# Patient Record
Sex: FEMALE | Race: WHITE | NOT HISPANIC OR LATINO | Employment: FULL TIME | URBAN - METROPOLITAN AREA
[De-identification: names, ages, dates, MRNs, and addresses within clinical notes are randomized per-mention and may not be internally consistent; named-entity substitution may affect disease eponyms.]

---

## 2017-01-05 DIAGNOSIS — E89.0 POSTOPERATIVE HYPOTHYROIDISM: ICD-10-CM

## 2017-01-07 RX ORDER — LEVOTHYROXINE SODIUM 125 UG/1
TABLET ORAL
Qty: 30 TABLET | Refills: 12 | Status: SHIPPED | OUTPATIENT
Start: 2017-01-07 | End: 2017-07-25 | Stop reason: SDUPTHER

## 2017-01-23 ENCOUNTER — OFFICE VISIT (OUTPATIENT)
Dept: HEMATOLOGY/ONCOLOGY | Facility: CLINIC | Age: 62
End: 2017-01-23
Payer: COMMERCIAL

## 2017-01-23 VITALS
DIASTOLIC BLOOD PRESSURE: 73 MMHG | WEIGHT: 140 LBS | RESPIRATION RATE: 20 BRPM | SYSTOLIC BLOOD PRESSURE: 132 MMHG | HEART RATE: 73 BPM | HEIGHT: 65 IN | BODY MASS INDEX: 23.32 KG/M2 | OXYGEN SATURATION: 97 % | TEMPERATURE: 99 F

## 2017-01-23 DIAGNOSIS — Z79.811 USE OF AROMATASE INHIBITORS: ICD-10-CM

## 2017-01-23 DIAGNOSIS — C50.411 BREAST CANCER OF UPPER-OUTER QUADRANT OF RIGHT FEMALE BREAST: Primary | ICD-10-CM

## 2017-01-23 PROCEDURE — 99215 OFFICE O/P EST HI 40 MIN: CPT | Mod: S$GLB,,, | Performed by: PHYSICIAN ASSISTANT

## 2017-01-23 PROCEDURE — 1159F MED LIST DOCD IN RCRD: CPT | Mod: S$GLB,,, | Performed by: PHYSICIAN ASSISTANT

## 2017-01-23 PROCEDURE — 99999 PR PBB SHADOW E&M-EST. PATIENT-LVL III: CPT | Mod: PBBFAC,,, | Performed by: PHYSICIAN ASSISTANT

## 2017-01-23 RX ORDER — LETROZOLE 2.5 MG/1
2.5 TABLET, FILM COATED ORAL DAILY
COMMUNITY
End: 2018-02-20 | Stop reason: SDUPTHER

## 2017-01-23 NOTE — PROGRESS NOTES
Subjective:       Patient ID: Sherry Torres is a 61 y.o. female.    Chief Complaint: Follow-up    HPI Comments: Dr. Mckee's patient, here for completion of treatment summary and surveillance.     61 year old female with Stage IIA ER/AZ positive, HER-2 negative right breast cancer.     She completed adjuvant radiation on 12/5/16 and is currently on Letrozole.    Patient tolerating treatment well. No fever, chills, nausea, vomiting or shortness of breath. No significant hot flashes or arthralgias. She is exercising regularly and intent on keeping weight down.  Works full time as an .  Emotionally doing well without significant anxiety related to diagnosis.          Breast history: She has a history of atypical ductal hyperplasia and atypical lobular hyperplasia in the past (12/2008). That biopsy was done to evaluate calcifications.     She noted an abnormality on self examination at the end of July early August.     On August 8 she underwent diagnostic mammogram which showed an irregular mass was plated margins in the middle right breast and oclock position. By ultrasound this was a solid 1.7 x 1.0 x 1.5 cm mass.     On August 9 a core needle biopsy showed infiltrating ductal carcinoma which was well differentiated (histologic grade 2, nuclear grade 2, mitotic index 1). The tumor was 100% ER positive, 70% AZ positive and HER-2 1+.  On August 25 right breast lumpectomy and sentinel lymph node biopsy was performed. That showed a 14 mm low-grade infiltrating ductal carcinoma.   The sentinel lymph node was positive for 1.5 mm micrometastasis.      Final pathological stage TI cN1 stage II   Mammaprint genomic assay showed that she was low risk. Score was +0.336 with a 5 year risk of distant recurrence at 5% and a 10 year risk at 10%.        Review of Systems   Constitutional: Negative.    HENT: Negative for congestion, rhinorrhea, sore throat and trouble swallowing.    Eyes: Negative for visual disturbance.    Respiratory: Negative for cough, chest tightness and shortness of breath.    Cardiovascular: Negative for chest pain, palpitations and leg swelling.   Gastrointestinal: Negative for abdominal pain, blood in stool, constipation, diarrhea, nausea and vomiting.   Genitourinary: Negative for dysuria, hematuria and vaginal bleeding.   Musculoskeletal: Negative for arthralgias, back pain and myalgias.   Skin: Negative for pallor and rash.   Neurological: Negative for dizziness, weakness and headaches.   Hematological: Negative for adenopathy. Does not bruise/bleed easily.   Psychiatric/Behavioral: Negative for dysphoric mood and suicidal ideas. The patient is not nervous/anxious.        Objective:      Physical Exam   Constitutional: She is oriented to person, place, and time. She appears well-developed and well-nourished. No distress.   NAD   HENT:   Head: Normocephalic.   Mouth/Throat: Oropharynx is clear and moist. No oropharyngeal exudate.   Eyes: Conjunctivae are normal. Pupils are equal, round, and reactive to light. No scleral icterus.   Neck: Normal range of motion. Neck supple. No thyromegaly present.   Cardiovascular: Normal rate and regular rhythm.    Pulmonary/Chest: Effort normal and breath sounds normal. No respiratory distress.   Right breast with well healed lumpectomy and SLNB incisions. No mass, nodule or skin changes. Left breast without mass, nodule or skin changes. No axillary or supraclavicular adenopathy.    Abdominal: Soft. Bowel sounds are normal. She exhibits no distension and no mass. There is no tenderness.   Musculoskeletal: Normal range of motion. She exhibits no edema or tenderness.   No spinal or paraspinal tenderness to palpation     Lymphadenopathy:     She has no cervical adenopathy.   Neurological: She is alert and oriented to person, place, and time. No cranial nerve deficit.   Skin: Skin is warm and dry.   Psychiatric: She has a normal mood and affect. Her behavior is normal.  Judgment and thought content normal.   Vitals reviewed.      Assessment:       1. Breast cancer of upper-outer quadrant of right female breast        Plan:       Patient will continue Letrozole and return to clinic in three months.    Annual mammogram due 8/2017.    Referral for baseline bone density to be scheduled.    We discussed the role of the survivorship clinic in her care.  Today we reviewed all aspects of treatment and the potential long term side effects of treatment.  We also discussed the emotional/psychological impact of diagnosis and treatment and I let her know about our psychosocial services (dedicated  and Psychologist).  She declines referral to these services at this time.       Please see Summary Treatment and Care plan filed under same date.    60 mins spent with patient and in completion of treatment summary.

## 2017-01-30 ENCOUNTER — OFFICE VISIT (OUTPATIENT)
Dept: RADIATION ONCOLOGY | Facility: CLINIC | Age: 62
End: 2017-01-30
Attending: RADIOLOGY
Payer: COMMERCIAL

## 2017-01-30 DIAGNOSIS — C50.411 BREAST CANCER OF UPPER-OUTER QUADRANT OF RIGHT FEMALE BREAST: Primary | ICD-10-CM

## 2017-01-30 PROCEDURE — 99499 UNLISTED E&M SERVICE: CPT | Mod: S$GLB,,, | Performed by: RADIOLOGY

## 2017-01-30 PROCEDURE — 99999 PR PBB SHADOW E&M-EST. PATIENT-LVL II: CPT | Mod: PBBFAC,,, | Performed by: RADIOLOGY

## 2017-01-30 NOTE — PROGRESS NOTES
Subjective:       Patient ID: Sherry Torres is a 61 y.o. female.    Chief Complaint: Breast Cancer (f/u after xrt)    HPI Comments: This patient returns for her initial follow up visit.     Mrs. Torres has a history of Stage IB (T1c, N1(taya), M0) infiltrating ductal carcinoma.  presented in August of this year for evaluation of soreness in the region of the right axillary tail. Mammogram revealed an irregular mass with spiculated margins and associated architectural distortion in the middle region of the right breast at the 10:00 position. Core biopsy on 8/9/16 revealed well-differentiated invasive ductal carcinoma. 100% of the tumor cells were strongly ER positive, 70% of the tumor cells were moderate to strongly GA positive, HER-2 was negative. The patient underwent wire localization lumpectomy with sentinel lymph node biopsy on 8/25/16. Pathology revealed a 1.4 cm focus of invasive ductal carcinoma, histologic grade 2, nuclear grade 2, and mitotic index 1. There was associated solid and cribriform DCIS. The tumor was negative for EIC. The margins of resection were negative with the posterior margin being the closest for invasive carcinoma at 2 mm. The anterior margin was the closest margin for DCIS at 0.2 mm. One sentinel lymph node was removed which was positive for a focus of micrometastatic disease measuring 1.5 mm. A Mammaprint genomic assay showed that she was low risk. Score was +0.336 with a 5 year risk of distant recurrence at 5% and a 10 year risk at 10%.  The patient was referred to our department and completed a course of adjuvant radiotherapy to the breast and axilla on 12/5/16.  Today, the patient states she feels well.  No breast complaints.  Currently on hormonal therapy with Letrozole.  Tolerating therapy well.      Review of Systems   Constitutional: Negative for activity change, appetite change, chills, fatigue and fever.   Respiratory: Negative for cough and shortness of breath.     Cardiovascular: Negative for chest pain and palpitations.   Gastrointestinal: Negative for abdominal pain, diarrhea, nausea and vomiting.       Objective:      Physical Exam   Constitutional: She appears well-developed and well-nourished. No distress.   Neck: Normal range of motion. Neck supple.   Pulmonary/Chest: Right breast exhibits no inverted nipple, no mass, no nipple discharge, no skin change and no tenderness.   Lymphadenopathy:     She has no cervical adenopathy.     She has no axillary adenopathy.        Right: No supraclavicular adenopathy present.        Left: No supraclavicular adenopathy present.       Assessment:       1. Breast cancer of upper-outer quadrant of right female breast        Plan:       Patient doing well  recovered from the acute effects of radiotherapy.  She is planned for continued folllow up in Medical oncology.  She will be due for MM in August.  Plan follow up with us PRN.

## 2017-02-06 ENCOUNTER — HOSPITAL ENCOUNTER (OUTPATIENT)
Dept: RADIOLOGY | Facility: CLINIC | Age: 62
Discharge: HOME | End: 2017-02-06
Attending: INTERNAL MEDICINE
Payer: COMMERCIAL

## 2017-02-06 DIAGNOSIS — C50.411 BREAST CANCER OF UPPER-OUTER QUADRANT OF RIGHT FEMALE BREAST: ICD-10-CM

## 2017-02-06 DIAGNOSIS — Z79.811 USE OF AROMATASE INHIBITORS: ICD-10-CM

## 2017-02-06 PROCEDURE — 77080 DXA BONE DENSITY AXIAL: CPT | Mod: TC

## 2017-02-06 PROCEDURE — 77080 DXA BONE DENSITY AXIAL: CPT | Mod: 26,,, | Performed by: INTERNAL MEDICINE

## 2017-02-13 ENCOUNTER — PATIENT MESSAGE (OUTPATIENT)
Dept: HEMATOLOGY/ONCOLOGY | Facility: CLINIC | Age: 62
End: 2017-02-13

## 2017-02-14 ENCOUNTER — TELEPHONE (OUTPATIENT)
Dept: OBSTETRICS AND GYNECOLOGY | Facility: CLINIC | Age: 62
End: 2017-02-14

## 2017-02-14 NOTE — TELEPHONE ENCOUNTER
----- Message from Natalya Daley sent at 2/14/2017  3:20 PM CST -----  Contact: pt   Patient states she is returning a call Patient transferred to staff

## 2017-02-15 ENCOUNTER — OFFICE VISIT (OUTPATIENT)
Dept: OBSTETRICS AND GYNECOLOGY | Facility: CLINIC | Age: 62
End: 2017-02-15
Attending: OBSTETRICS & GYNECOLOGY
Payer: COMMERCIAL

## 2017-02-15 VITALS
BODY MASS INDEX: 23.95 KG/M2 | HEIGHT: 65 IN | SYSTOLIC BLOOD PRESSURE: 120 MMHG | DIASTOLIC BLOOD PRESSURE: 76 MMHG | WEIGHT: 143.75 LBS

## 2017-02-15 DIAGNOSIS — Z85.3 HISTORY OF BREAST CANCER: ICD-10-CM

## 2017-02-15 DIAGNOSIS — Z12.4 SCREENING FOR MALIGNANT NEOPLASM OF THE CERVIX: Primary | ICD-10-CM

## 2017-02-15 DIAGNOSIS — Z01.419 ENCOUNTER FOR GYNECOLOGICAL EXAMINATION WITHOUT ABNORMAL FINDING: ICD-10-CM

## 2017-02-15 PROCEDURE — 88175 CYTOPATH C/V AUTO FLUID REDO: CPT

## 2017-02-15 PROCEDURE — 99396 PREV VISIT EST AGE 40-64: CPT | Mod: S$GLB,,, | Performed by: OBSTETRICS & GYNECOLOGY

## 2017-02-15 PROCEDURE — 99999 PR PBB SHADOW E&M-EST. PATIENT-LVL III: CPT | Mod: PBBFAC,,, | Performed by: OBSTETRICS & GYNECOLOGY

## 2017-02-15 NOTE — MR AVS SNAPSHOT
[facility] [address]  [address]  [facility] [address]  Phone: [phone]  Fax: [phone]                  Sherry Torres   2/15/2017 9:30 AM   Office Visit    Description:  Female : 1955   Provider:  Laura Oden MD   Department:  [facility] [address]           Reason for Visit     Gynecologic Exam                To Do List           Goals (5 Years of Data)     None      [facility]     [facility] Nurse Care Line -  Assistance  Registered nurses in the [facility] provide clinical advisement, health education, appointment booking, and other advisory services.  Call for this free service at [phone].             Medications           Message regarding Medications     Verify the changes and/or additions to your medication regime listed below are the same as discussed with your clinician today.  If any of these changes or additions are incorrect, please notify your healthcare provider.             Verify that the below list of medications is an accurate representation of the medications you are currently taking.  If none reported, the list may be blank. If incorrect, please contact your healthcare provider. Carry this list with you in case of emergency.           Current Medications     calcium-vitamin D3 500 MG, 1,250MG, 200 UNITS (CALCIUM-VITAMIN D) 500 mg(1,250mg) -200 unit per tablet Take 4 tablets by mouth 2 (two) times daily with meals.     fish oil-omega-3 fatty acids 300-1,000 mg capsule Take 2 g by mouth once daily.    letrozole (FEMARA) 2.5 mg Tab Take 2.5 mg by mouth once daily.    MAGNESIUM CARBONATE ORAL Take by mouth.    multivitamin capsule Take 2 capsules by mouth once daily.     potassium chloride SA (K-DUR,KLOR-CON) 10 MEQ tablet Take 20 mEq by mouth once daily.    SYNTHROID 125 mcg tablet TAKE ONE DAILY           Clinical Reference Information           Your Vitals Were     BP Height Weight BMI       120/76 5'  "5" (1.651 m) 65.2 kg (143 lb 11.8 oz) 23.92 kg/m2       Blood Pressure          Most Recent Value    BP  120/76      Allergies as of 2/15/2017     Codeine    Sulfa (Sulfonamide Antibiotics)      Immunizations Administered on Date of Encounter - 2/15/2017     None      Language Assistance Services     ATTENTION: Language assistance services are available, free of charge. Please call [phone].      ATENCIÓN: Si habla español, tiene a vasquze disposición servicios gratuitos de asistencia lingüística. Llame al [phone].     CHÚ Ý: N?u b?n nói Ti?ng Vi?t, có các d?ch v? h? tr? ngôn ng? mi?n phí dành cho b?n. G?i s? [phone].         [facility] [address] complies with applicable Federal civil rights laws and does not discriminate on the basis of race, color, national origin, age, disability, or sex.        "

## 2017-02-15 NOTE — PROGRESS NOTES
"SUBJECTIVE:   61 y.o. female   for annual routine Pap and checkup.She is also here for survivorship- she has not seen GYN in 3 years.  No LMP recorded. Patient is postmenopausal..  She complains of vaginal dryness and occasional hot flashes although these are not "as bad as menopause."   She denies vaginal itching or irritation. She is doing well emotionally.   She had Stage IIA ER/ND positive, HER-2 negative right breast cancer.      She completed adjuvant radiation on 16 and is currently on Letrozole.    Past Medical History   Diagnosis Date    Breast cancer     Cancer     GERD (gastroesophageal reflux disease)     History of thyroid cancer     Mitral valve prolapse     Psychiatric problem      Past Surgical History   Procedure Laterality Date     section      Tubal ligation      Thyroid removed      Cyst removed from right breast in       Left breast wire localization excisional biopsy with bracketed wires using 3 wires and excision through 1 incision.       Thyroid surgery      Hernia repair      Breast surgery       Social History     Social History    Marital status:      Spouse name: N/A    Number of children: 2    Years of education: N/A     Occupational History    Not on file.     Social History Main Topics    Smoking status: Former Smoker    Smokeless tobacco: Never Used      Comment: socally a few years in college. none since 30 years ago    Alcohol use No      Comment: none since 2016    Drug use: No    Sexual activity: Yes     Partners: Male     Birth control/ protection: Post-menopausal     Other Topics Concern    Patient Feels They Ought To Cut Down On Drinking/Drug Use No    Patient Annoyed By Others Criticizing Their Drinking/Drug Use No    Patient Has Felt Bad Or Guilty About Drinking/Drug Use No    Patient Has Had A Drink/Used Drugs As An Eye Opener In The Am No     Social History Narrative    ,  x 40 years, 2 " children, 2 grandchildren, Zoro[employer]ism     Family History   Problem Relation Age of Onset    Osteoporosis Mother     Stroke Mother     Dementia Father     Breast cancer Maternal Grandmother 88    Breast cancer Other      70-80's    Colon cancer Neg Hx     Colon polyps Neg Hx     Rectal cancer Neg Hx     Stomach cancer Neg Hx     Esophageal cancer Neg Hx     Liver cancer Neg Hx     Liver disease Neg Hx     Cirrhosis Neg Hx     Inflammatory bowel disease Neg Hx     Celiac disease Neg Hx     Crohn's disease Neg Hx     Ulcerative colitis Neg Hx     Ovarian cancer Neg Hx     Cancer Neg Hx      OB History    Para Term  AB SAB TAB Ectopic Multiple Living   2 2              # Outcome Date GA Lbr Tin/2nd Weight Sex Delivery Anes PTL Lv   2 Para      CS-LTranv      1 Para      CS-LTranv               Current Outpatient Prescriptions   Medication Sig Dispense Refill    calcium-vitamin D3 500 MG, 1,250MG, 200 UNITS (CALCIUM-VITAMIN D) 500 mg(1,250mg) -200 unit per tablet Take 4 tablets by mouth 2 (two) times daily with meals.       fish oil-omega-3 fatty acids 300-1,000 mg capsule Take 2 g by mouth once daily.      letrozole (FEMARA) 2.5 mg Tab Take 2.5 mg by mouth once daily.      MAGNESIUM CARBONATE ORAL Take by mouth.      multivitamin capsule Take 2 capsules by mouth once daily.       potassium chloride SA (K-DUR,KLOR-CON) 10 MEQ tablet Take 20 mEq by mouth once daily.      SYNTHROID 125 mcg tablet TAKE ONE DAILY 30 tablet 12     No current facility-administered medications for this visit.      Allergies: Codeine and Sulfa (sulfonamide antibiotics)     ROS:  Constitutional: no weight loss, weight gain, fever, fatigue  Eyes:  No vision changes, glasses/contacts  ENT/Mouth: No ulcers, sinus problems, ears ringing, headache  Cardiovascular: No inability to lie flat, chest pain, exercise intolerance, swelling, heart palpitations  Respiratory: No wheezing, coughing blood, shortness of  breath, or cough  Gastrointestinal: No diarrhea, bloody stool, nausea/vomiting, constipation, gas, hemorrhoids  Genitourinary: No blood in urine, painful urination, urgency of urination, frequency of urination, incomplete emptying, incontinence, abnormal bleeding, painful periods, heavy periods, vaginal discharge, vaginal odor, painful intercourse, sexual problems, bleeding after intercourse.  Musculoskeletal: No muscle weakness  Skin/Breast: No painful breasts, nipple discharge, masses, rash, ulcers- right breast cancer  Neurological: No passing out, seizures,+ numbness, headache  Endocrine: No diabetes, hypothyroid, hyperthyroid, hot flashes, hair loss, abnormal hair growth, acne  Psychiatric: No depression, crying  Hematologic: No bruises, bleeding, swollen lymph nodes, anemia.      Physical Exam:   Constitutional: She is oriented to person, place, and time. She appears well-developed and well-nourished.      Neck: Normal range of motion. No tracheal deviation present. No thyromegaly present.    Cardiovascular: Exam reveals no edema.     Pulmonary/Chest: Effort normal. She exhibits no mass, no tenderness, no deformity and no retraction.        Abdominal: Soft. She exhibits no distension and no mass. There is no tenderness. There is no rebound and no guarding. No hernia. Hernia confirmed negative in the left inguinal area.     Genitourinary: Vagina normal and uterus normal. Rectal exam shows no external hemorrhoid. There is no rash, tenderness or lesion on the right labia. There is no rash, tenderness or lesion on the left labia. Uterus is not deviated. Cervix is normal. No no adexnal prolapse. Right adnexum displays no mass, no tenderness and no fullness. Left adnexum displays no mass, no tenderness and no fullness. No tenderness, bleeding, rectocele, cystocele or unspecified prolapse of vaginal walls in the vagina. No vaginal discharge found. Cervix exhibits no motion tenderness, no discharge and no friability.            Musculoskeletal: Normal range of motion and moves all extremeties. She exhibits no edema.      Lymphadenopathy:        Right: No inguinal adenopathy present.        Left: No inguinal adenopathy present.    Neurological: She is alert and oriented to person, place, and time.    Skin: No rash noted. No erythema. No pallor.    Psychiatric: She has a normal mood and affect. Her behavior is normal. Judgment and thought content normal.     Breast exam deferred  Vaginal atrophy    ASSESSMENT:   well woman  History of breast cancer  Vaginal atrophy    PLAN:   pap smear  return annually or prn for survivorship issues  Gave patient samples of Uber lube to try for vaginal dryness

## 2017-04-24 DIAGNOSIS — C50.919 MALIGNANT NEOPLASM OF FEMALE BREAST, UNSPECIFIED LATERALITY, UNSPECIFIED SITE OF BREAST: Primary | ICD-10-CM

## 2017-04-24 DIAGNOSIS — Z00.6 EXAMINATION OF PARTICIPANT IN CLINICAL TRIAL: ICD-10-CM

## 2017-04-25 ENCOUNTER — HOSPITAL ENCOUNTER (OUTPATIENT)
Dept: RADIOLOGY | Facility: HOSPITAL | Age: 62
Discharge: HOME | End: 2017-04-25
Attending: INTERNAL MEDICINE
Payer: COMMERCIAL

## 2017-04-25 ENCOUNTER — OFFICE VISIT (OUTPATIENT)
Dept: HEMATOLOGY/ONCOLOGY | Facility: CLINIC | Age: 62
End: 2017-04-25
Payer: COMMERCIAL

## 2017-04-25 VITALS
SYSTOLIC BLOOD PRESSURE: 132 MMHG | DIASTOLIC BLOOD PRESSURE: 76 MMHG | HEIGHT: 65 IN | TEMPERATURE: 99 F | RESPIRATION RATE: 16 BRPM | HEART RATE: 75 BPM | BODY MASS INDEX: 24.75 KG/M2 | WEIGHT: 148.56 LBS

## 2017-04-25 DIAGNOSIS — C50.411 BREAST CANCER OF UPPER-OUTER QUADRANT OF RIGHT FEMALE BREAST: ICD-10-CM

## 2017-04-25 DIAGNOSIS — N64.4 BREAST PAIN, RIGHT: Primary | ICD-10-CM

## 2017-04-25 DIAGNOSIS — N64.4 BREAST PAIN, RIGHT: ICD-10-CM

## 2017-04-25 DIAGNOSIS — C50.911 BREAST CANCER, RIGHT: ICD-10-CM

## 2017-04-25 PROCEDURE — 77061 BREAST TOMOSYNTHESIS UNI: CPT | Mod: 26,,, | Performed by: RADIOLOGY

## 2017-04-25 PROCEDURE — 76642 ULTRASOUND BREAST LIMITED: CPT | Mod: TC,RT

## 2017-04-25 PROCEDURE — 1160F RVW MEDS BY RX/DR IN RCRD: CPT | Mod: S$GLB,,, | Performed by: PHYSICIAN ASSISTANT

## 2017-04-25 PROCEDURE — 99999 PR PBB SHADOW E&M-EST. PATIENT-LVL III: CPT | Mod: PBBFAC,,, | Performed by: PHYSICIAN ASSISTANT

## 2017-04-25 PROCEDURE — 76642 ULTRASOUND BREAST LIMITED: CPT | Mod: 26,RT,, | Performed by: RADIOLOGY

## 2017-04-25 PROCEDURE — 77061 BREAST TOMOSYNTHESIS UNI: CPT | Mod: TC

## 2017-04-25 PROCEDURE — 77065 DX MAMMO INCL CAD UNI: CPT | Mod: 26,,, | Performed by: RADIOLOGY

## 2017-04-25 PROCEDURE — 99213 OFFICE O/P EST LOW 20 MIN: CPT | Mod: S$GLB,,, | Performed by: PHYSICIAN ASSISTANT

## 2017-04-25 RX ORDER — MELOXICAM 15 MG/1
15 TABLET ORAL DAILY
COMMUNITY
End: 2017-07-04

## 2017-04-25 RX ORDER — CYCLOBENZAPRINE HCL 10 MG
10 TABLET ORAL 3 TIMES DAILY PRN
COMMUNITY
End: 2017-07-25

## 2017-04-25 NOTE — PROGRESS NOTES
Subjective:       Patient ID: Sherry Torres is a 61 y.o. female.    Chief Complaint: No chief complaint on file.    HPI  Review of Systems   Constitutional: Negative for activity change, appetite change, chills, diaphoresis, fatigue, fever and unexpected weight change.   HENT: Negative for congestion, rhinorrhea and trouble swallowing.    Eyes: Negative for visual disturbance.   Respiratory: Positive for cough. Negative for shortness of breath.    Cardiovascular: Negative for chest pain.   Gastrointestinal: Negative for abdominal pain, blood in stool, diarrhea, nausea and vomiting.   Genitourinary: Negative for dysuria and frequency.   Musculoskeletal: Positive for back pain (herniated disc).   Skin: Negative for rash.   Neurological: Positive for headaches (a couple of migraines which is baseline.).   Hematological: Negative for adenopathy. Does not bruise/bleed easily.   Psychiatric/Behavioral: Negative for decreased concentration. The patient is nervous/anxious (generalized anxiety).        Objective:      Physical Exam    Assessment:       No diagnosis found.    Plan:       ***

## 2017-04-25 NOTE — PROGRESS NOTES
Subjective:       Patient ID: Sherry Torres is a 61 y.o. female.    Chief Complaint: No chief complaint on file.    HPI Comments: Dr. Mckee's patient    61 year old female with Stage IIA ER/NM positive, HER-2 negative right breast cancer.     She completed adjuvant radiation on 12/5/16 and is currently on Letrozole.    Patient tolerating Letrozole well. No fever, chills, nausea, vomiting or shortness of breath. She has has some right knee pain related to a herniated disc and is seen by ortho.  She also reports right breast pain in the inferior aspect of her right breast x 2 weeks. This has intensified in the last two weeks and is tender to palpation.  Per patient this has been extremely worrisome and has kept her from sleeping well.   Hasn't been able to exercise due to knee pain.        Breast history: She has a history of atypical ductal hyperplasia and atypical lobular hyperplasia in the past (12/2008). That biopsy was done to evaluate calcifications.     She noted an abnormality on self examination at the end of July early August.     On August 8 she underwent diagnostic mammogram which showed an irregular mass was plated margins in the middle right breast and oclock position. By ultrasound this was a solid 1.7 x 1.0 x 1.5 cm mass.     On August 9 a core needle biopsy showed infiltrating ductal carcinoma which was well differentiated (histologic grade 2, nuclear grade 2, mitotic index 1). The tumor was 100% ER positive, 70% NM positive and HER-2 1+.  On August 25 right breast lumpectomy and sentinel lymph node biopsy was performed. That showed a 14 mm low-grade infiltrating ductal carcinoma.   The sentinel lymph node was positive for 1.5 mm micrometastasis.      Final pathological stage TI cN1 stage II   Mammaprint genomic assay showed that she was low risk. Score was +0.336 with a 5 year risk of distant recurrence at 5% and a 10 year risk at 10%.        Review of Systems   Constitutional: Negative for  appetite change and unexpected weight change.   HENT: Negative for congestion, postnasal drip, sinus pressure, sore throat and trouble swallowing.         Watery eyes from seasonal allergies   Eyes: Negative for visual disturbance.   Respiratory: Positive for cough. Negative for shortness of breath.    Cardiovascular: Negative for chest pain.   Gastrointestinal: Negative for abdominal distention, abdominal pain, constipation, diarrhea, nausea and vomiting.   Genitourinary: Negative for dysuria and frequency.   Musculoskeletal: Positive for arthralgias and myalgias. Negative for back pain.        Right knee pain  Recently diagnosed with herniated disc     Skin: Negative for rash.   Neurological: Positive for headaches (occasional migraines which are baseline for patient). Negative for dizziness and weakness.   Hematological: Negative for adenopathy. Does not bruise/bleed easily.   Psychiatric/Behavioral: Negative for dysphoric mood. The patient is nervous/anxious.        Objective:      Physical Exam   Constitutional: She is oriented to person, place, and time. She appears well-developed and well-nourished. No distress.   NAD   HENT:   Head: Normocephalic.   Mouth/Throat: Oropharynx is clear and moist. No oropharyngeal exudate.   Eyes: Conjunctivae are normal. Pupils are equal, round, and reactive to light. No scleral icterus.   Neck: Normal range of motion. Neck supple. No thyromegaly present.   Cardiovascular: Normal rate and regular rhythm.    Pulmonary/Chest: Effort normal and breath sounds normal. No respiratory distress.   Right breast with well healed lumpectomy and SLNB incisions. No mass, nodule or skin changes but tenderness to palpation in lower inner quadrant. Left breast without mass, nodule or skin changes. No axillary or supraclavicular adenopathy.    Abdominal: Soft. Bowel sounds are normal. She exhibits no distension and no mass. There is no tenderness.   Musculoskeletal: Normal range of motion. She  exhibits no edema or tenderness.   No spinal or paraspinal tenderness to palpation     Lymphadenopathy:     She has no cervical adenopathy.   Neurological: She is alert and oriented to person, place, and time. No cranial nerve deficit.   Skin: Skin is warm and dry.   Psychiatric: She has a normal mood and affect. Her behavior is normal. Judgment and thought content normal.   Vitals reviewed.      Assessment:       1. Breast pain, right    2. Breast cancer of upper-outer quadrant of right female breast        Plan:       Right breast US today did not show any abnormalities.  Will be due for annual mammogram in 8/2017.  Reassured patient that pain was likely post surgical.  Continue Letrozole and return to clinic in 3 months.

## 2017-05-17 ENCOUNTER — TELEPHONE (OUTPATIENT)
Dept: PAIN MEDICINE | Facility: CLINIC | Age: 62
End: 2017-05-17

## 2017-05-17 NOTE — TELEPHONE ENCOUNTER
Procedure is not medically urgent.     Patient can be offered to accept financial responsibility if she so choose.

## 2017-05-17 NOTE — TELEPHONE ENCOUNTER
----- Message from Clifford Pena sent at 5/17/2017 12:28 PM CDT -----  We have not been able to secure an authorization for  for this visit.  We are attempting to work with the insurance company; [facility] turn around time is 15 days. However, could the procedure be potentially rescheduled if we are unable to obtain authorization?   If not, is this Medically Urgent?  Please advise.     Thanks,   Clifford Ext [phone]

## 2017-05-18 ENCOUNTER — HOSPITAL ENCOUNTER (OUTPATIENT)
Facility: OTHER | Age: 62
Discharge: HOME | End: 2017-05-18
Attending: ANESTHESIOLOGY | Admitting: ANESTHESIOLOGY
Payer: COMMERCIAL

## 2017-05-18 ENCOUNTER — SURGERY (OUTPATIENT)
Age: 62
End: 2017-05-18

## 2017-05-18 VITALS
DIASTOLIC BLOOD PRESSURE: 63 MMHG | OXYGEN SATURATION: 92 % | WEIGHT: 135 LBS | HEART RATE: 79 BPM | TEMPERATURE: 99 F | SYSTOLIC BLOOD PRESSURE: 136 MMHG | BODY MASS INDEX: 22.49 KG/M2 | HEIGHT: 65 IN | RESPIRATION RATE: 18 BRPM

## 2017-05-18 DIAGNOSIS — M54.16 LUMBAR RADICULOPATHY: Primary | ICD-10-CM

## 2017-05-18 PROCEDURE — 64483 NJX AA&/STRD TFRM EPI L/S 1: CPT | Performed by: ANESTHESIOLOGY

## 2017-05-18 PROCEDURE — 64483 NJX AA&/STRD TFRM EPI L/S 1: CPT | Mod: RT,,, | Performed by: ANESTHESIOLOGY

## 2017-05-18 PROCEDURE — 63600175 PHARM REV CODE 636 W HCPCS: Performed by: ANESTHESIOLOGY

## 2017-05-18 PROCEDURE — 25000003 PHARM REV CODE 250: Performed by: ANESTHESIOLOGY

## 2017-05-18 PROCEDURE — 64484 NJX AA&/STRD TFRM EPI L/S EA: CPT | Mod: R,,, | Performed by: ANESTHESIOLOGY

## 2017-05-18 PROCEDURE — 64484 NJX AA&/STRD TFRM EPI L/S EA: CPT | Performed by: ANESTHESIOLOGY

## 2017-05-18 PROCEDURE — 25500020 PHARM REV CODE 255: Performed by: ANESTHESIOLOGY

## 2017-05-18 RX ORDER — DEXAMETHASONE SODIUM PHOSPHATE 100 MG/10ML
INJECTION INTRAMUSCULAR; INTRAVENOUS
Status: DISCONTINUED | OUTPATIENT
Start: 2017-05-18 | End: 2017-05-18 | Stop reason: HOSPADM

## 2017-05-18 RX ORDER — LIDOCAINE HYDROCHLORIDE 10 MG/ML
INJECTION INFILTRATION; PERINEURAL
Status: DISCONTINUED | OUTPATIENT
Start: 2017-05-18 | End: 2017-05-18 | Stop reason: HOSPADM

## 2017-05-18 RX ORDER — SODIUM CHLORIDE 9 MG/ML
500 INJECTION, SOLUTION INTRAVENOUS CONTINUOUS
Status: CANCELLED | OUTPATIENT
Start: 2017-05-18

## 2017-05-18 RX ORDER — LIDOCAINE HYDROCHLORIDE 10 MG/ML
INJECTION, SOLUTION EPIDURAL; INFILTRATION; INTRACAUDAL; PERINEURAL
Status: DISCONTINUED | OUTPATIENT
Start: 2017-05-18 | End: 2017-05-18 | Stop reason: HOSPADM

## 2017-05-18 RX ADMIN — LIDOCAINE HYDROCHLORIDE 10 ML: 10 INJECTION, SOLUTION INFILTRATION; PERINEURAL at 03:05

## 2017-05-18 RX ADMIN — IOHEXOL 5 ML: 300 INJECTION, SOLUTION INTRAVENOUS at 03:05

## 2017-05-18 RX ADMIN — DEXAMETHASONE SODIUM PHOSPHATE 10 MG: 10 INJECTION INTRAMUSCULAR; INTRAVENOUS at 03:05

## 2017-05-18 RX ADMIN — LIDOCAINE HYDROCHLORIDE 5 ML: 10 INJECTION, SOLUTION EPIDURAL; INFILTRATION; INTRACAUDAL; PERINEURAL at 03:05

## 2017-05-18 NOTE — OP NOTE
Lumbosacral Transforaminal Epidural Steroid Injection    Date of Service: 05/18/2017    PCP: John Jackson MD    Referring Physician:    Current medications reviewed. Time-out taken to identify patient and procedure side prior to starting the procedure.                                                                 PROCEDURE: Right L5 and S1 transforaminal epidural steroid injection under fluoroscopy    REASON FOR PROCEDURE: Right radiculopathy    PHYSICIAN: Nadya Mcfarland MD  ASSISTANTS: Jas España DO    MEDICATIONS INJECTED:  Preservative-free dexamethasone 10mg, Xylocaine 1% MPF 3-5ml. 3ml per level. Preservative free, sterile normal saline is used to get larger volume as needed.  LOCAL ANESTHETIC INJECTED:  Xylocaine 1% 9ml with Sodium Bicarbonate 1ml. 3ml per site.    SEDATION MEDICATIONS: None  ESTIMATED BLOOD LOSS:  None.    COMPLICATIONS:  None.    TECHNIQUE:   Laying in a prone position, the patient was prepped and draped in the usual sterile fashion using ChloraPrep and fenestrated drape.  The area to be injected was determined under fluoroscopic guidance.  Local anesthetic was given by raising a wheel and going down to the hub of a 27-gauge 1.25 inch needle.  The 3.5inch 22-gauge spinal needle was introduced towards the transverse process of each above named nerve root level.  The needle was walked medially then hinged into the neural foramen.  Omnipaque was injected to confirm appropriate placement and that there was no vascular runoff.  The medication was then injected after applying negative pressure. The patient tolerated the procedure well.    PAIN BEFORE THE PROCEDURE: 6/10.    PAIN AFTER THE PROCEDURE: 0/10.    The patient was monitored after the procedure.  Patient was given post procedure and discharge instructions to follow at home.  We will see the patient back in two weeks or the patient may call to inform of status. The patient was discharged in a stable condition.

## 2017-05-18 NOTE — DISCHARGE INSTRUCTIONS

## 2017-05-18 NOTE — IP AVS SNAPSHOT
[facility] Location ([facility])  [address] [place], [place] [id number]  Phone: [phone]           Patient Discharge Instructions   Our goal is to set you up for success. This packet includes information on your condition, medications, and your home care.  It will help you care for yourself to prevent having to return to the hospital.     Please ask your nurse if you have any questions.      There are many details to remember when preparing to leave the hospital. Here is what you will need to do:    1. Take your medicine. If you are prescribed medications, review your Medication List on the following pages. You may have new medications to  at the pharmacy and others that you'll need to stop taking. Review the instructions for how and when to take your medications. Talk with your doctor or nurses if you are unsure of what to do.     2. Go to your follow-up appointments. Specific follow-up information is listed in the following pages. Your may be contacted by a nurse or clinical provider about future appointments. Be sure we have all of the phone numbers to reach you. Please contact your provider's office if you are unable to make an appointment.     3. Watch for warning signs. Your doctor or nurse will give you detailed warning signs to watch for and when to call for assistance. These instructions may also include educational information about your condition. If you experience any of warning signs to your health, call your doctor.           [facility]  Unless otherwise directed by your provider, please   contact [facility], our nurse care line   that is available for 24/7 assistance.     [phone] (toll-free)     Registered nurses in the [facility]   provide: appointment scheduling, clinical advisement, health education, and other advisory services.                  ** Verify the list of medication(s) below is accurate and up to date. Carry this with you in case of  emergency. If your medications have changed, please notify your healthcare provider.             Medication List      ASK your doctor about these medications        Additional Info                      CALCIUM 500 + D 500 mg(1,250mg) -200 unit per tablet   Refills:  0   Dose:  4 tablet   Generic drug:  calcium-vitamin D3    Instructions:  Take 4 tablets by mouth 2 (two) times daily with meals.     Begin Date    AM    Noon    PM    Bedtime       cyclobenzaprine 10 MG tablet   Commonly known as:  FLEXERIL   Refills:  0   Dose:  10 mg    Instructions:  Take 10 mg by mouth 3 (three) times daily as needed for Muscle spasms.     Begin Date    AM    Noon    PM    Bedtime       fish oil-omega-3 fatty acids 300-1,000 mg capsule   Refills:  0   Dose:  2 g    Instructions:  Take 2 g by mouth once daily.     Begin Date    AM    Noon    PM    Bedtime       letrozole 2.5 mg Tab   Commonly known as:  FEMARA   Refills:  0   Dose:  2.5 mg    Instructions:  Take 2.5 mg by mouth once daily.     Begin Date    AM    Noon    PM    Bedtime       MAGNESIUM CARBONATE ORAL   Refills:  0    Instructions:  Take by mouth.     Begin Date    AM    Noon    PM    Bedtime       meloxicam 15 MG tablet   Commonly known as:  MOBIC   Refills:  0   Dose:  15 mg    Instructions:  Take 15 mg by mouth once daily.     Begin Date    AM    Noon    PM    Bedtime       multivitamin capsule   Refills:  0   Dose:  2 capsule    Instructions:  Take 2 capsules by mouth once daily.     Begin Date    AM    Noon    PM    Bedtime       potassium chloride SA 10 MEQ tablet   Commonly known as:  K-DUR,KLOR-CON   Refills:  0   Dose:  20 mEq    Instructions:  Take 20 mEq by mouth once daily.     Begin Date    AM    Noon    PM    Bedtime       SYNTHROID 125 MCG tablet   Quantity:  30 tablet   Refills:  12   Generic drug:  levothyroxine    Instructions:  TAKE ONE DAILY     Begin Date    AM    Noon    PM    Bedtime                  Please bring to all follow up  appointments:    1. A copy of your discharge instructions.  2. All medicines you are currently taking in their original bottles.  3. Identification and insurance card.    Please arrive 15 minutes ahead of scheduled appointment time.    Please call 24 hours in advance if you must reschedule your appointment and/or time.        Your Scheduled Appointments     Jul 25, 2017  9:00 AM CDT   Follow Up/Office Visit with MD Eric Logan II - Endo/Diab/Metab ([facility] )    [address]  [facility] [id number] [phone]            Jul 26, 2017  9:00 AM CDT   Established Patient Visit with Phillip Mckee MD   [place] - Hematology Oncology ([facility])    [address]  [facility] [address] [phone]                  Discharge Instructions       Home Care Instructions Pain Management:    1. DIET:   You may resume your normal diet today.   2. BATHING:   You may shower with luke warm water.  3. DRESSING:   You may remove your bandage today.   4. ACTIVITY LEVEL:   You may resume your normal activities 24 hrs after your procedure.  5. MEDICATIONS:   You may resume your normal medications today.   6. SPECIAL INSTRUCTIONS:   No heat to the injection site for 24 hrs including, bath or shower, heating pad, moist heat, or hot tubs.    Use ice pack to injection site for any pain or discomfort.  Apply ice packs for 20 minute intervals as needed.   If you have received any sedatives by mouth today you may not drive for 12 hours.    If you have received any sedation through your IV, you may not drive for 24 hrs.     PLEASE CALL YOUR DOCTOR IF:  1. Redness or swelling around the injection site.  2. Fever of 101 degrees  3. Drainage (pus) from the injection site.  4. For any continuous bleeding (some dried blood over the incision is normal.)    FOR EMERGENCIES:   If any unusual problems or difficulties occur during clinic hours, call [phone] or [phone].  "        Admission Information     Date & Time Provider Department CSN    5/18/2017 12:39 PM Nadya Mcfarland MD [facility] [id number]      Care Providers     Provider Role Specialty Primary office phone    Nadya Mcfarland MD Attending Provider Pain Medicine [phone]    Nadya Mcfarland MD Surgeon  Pain Medicine [phone]      Your Vitals Were     BP Pulse Temp Resp Height Weight    136/63 (BP Location: Right arm, Patient Position: Sitting, BP Method: Automatic) 79 98.5 °F (36.9 °C) (Oral) 18 5' 5" (1.651 m) 61.2 kg (135 lb)    SpO2 BMI             92% 22.47 kg/m2         Recent Lab Values     No lab values to display.      Allergies as of 5/18/2017        Reactions    Codeine Nausea Only    Sulfa (Sulfonamide Antibiotics) Nausea Only      Advance Directives     An advance directive is a document which, in the event you are no longer able to make decisions for yourself, tells your healthcare team what kind of treatment you do or do not want to receive, or who you would like to make those decisions for you.  If you do not currently have an advance directive, [facility] encourages you to create one.  For more information call:  [phone] ([phone]), [phone] ([phone]),  or log on to [email or web address].        Smoking Cessation     If you would like to quit smoking:   You may be eligible for free services if you are a [place] resident and started smoking cigarettes before September 1, 1988.  Call the Smoking Cessation Trust (SCT) toll free at (304) 430-0836 or (116) 625-7816.   Call 2-602-QUIT-NOW if you do not meet the above criteria.   Contact us via email: [email or web address]   View our website for more information: [email or web address]        Language Assistance Services     ATTENTION: Language assistance services are available, free of charge. Please call [phone].      ATENCIÓN: Si habla español, tiene a vasquez disposición servicios gratuitos de asistencia " lingüística. Juliet diallo [phone].     SAEID Ý: N?u b?n nói Ti?ng Vi?t, có các d?ch v? h? tr? ngôn ng? mi?n phí dành cho b?n. G?i s? [phone].         [facility] complies with applicable Federal civil rights laws and does not discriminate on the basis of race, color, national origin, age, disability, or sex.

## 2017-07-04 ENCOUNTER — HOSPITAL ENCOUNTER (EMERGENCY)
Facility: HOSPITAL | Age: 62
Discharge: HOME | End: 2017-07-04
Attending: FAMILY MEDICINE
Payer: COMMERCIAL

## 2017-07-04 VITALS
SYSTOLIC BLOOD PRESSURE: 127 MMHG | OXYGEN SATURATION: 100 % | HEIGHT: 65 IN | WEIGHT: 140 LBS | HEART RATE: 71 BPM | BODY MASS INDEX: 23.32 KG/M2 | TEMPERATURE: 98 F | RESPIRATION RATE: 16 BRPM | DIASTOLIC BLOOD PRESSURE: 71 MMHG

## 2017-07-04 DIAGNOSIS — K44.9 HIATAL HERNIA: ICD-10-CM

## 2017-07-04 DIAGNOSIS — R06.09 EXERTIONAL DYSPNEA: ICD-10-CM

## 2017-07-04 DIAGNOSIS — Z87.19 H/O GASTROESOPHAGEAL REFLUX (GERD): ICD-10-CM

## 2017-07-04 DIAGNOSIS — R07.9 CHEST PAIN: ICD-10-CM

## 2017-07-04 DIAGNOSIS — R07.9 CHEST PAIN AT REST: Primary | ICD-10-CM

## 2017-07-04 LAB
ALBUMIN SERPL BCP-MCNC: 3.8 G/DL
ALP SERPL-CCNC: 45 U/L
ALT SERPL W/O P-5'-P-CCNC: 12 U/L
ANION GAP SERPL CALC-SCNC: 11 MMOL/L
AST SERPL-CCNC: 14 U/L
BASOPHILS # BLD AUTO: 0.01 K/UL
BASOPHILS NFR BLD: 0.1 %
BILIRUB SERPL-MCNC: 0.6 MG/DL
BUN SERPL-MCNC: 17 MG/DL
BUN SERPL-MCNC: 17 MG/DL (ref 6–30)
CALCIUM SERPL-MCNC: 9.1 MG/DL
CHLORIDE SERPL-SCNC: 102 MMOL/L (ref 95–110)
CHLORIDE SERPL-SCNC: 104 MMOL/L
CO2 SERPL-SCNC: 24 MMOL/L
CREAT SERPL-MCNC: 0.6 MG/DL (ref 0.5–1.4)
CREAT SERPL-MCNC: 0.8 MG/DL
DIFFERENTIAL METHOD: ABNORMAL
EOSINOPHIL # BLD AUTO: 0.2 K/UL
EOSINOPHIL NFR BLD: 1.4 %
ERYTHROCYTE [DISTWIDTH] IN BLOOD BY AUTOMATED COUNT: 12.5 %
EST. GFR  (AFRICAN AMERICAN): >60 ML/MIN/1.73 M^2
EST. GFR  (NON AFRICAN AMERICAN): >60 ML/MIN/1.73 M^2
GLUCOSE SERPL-MCNC: 101 MG/DL
GLUCOSE SERPL-MCNC: 97 MG/DL (ref 70–110)
HCT VFR BLD AUTO: 40.1 %
HCT VFR BLD CALC: 44 %PCV (ref 36–54)
HGB BLD-MCNC: 13.9 G/DL
INR PPP: 0.9
LYMPHOCYTES # BLD AUTO: 0.9 K/UL
LYMPHOCYTES NFR BLD: 8.3 %
MCH RBC QN AUTO: 31.7 PG
MCHC RBC AUTO-ENTMCNC: 34.7 %
MCV RBC AUTO: 91 FL
MONOCYTES # BLD AUTO: 0.7 K/UL
MONOCYTES NFR BLD: 6.5 %
NEUTROPHILS # BLD AUTO: 8.9 K/UL
NEUTROPHILS NFR BLD: 83.5 %
PLATELET # BLD AUTO: 326 K/UL
PMV BLD AUTO: 9.2 FL
POC IONIZED CALCIUM: 1.13 MMOL/L (ref 1.06–1.42)
POC TCO2 (MEASURED): 25 MMOL/L (ref 23–29)
POTASSIUM BLD-SCNC: 3.7 MMOL/L (ref 3.5–5.1)
POTASSIUM SERPL-SCNC: 4 MMOL/L
PROT SERPL-MCNC: 7.1 G/DL
PROTHROMBIN TIME: 10 SEC
RBC # BLD AUTO: 4.39 M/UL
SAMPLE: NORMAL
SODIUM BLD-SCNC: 139 MMOL/L (ref 136–145)
SODIUM SERPL-SCNC: 139 MMOL/L
TROPONIN I SERPL DL<=0.01 NG/ML-MCNC: 0.01 NG/ML
WBC # BLD AUTO: 10.69 K/UL

## 2017-07-04 PROCEDURE — 85610 PROTHROMBIN TIME: CPT

## 2017-07-04 PROCEDURE — 96374 THER/PROPH/DIAG INJ IV PUSH: CPT

## 2017-07-04 PROCEDURE — 25500020 PHARM REV CODE 255: Performed by: FAMILY MEDICINE

## 2017-07-04 PROCEDURE — 80053 COMPREHEN METABOLIC PANEL: CPT

## 2017-07-04 PROCEDURE — 99284 EMERGENCY DEPT VISIT MOD MDM: CPT | Mod: 25

## 2017-07-04 PROCEDURE — 93010 ELECTROCARDIOGRAM REPORT: CPT | Mod: S$GLB,,, | Performed by: INTERNAL MEDICINE

## 2017-07-04 PROCEDURE — 25000003 PHARM REV CODE 250: Performed by: FAMILY MEDICINE

## 2017-07-04 PROCEDURE — 93005 ELECTROCARDIOGRAM TRACING: CPT

## 2017-07-04 PROCEDURE — 85025 COMPLETE CBC W/AUTO DIFF WBC: CPT

## 2017-07-04 PROCEDURE — 99284 EMERGENCY DEPT VISIT MOD MDM: CPT | Mod: ,,, | Performed by: FAMILY MEDICINE

## 2017-07-04 PROCEDURE — 84484 ASSAY OF TROPONIN QUANT: CPT

## 2017-07-04 PROCEDURE — 63600175 PHARM REV CODE 636 W HCPCS: Performed by: FAMILY MEDICINE

## 2017-07-04 RX ORDER — ESOMEPRAZOLE MAGNESIUM 40 MG/1
40 CAPSULE, DELAYED RELEASE ORAL DAILY
Qty: 30 CAPSULE | Refills: 1 | Status: SHIPPED | OUTPATIENT
Start: 2017-07-04 | End: 2017-11-20

## 2017-07-04 RX ORDER — ASPIRIN 325 MG
325 TABLET ORAL
Status: COMPLETED | OUTPATIENT
Start: 2017-07-04 | End: 2017-07-04

## 2017-07-04 RX ORDER — ONDANSETRON 2 MG/ML
4 INJECTION INTRAMUSCULAR; INTRAVENOUS
Status: COMPLETED | OUTPATIENT
Start: 2017-07-04 | End: 2017-07-04

## 2017-07-04 RX ADMIN — IOHEXOL 75 ML: 350 INJECTION, SOLUTION INTRAVENOUS at 01:07

## 2017-07-04 RX ADMIN — ASPIRIN 325 MG ORAL TABLET 325 MG: 325 PILL ORAL at 10:07

## 2017-07-04 RX ADMIN — ONDANSETRON 4 MG: 2 INJECTION INTRAMUSCULAR; INTRAVENOUS at 11:07

## 2017-07-04 NOTE — ED TRIAGE NOTES
Complaining of chest tightness and mildly SOB with nausea for 2-3 days.    GENERAL: The patient is a well-developed, well-nourished female in no apparent distress. She is alert and oriented x3.    HEENT: Head is normocephalic and atraumatic. Extraocular muscles are intact. Pupils are equal, round, and reactive to light and accommodation. Nares appeared normal. Mouth is well hydrated and without lesions. Mucous membranes are moist. Posterior pharynx clear of any exudate or lesions.    NECK: Supple. No carotid bruits. No lymphadenopathy or thyromegaly.    LUNGS: Clear to auscultation.    HEART: Regular rate and rhythm without murmur.     ABDOMEN: Soft, nontender, and nondistended. Positive bowel sounds. No hepatosplenomegaly was noted.     EXTREMITIES: Without any cyanosis, clubbing, rash, lesions or edema.     NEUROLOGIC: Cranial nerves II through XII are grossly intact.     PSYCHIATRIC: Flat affect, but denies suicidal or homicidal ideations.    SKIN: No ulceration or induration present.

## 2017-07-04 NOTE — PROVIDER PROGRESS NOTES - EMERGENCY DEPT.
Encounter Date: 7/4/2017    ED Physician Progress Notes       SCRIBE NOTE: I, Lilian Bassett, am scribing for, and in the presence of,  Dr. Denise.  Physician Statement: I, Dr. Denise, personally performed the services described in this documentation as scribed by Lilian Bassett in my presence, and it is both accurate and complete.     Physician Note:   EKG reading: Sinus rhythm rate of 91, no acute st/t changes, no ectopy, no STEMI.

## 2017-07-25 ENCOUNTER — LAB VISIT (OUTPATIENT)
Dept: LAB | Facility: HOSPITAL | Age: 62
End: 2017-07-25
Payer: COMMERCIAL

## 2017-07-25 ENCOUNTER — OFFICE VISIT (OUTPATIENT)
Dept: ENDOCRINOLOGY | Facility: CLINIC | Age: 62
End: 2017-07-25
Payer: COMMERCIAL

## 2017-07-25 VITALS
WEIGHT: 147.25 LBS | HEART RATE: 83 BPM | DIASTOLIC BLOOD PRESSURE: 78 MMHG | HEIGHT: 65 IN | SYSTOLIC BLOOD PRESSURE: 126 MMHG | BODY MASS INDEX: 24.53 KG/M2

## 2017-07-25 DIAGNOSIS — Z85.850 HISTORY OF THYROID CANCER: ICD-10-CM

## 2017-07-25 DIAGNOSIS — E89.0 POST-SURGICAL HYPOTHYROIDISM: ICD-10-CM

## 2017-07-25 DIAGNOSIS — E89.0 POST-SURGICAL HYPOTHYROIDISM: Primary | ICD-10-CM

## 2017-07-25 LAB
T4 FREE SERPL-MCNC: 1.63 NG/DL
TSH SERPL DL<=0.005 MIU/L-ACNC: 0.03 UIU/ML

## 2017-07-25 PROCEDURE — 86800 THYROGLOBULIN ANTIBODY: CPT

## 2017-07-25 PROCEDURE — 84443 ASSAY THYROID STIM HORMONE: CPT

## 2017-07-25 PROCEDURE — 84439 ASSAY OF FREE THYROXINE: CPT

## 2017-07-25 PROCEDURE — 36415 COLL VENOUS BLD VENIPUNCTURE: CPT

## 2017-07-25 PROCEDURE — 99214 OFFICE O/P EST MOD 30 MIN: CPT | Mod: S$GLB,,, | Performed by: INTERNAL MEDICINE

## 2017-07-25 PROCEDURE — 3008F BODY MASS INDEX DOCD: CPT | Mod: S$GLB,,, | Performed by: INTERNAL MEDICINE

## 2017-07-25 PROCEDURE — 99999 PR PBB SHADOW E&M-EST. PATIENT-LVL III: CPT | Mod: PBBFAC,,, | Performed by: INTERNAL MEDICINE

## 2017-07-25 RX ORDER — LEVOTHYROXINE SODIUM 125 UG/1
125 TABLET ORAL DAILY
Qty: 30 TABLET | Refills: 12 | Status: SHIPPED | OUTPATIENT
Start: 2017-07-25 | End: 2018-07-24 | Stop reason: SDUPTHER

## 2017-07-25 NOTE — PROGRESS NOTES
Subjective:       Patient ID: Sherry Torres is a 61 y.o. female.    Chief Complaint: Hypothyroidism    HPI   Patient comes in for a thyroid cancer followup. She had thyroid cancer surgery in 2006 by Dr. Pardo. It was stage III: T2, N0, M0. 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 She has had 3 total body scans, 2007, 2008 and 2009, all negative. She was treated with 100 mCi I-131 postsurgery.    Most recent thyroid labs (8/2016) show normal fT4 (1.36), undetectable thyroglobulin, and low TSH (0.183).  Most recent chemistry (7/2017) normal.     She notes that she was diagnosed in 8/2016 with breast cancer.  Required lumpectomy and radiation (33 rounds, completed 12/2016). She is currently taking letrozole, which she associates with some weight gain.   In March 2017, was diagnosed with herniated disc. She had an epidural injection at [facility].     With regards to her hypothyroidism:    Current medication:  synthroid 125mcg daily   Ca-Vit D3 (1250mg)-200u daily.    Takes thyroid medication properly without food first thing in the morning     Denies any current symptoms including:   Fatigue   Constipation   Hair loss  Brittle nails  Mental fog      She denies dysphagia, but notes that she had terrible chest pain in 7/4/17.  Negative workup.  Only significant for hiatal hernia. She was on protonix previously, but had discontinued it for one year prior to her symptoms, but has since been restarted on Nexium 40mg Daily.     Last BMD: 2/2017  Osteopenia of the lumbar spine. L1 and L2 are denser than surrounding vertebrae thus falsely elevating BMD at the lumbar spine.  There has been a significant decrease in the total hip and lumbar spine BMD from the previous study.    RECOMMENDATIONS:  1) Adequate calcium and Vitamin D therapy  2) Appropriate exercise  3) Consider repeat BMD in 2- 4 years.  4) Consider lateral thoracic and lumbar spine X-rays to evaluate variation in vertebral  densities.    Review of Systems   Constitutional: Negative for unexpected weight change.   Eyes: Negative for visual disturbance.   Respiratory: Negative for shortness of breath.    Cardiovascular: Negative for chest pain.   Gastrointestinal: Negative for abdominal pain.   Genitourinary: Negative for urgency.   Musculoskeletal: Negative for arthralgias.   Skin: Negative for wound.   Neurological: Negative for headaches.   Hematological: Does not bruise/bleed easily.   Psychiatric/Behavioral: Negative for sleep disturbance.       Objective:      Physical Exam   Neck: No thyromegaly present.   Cardiovascular: Normal rate.    Pulmonary/Chest: Effort normal.   Abdominal: Soft.   Musculoskeletal: She exhibits no edema.   Vitals reviewed.      Assessment:       1. Post-surgical hypothyroidism    2. History of thyroid cancer        Plan:       History of thyroid cancer  Thyroid cancer, stage III: T2, N0, M0. There is no evidence of recurrence.     Post-surgical hypothyroidism  No new symptoms.  Hypothyroidism stable on synthroid 125mcg.  Takes medication appropriately.  Will update labs and neck ultrasound.         Labs today.  Ordered Neck US (last performed 2015)  Return to clinic in 1 year.

## 2017-07-25 NOTE — ASSESSMENT & PLAN NOTE
No new symptoms.  Hypothyroidism stable on synthroid 125mcg.  Takes medication appropriately.  Will update labs and neck ultrasound.

## 2017-07-26 LAB
THRYOGLOBULIN INTERPRETATION: NORMAL
THYROGLOB AB SERPL-ACNC: <1.8 IU/ML
THYROGLOB SERPL-MCNC: <0.1 NG/ML

## 2017-07-27 ENCOUNTER — HOSPITAL ENCOUNTER (OUTPATIENT)
Dept: ENDOCRINOLOGY | Facility: CLINIC | Age: 62
Discharge: HOME | End: 2017-07-27
Attending: INTERNAL MEDICINE
Payer: COMMERCIAL

## 2017-07-27 DIAGNOSIS — Z85.850 HISTORY OF THYROID CANCER: ICD-10-CM

## 2017-07-27 DIAGNOSIS — E89.0 POST-SURGICAL HYPOTHYROIDISM: ICD-10-CM

## 2017-07-27 PROCEDURE — 76536 US EXAM OF HEAD AND NECK: CPT | Mod: S$GLB,,, | Performed by: INTERNAL MEDICINE

## 2017-08-07 NOTE — PROGRESS NOTES
I have personally taken the history and examined this patient and agree with the resident's or fellow's note as stated above     John Jackson MD, FACE

## 2017-08-17 ENCOUNTER — OFFICE VISIT (OUTPATIENT)
Dept: HEMATOLOGY/ONCOLOGY | Facility: CLINIC | Age: 62
End: 2017-08-17
Payer: COMMERCIAL

## 2017-08-17 ENCOUNTER — HOSPITAL ENCOUNTER (OUTPATIENT)
Dept: RADIOLOGY | Facility: HOSPITAL | Age: 62
Discharge: HOME | End: 2017-08-17
Attending: RADIOLOGY
Payer: COMMERCIAL

## 2017-08-17 VITALS
DIASTOLIC BLOOD PRESSURE: 68 MMHG | SYSTOLIC BLOOD PRESSURE: 132 MMHG | HEART RATE: 72 BPM | RESPIRATION RATE: 18 BRPM | TEMPERATURE: 98 F | BODY MASS INDEX: 24.58 KG/M2 | OXYGEN SATURATION: 100 % | WEIGHT: 147.69 LBS

## 2017-08-17 VITALS — BODY MASS INDEX: 24.49 KG/M2 | HEIGHT: 65 IN | WEIGHT: 147 LBS

## 2017-08-17 DIAGNOSIS — C50.411 BREAST CANCER OF UPPER-OUTER QUADRANT OF RIGHT FEMALE BREAST: ICD-10-CM

## 2017-08-17 DIAGNOSIS — Z17.0 MALIGNANT NEOPLASM OF UPPER-OUTER QUADRANT OF RIGHT BREAST IN FEMALE, ESTROGEN RECEPTOR POSITIVE: Primary | ICD-10-CM

## 2017-08-17 DIAGNOSIS — C50.411 MALIGNANT NEOPLASM OF UPPER-OUTER QUADRANT OF RIGHT BREAST IN FEMALE, ESTROGEN RECEPTOR POSITIVE: Primary | ICD-10-CM

## 2017-08-17 PROCEDURE — 77062 BREAST TOMOSYNTHESIS BI: CPT | Mod: 26,,, | Performed by: RADIOLOGY

## 2017-08-17 PROCEDURE — 3008F BODY MASS INDEX DOCD: CPT | Mod: S$GLB,,, | Performed by: PHYSICIAN ASSISTANT

## 2017-08-17 PROCEDURE — 77066 DX MAMMO INCL CAD BI: CPT | Mod: 26,,, | Performed by: RADIOLOGY

## 2017-08-17 PROCEDURE — 99999 PR PBB SHADOW E&M-EST. PATIENT-LVL III: CPT | Mod: PBBFAC,,, | Performed by: PHYSICIAN ASSISTANT

## 2017-08-17 PROCEDURE — 99213 OFFICE O/P EST LOW 20 MIN: CPT | Mod: S$GLB,,, | Performed by: PHYSICIAN ASSISTANT

## 2017-08-17 PROCEDURE — 77066 DX MAMMO INCL CAD BI: CPT | Mod: TC

## 2017-08-17 NOTE — PROGRESS NOTES
Subjective:       Patient ID: Sherry Torres is a 61 y.o. female.    Chief Complaint: No chief complaint on file.    Dr. Mckee's patient    61 year old female with Stage IIA ER/NE positive, HER-2 negative right breast cancer.     She completed adjuvant radiation on 12/5/16 and is currently on Letrozole.    Patient tolerating Letrozole well. No fever, chills, nausea, vomiting or shortness of breath. She has has some right knee pain related to a herniated disc and is seen by ortho, recent steroid injection and resolution of those symptoms.  Went to ED on 7/4 for chest pain, found to have hiatal hernia. She is currently on Nexium with resolution of symptoms and negative cardiac workup.    No breast pain.     Bilateral mammogram from today:  Impression:  Bilateral  There is no mammographic evidence of malignancy.  BI-RADS Category:   Overall: 2 - Benign  Recommendation:  Bilateral mammogram in 1 year.     Breast history:   She has a history of atypical ductal hyperplasia and atypical lobular hyperplasia in the past (12/2008). That biopsy was done to evaluate calcifications.     She noted an abnormality on self examination at the end of July early August.     On August 8 she underwent diagnostic mammogram which showed an irregular mass was plated margins in the middle right breast and o'clock position. By ultrasound this was a solid 1.7 x 1.0 x 1.5 cm mass.     On August 9 a core needle biopsy showed infiltrating ductal carcinoma which was well differentiated (histologic grade 2, nuclear grade 2, mitotic index 1). The tumor was 100% ER positive, 70% NE positive and HER-2 1+.  On August 25 right breast lumpectomy and sentinel lymph node biopsy was performed. That showed a 14 mm low-grade infiltrating ductal carcinoma.   The sentinel lymph node was positive for 1.5 mm micrometastasis.      Final pathological stage TI cN1 stage II   Mammaprint genomic assay showed that she was low risk. Score was +0.336 with a 5 year  risk of distant recurrence at 5% and a 10 year risk at 10%.          Review of Systems   Constitutional: Negative for appetite change and unexpected weight change.   HENT: Negative for congestion, postnasal drip, sinus pressure, sore throat and trouble swallowing.    Eyes: Negative for visual disturbance.   Respiratory: Negative for cough and shortness of breath.    Cardiovascular: Negative for chest pain.   Gastrointestinal: Negative for abdominal distention, abdominal pain, constipation, diarrhea, nausea and vomiting.   Genitourinary: Negative for dysuria and frequency.   Musculoskeletal: Negative for arthralgias, back pain and myalgias.             Skin: Negative for rash.   Neurological: Positive for headaches (occasional migraines which are baseline for patient). Negative for dizziness and weakness.   Hematological: Negative for adenopathy. Does not bruise/bleed easily.   Psychiatric/Behavioral: Negative for dysphoric mood. The patient is not nervous/anxious.        Objective:      Physical Exam   Constitutional: She is oriented to person, place, and time. She appears well-developed and well-nourished. No distress.   NAD  Presents alone   HENT:   Head: Normocephalic.   Mouth/Throat: Oropharynx is clear and moist. No oropharyngeal exudate.   Eyes: Conjunctivae are normal. Pupils are equal, round, and reactive to light. No scleral icterus.   Neck: Normal range of motion. Neck supple. No thyromegaly present.   Cardiovascular: Normal rate and regular rhythm.    Pulmonary/Chest: Effort normal and breath sounds normal. No respiratory distress.   Right breast with well healed lumpectomy and SLNB incisions. No mass, nodule or skin changes. Left breast without mass, nodule or skin changes. No axillary or supraclavicular adenopathy.    Abdominal: Soft. Bowel sounds are normal. She exhibits no distension and no mass. There is no tenderness.   Musculoskeletal: Normal range of motion. She exhibits no edema or tenderness.    No spinal or paraspinal tenderness to palpation     Lymphadenopathy:     She has no cervical adenopathy.   Neurological: She is alert and oriented to person, place, and time. No cranial nerve deficit.   Skin: Skin is warm and dry.   Psychiatric: She has a normal mood and affect. Her behavior is normal. Judgment and thought content normal.   Vitals reviewed.      Assessment:       1. Malignant neoplasm of upper-outer quadrant of right breast in female, estrogen receptor positive        Plan:         Continue Letrozole and return to clinic in 3 months.  Patient clinically doing well with normal mammogram.

## 2017-10-25 ENCOUNTER — PATIENT MESSAGE (OUTPATIENT)
Dept: PSYCHIATRY | Facility: CLINIC | Age: 62
End: 2017-10-25

## 2017-11-20 ENCOUNTER — OFFICE VISIT (OUTPATIENT)
Dept: HEMATOLOGY/ONCOLOGY | Facility: CLINIC | Age: 62
End: 2017-11-20
Payer: COMMERCIAL

## 2017-11-20 VITALS
DIASTOLIC BLOOD PRESSURE: 76 MMHG | TEMPERATURE: 99 F | WEIGHT: 152.56 LBS | RESPIRATION RATE: 17 BRPM | SYSTOLIC BLOOD PRESSURE: 137 MMHG | BODY MASS INDEX: 25.39 KG/M2 | HEART RATE: 82 BPM

## 2017-11-20 DIAGNOSIS — C50.411 MALIGNANT NEOPLASM OF UPPER-OUTER QUADRANT OF RIGHT BREAST IN FEMALE, ESTROGEN RECEPTOR POSITIVE: Primary | ICD-10-CM

## 2017-11-20 DIAGNOSIS — Z17.0 MALIGNANT NEOPLASM OF UPPER-OUTER QUADRANT OF RIGHT BREAST IN FEMALE, ESTROGEN RECEPTOR POSITIVE: Primary | ICD-10-CM

## 2017-11-20 PROCEDURE — 99999 PR PBB SHADOW E&M-EST. PATIENT-LVL III: CPT | Mod: PBBFAC,,, | Performed by: INTERNAL MEDICINE

## 2017-11-20 PROCEDURE — 99213 OFFICE O/P EST LOW 20 MIN: CPT | Mod: S$GLB,,, | Performed by: INTERNAL MEDICINE

## 2017-11-20 RX ORDER — HYDROCODONE BITARTRATE AND ACETAMINOPHEN 5; 325 MG/1; MG/1
TABLET ORAL
COMMUNITY
Start: 2017-11-15 | End: 2017-11-20

## 2017-11-20 RX ORDER — DOCUSATE SODIUM 100 MG/1
CAPSULE, LIQUID FILLED ORAL
Refills: 0 | COMMUNITY
Start: 2017-11-15 | End: 2017-11-20

## 2017-11-20 RX ORDER — OLOPATADINE HYDROCHLORIDE 1 MG/ML
SOLUTION/ DROPS OPHTHALMIC
COMMUNITY
Start: 2017-10-02 | End: 2017-11-29

## 2017-11-20 RX ORDER — TOBRAMYCIN AND DEXAMETHASONE 3; 1 MG/ML; MG/ML
SUSPENSION/ DROPS OPHTHALMIC
COMMUNITY
Start: 2017-10-02 | End: 2017-11-29

## 2017-11-20 RX ORDER — LORATADINE 10 MG/1
10 TABLET ORAL DAILY
COMMUNITY
End: 2018-01-29

## 2017-11-20 NOTE — PROGRESS NOTES
Subjective:       Patient ID: Sherry Torres is a 61 y.o. female.    Chief Complaint: No chief complaint on file.    HPI   Ms Torres is a 61-year-old female seen in follow-up  of right breast cancer.   She had stage II A, strongly ER and AZ positive and HER-2 negative disease. She is on letrozole therapy.      She fell last week and sustained a laceration to her left forehead and nose which required multiple stitches.  She was taken to [facility] for that treatment.  Pertinent she been doing well with no new issues.      Breast history: She has a history of atypical ductal hyperplasia and atypical lobular hyperplasia in the past (12/2008). That biopsy was done to evaluate calcifications.    She noted an abnormality on self examination at the end of July or early August 2016.  On August 8 she underwent diagnostic mammogram which showed an irregular mass was plated margins in the middle right breast and oclock position.  By ultrasound this was a solid 1.7 x 1.0 x 1.5 cm mass.    On August 9 a core needle biopsy showed infiltrating ductal carcinoma which was well differentiated (histologic grade 2, nuclear grade 2, mitotic index 1).  The tumor was 100% ER positive, 70% AZ positive and HER-2 1+.  On August 25 right breast lumpectomy and sentinel lymph node biopsy was performed.  That showed a 14 mm low-grade infiltrating ductal carcinoma.    The sentinel lymph node was positive for 1.5 mm micrometastasis.      Final pathological stage TI cN1 stage II    Mammaprint genomic assay  showed that she was low risk.  Score was +0.336 with a 5 year risk of distant recurrence at 5% and a 10 year risk at 10%.      She completed radiation in December 2016 and began Letrozole at that time.      Review of Systems   Constitutional: Negative.    Respiratory: Negative.    Cardiovascular: Negative.    Gastrointestinal: Negative.    Musculoskeletal: Negative.    Neurological: Negative.        Objective:      Physical Exam    Constitutional: She is oriented to person, place, and time. She appears well-developed. No distress.   Eyes: No scleral icterus.   Cardiovascular: Regular rhythm.    Pulmonary/Chest: Effort normal and breath sounds normal. Left breast exhibits no mass, no nipple discharge and no skin change.   Abdominal: Soft. She exhibits no mass. There is no tenderness.   Lymphadenopathy:     She has no cervical adenopathy.   Neurological: She is alert and oriented to person, place, and time.   Psychiatric: She has a normal mood and affect. Her behavior is normal. Thought content normal.   Vitals reviewed.      Assessment:     last mammograms were  2017 August, those were negative.  1. Malignant neoplasm of upper-outer quadrant of right breast in female, estrogen receptor positive        Plan:       Return to clinic in 3 months.

## 2017-11-29 ENCOUNTER — OFFICE VISIT (OUTPATIENT)
Dept: GASTROENTEROLOGY | Facility: CLINIC | Age: 62
End: 2017-11-29
Payer: COMMERCIAL

## 2017-11-29 VITALS
HEIGHT: 65 IN | DIASTOLIC BLOOD PRESSURE: 84 MMHG | HEART RATE: 74 BPM | WEIGHT: 152.13 LBS | SYSTOLIC BLOOD PRESSURE: 132 MMHG | BODY MASS INDEX: 25.34 KG/M2

## 2017-11-29 DIAGNOSIS — K21.9 GASTROESOPHAGEAL REFLUX DISEASE, ESOPHAGITIS PRESENCE NOT SPECIFIED: Primary | ICD-10-CM

## 2017-11-29 DIAGNOSIS — R13.19 ESOPHAGEAL DYSPHAGIA: ICD-10-CM

## 2017-11-29 PROCEDURE — 99999 PR PBB SHADOW E&M-EST. PATIENT-LVL III: CPT | Mod: PBBFAC,,, | Performed by: NURSE PRACTITIONER

## 2017-11-29 PROCEDURE — 99214 OFFICE O/P EST MOD 30 MIN: CPT | Mod: S$GLB,,, | Performed by: NURSE PRACTITIONER

## 2017-11-29 RX ORDER — OMEPRAZOLE 20 MG/1
20 TABLET, DELAYED RELEASE ORAL DAILY
COMMUNITY
End: 2017-11-29 | Stop reason: DRUGHIGH

## 2017-11-29 RX ORDER — OMEPRAZOLE 40 MG/1
40 CAPSULE, DELAYED RELEASE ORAL EVERY MORNING
Qty: 30 CAPSULE | Refills: 3 | Status: SHIPPED | OUTPATIENT
Start: 2017-11-29 | End: 2018-09-05 | Stop reason: ALTCHOICE

## 2017-11-29 NOTE — PATIENT INSTRUCTIONS
For GERD/Reflux:    Take your PPI (omeprazole/Prilosec) 30-45 minutes before your first protein containing meal (breakfast) every day.    Remain upright for at least 3 hours after eating.    Elevate the head of the bead about 6 inches.  Some patients place cinder blocks under the head of the bed for elevation.    Avoid foods that you have noticed make your symptoms worse (possible triggers include:peppermint, chocolate, caffeine, spicy foods, greasy/fried foods, acidic foods).    Set a weight loss goal of 10% of your body weight. (For example, if you weigh 150 lbs, your goal should be to loose 15 lbs).    You may take over the counter Zantac/ranitadine as directed, as needed for breakthrough symptoms.

## 2017-11-29 NOTE — PROGRESS NOTES
[facility] Established Patient Visit    Reason for Visit:  The primary encounter diagnosis was Gastroesophageal reflux disease, esophagitis presence not specified. A diagnosis of Esophageal dysphagia was also pertinent to this visit.    PCP: John Jackson    HPI:This is a 61 y.o. female here for evaluation of GERD. Her last GI clinic visit w/ me was on 7/8/2016-please refer to that note for details.      GERD- worse since this summer. Has been taking 20 mg Prilosec every morning b/f breakfast and 75 mg zantac 1-2 day x one month with no improvement. Had been managing GERD well off PPI with GERD healthy lifestyle prior to this. Daily retrosternal pyrosis and indigestion.     Dysphagia-H/o dysphagia w/ improvement after dilation. Problems swallowing solids several days weekly, which recurred this summer a few weeks after GERD got worse. Worse with sandwiches. Practices swallow precautions. Problems in upper esophagus. Lasts for 5-10 secs. Some relief with washing with fluids. Has not had to go to ED for this.      Was seen in ED for CP this summer. Cards and lung cleared. Ultimately d/x with GERD and HH.     Abdominal pain -denies  Bowel habits-1 formed BM/daily  GI bleeding - denies hematochezia, hematemesis, melena, BRBPR, black/tarry stools, and coffee ground emesis  NSAID usage -was RX NSAIDs for herniated disc in 4/2017, which she took for a few weeks with meals. Had been avoiding prior to and has been avoiding since.     ROS:  Constitutional: No fevers, no chills, No unintentional weight loss, no fatigue,   ENT: + allergies-has not needed allergy meds since being gluten free  CV: No chest pain, no palpitations, no perif. edema, no sob on exertion  Pulm: no cough, No shortness of breath, no wheezes, no sputum.  Ophtho: No vision changes  GI: see HPI; also no nausea, no vomiting, no change in appetite  Derm: No rash  Heme: No lymphadenopathy, No bruising  MSK: No arthritis, no muscle pain, no  muscle weakness  : No dysuria, No hematuria  Endo: No hot or cold intolerance  Neuro: No syncope, No seizure,     PMHX:  has a past medical history of Atypical ductal hyperplasia, breast (); Breast cancer (2016); Breast cyst (approx. 40 years ago); Cancer; GERD (gastroesophageal reflux disease); History of thyroid cancer (); Lobular carcinoma in situ (not certain of 2016 diagnosis); Mitral valve prolapse; and Psychiatric problem.    PSHX:  has a past surgical history that includes  section; Tubal ligation; thyroid removed; CYST REMOVED FROM RIGHT BREAST IN ; left breast wire localization excisional biopsy with bracketed wires using 3 wires and excision through 1 incision. ; Thyroid surgery; Hernia repair; Breast cyst excision (Right, approx. 40 years ago); Breast lumpectomy (Right, ); Breast biopsy (Right, 2016); and Breast biopsy (Left, ).    The patient's social and family histories were reviewed by me and updated in the appropriate section of the electronic medical record.    Allergies   Allergen Reactions    Codeine Nausea Only    Sulfa (Sulfonamide Antibiotics) Nausea Only       Current Outpatient Prescriptions   Medication Sig    calcium-vitamin D3 500 MG, 1,250MG, 200 UNITS (CALCIUM-VITAMIN D) 500 mg(1,250mg) -200 unit per tablet Take 4 tablets by mouth 2 (two) times daily with meals.     fish oil-omega-3 fatty acids 300-1,000 mg capsule Take 2 g by mouth once daily.    letrozole (FEMARA) 2.5 mg Tab Take 2.5 mg by mouth once daily.    loratadine (CLARITIN) 10 mg tablet Take 10 mg by mouth once daily.    MAGNESIUM CARBONATE ORAL Take by mouth.    multivitamin capsule Take 2 capsules by mouth once daily.     potassium chloride SA (K-DUR,KLOR-CON) 10 MEQ tablet Take 20 mEq by mouth once daily.    ranitidine (ZANTAC) 75 MG tablet Take 75 mg by mouth 2 (two) times daily.    SYNTHROID 125 mcg tablet Take 1 tablet (125 mcg total) by mouth once daily.    omeprazole  "(PRILOSEC) 40 MG capsule Take 1 capsule (40 mg total) by mouth every morning.     No current facility-administered medications for this visit.          Objective Findings:    Vital Signs:  /84   Pulse 74   Ht 5' 5" (1.651 m)   Wt 69 kg (152 lb 1.9 oz)   BMI 25.31 kg/m²  Body mass index is 25.31 kg/m².    Physical Exam:  General Appearance: Well appearing in no acute distress  Head:   Normocephalic, without obvious abnormality  Eyes:    No scleral icterus, EOMI  Throat: Lips, mucosa, and tongue normal; teeth and gums normal  Lungs: CTA bilaterally in anterior and posterior fields, no wheezes, no crackles.  Heart:  Regular rate and rhythm, S1, S2 normal, no murmurs heard  Abdomen: Soft, non tender, non distended with positive bowel sounds in all four quadrants. No hepatosplenomegaly, ascites, or mass  Extremities:    2+ radial pulses, no clubbing, cyanosis or edema  Skin: No rash to exposed areas  Neurologic: A&Ox4      Labs:  Lab Results   Component Value Date    WBC 10.69 07/04/2017    HGB 13.9 07/04/2017    HCT 44 07/04/2017     07/04/2017    ALT 12 07/04/2017    AST 14 07/04/2017     07/04/2017    K 4.0 07/04/2017     07/04/2017    CREATININE 0.8 07/04/2017    BUN 17 07/04/2017    CO2 24 07/04/2017    TSH 0.032 (L) 07/25/2017    INR 0.9 07/04/2017       Endoscopy:   10/2015 EGD Dr. Cabrera--HH. Fundic gland polyp-benign.irreg z line  7/2009 Colon Dr. Barnett- WNL. Repeat in 10 years (2019).  5/13/2009 EGD Dr. Lemos-Benign esophageal stricture w/ balloon dilation. HH.   5/1/2009 EGD Dr. Lemos- LA grade D esophagitis, food in somach  Assessment:    1. Gastroesophageal reflux disease, esophagitis presence not specified    2. Esophageal dysphagia          Recommendations:  1.  GERD-recurrence this summer. EGD for further eval. 40 mg PPI once daily 30-45 min b/f breakfast. Zantac PRN. GERD healthy lifestyle as per handout provided.   2. dysphagia/ recurrence this summer. EGD as above. " PPI as above.      Return in about 2 months (around 1/29/2018) for GERD.      Thank you for allowing me to participate in the care of DEJAN Estrada, FNP-C

## 2017-12-11 ENCOUNTER — HOSPITAL ENCOUNTER (OUTPATIENT)
Facility: HOSPITAL | Age: 62
Discharge: HOME | End: 2017-12-11
Attending: INTERNAL MEDICINE | Admitting: INTERNAL MEDICINE
Payer: COMMERCIAL

## 2017-12-11 ENCOUNTER — SURGERY (OUTPATIENT)
Age: 62
End: 2017-12-11

## 2017-12-11 ENCOUNTER — ANESTHESIA (OUTPATIENT)
Dept: ENDOSCOPY | Facility: HOSPITAL | Age: 62
End: 2017-12-11
Payer: COMMERCIAL

## 2017-12-11 ENCOUNTER — PATIENT MESSAGE (OUTPATIENT)
Dept: ENDOSCOPY | Facility: HOSPITAL | Age: 62
End: 2017-12-11

## 2017-12-11 ENCOUNTER — ANESTHESIA EVENT (OUTPATIENT)
Dept: ENDOSCOPY | Facility: HOSPITAL | Age: 62
End: 2017-12-11
Payer: COMMERCIAL

## 2017-12-11 VITALS
WEIGHT: 150 LBS | HEART RATE: 69 BPM | RESPIRATION RATE: 18 BRPM | OXYGEN SATURATION: 99 % | BODY MASS INDEX: 24.99 KG/M2 | SYSTOLIC BLOOD PRESSURE: 114 MMHG | TEMPERATURE: 97 F | DIASTOLIC BLOOD PRESSURE: 57 MMHG | HEIGHT: 65 IN

## 2017-12-11 DIAGNOSIS — K21.9 GERD (GASTROESOPHAGEAL REFLUX DISEASE): ICD-10-CM

## 2017-12-11 DIAGNOSIS — K21.9 GASTROESOPHAGEAL REFLUX DISEASE, ESOPHAGITIS PRESENCE NOT SPECIFIED: Primary | ICD-10-CM

## 2017-12-11 PROCEDURE — 25000003 PHARM REV CODE 250: Performed by: INTERNAL MEDICINE

## 2017-12-11 PROCEDURE — 88305 TISSUE EXAM BY PATHOLOGIST: CPT | Mod: 26,,, | Performed by: PATHOLOGY

## 2017-12-11 PROCEDURE — D9220A PRA ANESTHESIA: Mod: ANES,,, | Performed by: ANESTHESIOLOGY

## 2017-12-11 PROCEDURE — C1773 RET DEV, INSERTABLE: HCPCS | Performed by: INTERNAL MEDICINE

## 2017-12-11 PROCEDURE — 43239 EGD BIOPSY SINGLE/MULTIPLE: CPT | Mod: 59,,, | Performed by: INTERNAL MEDICINE

## 2017-12-11 PROCEDURE — 43239 EGD BIOPSY SINGLE/MULTIPLE: CPT | Performed by: INTERNAL MEDICINE

## 2017-12-11 PROCEDURE — 37000008 HC ANESTHESIA 1ST 15 MINUTES: Performed by: INTERNAL MEDICINE

## 2017-12-11 PROCEDURE — 43249 ESOPH EGD DILATION <30 MM: CPT | Mod: ,,, | Performed by: INTERNAL MEDICINE

## 2017-12-11 PROCEDURE — 37000009 HC ANESTHESIA EA ADD 15 MINS: Performed by: INTERNAL MEDICINE

## 2017-12-11 PROCEDURE — C1726 CATH, BAL DIL, NON-VASCULAR: HCPCS | Performed by: INTERNAL MEDICINE

## 2017-12-11 PROCEDURE — 88305 TISSUE EXAM BY PATHOLOGIST: CPT | Performed by: PATHOLOGY

## 2017-12-11 PROCEDURE — D9220A PRA ANESTHESIA: Mod: CRNA,,, | Performed by: NURSE ANESTHETIST, CERTIFIED REGISTERED

## 2017-12-11 PROCEDURE — 43249 ESOPH EGD DILATION <30 MM: CPT | Performed by: INTERNAL MEDICINE

## 2017-12-11 PROCEDURE — 27201012 HC FORCEPS, HOT/COLD, DISP: Performed by: INTERNAL MEDICINE

## 2017-12-11 PROCEDURE — 63600175 PHARM REV CODE 636 W HCPCS: Performed by: NURSE ANESTHETIST, CERTIFIED REGISTERED

## 2017-12-11 RX ORDER — PROPOFOL 10 MG/ML
VIAL (ML) INTRAVENOUS
Status: DISCONTINUED | OUTPATIENT
Start: 2017-12-11 | End: 2017-12-11

## 2017-12-11 RX ORDER — PROPOFOL 10 MG/ML
VIAL (ML) INTRAVENOUS CONTINUOUS PRN
Status: DISCONTINUED | OUTPATIENT
Start: 2017-12-11 | End: 2017-12-11

## 2017-12-11 RX ORDER — LIDOCAINE HCL/PF 100 MG/5ML
SYRINGE (ML) INTRAVENOUS
Status: DISCONTINUED | OUTPATIENT
Start: 2017-12-11 | End: 2017-12-11

## 2017-12-11 RX ORDER — SODIUM CHLORIDE 9 MG/ML
INJECTION, SOLUTION INTRAVENOUS CONTINUOUS
Status: DISCONTINUED | OUTPATIENT
Start: 2017-12-11 | End: 2017-12-11 | Stop reason: HOSPADM

## 2017-12-11 RX ADMIN — LIDOCAINE HYDROCHLORIDE 100 MG: 20 INJECTION, SOLUTION INTRAVENOUS at 07:12

## 2017-12-11 RX ADMIN — PROPOFOL 80 MG: 10 INJECTION, EMULSION INTRAVENOUS at 07:12

## 2017-12-11 RX ADMIN — SODIUM CHLORIDE: 0.9 INJECTION, SOLUTION INTRAVENOUS at 07:12

## 2017-12-11 RX ADMIN — PROPOFOL 200 MCG/KG/MIN: 10 INJECTION, EMULSION INTRAVENOUS at 07:12

## 2017-12-11 NOTE — TRANSFER OF CARE
"Anesthesia Transfer of Care Note    Patient: Sherry Torres    Procedure(s) Performed: Procedure(s) (LRB):  ESOPHAGOGASTRODUODENOSCOPY (EGD) (N/A)    Patient location: PACU    Anesthesia Type: general    Transport from OR: Transported from OR on 2-3 L/min O2 by NC with adequate spontaneous ventilation    Post pain: adequate analgesia    Post assessment: no apparent anesthetic complications    Post vital signs: stable    Level of consciousness: sedated and responds to stimulation    Nausea/Vomiting: no nausea/vomiting    Complications: none    Transfer of care protocol was followed      Last vitals:   Visit Vitals  BP 92/63 (BP Location: Left arm, Patient Position: Lying)   Pulse 73   Temp 36.2 °C (97.2 °F) (Temporal)   Resp 20   Ht 5' 5" (1.651 m)   Wt 68 kg (150 lb)   SpO2 95%   Breastfeeding? No   BMI 24.96 kg/m²     "

## 2017-12-11 NOTE — ANESTHESIA POSTPROCEDURE EVALUATION
"Anesthesia Post Evaluation    Patient: Sherry Torres    Procedure(s) Performed: Procedure(s) (LRB):  ESOPHAGOGASTRODUODENOSCOPY (EGD) (N/A)    Final Anesthesia Type: general  Patient location during evaluation: PACU  Patient participation: Yes- Able to Participate  Level of consciousness: awake and alert  Post-procedure vital signs: reviewed and stable  Pain management: adequate  Airway patency: patent  PONV status at discharge: No PONV  Anesthetic complications: no      Cardiovascular status: blood pressure returned to baseline  Respiratory status: unassisted  Hydration status: euvolemic  Follow-up not needed.        Visit Vitals  BP (!) 114/57 (BP Location: Left arm, Patient Position: Lying)   Pulse 69   Temp 36.2 °C (97.2 °F) (Temporal)   Resp 18   Ht 5' 5" (1.651 m)   Wt 68 kg (150 lb)   SpO2 99%   Breastfeeding? No   BMI 24.96 kg/m²       Pain/Sheldon Score: Pain Assessment Performed: Yes (12/11/2017  8:05 AM)  Presence of Pain: non-verbal indicators absent (12/11/2017  8:05 AM)  Sheldon Score: 10 (12/11/2017  8:30 AM)      "

## 2017-12-11 NOTE — PATIENT INSTRUCTIONS
Discharge Summary/Instructions after an Endoscopic Procedure  Patient Name: Sherry Torres  Patient MRN: [id number]  Patient YOB: 1955 Monday, December 11, 2017  Shine Cabrera MD  RESTRICTIONS:  During your procedure today, you received medications for sedation.  These   medications may affect your judgment, balance and coordination.  Therefore,   for 24 hours, you have the following restrictions:   - DO NOT drive a car, operate machinery, make legal/financial decisions,   sign important papers or drink alcohol.    ACTIVITY:  The following day: return to full activity including work, except no heavy   lifting, straining or running for 3 days if polyps were removed.  DIET:  Eat and drink normally unless instructed otherwise.     TREATMENT FOR COMMON SIDE EFFECTS:  - Mild abdominal pain, belching, bloating or excessive gas: rest, eat   lightly and use a heating pad.  - Sore Throat: treat with throat lozenges and/or gargle with warm salt   water.  SYMPTOMS TO WATCH FOR AND REPORT TO YOUR PHYSICIAN:  1. Abdominal pain or bloating, other than gas cramps.  2. Chest pain.  3. Back pain.  4. Chills or fever occurring within 24 hours after the procedure.  5. Rectal bleeding, which would show as bright red, maroon, or black stools.   (A tablespoon of blood from the rectum is not serious, especially if   hemorrhoids are present.)  6. Vomiting.  7. Weakness or dizziness.  8. Because air was used during the procedure, expelling large amounts of air   from your rectum or belching is normal.  9. If a bowel prep was taken, you may not have a bowel movement for 1-3   days.  This is normal.  GO DIRECTLY TO THE NEAREST EMERGENCY ROOM IF YOU HAVE ANY OF THE FOLLOWING:      Difficulty breathing  Chills and/or fever over 101 F   Persistent vomiting and/or vomiting blood   Severe abdominal pain   Severe chest pain   Black, tarry stools   Bleeding- more than one tablespoon   Any other symptom or condition that you may feel  needs urgent attention  Your doctor recommends these additional instructions:  If any biopsies were taken, your doctor s clinic will contact you in 1 to 2   weeks with any results.  You have a contact number available for emergencies.  The signs and symptoms   of potential delayed complications were discussed with you.  You may return   to normal activities tomorrow.  Written discharge instructions were   provided to you.   You are being discharged to home.   Resume your previous diet.   Continue your present medications.   We are waiting for your pathology results.  For questions, problems or results please call your physician - Shine Cabrera MD at Work:  [phone].  [facility], EMERGENCY ROOM PHONE NUMBER: [phone]  IF A COMPLICATION OR EMERGENCY SITUATION ARISES AND YOU ARE UNABLE TO REACH   YOUR PHYSICIAN - GO DIRECTLY TO THE EMERGENCY ROOM.  Shine Cabrera MD  12/11/2017 8:01:29 AM  This report has been verified and signed electronically.

## 2017-12-11 NOTE — ANESTHESIA PREPROCEDURE EVALUATION
12/11/2017  Sherry Torres is a 62 y.o., female.    Anesthesia Evaluation         Review of Systems  Anesthesia Hx:  No problems with previous Anesthesia   Social:  Non-Smoker    Cardiovascular:   Exercise tolerance: good Denies Hypertension.  Denies CAD.     Denies Angina.  Functional Capacity Can you climb two flights of stairs? ==> Yes    Pulmonary:   Denies Asthma.  Denies Recent URI.  Denies Sleep Apnea.    Renal/:  Renal/ Normal     Hepatic/GI:   Denies PUD. Denies Hiatal Hernia. GERD Denies Liver Disease.  Denies Hepatitis.    Neurological:   Denies CVA. Denies Seizures.    Endocrine:   Denies Diabetes. Hypothyroidism        Physical Exam  General:  Well nourished    Airway/Jaw/Neck:  Airway Findings: Mouth Opening: Normal Tongue: Normal  General Airway Assessment: Adult  Mallampati: I  TM Distance: Normal, at least 6 cm  Jaw/Neck Findings:  Neck ROM: Normal ROM  Neck Findings:     Eyes/Ears/Nose:  EYES/EARS/NOSE FINDINGS: Normal   Dental:  Dental Findings: In tact   Chest/Lungs:  Chest/Lungs Findings: Clear to auscultation     Heart/Vascular:  Heart Findings: Rate: Normal  Rhythm: Regular Rhythm  Sounds: Normal        Mental Status:  Mental Status Findings:  Alert and Oriented         Anesthesia Plan  Type of Anesthesia, risks & benefits discussed:  Anesthesia Type:  general, MAC  Patient's Preference: Proceed with anesthesia understanding that the risks are very small but could be serious or life threatening.  Intra-op Monitoring Plan: standard ASA monitors  Intra-op Monitoring Plan Comments:   Post Op Pain Control Plan:   Post Op Pain Control Plan Comments:   Induction:   IV  Beta Blocker:  Patient is not currently on a Beta-Blocker (No further documentation required).       Informed Consent: Patient understands risks and agrees with Anesthesia plan.  Questions answered. Anesthesia consent  signed with patient.  ASA Score: 2     Day of Surgery Review of History & Physical: I have interviewed and examined the patient. I have reviewed the patient's H&P dated:            Ready For Surgery From Anesthesia Perspective.

## 2017-12-11 NOTE — INTERVAL H&P NOTE
The patient has been examined and the H&P has been reviewed:    There is no interval changes since last encounter.    EGD: GERD and Dysphagia  Sedation: GA  ASA: Per anesthesia  Mallampati: Per anesthesia    Endoscopy risks, benefits and alternative options discussed and understood by patient/family.          Active Hospital Problems    Diagnosis  POA    GERD (gastroesophageal reflux disease) [K21.9]  Yes      Resolved Hospital Problems    Diagnosis Date Resolved POA   No resolved problems to display.

## 2017-12-13 ENCOUNTER — TELEPHONE (OUTPATIENT)
Dept: GASTROENTEROLOGY | Facility: CLINIC | Age: 62
End: 2017-12-13

## 2017-12-13 NOTE — PROGRESS NOTES
No Olivia's esophagus seen on biopsies, just inflammation from reflux. Continue Omeprazole prescribed by Ms. Coombs.

## 2017-12-13 NOTE — TELEPHONE ENCOUNTER
----- Message from Shine Cabrera MD sent at 12/13/2017 10:44 AM CST -----  No Olivia's esophagus seen on biopsies, just inflammation from reflux. Continue Omeprazole prescribed by Ms. Coombs.

## 2017-12-18 ENCOUNTER — TELEPHONE (OUTPATIENT)
Dept: ENDOSCOPY | Facility: HOSPITAL | Age: 62
End: 2017-12-18

## 2017-12-28 ENCOUNTER — OFFICE VISIT (OUTPATIENT)
Dept: NEUROLOGY | Facility: CLINIC | Age: 62
End: 2017-12-28
Payer: COMMERCIAL

## 2017-12-28 VITALS
DIASTOLIC BLOOD PRESSURE: 73 MMHG | BODY MASS INDEX: 25.16 KG/M2 | HEART RATE: 93 BPM | HEIGHT: 65 IN | SYSTOLIC BLOOD PRESSURE: 116 MMHG | WEIGHT: 151 LBS

## 2017-12-28 DIAGNOSIS — R55 SYNCOPE AND COLLAPSE: Primary | ICD-10-CM

## 2017-12-28 DIAGNOSIS — H93.11 TINNITUS OF RIGHT EAR: ICD-10-CM

## 2017-12-28 PROCEDURE — 99999 PR PBB SHADOW E&M-EST. PATIENT-LVL III: CPT | Mod: PBBFAC,,, | Performed by: PSYCHIATRY & NEUROLOGY

## 2017-12-28 PROCEDURE — 99204 OFFICE O/P NEW MOD 45 MIN: CPT | Mod: S$GLB,,, | Performed by: PSYCHIATRY & NEUROLOGY

## 2017-12-28 NOTE — LETTER
December 28, 2017      John Jackson II, MD  [address]  [facility] [address]           [facility]  [address]  [facility] [address]  Phone: [phone]  Fax: [phone]          Patient: Sherry Torres   MR Number: [id number]   YOB: 1955   Date of Visit: 12/28/2017       Dear Dr. John Jackson II:    Thank you for referring Sherry Torres to me for evaluation. Attached you will find relevant portions of my assessment and plan of care.    If you have questions, please do not hesitate to call me. I look forward to following Sherry Torres along with you.    Sincerely,    Pito Guerrero MD    Enclosure  CC:  No Recipients    If you would like to receive this communication electronically, please contact [email or web address] or [phone] to request more information on [facility] [facility] Link access.    For providers and/or their staff who would like to refer a patient to [facility], please contact us through our one-stop-shop provider referral line, [facility], at [phone].    If you feel you have received this communication in error or would no longer like to receive these types of communications, please e-mail [email or web address]

## 2017-12-29 NOTE — PROGRESS NOTES
Subjective:     Chief Complaint:  Consult      History of Present Illness:  Sherry Torres is a 62 y.o. female who presents today for initial evaluation of recent syncopal event, which occurred on 11/15/17. She arose from bed in the middle of the night and walked through the adjacent room when she fell into the corner of a wall and split open her left/central forehead and the periorbital tissue of the left eye. She has only partial recollection of the time immediately prior to the fall, but is fairly certain that there were no preceding feelings of dizziness, disorientation, arhythmia or tachycardia, seizure-like events, or otherwise. She is on synthroid and and reports normal compliance with it. No other medications were taken prior to the event.     She was taken to [facility] for suturing of the wounds, which have healed very nicely. She has had no similar events. MRI Brain was reportedly done at [facility] and she reports it as normal. Her D-dimer was elevated (537) and CK was 304. Other available labs for review were largely unremarkable.    No seizure history. She does have h/o thyroid and breast cancer and is on letrozole, but I did not see any known side effects listed as similar to her presentation. She does have a known history of mitral valve prolapse, but it has never caused any clinical deficits. Since the fall, she has had intermittent right ear tinnitus and has some numbness in the left CNV1 distribution as well as tingling in the same region.    Review of Systems  Review of Systems   Constitutional: Negative for activity change, fatigue and fever.   HENT: Negative for hearing loss, trouble swallowing and voice change.    Eyes: Negative for pain, redness and visual disturbance.   Respiratory: Negative for choking, chest tightness and shortness of breath.    Cardiovascular: Negative for chest pain.   Gastrointestinal: Negative for abdominal pain, nausea and vomiting.   Endocrine: Negative for cold intolerance.  "  Genitourinary: Negative for frequency.   Musculoskeletal: Negative for arthralgias, back pain, gait problem, joint swelling, myalgias and neck pain.   Skin: Negative for color change.   Allergic/Immunologic: Negative for immunocompromised state.   Neurological: Positive for syncope (one time event 11/15/17). Negative for dizziness, seizures, speech difficulty, weakness, numbness and headaches.   Hematological: Negative for adenopathy.   Psychiatric/Behavioral: Negative for agitation, behavioral problems, dysphoric mood and suicidal ideas.        Objective:     Vitals:    12/28/17 1405   BP: 116/73   Pulse: 93   Weight: 68.5 kg (151 lb)   Height: 5' 5" (1.651 m)   PainSc: 0-No pain       Neurologic Exam     Mental Status   Oriented to person, place, and time.   Attention: normal.   Speech: speech is normal   Level of consciousness: alert  Knowledge: good.     Cranial Nerves     CN II   Visual fields full to confrontation.     CN III, IV, VI   Pupils are equal, round, and reactive to light.  Ophthalmoparesis: right abduction (right strabismus)    CN V   Facial sensation intact.     CN VII   Facial expression full, symmetric.     CN VIII   Hearing: intact    CN IX, X   CN IX normal.     CN XI   CN XI normal.     CN XII   CN XII normal.     Motor Exam   Muscle bulk: normal  Overall muscle tone: normal    Strength   Strength 5/5 throughout.     Sensory Exam   Light touch normal.   Vibration normal.     Gait, Coordination, and Reflexes     Gait  Gait: normal    Tremor   Resting tremor: absent  Intention tremor: absent  Action tremor: absent    Reflexes   Right brachioradialis: 2+  Left brachioradialis: 2+  Right biceps: 2+  Left biceps: 2+  Right triceps: 2+  Left triceps: 2+  Right patellar: 2+  Left patellar: 2+  Right achilles: 2+  Left achilles: 2+      Physical Exam   Constitutional: She is oriented to person, place, and time.   HENT:   Head:       Left frontal/central scar that is well-healed   Eyes: Pupils are " equal, round, and reactive to light.   Neurological: She is oriented to person, place, and time. She has normal strength. Gait normal.   Reflex Scores:       Tricep reflexes are 2+ on the right side and 2+ on the left side.       Bicep reflexes are 2+ on the right side and 2+ on the left side.       Brachioradialis reflexes are 2+ on the right side and 2+ on the left side.       Patellar reflexes are 2+ on the right side and 2+ on the left side.       Achilles reflexes are 2+ on the right side and 2+ on the left side.  Psychiatric: Her speech is normal.         Lab Results   Component Value Date    WBC 10.69 07/04/2017    HGB 13.9 07/04/2017    HCT 44 07/04/2017     07/04/2017    ALT 12 07/04/2017    AST 14 07/04/2017     07/04/2017    K 4.0 07/04/2017     07/04/2017    CREATININE 0.8 07/04/2017    BUN 17 07/04/2017    CO2 24 07/04/2017    TSH 0.032 (L) 07/25/2017    [id number] 02/24/2015         Assessment and Plan:     Problem List Items Addressed This Visit     Syncope and collapse - Primary    Current Assessment & Plan     One time syncopal event on 11/15/18. She has vague recollection of events preceding event but denies any sensation of dizziness, tachycardia, abnormal heart rhythms, stumble, concurrent illness, or seizure-like episodes. No similar either prior to or since then. Etiology is uncertain. She denies any use of drugs or improper use of synthroid, and she cannot explain how she came to fall. EEG will be low yield. She has known mitral valve prolapse but has never had cardiogenic syncope previously. Brain MRI done at [facility] was reportedly normal.    - 2D Echo to assess for cardiac abnormalities, abnormal rhythms, abnormal wall motion, etc.         Relevant Orders    Echo 2d complete    Ambulatory consult to Physical Therapy    Tinnitus of right ear    Current Assessment & Plan     Onset after her recent fall of 11/15/17. Intermittent and not related to aspirin use or other  medication. No current associated dizziness or abnormality with neck rotation, but given her recent unexplained fall, Epley maneuver training is in order.   - PT referral for Epley training.                 Pito Guerrero MD  [facility]

## 2017-12-29 NOTE — ASSESSMENT & PLAN NOTE
Onset after her recent fall of 11/15/17. Intermittent and not related to aspirin use or other medication. No current associated dizziness or abnormality with neck rotation, but given her recent unexplained fall, Epley maneuver training is in order.   - PT referral for Epley training.

## 2017-12-29 NOTE — ASSESSMENT & PLAN NOTE
One time syncopal event on 11/15/18. She has vague recollection of events preceding event but denies any sensation of dizziness, tachycardia, abnormal heart rhythms, stumble, concurrent illness, or seizure-like episodes. No similar either prior to or since then. Etiology is uncertain. She denies any use of drugs or improper use of synthroid, and she cannot explain how she came to fall. EEG will be low yield. She has known mitral valve prolapse but has never had cardiogenic syncope previously. Brain MRI done at [facility] was reportedly normal.    - 2D Echo to assess for cardiac abnormalities, abnormal rhythms, abnormal wall motion, etc.

## 2018-01-02 ENCOUNTER — CLINICAL SUPPORT (OUTPATIENT)
Dept: REHABILITATION | Facility: HOSPITAL | Age: 63
End: 2018-01-02
Attending: PSYCHIATRY & NEUROLOGY
Payer: COMMERCIAL

## 2018-01-02 DIAGNOSIS — Z78.9 IMPAIRED MOTOR CONTROL: ICD-10-CM

## 2018-01-02 DIAGNOSIS — R29.898 DECREASED ROM OF NECK: ICD-10-CM

## 2018-01-02 DIAGNOSIS — R51.9 NEW ONSET OF HEADACHES: ICD-10-CM

## 2018-01-02 PROCEDURE — 97161 PT EVAL LOW COMPLEX 20 MIN: CPT | Mod: PO | Performed by: PHYSICAL THERAPIST

## 2018-01-02 PROCEDURE — G8990 OTHER PT/OT CURRENT STATUS: HCPCS | Mod: CJ,PO | Performed by: PHYSICAL THERAPIST

## 2018-01-02 PROCEDURE — G8991 OTHER PT/OT GOAL STATUS: HCPCS | Mod: CI,PO | Performed by: PHYSICAL THERAPIST

## 2018-01-02 PROCEDURE — 97110 THERAPEUTIC EXERCISES: CPT | Mod: PO | Performed by: PHYSICAL THERAPIST

## 2018-01-02 NOTE — PLAN OF CARE
OUTPATIENT NEUROLOGICAL REHABILITATION  PHYSICAL THERAPY EVALUATION    Name: Sherry Torres  Clinic Number: [id number]    Medical Diagnosis: R55 (ICD-10-CM) - Syncope and collapse  Encounter Diagnosis:   1. New onset of headaches     2. Decreased ROM of neck     3. Impaired motor control         Physician: Pito Guerrero*  Treatment Orders: PT Eval and Treat, perform Tomer Gonzalez  Past Medical History:   Diagnosis Date    Atypical ductal hyperplasia, breast 2008    left side    Breast cancer 08/2016    right side    Breast cyst approx. 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    Mitral valve prolapse     Psychiatric problem        Evaluation Date: 1/2/2018  Visit #: 1  Plan of care expiration: 2/13/2018  Precautions: universal    Functional Limitations Reports - G Codes  Category: other   Tool: FOTO  Score: 73% (27% impairment)  Current: CJ at least 20% < 40% impaired, limited or restricted  Goal: CI at least 1% but less than 20% impaired, limited or restricted    History   Medical Diagnosis: syncope and collapse  PT Diagnosis: impaired ROM, impaired muscle tone, impaired oculomotor function, headaches  Chief complaint: HA, ringing in ears, numbness in face  History of Present Illness: Sherry is a 62 y.o. female that presents to [facility] secondary to fall due to syncope. Fall resulted in frequent HA and tinnitus. Pt reports soreness of LUE due to fall. Pt reports she typically receive massages 2-3x/month. PMHx: breast CA, thyroid CA, history of epidural to lumbar region for pain, L5 disc impairment    Prior Therapy: none  Social History: cooks  Place of Residence (Steps/Adaptations/Levels)/ assistance available: lives with  in a 2 story home, bedroom on first floor. Threshold to enter.   Previous functional status includes: occassional HA, no  "tinnitus, no abnormal HA  Current functional status:  returned to work full time, daily HA and tinnitus  DME owned: none  Work/Job description:  working full time, . Has not worked an 8 hour day since falling      Subjective   Pt stated goals: get rid of the ringing in the ears.   Family present/states: NA  Pain: 7-8/10 headaches, daily, increases with computer use and florescent lighting    FOTO: unspecified injury survey= 73% (27% limitation)  Objective   - Follows commands: yes   - Speech: no deficits     Mental status: alert, oriented to person, place, and time, normal mood, behavior, speech, dress, motor activity, and thought processes  Appearance: Casually dressed  Behavior:  calm and cooperative  Attention Span and Concentration:  Decreased    Dominant hand:  right     Posture Alignment :rounded shoulders, left shoulder at least 1" shorter than right, no scoliosis noted    Sensation:  Light Touch: Impaired: CN 5 left side           Proprioception:   Intact    Tone: 1-  Slight increase in muscle tone, manifested by a catch and release or by minimal resistance at the end of the range of motino when the affected part(s) is moved in flexion or extension  Limbs/muscles affected: left upper trap, left levator scapula, left erector spinae cervical- mid thoracic region    Visual/Auditory: denies changes   Tracking:Intact and but pt reports difficulty. Noted blinking in superior quadrant L>R and lower L  Saccades: Impaired: slowed, pt reports difficulty with saccades to left  Convergence: intact  VOR: intact  VOR cancellation: slight blurriness reported, but grossly intact  Acuity:Intact  R/L discrimination: Intact  Visual field: Intact    Vertebral artery test: (-) BRIGITTE  Hallpike Win: (-) BRIGITTE, pt reported a "euphoria" of dizziness, no nystagmus noted    Coordination:   - fine motor: thumb to fingertip- intact  - UE coordination: gross coordination intact    - LE coordination:  Alternating toe taps- " normal    ROM:   UPPER EXTREMITY--AROM/PROM  (R) UE: WFLs  (L) UE: WFLs       CERVICAL ROM: AROM SITTING  Flex: 40 deg  Ext: 40 deg  Rotation: R= 55 deg, L=40 degrees  SB= R=12 deg, L= 20 deg    *pain in left upper quadrant at end ranges of all directions    RANGE OF MOTION--LOWER EXTREMITIES  Grossly WFLs    Strength: manual muscle test grades below   Upper Extremity Strength  RUE grossly WFLs  LUE grossly 4/5, limited due to reports of pt soreness    Lower Extremity Strength  Grossly WFLs, no complaints     Evaluation   Single Limb Stance R LE NT  (<10 sec = HIGH FALL RISK)   Single Limb Stance L LE NT  (<10 sec = HIGH FALL RISK)   30 second Chair Rise NT   5 times sit-stand NT     Postural control:  Grossly WFLs, pt denies any balance issues    Gait Assessment:   - AD used: none  - Assistance: I  - Distance: community  - Curb: I  - Ramp:  I      GAIT DEVIATIONS:  Sherry displays the following deviations with ambulation: no significant deviations noted    Impairments contributing to deviations: NA    Endurance Deficit: pt reports increased need for breaks during average workday. Computer use is currently limited by headaches      Evaluation   Timed Up and Go NT   Self Selected Walking Speed NT   Fast Walking Speed NT     Functional Mobility (Bed mobility, transfers)  Bed mobility: I  Supine to sit: I  Sit to supine: I  Rolling: I  Transfers to bed: I  Transfers to toilet: I  Sit to stand:  I  Stand pivot:  I  Car transfers: I  Wheelchair mobility: NA  Floor transfers: NT    Written Home Exercises Provided: eval- chin tuck, levator scap stretch, upper trap stretch, AROM cervical rotation, scap retraction, pec doorway stretch (2 positions) saccades.  Pt demo good understanding of the education provided. Sherry demonstrated good return demonstration of activities.     Education provided re:role of PT, goals for PT, scheduling - pt verbalized understanding.     Sherry verbalized good understanding of education  provided.   Pt has no cultural, educational or language barriers to learning provided.    Pt participated in the following treatment today:   therapeutic exercises x 17 minutes to review HEP:   chin tuck, levator scap stretch, upper trap stretch, AROM cervical rotation, scap retraction, pec doorway stretch (2 positions)  Saccades- able to perform for 30 sec, no symptoms. Pt instructed to perform 1 min at home    Assessment   This is a 62 y.o. female referred to outpatient physical therapy and presents with a medical diagnosis of syncope with collapse and demonstrates limitations as described in the problem list. Due to pt's deficits, pt is not currently working full time and reports a limitation in usual daily activities (i.e. Carrying groceries). Pt is reporting daily headaches that limit physical and mental activity. Hallpike Paris was negative bilaterally to r/o BPPV. Headaches are mostly likely arising from increased muscle tone in the left upper quadrant and decreased oculomotor function. PT is warranted to address impairments listed below to return pt to OF. Pt's condition is considered stable, she denies any significant changes in symptoms. Pt demonstrates a 27% average level of impairment for daily activities, pt is anticipated to improve impairment level to <20% by d/c.      History  Co-morbidities and personal factors that may impact the plan of care Examination  Body Structures and Functions, activity limitations and participation restrictions that may impact the plan of care    Clinical Presentation   Co-morbidities:   none        Personal Factors:   no deficits Body Regions:   head  neck  back  upper extremities  trunk    Body Systems:    gross symmetry  ROM  strength  gross coordinated movement  gait  transfers  transitions  skin integrity  muscle tone    Participation Restrictions:    1. Impaired muscle tone  2. Limited computer use  3. Frequent HA  4. Decreased ROM  5. Decreased activity tolerance    6. Difficulty participating in daily activities   7. Difficulty participating in vocational pursuits   8. Requires skilled supervision to complete and progress HEP      Activity limitations:   Learning and applying knowledge  no deficits    General Tasks and Commands  undertaking multiple tasks    Communication  no deficits    Mobility  lifting and carrying objects    Self care  no deficits    Domestic Life  shopping    Interactions/Relationships  no deficits    Life Areas  no deficits    Community and Social Life  recreation and leisure         stable and uncomplicated                      low     Pt rehab potential is Excellent. Pt will benefit from continuing skilled outpatient physical therapy to address the deficits listed below in the problem list, provide pt/family education and to maximize pt's level of independence in the home and community environment.       Pt's spiritual, cultural and educational needs considered and pt agreeable to plan of care and goals as stated below:     GOALS:   Short term goals: 2 weeks, pt agrees to goals set.  1. Pt will perform HEP for cervical ROM and oculomotor function with supervision to improve carryover of progress and reduce HA.  2. Pt will report decrease intensity of HA to 4-5/10 to improve tolerance to daily activities.  3. Pt will demonstrate improved cervical SB AROM to R to 20 deg to equal left and decrease pain/ HA.  4. Pt will demonstrate improved cervical AROM for flexion and extension to 50 degrees to demonstrate improved mobility and to decrease pain/ HA.    Long term goals: 4 weeks, pt agrees to goals set  5. Pt will report decreased HA occurrence to <daily to demonstrate an improvement of symptoms.  6. Pt will report decreased HA intensity to 3/10 to demonstrate improved tolerance to daily activities.   7. Pt will demonstrate improved cervical rotation AROM to 70 degrees to demonstrate improved mobility and ROM WFLs.   8. Pt will perform horizontal  saccades at 120 bpm x 30 sec to demonstrate improved oculomotor function to decrease headaches.       Plan   Outpatient physical therapy 1-2 times weekly for 4-6 weeks to include: Pt Education, HEP, therapeutic exercises, gait training, neuromuscular re-education, therapeutic activities, manual therapy, joint mobilizations, and modalities PRN to achieve established goals. Pt may be seen by PTA as part of the rehabilitation team.       Susy Nicole, PT  01/02/2018      I certify the need for these services furnished under this plan of treatment and while under my care.  ____________________________________ Physician/Referring Practitioner   Date of Signature

## 2018-01-05 ENCOUNTER — DOCUMENTATION ONLY (OUTPATIENT)
Dept: REHABILITATION | Facility: HOSPITAL | Age: 63
End: 2018-01-05

## 2018-01-05 ENCOUNTER — CLINICAL SUPPORT (OUTPATIENT)
Dept: REHABILITATION | Facility: HOSPITAL | Age: 63
End: 2018-01-05
Attending: PSYCHIATRY & NEUROLOGY
Payer: COMMERCIAL

## 2018-01-05 DIAGNOSIS — R51.9 NEW ONSET OF HEADACHES: ICD-10-CM

## 2018-01-05 DIAGNOSIS — Z78.9 IMPAIRED MOTOR CONTROL: ICD-10-CM

## 2018-01-05 DIAGNOSIS — R29.898 DECREASED ROM OF NECK: ICD-10-CM

## 2018-01-05 PROCEDURE — 97140 MANUAL THERAPY 1/> REGIONS: CPT | Mod: PO | Performed by: PHYSICAL THERAPIST

## 2018-01-05 PROCEDURE — 97110 THERAPEUTIC EXERCISES: CPT | Mod: PO | Performed by: PHYSICAL THERAPIST

## 2018-01-05 NOTE — PROGRESS NOTES
I, JADIEL MerrittT, met with Jeimy Avalos PTA to discuss this pt's POC.  Pt is presenting with increased tone in left upper quadrant, responded well to MHP. Address cervical ROM in all directions (P/AROM). Perform gentle scapular strengthening (used OTB for pull downs, may try scap stars with YTB). Pt tolerates saccades at 70 bpm.      Susy Nicole DPT  1/5/2018    Face to face consultation with supervision PT held on 01/05/2018    Jeimy vAalos PTA

## 2018-01-05 NOTE — PATIENT INSTRUCTIONS
Axial Extension (Chin Tuck)        Gently pull chin in while lengthening back of neck. Hold 5 seconds. Repeat 10 times. Do 2 sets, 1-2 sessions per day.     Levator Stretch        Grasp seat or sit on hand on side to be stretched. Turn head toward other side and look down. Use hand on head to gently stretch neck in that position. Hold 15-30 seconds. Repeat on other side.  Repeat 3 times each side. Do 1-2 sessions per day.    Upper Trapezius Stretch        Look straight ahead. Gently grasp right side of head while reaching behind back with other hand. Tilt head away until a gentle stretch is felt. Hold 15-30 seconds. Repeat on opposite side.   Repeat 3 times each side. Do 1-2 sessions per day.    AROM, Rotation        Sit or stand, head comfortable, centered position. Turn head slowly to look over one shoulder. Hold 5-10 seconds. Repeat to other side.  Repeat 10 times per session. Do 1-2 sessions per day.       Scapula Adduction With Pectoralis Stretch: Low - Standing        Shoulders at 45°, keeping weight on feet, lean forward and squeeze shoulder blades together. Hold 30 seconds.  Do 3 times, 1-2 times per day.   Scapula Adduction With Pectoralis Stretch: Mid-Range - Standing        Shoulders at 90°, keeping weight on feet, lean forward and squeeze shoulder blades together. Hold 10 seconds.   Do 10 times, 2 times per day.     [email or web address]     Shoulder Blade Squeeze        Rotate shoulders back, then squeeze shoulder blades together. Hold 5 seconds.   Repeat 10 times. Do 2 sets, 1-2 sessions per day.         Gaze Stabilization: Tip Card    1.Target must remain in focus, not blurry, and appear stationary while head is in motion.  2.Perform exercises with small head movements (45° to either side of midline).  3.Increase speed of head motion so long as target is in focus.  4.If you wear eyeglasses, be sure you can see target through lens (therapist will give specific instructions for bifocal / progressive  lenses).  5.These exercises may provoke dizziness or nausea. Work through these symptoms. If too dizzy, slow head movement slightly. Rest between each exercise.  6.Exercises demand concentration; avoid distractions.  7.For safety, perform standing exercises close to a counter, wall, corner, or next to someone.    Copyright © I. All rights reserved.   Oculomotor: Saccades        Holding two targets positioned side by side (in front of a blank wall)~10-12 inches apart, move eyes quickly from target to target as head stays still. Perform in sitting.    Repeat 1 minute per session. Do 3-5 sessions per day.

## 2018-01-08 ENCOUNTER — CLINICAL SUPPORT (OUTPATIENT)
Dept: REHABILITATION | Facility: HOSPITAL | Age: 63
End: 2018-01-08
Attending: PSYCHIATRY & NEUROLOGY
Payer: COMMERCIAL

## 2018-01-08 DIAGNOSIS — R29.898 DECREASED ROM OF NECK: ICD-10-CM

## 2018-01-08 DIAGNOSIS — Z78.9 IMPAIRED MOTOR CONTROL: ICD-10-CM

## 2018-01-08 DIAGNOSIS — R51.9 NEW ONSET OF HEADACHES: ICD-10-CM

## 2018-01-08 PROCEDURE — 97140 MANUAL THERAPY 1/> REGIONS: CPT | Mod: PO

## 2018-01-08 PROCEDURE — 97110 THERAPEUTIC EXERCISES: CPT | Mod: PO

## 2018-01-08 NOTE — PROGRESS NOTES
"                                                    Physical Therapy Progress Note     Name: Sherry Torres  Clinic Number: [id number]  Diagnosis:R55 (ICD-10-CM) - Syncope and collapse     Encounter Diagnoses   Name Primary?    New onset of headaches     Decreased ROM of neck     Impaired motor control        Physician: Pito Guerrero*  Treatment Orders: PT Eval and Treat, perform Eply Manuever  Past Medical History:   Diagnosis Date    Atypical ductal hyperplasia, breast 2008    left side    Breast cancer 08/2016    right side    Breast cyst approx. 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    Mitral valve prolapse     Psychiatric problem        Precautions: standard  Visit #: 2  Date of Eval: 1/2/2018  Plan of Care Expiration: 2/13/2018    Functional Limitations Reports - G Codes  Category: other   Tool: FOTO  Score: 73% (27% impairment)    G-CODE  3/10   eval CJ-CI             Subjective   Pt reports: " I have a headache, but only because I didn't sleep last night.  I was in the ER last night with my son.  He fell and broke his ankle after the game."   Pain Scale:  No pain, other than HA.    Objective     Patient received individual therapy to increase strength, ROM, flexibility, posture and decrease HA with activities as follows:     Pt participated in therapeutic exercise x 30 minutes to address ROM, strength, and posture to decrease headaches:   MHP cervical region in supine with knees elevated x 10 min    Supine:    PROM upper trap 3 x 30 sec    Chin tucks x 10 reps at 5 sec each     Standing: Pull downs with OTB 2 x 10    Sitting:   Upper trap stretch 2 x 30 sec B  Scapular stretch 2 x 30 sec B  X 10 reps of cervical rotation   X 10 reps of cervical flex/ext    Saccades- 70 BPM, all directions x 60 sec each(sitting)           Scalene stretch    Pt participated in manual " therapy treatment x 15 minutes to address cervical ROM and decrease headaches:  Manual cervical distraction   Cervical PROM rotation    Written Home Exercises: 1/5/18- scalene stretch  eval- chin tuck, levator scap stretch, upper trap stretch, AROM cervical rotation, scap retraction, pec doorway stretch (2 positions) saccades.  Pt demo good understanding of the education provided. Sherry demonstrated good return demonstration of activities.     Education provided re: POC, HEP    Pt has no cultural, educational or language barriers to learning provided.    Assessment   Sherry tolerated treatment well and did not have any problems. Pt cont with stretching and strengthening and reports feeling some relief following tx session.  Pt can benefit from continued skilled PT to address impairments to decrease headaches, improve cervical mobility, and return pt to PLOF.    Pt prognosis is Excellent. Pt will continue to benefit from skilled outpatient physical therapy to address the deficits listed in the problem list, provide pt/family education and to maximize pt's level of independence in the home and community environment.     Anticipated barriers to physical therapy: none    Medical necessity is demonstrated by the following IMPAIRMENTS/PROBLEM LIST:   Impaired muscle tone  Limited computer use  Frequent HA  Decreased ROM  Decreased activity tolerance   Difficulty participating in daily activities   Difficulty participating in vocational pursuits   Requires skilled supervision to complete and progress HEP     Pt's spiritual, cultural and educational needs considered and pt agreeable to plan of care and GOALS as stated below:   Short term goals: 2 weeks, pt agrees to goals set.  1. Pt will perform HEP for cervical ROM and oculomotor function with supervision to improve carryover of progress and reduce HA.  2. Pt will report decrease intensity of HA to 4-5/10 to improve tolerance to daily activities.  3. Pt will  demonstrate improved cervical SB AROM to R to 20 deg to equal left and decrease pain/ HA.  4. Pt will demonstrate improved cervical AROM for flexion and extension to 50 degrees to demonstrate improved mobility and to decrease pain/ HA.     Long term goals: 4 weeks, pt agrees to goals set  5. Pt will report decreased HA occurrence to <daily to demonstrate an improvement of symptoms.  6. Pt will report decreased HA intensity to 3/10 to demonstrate improved tolerance to daily activities.   7. Pt will demonstrate improved cervical rotation AROM to 70 degrees to demonstrate improved mobility and ROM WFLs.   8. Pt will perform horizontal saccades at 120 bpm x 30 sec to demonstrate improved oculomotor function to decrease headaches.         Plan   Continue PT 1-2x weekly under established Plan of Care, with treatment to include: pt education, HEP, therapeutic exercises, gait training, neuromuscular re-education/balance exercises, therapeutic activities, manual therapy, and modalities PRN, to work towards established goals. Pt may be seen by PTA to carry out plan of care.     Jeimy Avalos, PTA   01/08/2018

## 2018-01-09 ENCOUNTER — HOSPITAL ENCOUNTER (OUTPATIENT)
Dept: CARDIOLOGY | Facility: CLINIC | Age: 63
Discharge: HOME | End: 2018-01-09
Attending: PSYCHIATRY & NEUROLOGY
Payer: COMMERCIAL

## 2018-01-09 DIAGNOSIS — R55 SYNCOPE AND COLLAPSE: ICD-10-CM

## 2018-01-09 LAB
DIASTOLIC DYSFUNCTION: NO
ESTIMATED PA SYSTOLIC PRESSURE: 26.43
MITRAL VALVE REGURGITATION: NORMAL
RETIRED EF AND QEF - SEE NOTES: 63 (ref 55–65)
TRICUSPID VALVE REGURGITATION: NORMAL

## 2018-01-09 PROCEDURE — 93306 TTE W/DOPPLER COMPLETE: CPT | Mod: S$GLB,,, | Performed by: INTERNAL MEDICINE

## 2018-01-11 ENCOUNTER — CLINICAL SUPPORT (OUTPATIENT)
Dept: REHABILITATION | Facility: HOSPITAL | Age: 63
End: 2018-01-11
Attending: PSYCHIATRY & NEUROLOGY
Payer: COMMERCIAL

## 2018-01-11 DIAGNOSIS — R51.9 NEW ONSET OF HEADACHES: ICD-10-CM

## 2018-01-11 DIAGNOSIS — Z78.9 IMPAIRED MOTOR CONTROL: ICD-10-CM

## 2018-01-11 DIAGNOSIS — R29.898 DECREASED ROM OF NECK: ICD-10-CM

## 2018-01-11 PROCEDURE — 97110 THERAPEUTIC EXERCISES: CPT | Mod: PO

## 2018-01-11 NOTE — PROGRESS NOTES
"                                                    Physical Therapy Progress Note     Name: Sherry Torres  Clinic Number: [id number]  Diagnosis:R55 (ICD-10-CM) - Syncope and collapse     Encounter Diagnoses   Name Primary?    New onset of headaches     Decreased ROM of neck     Impaired motor control        Physician: Pito Guerrero*  Treatment Orders: PT Eval and Treat, perform Eply Manuever  Past Medical History:   Diagnosis Date    Atypical ductal hyperplasia, breast 2008    left side    Breast cancer 08/2016    right side    Breast cyst approx. 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    Mitral valve prolapse     Psychiatric problem        Precautions: standard  Visit #: 4  Date of Eval: 1/2/2018  Plan of Care Expiration: 2/13/2018    Functional Limitations Reports - G Codes  Category: other   Tool: FOTO  Score: 73% (27% impairment)    G-CODE  4/10   eval CJ-CI             Subjective   Pt reports: " I'm stressed. My granddaughter has the flu and after this I have to go home and take care of her.  I had to take off work."   Pain Scale:  3/10 HA.    Objective     Patient received individual therapy to increase strength, ROM, flexibility, posture and decrease HA with activities as follows:     Pt participated in therapeutic exercise x 30 minutes to address ROM, strength, and posture to decrease headaches:   MHP cervical region in supine with knees elevated x 8 min    Supine:    PROM upper trap 3 x 30 sec    Chin tucks x 10 reps at 5 sec each     Standing: Pull downs with OTB 2 x 10   Scapular stars with YTB x 5 each  Sitting:   Upper trap stretch 2 x 30 sec B  Scapular stretch 2 x 30 sec B  X 10 reps of cervical rotation     Saccades- 80 BPM, all directions x 60 sec each(sitting)           Scalene stretch    Pt participated in manual therapy treatment x 15 minutes to address " cervical ROM and decrease headaches:  Manual cervical distraction   Cervical PROM rotation    Written Home Exercises: 1/5/18- scalene stretch  eval- chin tuck, levator scap stretch, upper trap stretch, AROM cervical rotation, scap retraction, pec doorway stretch (2 positions) saccades.  Pt demo good understanding of the education provided. Sherry demonstrated good return demonstration of activities.     Education provided re: POC, HEP    Pt has no cultural, educational or language barriers to learning provided.    Assessment   Sherry tolerated treatment well and did not have any problems. Pt was able to progress to 80 bpm on saccades with no reports of dizziness or increased headache.  Pt remains cooperative and motivated.    Pt can benefit from continued skilled PT to address impairments to decrease headaches, improve cervical mobility, and return pt to PLOF.    Pt prognosis is Excellent. Pt will continue to benefit from skilled outpatient physical therapy to address the deficits listed in the problem list, provide pt/family education and to maximize pt's level of independence in the home and community environment.     Anticipated barriers to physical therapy: none    Medical necessity is demonstrated by the following IMPAIRMENTS/PROBLEM LIST:   Impaired muscle tone  Limited computer use  Frequent HA  Decreased ROM  Decreased activity tolerance   Difficulty participating in daily activities   Difficulty participating in vocational pursuits   Requires skilled supervision to complete and progress HEP     Pt's spiritual, cultural and educational needs considered and pt agreeable to plan of care and GOALS as stated below:   Short term goals: 2 weeks, pt agrees to goals set.  1. Pt will perform HEP for cervical ROM and oculomotor function with supervision to improve carryover of progress and reduce HA.  2. Pt will report decrease intensity of HA to 4-5/10 to improve tolerance to daily activities.  3. Pt will  demonstrate improved cervical SB AROM to R to 20 deg to equal left and decrease pain/ HA.  4. Pt will demonstrate improved cervical AROM for flexion and extension to 50 degrees to demonstrate improved mobility and to decrease pain/ HA.     Long term goals: 4 weeks, pt agrees to goals set  5. Pt will report decreased HA occurrence to <daily to demonstrate an improvement of symptoms.  6. Pt will report decreased HA intensity to 3/10 to demonstrate improved tolerance to daily activities.   7. Pt will demonstrate improved cervical rotation AROM to 70 degrees to demonstrate improved mobility and ROM WFLs.   8. Pt will perform horizontal saccades at 120 bpm x 30 sec to demonstrate improved oculomotor function to decrease headaches.         Plan   Continue PT 1-2x weekly under established Plan of Care, with treatment to include: pt education, HEP, therapeutic exercises, gait training, neuromuscular re-education/balance exercises, therapeutic activities, manual therapy, and modalities PRN, to work towards established goals. Pt may be seen by PTA to carry out plan of care.     Jeimy Avalos, PTA   01/11/2018

## 2018-01-16 ENCOUNTER — CLINICAL SUPPORT (OUTPATIENT)
Dept: REHABILITATION | Facility: HOSPITAL | Age: 63
End: 2018-01-16
Attending: PSYCHIATRY & NEUROLOGY
Payer: COMMERCIAL

## 2018-01-16 DIAGNOSIS — Z78.9 IMPAIRED MOTOR CONTROL: ICD-10-CM

## 2018-01-16 DIAGNOSIS — R29.898 DECREASED ROM OF NECK: ICD-10-CM

## 2018-01-16 DIAGNOSIS — R51.9 NEW ONSET OF HEADACHES: ICD-10-CM

## 2018-01-16 PROCEDURE — 97140 MANUAL THERAPY 1/> REGIONS: CPT | Mod: PO | Performed by: PHYSICAL THERAPIST

## 2018-01-16 PROCEDURE — 97110 THERAPEUTIC EXERCISES: CPT | Mod: PO | Performed by: PHYSICAL THERAPIST

## 2018-01-16 NOTE — PROGRESS NOTES
Physical Therapy Progress Note     Name: Sherry Torres  Clinic Number: [id number]    Diagnosis:R55 (ICD-10-CM) - Syncope and collapse     Encounter Diagnoses   Name Primary?    New onset of headaches     Decreased ROM of neck     Impaired motor control        Physician: Pito Guerrero*  Treatment Orders: PT Eval and Treat, perform Eply Manuever  Past Medical History:   Diagnosis Date    Atypical ductal hyperplasia, breast 2008    left side    Breast cancer 08/2016    right side    Breast cyst approx. 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    Mitral valve prolapse     Psychiatric problem        Precautions: standard  Visit #: 4  Date of Eval: 1/2/2018  Plan of Care Expiration: 2/13/2018    Functional Limitations Reports - G Codes  Category: other   Tool: FOTO  Score: 73% (27% impairment)    G-CODE  4/10   eval CJ-CI             Subjective   Pt reports: I am feeling better. Decreased HA frequency, 3-4 times/ day mainly later in the afternoons. Pt also reports improved sensation in head/ face.  Pain Scale:  Average HA 4-5/10    Objective     Patient received individual therapy to increase strength, ROM, flexibility, posture and decrease HA with activities as follows:     Pt participated in therapeutic exercise x 23 minutes to address ROM, strength, and posture to decrease headaches:   MHP cervical region x 8 min while on UBE L3, front/ back    Cervical AROM:   Flex- 45 deg  Ext- 40 deg  SB: R- 14 deg, L-24 deg  Rotation: R- 75 deg, L- 68 deg    Supine: PROM SCM   PROM upper trap    Saccades- 90 BPM, all directions x 60 sec each(standing) x 2 reps    NP today:   Chin tucks x 10 reps at 5 sec each   Sitting:   Upper trap stretch 2 x 30 sec B  Scapular stretch 2 x 30 sec B  X 10 reps of cervical rotation   Rubi  stretch  Standing: Pull downs with OTB 2 x 10   Scapular stars with YTB x 5 each    Pt participated in manual therapy treatment x 17 minutes to address cervical ROM and decrease headaches:  Manual cervical distraction   Suboccipital PROM    Written Home Exercises: 1/5/18- scalene stretch  eval- chin tuck, levator scap stretch, upper trap stretch, AROM cervical rotation, scap retraction, pec doorway stretch (2 positions) saccades.  Pt demo good understanding of the education provided. Sherry demonstrated good return demonstration of activities.     Education provided re: POC, HEP    Pt has no cultural, educational or language barriers to learning provided.    Assessment   Sherry tolerated treatment well.  Pt was able to demonstrate increased speed of saccades up to 90 bpm without dizziness. Pt reports improvement of HA, now occurring mainly later in the day. Pt also reports improved cervical mobility. Pt reports continued impairment of left hand/ thumb. Pt is unable to oppose or flex thumb and reports sensitivity to touch. Pt can benefit from an OT eval (hand specialist) to address left thumb impairments as they are not improving. Pt can benefit from continued skilled PT to address impairments to decrease headaches, improve cervical mobility, and return pt to PLOF.    Pt prognosis is Excellent. Pt will continue to benefit from skilled outpatient physical therapy to address the deficits listed in the problem list, provide pt/family education and to maximize pt's level of independence in the home and community environment.     Anticipated barriers to physical therapy: none    Medical necessity is demonstrated by the following IMPAIRMENTS/PROBLEM LIST:   Impaired muscle tone  Limited computer use  Frequent HA  Decreased ROM  Decreased activity tolerance   Difficulty participating in daily activities   Difficulty participating in vocational pursuits   Requires skilled supervision to complete and progress HEP     Pt's  spiritual, cultural and educational needs considered and pt agreeable to plan of care and GOALS as stated below:   Status as of 1/16/18  Short term goals: 2 weeks, pt agrees to goals set.  1. Pt will perform HEP for cervical ROM and oculomotor function with supervision to improve carryover of progress and reduce HA. Met 1/16/18- pt reports good compliance  2. Pt will report decrease intensity of HA to 4-5/10 to improve tolerance to daily activities. Met 1/16/18- 4-5/10 average HA pain  3. Pt will demonstrate improved cervical SB AROM to R to 20 deg to equal left and decrease pain/ HA. Improved/ not met- 14 degrees AROM  4. Pt will demonstrate improved cervical AROM for flexion and extension to 50 degrees to demonstrate improved mobility and to decrease pain/ HA. Improved- flex= 45 deg, ext= 40 deg     Long term goals: 4 weeks, pt agrees to goals set  5. Pt will report decreased HA occurrence to <daily to demonstrate an improvement of symptoms.  6. Pt will report decreased HA intensity to 3/10 to demonstrate improved tolerance to daily activities.   7. Pt will demonstrate improved cervical rotation AROM to 70 degrees to demonstrate improved mobility and ROM WFLs.   8. Pt will perform horizontal saccades at 120 bpm x 30 sec to demonstrate improved oculomotor function to decrease headaches.         Plan   Continue PT 1-2x weekly under established Plan of Care, with treatment to include: pt education, HEP, therapeutic exercises, gait training, neuromuscular re-education/balance exercises, therapeutic activities, manual therapy, and modalities PRN, to work towards established goals. Pt may be seen by PTA to carry out plan of care.     Susy Nicole, PT   01/16/2018

## 2018-01-17 DIAGNOSIS — M79.642 HAND PAIN, LEFT: Primary | ICD-10-CM

## 2018-01-19 ENCOUNTER — CLINICAL SUPPORT (OUTPATIENT)
Dept: REHABILITATION | Facility: HOSPITAL | Age: 63
End: 2018-01-19
Attending: PSYCHIATRY & NEUROLOGY
Payer: COMMERCIAL

## 2018-01-19 DIAGNOSIS — R51.9 NEW ONSET OF HEADACHES: ICD-10-CM

## 2018-01-19 DIAGNOSIS — Z78.9 IMPAIRED MOTOR CONTROL: ICD-10-CM

## 2018-01-19 DIAGNOSIS — R29.898 DECREASED ROM OF NECK: ICD-10-CM

## 2018-01-19 PROCEDURE — 97110 THERAPEUTIC EXERCISES: CPT | Mod: PO | Performed by: PHYSICAL THERAPIST

## 2018-01-19 PROCEDURE — 97140 MANUAL THERAPY 1/> REGIONS: CPT | Mod: PO | Performed by: PHYSICAL THERAPIST

## 2018-01-19 NOTE — PROGRESS NOTES
"                                                    Physical Therapy Progress Note     Name: Sherry Torres  Clinic Number: [id number]    Diagnosis:R55 (ICD-10-CM) - Syncope and collapse     Encounter Diagnoses   Name Primary?    New onset of headaches     Decreased ROM of neck     Impaired motor control        Physician: Pito Guerrero*  Treatment Orders: PT Eval and Treat, perform Eply Manuever  Past Medical History:   Diagnosis Date    Atypical ductal hyperplasia, breast 2008    left side    Breast cancer 08/2016    right side    Breast cyst approx. 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    Mitral valve prolapse     Psychiatric problem        Precautions: standard  Visit #: 5  Date of Eval: 1/2/2018  Plan of Care Expiration: 2/13/2018    Functional Limitations Reports - G Codes  Category: other   Tool: FOTO  Score: 80% (20% impairment)     G-CODE  5/10   eval CJ-CI   1/19/18 CJ-CJ         Subjective   Pt reports: I am feeling better. Reports "itching" on L side of face/ head  Pain Scale:  Reports more discomfort in neck vs pain    Objective     Patient received individual therapy to increase strength, ROM, flexibility, posture and decrease HA with activities as follows:     Pt participated in therapeutic exercise x 27 minutes to address ROM, strength, and posture to decrease headaches:     Cervical AROM:   Flex- 40 deg  Ext- 45 deg  SB: R- 19 deg, L-23 deg  Rotation: R- 68 deg, L- 56 deg    Supine: PROM SCM   PROM upper trap   PROM cervical rotation    Standing: Pull downs with OTB 2 x 10   Rows OTB 2 x 10   Scapular stars with YTB x 5 each    Saccades- 100 BPM, all directions x 60 sec each(standing) x 2 reps    Sitting: Scalene stretch   Levator stretch    NP today:   Chin tucks x 10 reps at 5 sec each   Sitting:   Upper trap stretch 2 x 30 sec B    MHP cervical region x 8 " "min while on UBE L3, front/ back    Pt participated in manual therapy treatment x 15 minutes to address cervical ROM and decrease headaches:  Manual cervical distraction   Suboccipital PROM  Cervical upslides    Written Home Exercises: 1/5/18- scalene stretch  eval- chin tuck, levator scap stretch, upper trap stretch, AROM cervical rotation, scap retraction, pec doorway stretch (2 positions) saccades.  Pt demo good understanding of the education provided. Sherry demonstrated good return demonstration of activities.     Education provided re: POC, HEP    Pt has no cultural, educational or language barriers to learning provided.    Assessment   Sherry tolerated treatment well.  Pt reports that cervical "pain" is now feeling more of a "discomfort". Pt is subjectively reporting improvements per FOTO survey. Pt is able to tolerate increase in speed of saccades with minimal difficulty maintaining acuity with diagonal saccades. Pt can benefit from continued skilled PT to address impairments to decrease headaches, improve cervical mobility, and return pt to PLOF.    Pt prognosis is Excellent. Pt will continue to benefit from skilled outpatient physical therapy to address the deficits listed in the problem list, provide pt/family education and to maximize pt's level of independence in the home and community environment.     Anticipated barriers to physical therapy: none    Medical necessity is demonstrated by the following IMPAIRMENTS/PROBLEM LIST:   Impaired muscle tone  Limited computer use  Frequent HA  Decreased ROM  Decreased activity tolerance   Difficulty participating in daily activities   Difficulty participating in vocational pursuits   Requires skilled supervision to complete and progress HEP     Pt's spiritual, cultural and educational needs considered and pt agreeable to plan of care and GOALS as stated below:   Status as of 1/16/18  Short term goals: 2 weeks, pt agrees to goals set.  1. Pt will perform HEP " for cervical ROM and oculomotor function with supervision to improve carryover of progress and reduce HA. Met 1/16/18- pt reports good compliance  2. Pt will report decrease intensity of HA to 4-5/10 to improve tolerance to daily activities. Met 1/16/18- 4-5/10 average HA pain  3. Pt will demonstrate improved cervical SB AROM to R to 20 deg to equal left and decrease pain/ HA. Improved/ not met- 14 degrees AROM  4. Pt will demonstrate improved cervical AROM for flexion and extension to 50 degrees to demonstrate improved mobility and to decrease pain/ HA. Improved- flex= 45 deg, ext= 40 deg     Long term goals: 4 weeks, pt agrees to goals set  5. Pt will report decreased HA occurrence to <daily to demonstrate an improvement of symptoms.  6. Pt will report decreased HA intensity to 3/10 to demonstrate improved tolerance to daily activities.   7. Pt will demonstrate improved cervical rotation AROM to 70 degrees to demonstrate improved mobility and ROM WFLs.   8. Pt will perform horizontal saccades at 120 bpm x 30 sec to demonstrate improved oculomotor function to decrease headaches.         Plan   Continue PT 1-2x weekly under established Plan of Care, with treatment to include: pt education, HEP, therapeutic exercises, gait training, neuromuscular re-education/balance exercises, therapeutic activities, manual therapy, and modalities PRN, to work towards established goals. Pt may be seen by PTA to carry out plan of care.     Susy Nicole, PT   01/19/2018

## 2018-01-22 ENCOUNTER — HOSPITAL ENCOUNTER (OUTPATIENT)
Dept: RADIOLOGY | Facility: HOSPITAL | Age: 63
Discharge: HOME | End: 2018-01-22
Attending: PSYCHIATRY & NEUROLOGY
Payer: COMMERCIAL

## 2018-01-22 DIAGNOSIS — M79.642 HAND PAIN, LEFT: ICD-10-CM

## 2018-01-22 PROCEDURE — 73130 X-RAY EXAM OF HAND: CPT | Mod: 26,LT,, | Performed by: RADIOLOGY

## 2018-01-22 PROCEDURE — 73130 X-RAY EXAM OF HAND: CPT | Mod: TC,FY,LT

## 2018-01-23 ENCOUNTER — CLINICAL SUPPORT (OUTPATIENT)
Dept: REHABILITATION | Facility: HOSPITAL | Age: 63
End: 2018-01-23
Attending: PSYCHIATRY & NEUROLOGY
Payer: COMMERCIAL

## 2018-01-23 DIAGNOSIS — R29.898 DECREASED ROM OF NECK: ICD-10-CM

## 2018-01-23 DIAGNOSIS — R51.9 NEW ONSET OF HEADACHES: ICD-10-CM

## 2018-01-23 DIAGNOSIS — Z78.9 IMPAIRED MOTOR CONTROL: ICD-10-CM

## 2018-01-23 PROCEDURE — 97110 THERAPEUTIC EXERCISES: CPT | Mod: PO

## 2018-01-23 PROCEDURE — 97140 MANUAL THERAPY 1/> REGIONS: CPT | Mod: PO

## 2018-01-23 NOTE — PROGRESS NOTES
"                                                    Physical Therapy Progress Note     Name: Sherry Torres  Clinic Number: [id number]    Diagnosis:R55 (ICD-10-CM) - Syncope and collapse     Encounter Diagnoses   Name Primary?    New onset of headaches     Decreased ROM of neck     Impaired motor control        Physician: Pito Guerrero*  Treatment Orders: PT Eval and Treat, perform Eply Manuever  Past Medical History:   Diagnosis Date    Atypical ductal hyperplasia, breast 2008    left side    Breast cancer 08/2016    right side    Breast cyst approx. 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    Mitral valve prolapse     Psychiatric problem        Precautions: standard  Visit #: 6  Date of Eval: 1/2/2018  Plan of Care Expiration: 2/13/2018    Functional Limitations Reports - G Codes  Category: other   Tool: FOTO  Score: 80% (20% impairment)     G-CODE  6/10   eval CJ-CI   1/19/18 CJ-CJ         Subjective   Pt reports: " I'm feeling better."   Pain Scale:  None reported today    Objective     Patient received individual therapy to increase strength, ROM, flexibility, posture and decrease HA with activities as follows:     Pt participated in therapeutic exercise x 35 minutes to address ROM, strength, and posture to decrease headaches:   MHP cervical region x 8 min ( 4 forward/4 backward) while on UBE L3, front/ back    Supine: PROM SCM   PROM upper trap   PROM cervical rotation    Standing: Pull downs with OTB 2 x 10   Rows OTB 2 x 10   Scapular stars with YTB x 5 each    Saccades- 100 BPM, all directions x 60 sec each(standing) x 2 reps    Sitting: Scalene stretch   Levator stretch      Pt participated in manual therapy treatment x 10 minutes to address cervical ROM and decrease headaches:  Manual cervical distraction   Suboccipital PROM  Cervical upslides    Written Home " Exercises: 1/5/18- scalene stretch  eval- chin tuck, levator scap stretch, upper trap stretch, AROM cervical rotation, scap retraction, pec doorway stretch (2 positions) saccades.  Pt demo good understanding of the education provided. Sherry demonstrated good return demonstration of activities.     Education provided re: POC, HEP    Pt has no cultural, educational or language barriers to learning provided.    Assessment   Sherry tolerated treatment well and reports feeling better with no reports of pain today.  Pt cont with UBE, stretching and strengthening exercises.  No new exercises were started today.  Pt can benefit from continued skilled PT to address impairments to decrease headaches, improve cervical mobility, and return pt to PLOF.    Pt prognosis is Excellent. Pt will continue to benefit from skilled outpatient physical therapy to address the deficits listed in the problem list, provide pt/family education and to maximize pt's level of independence in the home and community environment.     Anticipated barriers to physical therapy: none    Medical necessity is demonstrated by the following IMPAIRMENTS/PROBLEM LIST:   Impaired muscle tone  Limited computer use  Frequent HA  Decreased ROM  Decreased activity tolerance   Difficulty participating in daily activities   Difficulty participating in vocational pursuits   Requires skilled supervision to complete and progress HEP     Pt's spiritual, cultural and educational needs considered and pt agreeable to plan of care and GOALS as stated below:   Status as of 1/16/18  Short term goals: 2 weeks, pt agrees to goals set.  1. Pt will perform HEP for cervical ROM and oculomotor function with supervision to improve carryover of progress and reduce HA. Met 1/16/18- pt reports good compliance  2. Pt will report decrease intensity of HA to 4-5/10 to improve tolerance to daily activities. Met 1/16/18- 4-5/10 average HA pain  3. Pt will demonstrate improved cervical SB  AROM to R to 20 deg to equal left and decrease pain/ HA. Improved/ not met- 14 degrees AROM  4. Pt will demonstrate improved cervical AROM for flexion and extension to 50 degrees to demonstrate improved mobility and to decrease pain/ HA. Improved- flex= 45 deg, ext= 40 deg     Long term goals: 4 weeks, pt agrees to goals set  5. Pt will report decreased HA occurrence to <daily to demonstrate an improvement of symptoms.  6. Pt will report decreased HA intensity to 3/10 to demonstrate improved tolerance to daily activities.   7. Pt will demonstrate improved cervical rotation AROM to 70 degrees to demonstrate improved mobility and ROM WFLs.   8. Pt will perform horizontal saccades at 120 bpm x 30 sec to demonstrate improved oculomotor function to decrease headaches.         Plan   Continue PT 1-2x weekly under established Plan of Care, with treatment to include: pt education, HEP, therapeutic exercises, gait training, neuromuscular re-education/balance exercises, therapeutic activities, manual therapy, and modalities PRN, to work towards established goals. Pt may be seen by PTA to carry out plan of care.     Jeimy Avalos, PTA   01/23/2018

## 2018-01-24 ENCOUNTER — PATIENT MESSAGE (OUTPATIENT)
Dept: NEUROLOGY | Facility: CLINIC | Age: 63
End: 2018-01-24

## 2018-01-24 DIAGNOSIS — M79.642 HAND PAIN, LEFT: Primary | ICD-10-CM

## 2018-01-26 ENCOUNTER — CLINICAL SUPPORT (OUTPATIENT)
Dept: REHABILITATION | Facility: HOSPITAL | Age: 63
End: 2018-01-26
Attending: PSYCHIATRY & NEUROLOGY
Payer: COMMERCIAL

## 2018-01-26 DIAGNOSIS — R51.9 NEW ONSET OF HEADACHES: ICD-10-CM

## 2018-01-26 DIAGNOSIS — R29.898 DECREASED ROM OF NECK: ICD-10-CM

## 2018-01-26 DIAGNOSIS — M79.643 PAIN OF HAND, UNSPECIFIED LATERALITY: ICD-10-CM

## 2018-01-26 DIAGNOSIS — Z78.9 IMPAIRED MOTOR CONTROL: ICD-10-CM

## 2018-01-26 PROCEDURE — 97165 OT EVAL LOW COMPLEX 30 MIN: CPT | Mod: PO

## 2018-01-26 PROCEDURE — 97110 THERAPEUTIC EXERCISES: CPT | Mod: PO

## 2018-01-26 PROCEDURE — 97110 THERAPEUTIC EXERCISES: CPT | Mod: PO | Performed by: PHYSICAL THERAPIST

## 2018-01-26 PROCEDURE — 97018 PARAFFIN BATH THERAPY: CPT | Mod: PO

## 2018-01-26 PROCEDURE — 97140 MANUAL THERAPY 1/> REGIONS: CPT | Mod: PO | Performed by: PHYSICAL THERAPIST

## 2018-01-26 NOTE — PROGRESS NOTES
Patient: Sherry Torres  Date of Evaluation: 01/26/2018  Referring Physician: LYN Guerrero Neurologist  Diagnosis:   Encounter Diagnosis   Name Primary?    Pain of hand, unspecified laterality        Referral Orders: Likely injured or malaligned CMC joint. Please refer to OT hand specialists at the [facility] location as pt would benefit from an evaluation and treatment to address    Start Time: 1300  End Time: 1345  Total Time: 45 min  Group Time: 0    Age: 62 y.o.  Sex: female  Hand dominance: right    Occupation:   Working presently: Yes  Last time worked: today  Workmen's Compensation: No    Date of Injury: November 15, 2017    Mechanism of Injury: Fell with TBI, hitting L side  Past Medical History:   Diagnosis Date    Atypical ductal hyperplasia, breast 2008    left side    Breast cancer 08/2016    right side    Breast cyst approx. 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    Mitral valve prolapse     Psychiatric problem      Current Outpatient Prescriptions   Medication Sig    calcium-vitamin D3 500 MG, 1,250MG, 200 UNITS (CALCIUM-VITAMIN D) 500 mg(1,250mg) -200 unit per tablet Take 4 tablets by mouth 2 (two) times daily with meals.     fish oil-omega-3 fatty acids 300-1,000 mg capsule Take 2 g by mouth once daily.    letrozole (FEMARA) 2.5 mg Tab Take 2.5 mg by mouth once daily.    loratadine (CLARITIN) 10 mg tablet Take 10 mg by mouth once daily.    MAGNESIUM CARBONATE ORAL Take by mouth.    multivitamin capsule Take 2 capsules by mouth once daily.     omeprazole (PRILOSEC) 40 MG capsule Take 1 capsule (40 mg total) by mouth every morning.    potassium chloride SA (K-DUR,KLOR-CON) 10 MEQ tablet Take 20 mEq by mouth once daily.    ranitidine (ZANTAC) 75 MG tablet Take 75 mg by mouth 2 (two) times daily.    SYNTHROID 125 mcg tablet Take 1 tablet (125 mcg total) by mouth  "once daily.     No current facility-administered medications for this visit.      Review of patient's allergies indicates:   Allergen Reactions    Codeine Nausea Only    Sulfa (sulfonamide antibiotics) Nausea Only     X-Ray Results: no fx or dislocation   Subjective  Sherry reports "Cannot open things"    Functional Pain Scale Rating 0-10: 6/10 at rest, 8/10 with grasp   Location: base of left thumb  Description: throbs, sharp pain with grasp,   Activities which increase pain: typing , grabbing  Activities which decrease pain: heat, aleve    Sherry's goals for therapy are: pain decrease for use of left hand      Objective    Observation: intact    Sensation: Radial Nerve distribution area numb but she can locate light touch  Edema: none  A/P Range of Motion: Thumb     Thumb Opposition: 3 cm/ WNL  Palmar Abduction: 52/60  Radial Abduction: 45/55    Wrist Ext/Flex: WNL  Wrist RD/UD: WNL  Supination/Pronation: WNL    Manual Muscle Test: deferred due to pain    Functional Limitations: Patient presents with the following functional Limitations:   Self Care / ADL: carrying plate, pulling on clothes, opening pill bottles, holding hair dryer    Work/Activities: typing    Leisure: none noted    Treatment included: OT evaluation and instruction in written HEP including (Hand Therapy/Finger Exercises) AROM of thumb. Paraffin followed by soft tissue massage of thumb MP joint, joint mobilization, PROM and AROM     Repetitions 5 each   Frequency 3 per day  CMS Impairment/Limitation/Restriction for FOTO Hand Survey  Status Limitation G-Code CMS Severity Modifier  Intake 50% 50% Current Status CK - At least 40 percent but less than 60 percent  Predicted 66% 34% Goal Status+ CJ - At least 20 percent but less than 40 percent        Assessment  Treatment Diagnosis/ Problem List LUE Decreased ROM, Decreased  strength, Decreased pinch strength, Decreased functional hand use and Increased pain    Short Term Goals:  Patient to be " IND with HEP and modalities for pain/edema managment.,  Increase AROM to WFL to improve functional grasp for ADLs/work/leisure activities.   Pain less than 3/10 with daily tasks.  Decrease complaints of numbness.   Measure  once pain decreases.  Plan  Sherry Torres to be treated by Occupational Therapy 1 times per week for 4 weeks during the certification period from 1/26/18 to 2/23/2018 to achieve the established goals.     Treatment to include: Paraffin, Fluidotherapy, Manual therapy/joint mobilizations, Modalities for pain management and Therapeutic exercises/activities., as well as any other treatments deemed necessary based on the patient's needs or progress.   Profile and History Assessment of Occupational Performance Level of Clinical Decision Making Complexity Score   Occupational Profile:   Sherry Torres is a 62 y.o. female who lives with their spouse and is currently employed as . Sherry Torres has difficulty with  dressing  typing   Home care  affecting his/her daily functional abilities. His/her main goal for therapy is decrease pain in L hand to use as prior to accident.     Comorbidities:   Concussion, back pain, breast cancer, GI problems    Medical and Therapy History Review:   Brief               Performance Deficits    Physical:  Pain, ROM and strength    Cognitive:  No Deficits    Psychosocial:    No Deficits     Clinical Decision Making:  low    Assessment Process:  Detailed Assessments    Modification/Need for Assistance:  Not Necessary    Intervention Selection:  Several Treatment Options       low  Based on PMHX, co morbidities , data from assessments and functional level of assistance required with task and clinical presentation directly impacting function.

## 2018-01-26 NOTE — PLAN OF CARE
"                                                    Physical Therapy Progress Note     Name: Sherry Torres  Clinic Number: [id number]    Diagnosis:R55 (ICD-10-CM) - Syncope and collapse     Encounter Diagnoses   Name Primary?    New onset of headaches     Decreased ROM of neck     Impaired motor control        Physician: Pito Guerrero*  Treatment Orders: PT Eval and Treat, perform Eply Manuever  Past Medical History:   Diagnosis Date    Atypical ductal hyperplasia, breast 2008    left side    Breast cancer 08/2016    right side    Breast cyst approx. 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    Mitral valve prolapse     Psychiatric problem        Precautions: standard  Visit #: 7  Date of Eval: 1/2/2018  Plan of Care Expiration: 2/13/2018    Functional Limitations Reports - G Codes  Category: other   Tool: FOTO  Score: 80% (20% impairment)     G-CODE  2/10   eval CJ-CI   1/19/18 CJ-CJ         Subjective   Pt reports: " I'm feeling better." I had a bad HA last night, but that was the first in a few weeks.  Pain Scale:  None reported today    Objective     Patient received individual therapy to increase strength, ROM, flexibility, posture and decrease HA with activities as follows:     Pt participated in therapeutic exercise x 25 minutes to address ROM, strength, and posture to decrease headaches:     AROM in sitting:   Flex= 55 deg  Ext= 40 deg   Rotation: R= 55 deg, L=  60 degrees  SB: B= 26 deg    MHP cervical region x 8 min ( 4 forward/4 backward) while on UBE L3, front/ back    Supine: PROM SCM   PROM upper trap   PROM cervical rotation  Side lying: PNF for left scap posterior depression- poor motor control for last 1/4 range  Standing: Pull downs with OTB 2 x 10   Lower trap Y's at door 10x     Saccades- 110 BPM, stripped surface, all directions x 60 sec each(standing) x 2 " reps    NP today:   Sitting: Scalene stretch   Levator stretch  Standing: Rows OTB 2 x 10   Scapular stars with YTB x 5 each    Pt participated in manual therapy treatment x 22 minutes to address cervical ROM and decrease headaches:  Manual cervical distraction   Suboccipital PROM  Cervical upslides  Trigger point release left levator scap    Written Home Exercises: 1/5/18- scalene stretch  eval- chin tuck, levator scap stretch, upper trap stretch, AROM cervical rotation, scap retraction, pec doorway stretch (2 positions) saccades.  Pt demo good understanding of the education provided. Sherry demonstrated good return demonstration of activities.     Education provided re: POC, HEP    Pt has no cultural, educational or language barriers to learning provided.    Assessment   Assessment period: 1/2/2018 to 1/26/2018. Sherry tolerates treatments well and demonstrates good progress towards goals set.  Pt met goals set for HEP, cervical side bending, and HA occurrence. Pt continues to demonstrate decreased cervical rotation AROM in sitting, but demonstrates improved ROM in supine. Pt reports tightness in left levator scapulae with right rotation and side bending. Pt is progressing well with oculomotor exercises and is tolerating saccades at 110 bpm. Lower trap strengthening was initiated to address neck and head posture to improve coordination of cervical rotation. Decreased motor control for left scapular posterior depression and increased tone of left levator scapulae also noted. Pt can benefit from continued skilled PT to address remaining impairments to decrease HA, improve ROM , and return pt to PLOF.     Pt prognosis is Excellent. Pt will continue to benefit from skilled outpatient physical therapy to address the deficits listed in the problem list, provide pt/family education and to maximize pt's level of independence in the home and community environment.     Anticipated barriers to physical therapy:  none    Medical necessity is demonstrated by the following IMPAIRMENTS/PROBLEM LIST:   Impaired muscle tone  Limited computer use  Frequent HA  Decreased ROM  Decreased activity tolerance   Difficulty participating in daily activities   Difficulty participating in vocational pursuits   Requires skilled supervision to complete and progress HEP     Pt's spiritual, cultural and educational needs considered and pt agreeable to plan of care and GOALS as stated below:   Status as of 1/26/18  Short term goals: 2 weeks, pt agrees to goals set.  1. Pt will perform HEP for cervical ROM and oculomotor function with supervision to improve carryover of progress and reduce HA. Met 1/16/18- pt reports good compliance  2. Pt will report decrease intensity of HA to 4-5/10 to improve tolerance to daily activities. Met 1/16/18-   3. Pt will demonstrate improved cervical SB AROM to R to 20 deg to equal left and decrease pain/ HA. Met- 26 deg  4. Pt will demonstrate improved cervical AROM for flexion and extension to 50 degrees to demonstrate improved mobility and to decrease pain/ HA. Improved/ partially met- flex= 55 deg, ext= 40 deg     Long term goals: 4 weeks, pt agrees to goals set  5. Pt will report decreased HA occurrence to <daily to demonstrate an improvement of symptoms. Met- pt reports 1 HA last night, 1st in 1-2 weeks  6. Pt will report decreased HA intensity to 3/10 to demonstrate improved tolerance to daily activities. Not met- pt reported last HA kept her up night  7. Pt will demonstrate improved cervical rotation AROM to 70 degrees to demonstrate improved mobility and ROM WFLs. Improved- R= 55 deg, L= 60 deg  8. Pt will perform horizontal saccades at 120 bpm x 30 sec to demonstrate improved oculomotor function to decrease headaches. Improved- 110 bpm x 1 min tolerated on stripped surface        Plan   Continue PT 1-2x weekly under established Plan of Care, with treatment to include: pt education, HEP, therapeutic  exercises, gait training, neuromuscular re-education/balance exercises, therapeutic activities, manual therapy, and modalities PRN, to work towards established goals. Pt may be seen by PTA to carry out plan of care.     Susy Nicole, PT   01/26/2018

## 2018-01-29 ENCOUNTER — OFFICE VISIT (OUTPATIENT)
Dept: GASTROENTEROLOGY | Facility: CLINIC | Age: 63
End: 2018-01-29
Payer: COMMERCIAL

## 2018-01-29 VITALS
DIASTOLIC BLOOD PRESSURE: 76 MMHG | HEART RATE: 91 BPM | WEIGHT: 154.13 LBS | HEIGHT: 65 IN | SYSTOLIC BLOOD PRESSURE: 117 MMHG | BODY MASS INDEX: 25.68 KG/M2

## 2018-01-29 DIAGNOSIS — R13.19 ESOPHAGEAL DYSPHAGIA: ICD-10-CM

## 2018-01-29 DIAGNOSIS — K21.9 GASTROESOPHAGEAL REFLUX DISEASE WITHOUT ESOPHAGITIS: Primary | ICD-10-CM

## 2018-01-29 PROCEDURE — 99999 PR PBB SHADOW E&M-EST. PATIENT-LVL III: CPT | Mod: PBBFAC,,, | Performed by: NURSE PRACTITIONER

## 2018-01-29 PROCEDURE — 99213 OFFICE O/P EST LOW 20 MIN: CPT | Mod: SA,S$GLB,, | Performed by: NURSE PRACTITIONER

## 2018-01-29 NOTE — PATIENT INSTRUCTIONS
If you abruptly stop taking a PPI (omperazole/Prilosec) (if you have been using it for 12 weeks or more), you will likely experience rebound acid reflux as your stomach acid pumps reactivate. Therefore, it is important to wean off of your PPI slowly and let your body readjust. Wean off in this manner:    1. Take your PPI every other day for 2 weeks. Alternately try half dose daily for a week, then every other day for a week.  2. Then take your PPI every 3rd day for 1 week.  3. Then take your PPI just once a week.  4. Try to stop altogether, and use as/if needed    If symptoms return, go back to the lowest dose necessary to control your symptoms (Good control means symptoms of reflux occurring less than twice per week).     If you will be on the medication daily you should follow up in GI clinic once a year.    *You may try to control your symptoms of reflux with a GERD healthy lifestyle and with alternative, more holistic options as detailed below:    GERD Healthy Lifestyle:    Remain upright for at least 3 hours after eating.    Elevate the head of the bead about 6 inches.  Some patients place cinder blocks under the head of the bed for elevation.    Avoid foods that you have noticed make your symptoms worse.    Set a weight loss goal of 10% of your body weight. (For example, if you weigh 150 lbs, your goal should be to loose 15 lbs).    You may take over the counter Zantac/ranitadine as directed, as needed for breakthrough symptoms.     Change what you eat:  Eat smaller meals  Eat slowly and chew thoroughly until food is almost liquid  Cut down on junk carbohydrates such as sugar and white flour  Use herbs in your cooking  Eat more raw foods (more than 10 ingredients is not a raw food)  Avoid trans fats and partially hydrogenated oils  Eat more fish and switch to grass fed beef  Switch your cooking oil to macadamia nut or olive oil  Watch extremes of salt intake (too high or too low is bad)    Change these  habits:  Stop smoking  Eat dinner earlier (3-4 hours before lying down to sleep)  Exercise (but wait 2 hours after eating)  Drink more water (between meals)    Worst Foods for Acid Reflux  Chocolate (milk chocolate worse than dark chocolate)  Soda (all carbonated beverages)  Alcohol (beer, liquor, wine)  Fried foods  Tan, sausage, ribs  Cream sauce  Fatty meats (beef)  Butter, margarine, lard, shortening  Coffee, tea  Mint   High fat nuts  Hot sauces and pepper  Citrus fruit/juices  Maybe bad foods (Everyone is unique)  Tomatoes  Garlic  Onion  Nuts (macadamia nuts)  Apples (especially green)  Cucumber  Green peppers  Spicy food  Some herbal teas      Alternative measures to control GERD    Take these supplements:  Multi vitamin  Probiotic  Fish oil    All natural immediate relief:  Chew 2-3 soft probiotic capsules - Dr. Rubio's Probiotics 12 Plus  Chew chewable DGL licorice tablet  Chew papaya tablet with high protein meal - [employer]  Drink 2 ounces of aloe vera juice  Swedish bitters  Prelief- reduce the acid in food to keep it form burning sensitive tissue  Iberogast  [place]  Drink Chamomile Tea  Teaspoon of baking soda in water  Spoonful of vinegar in water      All natural ulcer healers:  Zinc carnosine - 75.5 mg with food twice a day x 8 weeks   Chrissy Griffin - $8 for 60 pills  DGL (deglycyrrhizinated licorice) - 2 tablets before meals. Heals stomach lining   Natural Factors brand, Enzymatic therapy brand.  Aloe Vera juice  - 2 to 8 ounces a day   [place] or [place]

## 2018-01-29 NOTE — PROGRESS NOTES
[facility] Established Patient Visit    Reason for Visit:  The primary encounter diagnosis was Gastroesophageal reflux disease without esophagitis. A diagnosis of Esophageal dysphagia was also pertinent to this visit.    PCP: John Jackson    HPI:This is a 62 y.o. female here for evaluation of GERD. Her last GI clinic visit w/ me was on 2016-please refer to that note for details.      GERD-now well controlled with omeprazole 40 mg once daily x 2 months. Has not had to take Zantac.   Avoids triggers: wine, fried foods, red sauces.   Previously well controlled with GERD healthy lifestyle.    Dysphagia-resolved s/p daily PPI x 2 months and esophageal dilation.    Abdominal pain -denies  Bowel habits-1 formed BM/daily  GI bleeding - denies hematochezia, hematemesis, melena, BRBPR, black/tarry stools, and coffee ground emesis  NSAID usage -avoids.     ROS:  Constitutional: No fevers, no chills, No unintentional weight loss, no fatigue,   ENT: + allergies-has not needed allergy meds since being gluten free  CV: No chest pain, no palpitations, no perif. edema, no sob on exertion  Pulm: no cough, No shortness of breath, no wheezes, no sputum.  Ophtho: No vision changes  GI: see HPI; also no nausea, no vomiting, no change in appetite  Derm: No rash  Heme: No lymphadenopathy, No bruising  MSK: No arthritis, no muscle pain, no muscle weakness  : No dysuria, No hematuria  Endo: No hot or cold intolerance  Neuro: No syncope, No seizure,     PMHX:  has a past medical history of Atypical ductal hyperplasia, breast (); Breast cancer (2016); Breast cyst (approx. 40 years ago); Cancer; GERD (gastroesophageal reflux disease); History of thyroid cancer (); Lobular carcinoma in situ (not certain of 2016 diagnosis); Mitral valve prolapse; and Psychiatric problem.    PSHX:  has a past surgical history that includes  section; Tubal ligation; thyroid removed; CYST REMOVED FROM RIGHT BREAST IN  "1969; left breast wire localization excisional biopsy with bracketed wires using 3 wires and excision through 1 incision. ; Thyroid surgery; Hernia repair; Breast cyst excision (Right, approx. 40 years ago); Breast lumpectomy (Right, 8/16); Breast biopsy (Right, 08/2016); and Breast biopsy (Left, 2008).    The patient's social and family histories were reviewed by me and updated in the appropriate section of the electronic medical record.    Allergies   Allergen Reactions    Codeine Nausea Only    Sulfa (Sulfonamide Antibiotics) Nausea Only       Current Outpatient Prescriptions   Medication Sig    calcium-vitamin D3 500 MG, 1,250MG, 200 UNITS (CALCIUM-VITAMIN D) 500 mg(1,250mg) -200 unit per tablet Take 4 tablets by mouth 2 (two) times daily with meals.     fish oil-omega-3 fatty acids 300-1,000 mg capsule Take 2 g by mouth once daily.    letrozole (FEMARA) 2.5 mg Tab Take 2.5 mg by mouth once daily.    MAGNESIUM CARBONATE ORAL Take by mouth.    multivitamin capsule Take 2 capsules by mouth once daily.     omeprazole (PRILOSEC) 40 MG capsule Take 1 capsule (40 mg total) by mouth every morning.    potassium chloride SA (K-DUR,KLOR-CON) 10 MEQ tablet Take 20 mEq by mouth once daily.    SYNTHROID 125 mcg tablet Take 1 tablet (125 mcg total) by mouth once daily.     No current facility-administered medications for this visit.          Objective Findings:    Vital Signs:  /76   Pulse 91   Ht 5' 5" (1.651 m)   Wt 69.9 kg (154 lb 1.6 oz)   BMI 25.64 kg/m²  Body mass index is 25.64 kg/m².    Physical Exam:  General Appearance: Well appearing in no acute distress  Head:   Normocephalic, without obvious abnormality  Eyes:    No scleral icterus, EOMI  Throat: Lips, mucosa, and tongue normal; teeth and gums normal  Lungs: CTA bilaterally in anterior and posterior fields, no wheezes, no crackles.  Heart:  Regular rate and rhythm, S1, S2 normal, no murmurs heard  Abdomen: Soft, non tender, non distended with " positive bowel sounds in all four quadrants. No hepatosplenomegaly, ascites, or mass  Extremities:    2+ radial pulses, no clubbing, cyanosis or edema  Skin: No rash to exposed areas  Neurologic: A&Ox4      Labs:  Lab Results   Component Value Date    WBC 10.69 07/04/2017    HGB 13.9 07/04/2017    HCT 44 07/04/2017     07/04/2017    ALT 12 07/04/2017    AST 14 07/04/2017     07/04/2017    K 4.0 07/04/2017     07/04/2017    CREATININE 0.8 07/04/2017    BUN 17 07/04/2017    CO2 24 07/04/2017    TSH 0.032 (L) 07/25/2017    INR 0.9 07/04/2017       Endoscopy:   12/11/2017 EGD Dr. Cabrera-mild Schatzki ring. Dilated to 18 mm. GEJ bx (reflux esophagitis, chronic gastritis, no metaplasia, no dysplasia). Med HH.   10/2015 EGD Dr. Cabrera--HH. Fundic gland polyp-benign.irreg z line  7/2009 Colon Dr. Barnett- WNL. Repeat in 10 years (2019).  5/13/2009 EGD Dr. Lemos-Benign esophageal stricture w/ balloon dilation. HH.   5/1/2009 EGD Dr. Finch LA grade D esophagitis, food in somach  Assessment:    1. Gastroesophageal reflux disease without esophagitis    2. Esophageal dysphagia          Recommendations:  1.  GERD-better s/p  daily omeprazole use and GERD healthy lifestyle. No breakthrough. Continue daily PPI and GERD healthy lifestyle. Wean PPI to lowest dose necessary after 12 weeks of use as per handout provided. Pt made aware of slightly increased risk of CAP, C diff infections and decreased mag, vit D and Vit B- 12 levels with long term PPI use. Yearly Mag, B 12 and Vit D levels recomended. Routine bone mineral densities per PCP recommended. Pt instructed to contact PCP for s/s of lung infection (fever, chills, CP, SOB, and/or productive cough) or GI infection (profuse, watery diarrhea with or without rectal bleeding, severe abd pain,and/or fever).    2. Dysphagia-improved s/p daily PPI and esophageal dilation. Continue PPI x 12 weeks total and wean as above.    Follow-up prn or yearly if you remain  on daily omeprazole..      Thank you for allowing me to participate in the care of DEJAN Estrada, FNP-C

## 2018-01-30 ENCOUNTER — CLINICAL SUPPORT (OUTPATIENT)
Dept: REHABILITATION | Facility: HOSPITAL | Age: 63
End: 2018-01-30
Attending: PSYCHIATRY & NEUROLOGY
Payer: COMMERCIAL

## 2018-01-30 DIAGNOSIS — Z78.9 IMPAIRED MOTOR CONTROL: ICD-10-CM

## 2018-01-30 DIAGNOSIS — R29.898 DECREASED ROM OF NECK: ICD-10-CM

## 2018-01-30 DIAGNOSIS — M79.642 PAIN OF LEFT HAND: ICD-10-CM

## 2018-01-30 DIAGNOSIS — R51.9 NEW ONSET OF HEADACHES: ICD-10-CM

## 2018-01-30 PROCEDURE — 97140 MANUAL THERAPY 1/> REGIONS: CPT | Mod: PO | Performed by: PHYSICAL THERAPIST

## 2018-01-30 PROCEDURE — 97018 PARAFFIN BATH THERAPY: CPT | Mod: PO

## 2018-01-30 PROCEDURE — 97110 THERAPEUTIC EXERCISES: CPT | Mod: PO

## 2018-01-30 PROCEDURE — 97110 THERAPEUTIC EXERCISES: CPT | Mod: PO | Performed by: PHYSICAL THERAPIST

## 2018-01-30 PROCEDURE — 97035 APP MDLTY 1+ULTRASOUND EA 15: CPT | Mod: PO

## 2018-01-30 NOTE — PLAN OF CARE
Physical Therapy Progress Note      Name: Sherry Torres  [facility] Number: [id number]     Diagnosis:R55 (ICD-10-CM) - Syncope and collapse          Encounter Diagnoses   Name Primary?    New onset of headaches      Decreased ROM of neck      Impaired motor control           Physician: Pito Guerrero*  Treatment Orders: PT Eval and Treat, perform Eply Manuever       Past Medical History:   Diagnosis Date    Atypical ductal hyperplasia, breast 2008     left side    Breast cancer 08/2016     right side    Breast cyst approx. 40 years ago    Cancer      GERD (gastroesophageal reflux disease)      History of thyroid cancer 2006    Lobular carcinoma in situ not certain of 2016 diagnosis    Mitral valve prolapse      Psychiatric problem           Precautions: standard  Visit #: 8  Date of Eval: 1/2/2018  Plan of Care Expiration: 2/13/2018     Functional Limitations Reports - G Codes  Category: other   Tool: FOTO  Score: 80% (20% impairment)      G-CODE  3/10   eval CJ-CI   1/19/18 CJ-CJ          Subjective   Pt reports: no new complaints, improved frequency of headaches  Pain Scale:  None reported today     Objective      Patient received individual therapy to increase strength, ROM, flexibility, posture and decrease HA with activities as follows:      Pt participated in therapeutic exercise x 30 minutes to address ROM, strength, and posture to decrease headaches:      AROM in sitting:   Flex= 55 deg  Ext= 50 deg   Rotation: R= 75 deg, L=  80 degrees  SB: B= 26-7 deg     MHP cervical region x 8 min ( 4 forward/4 backward) while on UBE L3, front/ back     Supine: PROM SCM              PROM upper trap              PROM cervical rotation   pec stretch  Standing: Pull downs with GTB 2 x 10   Rows GTB 2 x10               Lower trap Y's at door 10x              Scapular stars with YTB x 10 each  Saccades- 120 BPM, stripped surface, all directions x 60 sec each(standing) x 2 reps     NP today:   Sitting:  Scalene stretch              Levator stretch     Pt participated in manual therapy treatment x 11 minutes to address cervical ROM and decrease headaches:  Manual cervical distraction   Suboccipital PROM  Cervical upslides       Written Home Exercises: 1/5/18- scalene stretch  eval- chin tuck, levator scap stretch, upper trap stretch, AROM cervical rotation, scap retraction, pec doorway stretch (2 positions) saccades.  Pt demo good understanding of the education provided. Sherry demonstrated good return demonstration of activities.      Education provided re: POC, HEP     Pt has no cultural, educational or language barriers to learning provided.     Assessment   Sherry tolerated treatment well. Pt reported 1 severe HA, but associated it with sleeping with an unfamiliar pillow. Pt denies HA today. Pt reports an overall decrease in frequency of HA.  Decreased tone of left levator scapulae noted today. Pt also demonstrate a good improvement in cervical ROM as compared to last visit. Pt was able to tolerate saccades at 120 bpm without significant complaints. Pt is planning to be in a parade and PT will add 1 follow up visit to determine if continued PT is required after that point (to address HA/ cervical ROM/ pain). Pt can benefit from continued skilled PT to address remaining impairments to decrease HA, improve ROM , and return pt to PLOF. POC will be extended until the end of the month to allow for last scheduled follow up session.      Pt prognosis is Excellent. Pt will continue to benefit from skilled outpatient physical therapy to address the deficits listed in the problem list, provide pt/family education and to maximize pt's level of independence in the home and community environment.      Anticipated barriers to physical therapy: none     Medical necessity is demonstrated by the following IMPAIRMENTS/PROBLEM LIST:   Impaired muscle tone  Limited computer use  Frequent HA  Decreased ROM  Decreased activity  tolerance   Difficulty participating in daily activities   Difficulty participating in vocational pursuits   Requires skilled supervision to complete and progress HEP      Pt's spiritual, cultural and educational needs considered and pt agreeable to plan of care and GOALS as stated below:   Status as of 1/26/18  Short term goals: 2 weeks, pt agrees to goals set.  1. Pt will perform HEP for cervical ROM and oculomotor function with supervision to improve carryover of progress and reduce HA. Met 1/16/18- pt reports good compliance  2. Pt will report decrease intensity of HA to 4-5/10 to improve tolerance to daily activities. Met 1/16/18-   3. Pt will demonstrate improved cervical SB AROM to R to 20 deg to equal left and decrease pain/ HA. Met- 26 deg  4. Pt will demonstrate improved cervical AROM for flexion and extension to 50 degrees to demonstrate improved mobility and to decrease pain/ HA. Improved/ partially met- flex= 55 deg, ext= 40 deg     Long term goals: 4 weeks, pt agrees to goals set  5. Pt will report decreased HA occurrence to <daily to demonstrate an improvement of symptoms. Met- pt reports 1 HA last night, 1st in 1-2 weeks  6. Pt will report decreased HA intensity to 3/10 to demonstrate improved tolerance to daily activities. Not met- pt reported last HA kept her up night  7. Pt will demonstrate improved cervical rotation AROM to 70 degrees to demonstrate improved mobility and ROM WFLs. Improved- R= 55 deg, L= 60 deg  8. Pt will perform horizontal saccades at 120 bpm x 30 sec to demonstrate improved oculomotor function to decrease headaches. Improved- 110 bpm x 1 min tolerated on stripped surface        Plan   Continue PT 1-2x weekly under established Plan of Care, with treatment to include: pt education, HEP, therapeutic exercises, gait training, neuromuscular re-education/balance exercises, therapeutic activities, manual therapy, and modalities PRN, to work towards established goals. Pt may be seen  by PTA to carry out plan of care.      Susy Nicole, PT   1/30/2018

## 2018-01-30 NOTE — PROGRESS NOTES
Patient:  Sherry Torres  Clinic #:  [id number]   Date of Note: 01/30/2018   Referring Physician:  Pito Guerrero*  Diagnosis:    Encounter Diagnosis   Name Primary?    Pain of left hand       Start Time: 1030  End Time: 1115  Total Time: 45 min  Group Time: 0      Subjective: Pt. States she can move better.     Pain: 6 out of 10     Objective:   Patient seen by OT this session. Treatment  consist of the following:  Paraffin, Ultrasound and Therapeutic exercises  Paraffin: 10 minutes with thumb in flexion stretch.  US: Patient receives ultrasound  for pain control and decreased inflammation @ 100% duty cycle, 3.3 Mhz, applied to MP of thumb, intensity = .6 w/cm2 for 8  Minutes.  Therapeutic exercise: PROM of thumb with and without blocking, AROM of thumb and wrist in fluidotherapy . Pt. Taught radial nerve glides to add to HEP. Written instructions given.      Assessment:ROM improving. Pain still present.  Pt will continue to benefit from skilled OT intervention. Patient continues to demonstrate limitation  with  LUE Decreased ROM, Decreased  strength, Decreased pinch strength, Decreased functional hand use and Increased pain     Short Term Goals:  Patient to be IND with HEP and modalities for pain/edema managment.,  Increase AROM to WFL to improve functional grasp for ADLs/work/leisure activities.   Pain less than 3/10 with daily tasks.  Decrease complaints of numbness.   Measure  once pain decreases.  Plan  Sherry Torres to be treated by Occupational Therapy 1 times per week for 4 weeks during the certification period from 1/26/18 to 2/23/2018 to achieve the established goals.      Treatment to include: Paraffin, Fluidotherapy, Manual therapy/joint mobilizations, Modalities for pain management and Therapeutic exercises/activities., as well as any other treatments deemed necessary based on the patient's needs or progress.

## 2018-02-05 ENCOUNTER — CLINICAL SUPPORT (OUTPATIENT)
Dept: REHABILITATION | Facility: HOSPITAL | Age: 63
End: 2018-02-05
Attending: PSYCHIATRY & NEUROLOGY
Payer: COMMERCIAL

## 2018-02-05 DIAGNOSIS — R29.898 DECREASED ROM OF NECK: ICD-10-CM

## 2018-02-05 DIAGNOSIS — R51.9 NEW ONSET OF HEADACHES: ICD-10-CM

## 2018-02-05 DIAGNOSIS — M79.642 PAIN OF LEFT HAND: ICD-10-CM

## 2018-02-05 DIAGNOSIS — Z78.9 IMPAIRED MOTOR CONTROL: ICD-10-CM

## 2018-02-05 PROCEDURE — 97110 THERAPEUTIC EXERCISES: CPT | Mod: PO | Performed by: PHYSICAL THERAPIST

## 2018-02-05 PROCEDURE — L3913 HFO W/O JOINTS CF: HCPCS | Mod: PO

## 2018-02-05 PROCEDURE — 97110 THERAPEUTIC EXERCISES: CPT | Mod: PO

## 2018-02-05 NOTE — PROGRESS NOTES
"Patient:  Sherry ACEVEDO [facility] #:  [id number]   Date of Note: 02/05/2018   Referring Physician:  Pito Guerrero*  Diagnosis:    Encounter Diagnosis   Name Primary?    Pain of left hand       Start Time: 2:00  End Time: 3:00  Total Time: 60 min  Group Time: 0      Subjective: Pt. states "not too painful, more of a nagging, my concern is more so the numbness."     Pain: 1 out of 10 not too painful    Objective:   Patient seen by OT this session. Treatment  consist of the following:  Paraffin, Ultrasound and Therapeutic exercises  Paraffin: 5 minutes with thumb in flexion stretch.  US: Patient receives ultrasound  for pain control and decreased inflammation @ 100% duty cycle, 3.3 Mhz, applied to MP of thumb, intensity = .6 w/cm2 for 5  Minutes.  Therapeutic exercise: TE: Pt instructed and performed thumb radial abduction x40 reps against gravity, instructed on doing these at home against gravity as well.   Fabricated and educated pt on CMC with thumb MP immobilization hand orthosis and wear schedule.        Assessment:  Palpated pain at RCL of the thumb. Fabricated CMC with thumb MP immobilization half way up to first phalanx orthosis to immobilize MP for healing possible MP collateral ligament injury that she sustained from a fall.  pt with good understanding of wear schedule and no pain reported from orthosis.  Pt reports pain and numbness still present. Pt reports good compliance with home exercises. Pt will continue to benefit from skilled OT intervention. Patient continues to demonstrate limitation  with  LUE Decreased ROM, Decreased  strength, Decreased pinch strength, Decreased functional hand use and Increased pain     Short Term Goals:  Patient to be IND with HEP and modalities for pain/edema managment.,  Increase AROM to WFL to improve functional grasp for ADLs/work/leisure activities.   Pain less than 3/10 with daily tasks.  Decrease complaints of numbness.   Measure  once pain " decreases.    Plan  Sherry Torres to be treated by Occupational Therapy 1 times per week for 4 weeks during the certification period from 1/26/18 to 2/23/2018 to achieve the established goals.      Treatment to include: Paraffin, Fluidotherapy, Manual therapy/joint mobilizations, Modalities for pain management and Therapeutic exercises/activities., as well as any other treatments deemed necessary based on the patient's needs or progress.

## 2018-02-05 NOTE — PROGRESS NOTES
Physical Therapy Progress Note      Name: Sherry Torres  [facility] Number: [id number]     Diagnosis:R55 (ICD-10-CM) - Syncope and collapse             Encounter Diagnoses   Name Primary?    New onset of headaches      Decreased ROM of neck      Impaired motor control           Physician: Pito Guerrero*  Treatment Orders: PT Eval and Treat, perform Eply Manuever          Past Medical History:   Diagnosis Date    Atypical ductal hyperplasia, breast 2008     left side    Breast cancer 08/2016     right side    Breast cyst approx. 40 years ago    Cancer      GERD (gastroesophageal reflux disease)      History of thyroid cancer 2006    Lobular carcinoma in situ not certain of 2016 diagnosis    Mitral valve prolapse      Psychiatric problem           Precautions: standard  Visit #: 9  Date of Eval: 1/2/2018  Plan of Care Expiration: 2/13/2018     Functional Limitations Reports - G Codes  Category: other   Tool: FOTO  Score: 80% (20% impairment)      G-CODE  4/10   eval CJ-CI   1/19/18 CJ-CJ         Subjective   Pt reports: no new complaints, no HA x ~4 days  Pain Scale:  None reported today     Objective      Patient received individual therapy to increase strength, ROM, flexibility, posture and decrease HA with activities as follows:      Pt participated in therapeutic exercise x 40 minutes to address ROM, strength, and posture to decrease headaches:      AROM in sitting:   Flex= 55 deg  Ext= 55 deg   Rotation: R= 86 deg, L=  86 degrees       MHP cervical region x 10 min ( 5 forward/5 backward) while on UBE L3, front/ back     Supine: suboccipital PROM (slight limitation on left)   Inhibitive distraction              PROM upper trap              PROM cervical rotation              pec stretch  Standing: Pull downs with GTB 2 x 10              Rows GTB 2 x10               Lower trap Y's at door 10x              Scapular stars with YTB x 10 each   Rachid wings 10x      Written Home Exercises: 2/5/18-  scap stars YTB, russell wings, Lower trap Y's  1/5/18- scalene stretch  eval- chin tuck, levator scap stretch, upper trap stretch, AROM cervical rotation, scap retraction, pec doorway stretch (2 positions) saccades.  Pt demo good understanding of the education provided. Sherry demonstrated good return demonstration of activities.      Education provided re: POC, HEP     Pt has no cultural, educational or language barriers to learning provided.     Assessment   Sherry tolerated treatment well. Pt reports improved headaches as compared to last visit. Pt also reports improved stiffness in cervical region. Pt demonstrates improved cervical ROM in all directions and equal side bending and rotation. PT advanced HEP for scapular strengthening/ stability to ensure longevity of progress made. Pt denies oculomotor problems and has stopped performing exercises as recommended by PT. Pt was instructed to initiate saccades only if reading became difficult or began causing headaches.  PT will follow up in 1 week to determine if continued PT is required.      Pt prognosis is Excellent. Pt will continue to benefit from skilled outpatient physical therapy to address the deficits listed in the problem list, provide pt/family education and to maximize pt's level of independence in the home and community environment.      Anticipated barriers to physical therapy: none     Medical necessity is demonstrated by the following IMPAIRMENTS/PROBLEM LIST:   Impaired muscle tone  Limited computer use  Frequent HA  Decreased ROM  Decreased activity tolerance   Difficulty participating in daily activities   Difficulty participating in vocational pursuits   Requires skilled supervision to complete and progress HEP      Pt's spiritual, cultural and educational needs considered and pt agreeable to plan of care and GOALS as stated below:   Status as of 1/26/18  Short term goals: 2 weeks, pt agrees to goals set.  1. Pt will perform HEP for cervical ROM  and oculomotor function with supervision to improve carryover of progress and reduce HA. Met 1/16/18- pt reports good compliance  2. Pt will report decrease intensity of HA to 4-5/10 to improve tolerance to daily activities. Met 1/16/18-   3. Pt will demonstrate improved cervical SB AROM to R to 20 deg to equal left and decrease pain/ HA. Met- 26 deg  4. Pt will demonstrate improved cervical AROM for flexion and extension to 50 degrees to demonstrate improved mobility and to decrease pain/ HA. Improved/ partially met- flex= 55 deg, ext= 40 deg     Long term goals: 4 weeks, pt agrees to goals set  5. Pt will report decreased HA occurrence to <daily to demonstrate an improvement of symptoms. Met- pt reports 1 HA last night, 1st in 1-2 weeks  6. Pt will report decreased HA intensity to 3/10 to demonstrate improved tolerance to daily activities. Not met- pt reported last HA kept her up night  7. Pt will demonstrate improved cervical rotation AROM to 70 degrees to demonstrate improved mobility and ROM WFLs. Improved- R= 55 deg, L= 60 deg  8. Pt will perform horizontal saccades at 120 bpm x 30 sec to demonstrate improved oculomotor function to decrease headaches. Improved- 110 bpm x 1 min tolerated on stripped surface        Plan   Continue PT 1-2x weekly under established Plan of Care, with treatment to include: pt education, HEP, therapeutic exercises, gait training, neuromuscular re-education/balance exercises, therapeutic activities, manual therapy, and modalities PRN, to work towards established goals. Pt may be seen by PTA to carry out plan of care.

## 2018-02-12 ENCOUNTER — CLINICAL SUPPORT (OUTPATIENT)
Dept: REHABILITATION | Facility: HOSPITAL | Age: 63
End: 2018-02-12
Attending: PSYCHIATRY & NEUROLOGY
Payer: COMMERCIAL

## 2018-02-12 DIAGNOSIS — M79.642 PAIN OF LEFT HAND: Primary | ICD-10-CM

## 2018-02-12 PROCEDURE — 97018 PARAFFIN BATH THERAPY: CPT | Mod: PO

## 2018-02-12 PROCEDURE — 97110 THERAPEUTIC EXERCISES: CPT | Mod: PO

## 2018-02-12 PROCEDURE — 97035 APP MDLTY 1+ULTRASOUND EA 15: CPT | Mod: PO

## 2018-02-12 NOTE — PROGRESS NOTES
Patient:  Sherry Torres  Clinic #:  [id number]   Date of Note: 02/12/2018   Referring Physician:  Pito Guerrero*  Diagnosis:    Encounter Diagnosis   Name Primary?    Pain of left hand Yes      Start Time: 720  End Time: 815  Total Time: 55 min  Group Time: 0      Subjective: Pt. states splint has helped a lot. She bought a prefab neoprene one to wear during riding on [employer].  Pain: 1-2 out of 10 not too painful    Objective:   Patient seen by OT this session. Treatment  consist of the following:  Paraffin, Ultrasound and Therapeutic exercises  Paraffin: 5 minutes with thumb in flexion stretch.  US: Patient receives ultrasound  for pain control and decreased inflammation @ 100% duty cycle, 3.3 Mhz, applied to MP of thumb, intensity = .6 w/cm2 for 5  Minutes.  Therapeutic exercise: TE: In FOT, thumb flexion, radial abd and palmar abduction. Pt instructed to continue thumb radial abduction x40 reps against gravity, instructed on doing these at home against gravity as well. Continue with splint wear.       Assessment:    Pt reports pain and numbness better. Pt reports good compliance with home exercises and splint wear. Pt will continue to benefit from skilled OT intervention. Patient continues to demonstrate limitation  with  LUE Decreased ROM, Decreased  strength, Decreased pinch strength, Decreased functional hand use and Increased pain     Short Term Goals:  Patient to be IND with HEP and modalities for pain/edema managment.,  Increase AROM to WFL to improve functional grasp for ADLs/work/leisure activities.   Pain less than 3/10 with daily tasks.  Decrease complaints of numbness.   Measure  once pain decreases.    Plan  Sherry Torres to be treated by Occupational Therapy 1 times per week for 4 weeks during the certification period from 1/26/18 to 2/23/2018 to achieve the established goals.      Treatment to include: Paraffin, Fluidotherapy, Manual therapy/joint mobilizations,  Modalities for pain management and Therapeutic exercises/activities., as well as any other treatments deemed necessary based on the patient's needs or progress.

## 2018-02-20 ENCOUNTER — PATIENT MESSAGE (OUTPATIENT)
Dept: HEMATOLOGY/ONCOLOGY | Facility: CLINIC | Age: 63
End: 2018-02-20

## 2018-02-20 ENCOUNTER — APPOINTMENT (OUTPATIENT)
Dept: LAB | Facility: HOSPITAL | Age: 63
End: 2018-02-20
Attending: PHYSICIAN ASSISTANT
Payer: COMMERCIAL

## 2018-02-20 ENCOUNTER — OFFICE VISIT (OUTPATIENT)
Dept: HEMATOLOGY/ONCOLOGY | Facility: CLINIC | Age: 63
End: 2018-02-20
Payer: COMMERCIAL

## 2018-02-20 VITALS
SYSTOLIC BLOOD PRESSURE: 145 MMHG | WEIGHT: 155 LBS | RESPIRATION RATE: 16 BRPM | DIASTOLIC BLOOD PRESSURE: 65 MMHG | HEIGHT: 65 IN | HEART RATE: 90 BPM | BODY MASS INDEX: 25.83 KG/M2 | TEMPERATURE: 98 F | OXYGEN SATURATION: 98 %

## 2018-02-20 DIAGNOSIS — R30.0 DYSURIA: ICD-10-CM

## 2018-02-20 DIAGNOSIS — Z17.0 MALIGNANT NEOPLASM OF UPPER-OUTER QUADRANT OF RIGHT BREAST IN FEMALE, ESTROGEN RECEPTOR POSITIVE: Primary | ICD-10-CM

## 2018-02-20 DIAGNOSIS — N39.0 URINARY TRACT INFECTION WITHOUT HEMATURIA, SITE UNSPECIFIED: ICD-10-CM

## 2018-02-20 DIAGNOSIS — R30.0 DYSURIA: Primary | ICD-10-CM

## 2018-02-20 DIAGNOSIS — C50.411 MALIGNANT NEOPLASM OF UPPER-OUTER QUADRANT OF RIGHT BREAST IN FEMALE, ESTROGEN RECEPTOR POSITIVE: Primary | ICD-10-CM

## 2018-02-20 LAB
BACTERIA #/AREA URNS AUTO: ABNORMAL /HPF
BILIRUB UR QL STRIP: NEGATIVE
CLARITY UR REFRACT.AUTO: ABNORMAL
COLOR UR AUTO: YELLOW
GLUCOSE UR QL STRIP: NEGATIVE
HGB UR QL STRIP: ABNORMAL
KETONES UR QL STRIP: NEGATIVE
LEUKOCYTE ESTERASE UR QL STRIP: ABNORMAL
MICROSCOPIC COMMENT: ABNORMAL
NITRITE UR QL STRIP: NEGATIVE
PH UR STRIP: 5 [PH] (ref 5–8)
PROT UR QL STRIP: NEGATIVE
RBC #/AREA URNS AUTO: 2 /HPF (ref 0–4)
SP GR UR STRIP: 1.02 (ref 1–1.03)
URN SPEC COLLECT METH UR: ABNORMAL
UROBILINOGEN UR STRIP-ACNC: NEGATIVE EU/DL
WBC #/AREA URNS AUTO: >100 /HPF (ref 0–5)
WBC CLUMPS UR QL AUTO: ABNORMAL

## 2018-02-20 PROCEDURE — 99214 OFFICE O/P EST MOD 30 MIN: CPT | Mod: SA,S$GLB,, | Performed by: PHYSICIAN ASSISTANT

## 2018-02-20 PROCEDURE — 3008F BODY MASS INDEX DOCD: CPT | Mod: S$GLB,,, | Performed by: PHYSICIAN ASSISTANT

## 2018-02-20 PROCEDURE — 99999 PR PBB SHADOW E&M-EST. PATIENT-LVL IV: CPT | Mod: PBBFAC,,, | Performed by: PHYSICIAN ASSISTANT

## 2018-02-20 PROCEDURE — 81001 URINALYSIS AUTO W/SCOPE: CPT

## 2018-02-20 RX ORDER — CIPROFLOXACIN 500 MG/1
500 TABLET ORAL 2 TIMES DAILY
Qty: 20 TABLET | Refills: 0 | Status: SHIPPED | OUTPATIENT
Start: 2018-02-20 | End: 2018-02-27

## 2018-02-20 RX ORDER — LETROZOLE 2.5 MG/1
2.5 TABLET, FILM COATED ORAL DAILY
Qty: 90 TABLET | Refills: 3 | Status: SHIPPED | OUTPATIENT
Start: 2018-02-20 | End: 2020-02-19 | Stop reason: SDUPTHER

## 2018-02-20 NOTE — PROGRESS NOTES
Subjective:       Patient ID: Sherry Torres is a 62 y.o. female.    Chief Complaint: Malignant neoplasm of upper-outer quadrant of right breast i    Ms Torres is a 62-year-old female seen in follow-up  of right breast cancer.   She had stage II A, strongly ER and IL positive and HER-2 negative disease. She is on letrozole therapy.      She fell last fall and sustained a laceration to her left forehead and nose which required multiple stitches.  She was treated at [facility] and per patient brain MRI was negative.  She was subsequently seen by neurology for that syncopal episode and no direct etiology uncovered. She just completed a course of Physical therapy for injuries associated with that fall.     Tolerating letrozole well. No fever, chills, nausea, vomiting.     Breast history: She has a history of atypical ductal hyperplasia and atypical lobular hyperplasia in the past (12/2008). That biopsy was done to evaluate calcifications.    She noted an abnormality on self examination at the end of July or early August 2016.  On August 8 she underwent diagnostic mammogram which showed an irregular mass was plated margins in the middle right breast and o'clock position.  By ultrasound this was a solid 1.7 x 1.0 x 1.5 cm mass.    On August 9 a core needle biopsy showed infiltrating ductal carcinoma which was well differentiated (histologic grade 2, nuclear grade 2, mitotic index 1).  The tumor was 100% ER positive, 70% IL positive and HER-2 1+.  On August 25 right breast lumpectomy and sentinel lymph node biopsy was performed.  That showed a 14 mm low-grade infiltrating ductal carcinoma.    The sentinel lymph node was positive for 1.5 mm micrometastasis.      Final pathological stage TI cN1 stage II    Mammaprint genomic assay  showed that she was low risk.  Score was +0.336 with a 5 year risk of distant recurrence at 5% and a 10 year risk at 10%.      She completed radiation in December 2016 and began Letrozole at that  time.         Review of Systems   Constitutional: Negative for appetite change and unexpected weight change.   Eyes: Negative for visual disturbance.   Respiratory: Negative for cough and shortness of breath.    Cardiovascular: Negative for chest pain.   Gastrointestinal: Negative for abdominal pain and diarrhea.   Genitourinary: Positive for dysuria and frequency. Negative for hematuria.   Musculoskeletal: Positive for back pain (low back pain with UTI symtptoms).   Skin: Negative for rash.   Neurological: Positive for headaches.   Hematological: Negative for adenopathy.   Psychiatric/Behavioral: The patient is nervous/anxious.        Objective:      Physical Exam   Constitutional: She is oriented to person, place, and time. She appears well-developed and well-nourished. No distress.   NAD  Presents alone   HENT:   Head: Normocephalic.   Mouth/Throat: Oropharynx is clear and moist. No oropharyngeal exudate.   Eyes: Conjunctivae are normal. Pupils are equal, round, and reactive to light. No scleral icterus.   Neck: Normal range of motion. Neck supple. No thyromegaly present.   Cardiovascular: Normal rate and regular rhythm.    Pulmonary/Chest: Effort normal and breath sounds normal. No respiratory distress.   Right breast with well healed lumpectomy and SLNB incisions. No mass, nodule or skin changes. Left breast without mass, nodule or skin changes. No axillary or supraclavicular adenopathy.    Abdominal: Soft. Bowel sounds are normal. She exhibits no distension and no mass. There is no tenderness.   Musculoskeletal: Normal range of motion. She exhibits no edema or tenderness.   No spinal or paraspinal tenderness to palpation     Lymphadenopathy:     She has no cervical adenopathy.   Neurological: She is alert and oriented to person, place, and time. No cranial nerve deficit.   Skin: Skin is warm and dry.   Psychiatric: She has a normal mood and affect. Her behavior is normal. Judgment and thought content normal.    Vitals reviewed.      Assessment:       1. Malignant neoplasm of upper-outer quadrant of right breast in female, estrogen receptor positive    2. Dysuria    3. Urinary tract infection without hematuria, site unspecified        Plan:       1)continue Letrozole and return to clinic in 3 months. Mammogram due April 2018.  2-3) UA shows UTI, RX for cipro sent to patient's pharmacy and she knows to call clinic if symptoms do not improve.    Distress Screening Results: Psychosocial Distress screening score of Distress Score: 8 noted and reviewed. No intervention indicated.

## 2018-02-22 ENCOUNTER — CLINICAL SUPPORT (OUTPATIENT)
Dept: REHABILITATION | Facility: HOSPITAL | Age: 63
End: 2018-02-22
Attending: PSYCHIATRY & NEUROLOGY
Payer: COMMERCIAL

## 2018-02-22 DIAGNOSIS — M79.642 PAIN OF LEFT HAND: Primary | ICD-10-CM

## 2018-02-22 PROCEDURE — 97035 APP MDLTY 1+ULTRASOUND EA 15: CPT | Mod: PO

## 2018-02-22 PROCEDURE — 97110 THERAPEUTIC EXERCISES: CPT | Mod: PO

## 2018-02-22 NOTE — PROGRESS NOTES
Patient:  Sherry Torres  Clinic #:  [id number]   Date of Note: 02/22/2018   Referring Physician:  Pito Guerrero*  Diagnosis:    Encounter Diagnosis   Name Primary?    Pain of left hand Yes      Start Time: 1355  End Time: 1435  Total Time:40min  Group Time: 0  Visit #5.     Subjective:   Pain: 1-2 out of 10 not too painful. Painful with palpation of lateral IP of thumb.    Objective:   Patient seen by OT this session. Treatment  consist of the following:  Paraffin, Ultrasound and Therapeutic exercises  Paraffin: 10 minutes with thumb in flexion stretch.  US: Patient receives ultrasound  for pain control and decreased inflammation @ 100% duty cycle, 3.3 Mhz, applied to MP of thumb, intensity = .6 w/cm2 for 8  Minutes.  Therapeutic exercise: TE: thumb flexion of MP and IP, radial abd and palmar abduction. Pt instructed to continue HEP.  Continue with splint wear.       Assessment:    Pt reports pain and numbness better. Pt reports good compliance with home exercises and splint wear. Pt will continue to benefit from skilled OT intervention. Patient continues to demonstrate limitation  with  LUE Decreased ROM, Decreased  strength, Decreased pinch strength, Decreased functional hand use and Increased pain     Short Term Goals:  Patient to be IND with HEP and modalities for pain/edema managment.,  Increase AROM to WFL to improve functional grasp for ADLs/work/leisure activities.   Pain less than 3/10 with daily tasks.  Decrease complaints of numbness.   Measure  once pain decreases.    Plan  Sherry Torres to be treated by Occupational Therapy 1 times per week for 4 weeks during the certification period from 1/26/18 to 2/23/2018 to achieve the established goals. Extend 2 weeks to assure goals maintained.      Treatment to include: Paraffin, Fluidotherapy, Manual therapy/joint mobilizations, Modalities for pain management and Therapeutic exercises/activities., as well as any other treatments deemed  necessary based on the patient's needs or progress.

## 2018-03-01 ENCOUNTER — CLINICAL SUPPORT (OUTPATIENT)
Dept: REHABILITATION | Facility: HOSPITAL | Age: 63
End: 2018-03-01
Attending: PSYCHIATRY & NEUROLOGY
Payer: COMMERCIAL

## 2018-03-01 DIAGNOSIS — M79.642 PAIN OF LEFT HAND: ICD-10-CM

## 2018-03-01 PROCEDURE — 97018 PARAFFIN BATH THERAPY: CPT | Mod: PO

## 2018-03-01 PROCEDURE — 97110 THERAPEUTIC EXERCISES: CPT | Mod: PO

## 2018-03-01 PROCEDURE — 97035 APP MDLTY 1+ULTRASOUND EA 15: CPT | Mod: PO

## 2018-03-01 NOTE — PROGRESS NOTES
Patient:  Sherry ACEVEDO [facility] #:  [id number]   Date of Note: 03/01/2018   Referring Physician:  Pito Guerrero*  Diagnosis:    Encounter Diagnosis   Name Primary?    Pain of left hand       Start Time: 2:00 pm  End Time: 2:45 pm  Total Time:45 min  Group Time: 0  Visit #6.     Subjective:   Pain: 2-3 out of 10 upon arrival     Objective:   Patient seen by OT this session. Treatment  consist of the following:  Paraffin, Ultrasound and Therapeutic exercises  Paraffin: 10 minutes with thumb in flexion stretch.  US: Patient receives ultrasound  for pain control and decreased inflammation @ 100% duty cycle, 3.3 Mhz, applied to MP of thumb, intensity = .6 w/cm2 for 8  Minutes.  Therapeutic exercise: TE: Pt performed the following in fluidotherapy: thumb flexion of MP and IP, radial abd and isometric palmar abduction against wall of fluido machine, thumb extension, gentle gripping of soft sponge x2 mins each exercise. Yellow theraputty thumb and index raking (MP and IP flexion) with puttyciser #1 Pt instructed to continue HEP, and add gentle gripping of soft sponge in pain free range.      Assessment:  Pt with no reports of pain during any exercises, with good tolerance of gentle sponge gripping, isometric thumb palmar abduction and theraputty raking. Pt feels as though thumb is making good improvements. Pt encouraged to continue wearing splint. Plan is to supply HEP for isometric HEP next session.  Pt reports pain and numbness better. Pt reports good compliance with home exercises and splint wear. Pt will continue to benefit from skilled OT intervention. Patient continues to demonstrate limitation  with  LUE Decreased ROM, Decreased  strength, Decreased pinch strength, Decreased functional hand use and Increased pain     Short Term Goals:  Patient to be IND with HEP and modalities for pain/edema managment.  Increase AROM to WFL to improve functional grasp for ADLs/work/leisure activities.   Pain less than  3/10 with daily tasks.  Decrease complaints of numbness.   Measure  once pain decreases.    Plan  Sherry Torres to be treated by Occupational Therapy 1 times per week for 4 weeks during the certification period from 1/26/18 to 2/23/2018 to achieve the established goals. Extend 2 weeks to assure goals maintained.      Treatment to include: Paraffin, Fluidotherapy, Manual therapy/joint mobilizations, Modalities for pain management and Therapeutic exercises/activities., as well as any other treatments deemed necessary based on the patient's needs or progress.

## 2018-03-08 ENCOUNTER — CLINICAL SUPPORT (OUTPATIENT)
Dept: REHABILITATION | Facility: HOSPITAL | Age: 63
End: 2018-03-08
Attending: PSYCHIATRY & NEUROLOGY
Payer: COMMERCIAL

## 2018-03-08 DIAGNOSIS — M79.642 PAIN OF LEFT HAND: ICD-10-CM

## 2018-03-08 PROCEDURE — 97018 PARAFFIN BATH THERAPY: CPT | Mod: PO

## 2018-03-08 PROCEDURE — 97110 THERAPEUTIC EXERCISES: CPT | Mod: PO

## 2018-03-08 NOTE — PROGRESS NOTES
"Patient:  Sherry ACEVEDO [facility] #:  [id number]   Date of Note: 03/08/2018   Referring Physician:  Pito Guerrero*  Diagnosis:    Encounter Diagnosis   Name Primary?    Pain of left hand       Start Time: 9:00 pm  End Time: 2:45 pm  Total Time:45 min  Group Time: 0    Visit #7.     Subjective: "My thumb is doing a lot better just a slight pain maybe about a 1."      Pain: 1 out of 10 upon arrival     Objective:   Patient seen by OT this session. Treatment  consist of the following:  Paraffin, Ultrasound and Therapeutic exercises  Paraffin: 8 minutes.  US: Patient receives ultrasound  for pain control and decreased inflammation @ 100% duty cycle, 3.3 Mhz, applied to MP of thumb, intensity = .6 w/cm2 for 8  Minutes.  Therapeutic exercise: TE:  Yellow theraputty thumb and index raking (MP and IP flexion) with puttyciser #1  Pt. Educated on and performed the following using yellow puttry for strengthening of opponens pollcis , abductor pollicis brevis and flexor pollicis brevis muscle: lateral, 2pt, and 3 pt pinch, thumb mp flexion and gross finger flexion squeezing putty x2 mins each with these same 5 exercises to be done as strengthening HEP x30 reps to be done every other day.  Pt also performed thump IP extension using yellow putty, digit extension using yellow rubber band, thumb palmar ABD and thumb radial ABD using yellow rubberband as well as gripping using green (5lbs) digiflex x2 mins each.        :  L: 30  R: 45    Pinch:   L R  Lateral  8 12   3pt  8 11   2pt  6 7    Assessment:  Pt with no reports of pain during any exercises, with good tolerance of yellow putty and rubber band resistive/strengthening exercises added on this date.  Pt feels as though thumb is making good improvements. Pt given the following strengthening HEP using yellow putty: lateral, 2pt, and 3 pt pinch, thumb mp flexion and gross finger flexion squeezing putty x30 reps to be done every other day. Pt reports good compliance " with previously given home exercises and splint wear. Pt will continue to benefit from skilled OT intervention. Patient continues to demonstrate limitation  with  LUE Decreased ROM, Decreased  strength, Decreased pinch strength, Decreased functional hand use and Increased pain     Short Term Goals:  Patient to be IND with HEP and modalities for pain/edema managment.  Increase AROM to WFL to improve functional grasp for ADLs/work/leisure activities.   Pain less than 3/10 with daily tasks.  Decrease complaints of numbness.   Measure  once pain decreases.    Plan  Sherry ACEVEDO Brian to be treated by Occupational Therapy 1 times per week for 8 weeks during the certification period from 1/26/18 to 2/23/2018 to achieve the established goals.Extend 4 weeks to assure goals maintained.

## 2018-03-08 NOTE — PLAN OF CARE
"Patient:  Sherry ACEVEDO [facility] #:  [id number]   Date of Note: 03/08/2018   Referring Physician:  Pito Guerrero*  Diagnosis:    Encounter Diagnosis   Name Primary?    Pain of left hand       Start Time: 9:00 pm  End Time: 2:45 pm  Total Time:45 min  Group Time: 0    Visit #7.     Subjective: "My thumb is doing a lot better just a slight pain maybe about a 1."      Pain: 1 out of 10 upon arrival     Objective:   Patient seen by OT this session. Treatment  consist of the following:  Paraffin, Ultrasound and Therapeutic exercises  Paraffin: 8 minutes.  US: Patient receives ultrasound  for pain control and decreased inflammation @ 100% duty cycle, 3.3 Mhz, applied to MP of thumb, intensity = .6 w/cm2 for 8  Minutes.  Therapeutic exercise: TE:  Yellow theraputty thumb and index raking (MP and IP flexion) with puttyciser #1  Pt. Educated on and performed the following using yellow puttry for strengthening of opponens pollcis , abductor pollicis brevis and flexor pollicis brevis muscle: lateral, 2pt, and 3 pt pinch, thumb mp flexion and gross finger flexion squeezing putty x2 mins each with these same 5 exercises to be done as strengthening HEP x30 reps to be done every other day.  Pt also performed thump IP extension using yellow putty, digit extension using yellow rubber band, thumb palmar ABD and thumb radial ABD using yellow rubberband as well as gripping using green (5lbs) digiflex x2 mins each.        :  L: 30  R: 45    Pinch:   L R  Lateral  8 12   3pt  8 11   2pt  6 7    Assessment:  Pt with no reports of pain during any exercises, with good tolerance of yellow putty and rubber band resistive/strengthening exercises added on this date.  Pt feels as though thumb is making good improvements. Pt given the following strengthening HEP using yellow putty: lateral, 2pt, and 3 pt pinch, thumb mp flexion and gross finger flexion squeezing putty x30 reps to be done every other day. Pt reports good compliance " with previously given home exercises and splint wear. Pt will continue to benefit from skilled OT intervention. Patient continues to demonstrate limitation  with  LUE Decreased ROM, Decreased  strength, Decreased pinch strength, Decreased functional hand use and Increased pain     Short Term Goals:  Patient to be IND with HEP and modalities for pain/edema managment.  Increase AROM to WFL to improve functional grasp for ADLs/work/leisure activities.   Pain less than 3/10 with daily tasks.  Decrease complaints of numbness.   Measure  once pain decreases.    Plan  Sherry ACEVEDO Brian to be treated by Occupational Therapy 1 times per week for 8 weeks during the certification period from 1/26/18 to 2/23/2018 to achieve the established goals.Extend 4 weeks to assure goals maintained.

## 2018-03-13 ENCOUNTER — CLINICAL SUPPORT (OUTPATIENT)
Dept: REHABILITATION | Facility: HOSPITAL | Age: 63
End: 2018-03-13
Attending: PSYCHIATRY & NEUROLOGY
Payer: COMMERCIAL

## 2018-03-13 DIAGNOSIS — M79.642 PAIN OF LEFT HAND: ICD-10-CM

## 2018-03-13 PROCEDURE — 97035 APP MDLTY 1+ULTRASOUND EA 15: CPT | Mod: PO

## 2018-03-13 PROCEDURE — 97018 PARAFFIN BATH THERAPY: CPT | Mod: PO

## 2018-03-13 PROCEDURE — 97110 THERAPEUTIC EXERCISES: CPT | Mod: PO

## 2018-03-13 NOTE — PROGRESS NOTES
"Patient:  Sherry Torres  [facility] #:  [id number]   Date of Note: 03/13/2018   Referring Physician:  Pito Guerrero*  Diagnosis:    Encounter Diagnosis   Name Primary?    Pain of left hand       Start Time: 3:00 pm  End Time: 4:00 pm  Total Time:45 min  Group Time: 0    Visit #8.     Subjective: "Thursday night my thumb was aching, it was really sore, I think I just over did it."      Pain: 3 out of 10 upon arrival  2/10 upon end of session     Objective:   Patient seen by OT this session. Treatment  consist of the following:  Paraffin, Ultrasound and Therapeutic exercises  Paraffin: 8 minutes.  US: Patient receives ultrasound  for pain control and decreased inflammation @ 20% duty cycle, 3.3 Mhz, applied to MP of thumb, intensity = .6 w/cm2 for 8  Minutes.  Therapeutic exercise: TE:  Pt performed the following in fluido: thumb flexion, extension, palmar ABD, radial ABD, circles clockwise and counter clock wise, opposition with pink slides x 2 mins each. Pt performed green Digiflex, thumb IP flexion and extension and thumb MP extension x2 mins each.     :  L: 30  R: 45    Pinch:   L R  Lateral  8 12   3pt  8 11   2pt  6 7    Assessment:  Pt reports exercises last session may have been too much, he reported soreness in her thumb Thursday night, and discontinued HEP until Sunday. Only performed AROM exercises on this date, no resistive exercises, pt with good tolerance. Pt will continue to benefit from skilled OT intervention. Patient continues to demonstrate limitation  with  LUE Decreased ROM, Decreased  strength, Decreased pinch strength, Decreased functional hand use and Increased pain     Short Term Goals:  Patient to be IND with HEP and modalities for pain/edema managment.  Increase AROM to WFL to improve functional grasp for ADLs/work/leisure activities.   Pain less than 3/10 with daily tasks.  Decrease complaints of numbness.   Measure  once pain decreases.    Plan  Sherry Torres to be " treated by Occupational Therapy 1 times per week for 8 weeks during the certification period from 1/26/18 to 3/23/2018 to achieve the established goals.Extend 4 weeks to assure goals maintained.

## 2018-03-20 ENCOUNTER — CLINICAL SUPPORT (OUTPATIENT)
Dept: REHABILITATION | Facility: HOSPITAL | Age: 63
End: 2018-03-20
Attending: PSYCHIATRY & NEUROLOGY
Payer: COMMERCIAL

## 2018-03-20 DIAGNOSIS — M79.642 PAIN OF LEFT HAND: ICD-10-CM

## 2018-03-20 PROCEDURE — 97018 PARAFFIN BATH THERAPY: CPT | Mod: PO

## 2018-03-20 PROCEDURE — 97110 THERAPEUTIC EXERCISES: CPT | Mod: PO

## 2018-03-20 NOTE — PROGRESS NOTES
"Patient:  Sherry ACEVEDO [facility] #:  [id number]   Date of Note: 03/20/2018   Referring Physician:  Pito Guerrero*  Diagnosis:    Encounter Diagnosis   Name Primary?    Pain of left hand       Start Time: 3:00 pm  End Time: 4:00 pm  Total Time:45 min  Group Time: 0    Visit #8.     Subjective: "It's feeling much much better today, but it really has been almost a week pain-free."      Pain: 0 out of 10 upon arrival  2/10 upon end of session     Objective:   Patient seen by OT this session. Treatment  consist of the following:  Paraffin, Ultrasound and Therapeutic exercises  Paraffin: 8 minutes.  Therapeutic exercise: TE: TE:  Yellow theraputty thumb and index raking (MP and IP flexion) with puttyciser #1  Pt. Educated on and performed the following using yellow puttry for strengthening of opponens pollcis , abductor pollicis brevis and flexor pollicis brevis muscle: lateral, 2pt, and 3 pt pinch, thumb mp flexion and gross finger flexion squeezing putty x2 mins each   Pt also performed thump IP extension using yellow putty, digit extension using yellow rubber band, thumb palmar ABD and thumb radial ABD using yellow rubberband as well as gripping using green (5lbs) digiflex x2 mins each.    Thumb Opposition:  WNL     L/R  Palmar Abduction:    40/43  Radial Abduction:     40/40  Thumb MP:          56/52  Thumb IP:          86/90       Wrist Ext/Flex: WNL  Wrist RD/UD: WNL  Supination/Pronation: WNL      :  L: 37 (+7)  R: 50 (+5)    Pinch:   L  R  Lateral  11 (+3)  13   3pt  8  10   2pt  6  8    Assessment:  Pt reported thumb was doing much better this session, reports it has been almost a week pain-free. Resumed with thumb,  and pinch strengthening on this date, pt with good tolerance, all strengthening kept in a pain free range.Pt ahs been pain-free for a bout a week now.   Pt will continue to benefit from skilled OT intervention. Pt will return next week for one more session, if pt continues " pain-free and doing well, she will be D/C from OT.     Short Term Goals:  Patient to be IND with HEP and modalities for pain/edema managment. MET  Increase AROM to WFL to improve functional grasp for ADLs/work/leisure activities. MET  Pain less than 3/10 with daily tasks. MET  Decrease complaints of numbness. MET  Measure  once pain decreases. taken. completed    Plan  Sherry ACEVEDO Brian to be treated by Occupational Therapy 1 times per week for 8 weeks during the certification period from 1/26/18 to 3/23/2018 to achieve the established goals.Extend 4 weeks to assure goals maintained.

## 2018-03-27 ENCOUNTER — CLINICAL SUPPORT (OUTPATIENT)
Dept: REHABILITATION | Facility: HOSPITAL | Age: 63
End: 2018-03-27
Attending: PSYCHIATRY & NEUROLOGY
Payer: COMMERCIAL

## 2018-03-27 DIAGNOSIS — M79.642 PAIN OF LEFT HAND: ICD-10-CM

## 2018-03-27 PROCEDURE — 97110 THERAPEUTIC EXERCISES: CPT | Mod: PO

## 2018-03-27 PROCEDURE — 97018 PARAFFIN BATH THERAPY: CPT | Mod: PO

## 2018-03-27 NOTE — PROGRESS NOTES
"Patient:  Sherry ACEVEDO [facility] #:  [id number]   Date of Note: 03/27/2018   Referring Physician:  Pito Guerrero*  Diagnosis:    Encounter Diagnosis   Name Primary?    Pain of left hand       Start Time: 3:00 pm  End Time: 3:50 pm  Total Time:45 min  Group Time: 0     Subjective: "I feel like i'm no longer restricted with the use of this thumb."      Pain: 0 out of 10 upon arrival       Objective:   Patient seen by OT this session. Treatment  consist of the following:  Paraffin, Ultrasound and Therapeutic exercises  Paraffin: 8 minutes.  Therapeutic exercise: TE: TE:  Yellow theraputty thumb and index raking (MP and IP flexion) with puttyciser #1  Pt. Educated on and performed the following using yellow puttry for strengthening of opponens pollcis , abductor pollicis brevis and flexor pollicis brevis muscle: lateral, 2pt, and 3 pt pinch, thumb mp flexion and gross finger flexion squeezing putty x2 mins each   Pt also performed thump IP extension using yellow putty, digit extension using yellow rubber band, thumb palmar ABD and thumb radial ABD using yellow rubberband as well as gripping using green (5lbs) digiflex x2 mins each.  Pt given red theraputty for additional resistance with strengthening HEP.   Thumb Opposition:  WNL     L/R  Palmar Abduction:    45 (+5)/45  Radial Abduction:     40/40  Thumb MP:          56/52  Thumb IP:          90/90       Wrist Ext/Flex: WNL  Wrist RD/UD: WNL  Supination/Pronation: WNL      :  L: 37   R: 50 (+5)    Pinch:   L  R  Lateral  11   13   3pt  10 (+2)  10   2pt  6  8    Assessment:  Pt reported thumb has continued doing well since last session, reports it has been almost a week and a half pain-free. Only mild soreness with red theraputty exercises.  Pt with good tolerance of all exercises on this date, pt in agreement with D/C, feels her thumb is no longer restricted.      Short Term Goals:  Patient to be IND with HEP and modalities for pain/edema managment. " MET  Increase AROM to WFL to improve functional grasp for ADLs/work/leisure activities. MET  Pain less than 3/10 with daily tasks. MET  Decrease complaints of numbness. MET  Measure  once pain decreases. taken. Completed MET    Plan  Sherry Torres to be D/C from OT services on this date, she is a pt of  and attended 10/10 OT sessions and canceled 2 OT appointments, she met 4/4 OT goals and reports being pain free, and feels like she is no longer restricted with her thumb, she is in agreement with D/C on this date.  Pt given red theraputty for increased resistance with strengthening HEP to be done as needed.

## 2018-03-29 ENCOUNTER — OFFICE VISIT (OUTPATIENT)
Dept: DERMATOLOGY | Facility: CLINIC | Age: 63
End: 2018-03-29

## 2018-03-29 DIAGNOSIS — Z41.1 ELECTIVE PROCEDURE FOR UNACCEPTABLE COSMETIC APPEARANCE: Primary | ICD-10-CM

## 2018-03-29 PROCEDURE — 99499 UNLISTED E&M SERVICE: CPT | Mod: CSM,S$GLB,, | Performed by: DERMATOLOGY

## 2018-03-29 NOTE — PROGRESS NOTES
Pt here for cosmetic Botox treatment.   Pt denies any new medical problems or medications.   Pt denies any history of adverse reaction to Botox or history of neuromuscular disease. Pt denies current pregnancy.     Discussed benefits and risks of Botox injections, including headache, weakness/paralysis of muscles, asymmetry, eyebrow/lid drooping, pain, bruising, swelling, infection, and rare risk of systemic botulism. Verbal and written consent was obtained.    Pt agreed to proceed with treatment. 30 units total injected today (10 units total across forehead (4 on R, 6 on L), 3-5-4-5-3 in glabellar region. Tolerated well with no complications. Pt instructed not to massage treated areas and that she should avoid exercise today.    Botox dilution: 2:1 in glabella (4 units of 5:1 dilution were inadvertently injected into right  and procerus), 5:1 in forehead  Lot #: [id number]  Exp date: 04/2020

## 2018-04-09 ENCOUNTER — DOCUMENTATION ONLY (OUTPATIENT)
Dept: REHABILITATION | Facility: HOSPITAL | Age: 63
End: 2018-04-09

## 2018-04-09 NOTE — PROGRESS NOTES
PHYSICAL THERAPY DISCHARGE SUMMARY     Name: Sherry Torres  [facility] Number: [id number]    Diagnosis: R55 (ICD-10-CM) - Syncope and collapse  Encounter Diagnosis:   1. New onset of headaches      2. Decreased ROM of neck      3. Impaired motor control           Physician: Pito Guerrero, perform Tomer Tejeda  Treatment Orders: PT Eval and Treat  Past Medical History:   Diagnosis Date    Atypical ductal hyperplasia, breast 2008    left side    Breast cancer 08/2016    right side    Breast cyst approx. 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    Mitral valve prolapse     Psychiatric problem        Initial visit: 1/2/2018  Date of Last visit: 2/5/2018  Total Visits Received: 9    DME recommended: none  HEP provided:scap stars YTB, russell wings, Lower trap Y's, scalene stretch, chin tuck, levator scap stretch, upper trap stretch, AROM cervical rotation, scap retraction, pec doorway stretch (2 positions)   Pain: denied at last visit    OBJECTIVE     ROM:   Cervical- AROM:   Flex= 55 deg  Ext= 55 deg   Rotation: R= 86 deg, L=  86 degrees    UPPER EXTREMITY--AROM/PROM  Grossly WFLs    RANGE OF MOTION--LOWER EXTREMITIES  Grossly WFLs    Strength: manual muscle test grades below   Upper Extremity Strength  Grossly WFLs    Lower Extremity Strength  Grossly WFLs     Evaluation   Single Limb Stance R LE Not tested due to unexpected d/c  (<10 sec = HIGH FALL RISK)   Single Limb Stance L LE Not tested due to unexpected d/c  (<10 sec = HIGH FALL RISK)   30 second Chair Rise Not tested due to unexpected d/c   5 times sit-stand Not tested due to unexpected d/c     Gait Assessment:   - AD used: none  - Assistance: I  - Distance: community  - Curb: I  - Ramp:  I     Evaluation   Timed Up and Go Not tested due to unexpected d/c   Self Selected Walking Speed Not tested due to unexpected d/c   Fast  Walking Speed .Not tested due to unexpected d/c     Functional Mobility (Bed mobility, transfers)  Bed mobility: I  Supine to sit: I  Sit to supine: I  Rolling: I  Transfers to bed: I  Transfers to toilet: I  Sit to stand:  I  Stand pivot:  I  Car transfers: I  Wheelchair mobility: NA  Floor transfers: NT      ASSESSMENT   62 year old female with diagnosis of syncope and collapse referred to [facility] received skilled outpatient PT 1/2/2018 to 2/5/2018. Pt did well with PT demonstrating improvements in vestibular system (decreased dizziness and improved balance), improved cervical ROM, and decreased HA intensity and occurrence. Pt demonstrated normalize saccades after PT treatment. Pt denied any LOB or falls. HA occurrence was less than daily. Pt was scheduled an additional PT session after pt was scheduled for strenuous activity (riding in parade) to determine if symptoms returned. Pt did not attend final follow up session, PT assumes pt's symptoms did not return after returning to PLOF. Pt d/c, maximizing PT benefits and returning to PLOF.       Status Towards Goals Met:     Status as of 1/26/18  Short term goals: 2 weeks, pt agrees to goals set.  1. Pt will perform HEP for cervical ROM and oculomotor function with supervision to improve carryover of progress and reduce HA. Met   2. Pt will report decrease intensity of HA to 4-5/10 to improve tolerance to daily activities. Met   3. Pt will demonstrate improved cervical SB AROM to R to 20 deg to equal left and decrease pain/ HA. Met- 26 deg  4. Pt will demonstrate improved cervical AROM for flexion and extension to 50 degrees to demonstrate improved mobility and to decrease pain/ HA. Met- 55 deg     Long term goals: 4 weeks, pt agrees to goals set  5. Pt will report decreased HA occurrence to <daily to demonstrate an improvement of symptoms. Met  6. Pt will report decreased HA intensity to 3/10 to demonstrate improved tolerance to daily  activities. Inconsistent- pt reported occasional HA breakthrough pain  7. Pt will demonstrate improved cervical rotation AROM to 70 degrees to demonstrate improved mobility and ROM WFLs. met- R= 86 deg, L= 86 deg  8. Pt will perform horizontal saccades at 120 bpm x 30 sec to demonstrate improved oculomotor function to decrease headaches. met- 120 bpm x 1 min tolerated on stripped surface- no symptoms reported      Goals Not achieved and why: pt reported 1 significant HA in the week prior to the last attended session.     Discharge reason : Met goals and Patient has maximized benefit from PT at this time    PLAN   This patient is discharged from Physical Therapy Services.         Susy Nicole, PT  04/09/2018

## 2018-04-26 ENCOUNTER — HOSPITAL ENCOUNTER (OUTPATIENT)
Dept: RADIOLOGY | Facility: HOSPITAL | Age: 63
Discharge: HOME | End: 2018-04-26
Attending: PHYSICIAN ASSISTANT
Payer: COMMERCIAL

## 2018-04-26 DIAGNOSIS — Z17.0 MALIGNANT NEOPLASM OF UPPER-OUTER QUADRANT OF RIGHT BREAST IN FEMALE, ESTROGEN RECEPTOR POSITIVE: ICD-10-CM

## 2018-04-26 DIAGNOSIS — C50.411 MALIGNANT NEOPLASM OF UPPER-OUTER QUADRANT OF RIGHT BREAST IN FEMALE, ESTROGEN RECEPTOR POSITIVE: ICD-10-CM

## 2018-04-26 PROCEDURE — 77062 BREAST TOMOSYNTHESIS BI: CPT | Mod: TC,PO

## 2018-04-26 PROCEDURE — 77066 DX MAMMO INCL CAD BI: CPT | Mod: 26,,, | Performed by: RADIOLOGY

## 2018-04-26 PROCEDURE — 77062 BREAST TOMOSYNTHESIS BI: CPT | Mod: 26,,, | Performed by: RADIOLOGY

## 2018-07-24 ENCOUNTER — PATIENT MESSAGE (OUTPATIENT)
Dept: ENDOCRINOLOGY | Facility: CLINIC | Age: 63
End: 2018-07-24

## 2018-07-24 ENCOUNTER — PATIENT MESSAGE (OUTPATIENT)
Dept: HEMATOLOGY/ONCOLOGY | Facility: CLINIC | Age: 63
End: 2018-07-24

## 2018-07-24 ENCOUNTER — TELEPHONE (OUTPATIENT)
Dept: OBSTETRICS AND GYNECOLOGY | Facility: CLINIC | Age: 63
End: 2018-07-24

## 2018-07-24 DIAGNOSIS — Z85.850 HISTORY OF THYROID CANCER: ICD-10-CM

## 2018-07-24 DIAGNOSIS — E89.0 POST-SURGICAL HYPOTHYROIDISM: ICD-10-CM

## 2018-07-24 RX ORDER — LEVOTHYROXINE SODIUM 125 UG/1
125 TABLET ORAL DAILY
Qty: 30 TABLET | Refills: 12 | Status: SHIPPED | OUTPATIENT
Start: 2018-07-24 | End: 2019-08-01 | Stop reason: SDUPTHER

## 2018-07-24 NOTE — TELEPHONE ENCOUNTER
----- Message from Scott Triana sent at 7/24/2018 10:17 AM CDT -----  Contact: self  Pt would like to be called back regarding a appt she would like to schedule asap her wellness visit    Pt callback number [phone]

## 2018-07-25 NOTE — PROGRESS NOTES
Subjective:       Patient ID: Sherry Torres is a 62 y.o. female.    Chief Complaint: No chief complaint on file.    HPI Ms Torres is a 62-year-old female seen in follow-up  of right breast cancer.   She had stage II A, strongly ER and WA positive and HER-2 negative disease. She is on letrozole therapy.      Today she reports she has had a area skin rash on her right breast for the last several months.  She has tried some hydrocortisone cream for that without resolution.  The last few weeks she has had some aching sensation in her right breast.  Appetite and bowel function has been good.  She has no shortness of breath.  She continues to have some residual effects from her scalp laceration with loss of sensation.    Last mammogram April 26, 2018-negative  Breast history:     She noted an abnormality on self examination at the end of July or early August 2016.  On August 8 she underwent diagnostic mammogram which showed an irregular mass was plated margins in the middle right breast and o'clock position.  By ultrasound this was a solid 1.7 x 1.0 x 1.5 cm mass.    On August 9 a core needle biopsy showed infiltrating ductal carcinoma which was well differentiated (histologic grade 2, nuclear grade 2, mitotic index 1).  The tumor was 100% ER positive, 70% WA positive and HER-2 1+.  On August 25 right breast lumpectomy and sentinel lymph node biopsy was performed.  That showed a 14 mm low-grade infiltrating ductal carcinoma.    The sentinel lymph node was positive for 1.5 mm micrometastasis.      Final pathological stage TI cN1 stage II    Mammaprint genomic assay  showed that she was low risk.  Score was +0.336 with a 5 year risk of distant recurrence at 5% and a 10 year risk at 10%.      She completed radiation in December 2016 and began Letrozole at that time.  Review of Systems   Constitutional: Negative for appetite change and unexpected weight change.   Eyes: Negative for visual disturbance.   Respiratory:  Negative for cough and shortness of breath.    Cardiovascular: Negative for chest pain.   Gastrointestinal: Positive for abdominal pain. Negative for diarrhea.   Genitourinary: Negative for frequency.   Musculoskeletal: Negative for back pain.   Skin: Positive for rash.   Neurological: Positive for headaches.   Hematological: Negative for adenopathy.   Psychiatric/Behavioral: The patient is nervous/anxious.        Objective:      Physical Exam   Constitutional: She is oriented to person, place, and time. She appears well-developed and well-nourished. No distress.   Eyes: No scleral icterus.   Cardiovascular: Regular rhythm.    Pulmonary/Chest: Effort normal and breath sounds normal. She has no wheezes. She has no rales.       Abdominal: Soft. She exhibits no mass. There is no tenderness.   Lymphadenopathy:     She has no cervical adenopathy.   Neurological: She is alert and oriented to person, place, and time.   Psychiatric: She has a normal mood and affect. Her behavior is normal. Thought content normal.   Vitals reviewed.      Assessment:       1. Malignant neoplasm of upper-outer quadrant of right breast in female, estrogen receptor positive        Plan:       Trial of triamcinolone cream for her right breast rash.  Needs to see Dermatology if that does not resolve.  RTC 3 M   Distress Screening Results: Psychosocial Distress screening score of Distress Score: 0 noted and reviewed. No intervention indicated.

## 2018-07-26 ENCOUNTER — OFFICE VISIT (OUTPATIENT)
Dept: HEMATOLOGY/ONCOLOGY | Facility: CLINIC | Age: 63
End: 2018-07-26
Payer: COMMERCIAL

## 2018-07-26 VITALS
HEART RATE: 71 BPM | BODY MASS INDEX: 25.75 KG/M2 | WEIGHT: 154.56 LBS | RESPIRATION RATE: 18 BRPM | DIASTOLIC BLOOD PRESSURE: 62 MMHG | OXYGEN SATURATION: 97 % | SYSTOLIC BLOOD PRESSURE: 137 MMHG | HEIGHT: 65 IN

## 2018-07-26 DIAGNOSIS — R21 RASH AND NONSPECIFIC SKIN ERUPTION: ICD-10-CM

## 2018-07-26 DIAGNOSIS — Z17.0 MALIGNANT NEOPLASM OF UPPER-OUTER QUADRANT OF RIGHT BREAST IN FEMALE, ESTROGEN RECEPTOR POSITIVE: Primary | ICD-10-CM

## 2018-07-26 DIAGNOSIS — C50.411 MALIGNANT NEOPLASM OF UPPER-OUTER QUADRANT OF RIGHT BREAST IN FEMALE, ESTROGEN RECEPTOR POSITIVE: Primary | ICD-10-CM

## 2018-07-26 PROCEDURE — 99213 OFFICE O/P EST LOW 20 MIN: CPT | Mod: S$GLB,,, | Performed by: INTERNAL MEDICINE

## 2018-07-26 PROCEDURE — 99999 PR PBB SHADOW E&M-EST. PATIENT-LVL III: CPT | Mod: PBBFAC,,, | Performed by: INTERNAL MEDICINE

## 2018-07-26 PROCEDURE — 3008F BODY MASS INDEX DOCD: CPT | Mod: CPTII,S$GLB,, | Performed by: INTERNAL MEDICINE

## 2018-07-26 RX ORDER — TRIAMCINOLONE ACETONIDE 1 MG/G
CREAM TOPICAL 2 TIMES DAILY
Qty: 15 G | Refills: 0 | Status: SHIPPED | OUTPATIENT
Start: 2018-07-26 | End: 2021-04-20

## 2018-08-07 ENCOUNTER — TELEPHONE (OUTPATIENT)
Dept: OBSTETRICS AND GYNECOLOGY | Facility: CLINIC | Age: 63
End: 2018-08-07

## 2018-08-07 NOTE — TELEPHONE ENCOUNTER
----- Message from Rosie Montiel sent at 8/7/2018  9:50 AM CDT -----  Name of Who is Calling: NICK KABA [[id number]]      What is the request in detail: Pt would like to schedule an annual vist     Can the clinic reply by [facility]:  Yes  (Prefered)      [facility] [phone]

## 2018-09-05 ENCOUNTER — OFFICE VISIT (OUTPATIENT)
Dept: GASTROENTEROLOGY | Facility: CLINIC | Age: 63
End: 2018-09-05
Payer: COMMERCIAL

## 2018-09-05 ENCOUNTER — LAB VISIT (OUTPATIENT)
Dept: LAB | Facility: HOSPITAL | Age: 63
End: 2018-09-05
Payer: COMMERCIAL

## 2018-09-05 VITALS
SYSTOLIC BLOOD PRESSURE: 118 MMHG | BODY MASS INDEX: 25.75 KG/M2 | HEIGHT: 65 IN | DIASTOLIC BLOOD PRESSURE: 80 MMHG | HEART RATE: 88 BPM | WEIGHT: 154.56 LBS

## 2018-09-05 DIAGNOSIS — R14.0 ABDOMINAL BLOATING: ICD-10-CM

## 2018-09-05 DIAGNOSIS — K21.9 GASTROESOPHAGEAL REFLUX DISEASE WITHOUT ESOPHAGITIS: ICD-10-CM

## 2018-09-05 DIAGNOSIS — R10.84 GENERALIZED ABDOMINAL PAIN: ICD-10-CM

## 2018-09-05 DIAGNOSIS — K21.9 GASTROESOPHAGEAL REFLUX DISEASE WITHOUT ESOPHAGITIS: Primary | ICD-10-CM

## 2018-09-05 DIAGNOSIS — R19.7 DIARRHEA, UNSPECIFIED TYPE: ICD-10-CM

## 2018-09-05 PROCEDURE — 86677 HELICOBACTER PYLORI ANTIBODY: CPT

## 2018-09-05 PROCEDURE — 3008F BODY MASS INDEX DOCD: CPT | Mod: CPTII,S$GLB,, | Performed by: NURSE PRACTITIONER

## 2018-09-05 PROCEDURE — 99999 PR PBB SHADOW E&M-EST. PATIENT-LVL III: CPT | Mod: PBBFAC,,, | Performed by: NURSE PRACTITIONER

## 2018-09-05 PROCEDURE — 36415 COLL VENOUS BLD VENIPUNCTURE: CPT

## 2018-09-05 PROCEDURE — 99214 OFFICE O/P EST MOD 30 MIN: CPT | Mod: S$GLB,,, | Performed by: NURSE PRACTITIONER

## 2018-09-05 RX ORDER — OMEPRAZOLE 40 MG/1
40 CAPSULE, DELAYED RELEASE ORAL EVERY MORNING
Qty: 90 CAPSULE | Refills: 3 | Status: SHIPPED | OUTPATIENT
Start: 2018-09-05 | End: 2018-12-04

## 2018-09-05 RX ORDER — LOTEPREDNOL ETABONATE AND TOBRAMYCIN 5; 3 MG/ML; MG/ML
SUSPENSION/ DROPS OPHTHALMIC
Refills: 0 | COMMUNITY
Start: 2018-06-05 | End: 2018-10-30

## 2018-09-05 NOTE — PROGRESS NOTES
[facility] Established Patient Visit    Reason for Visit:  The primary encounter diagnosis was Gastroesophageal reflux disease without esophagitis. Diagnoses of Diarrhea, unspecified type, Abdominal bloating, and Generalized abdominal pain were also pertinent to this visit.    PCP: John Jackson    HPI:  This is a 62 y.o. female here for evaluation of GERD and diarrhea.   Last GI visit in 1/2018 with good control of reflux and dysphagia.   Currently reports she has been taking omeprazole 20 mg daily for several months with worsening of reflux for the last several weeks. Has also been taking Zantac 150 mg almost nightly with out improvement in epigastric and retrosternal pyrosis and abdominal queasiness.     Also with diarrhea x 3 weeks, after returning from vacation in [place]. Has been having 3-4 loose stools almost daily. Takes pepto with temporary improvement.       Dysphagia-h/o dysphagia. improvement with daily PPI and esophageal dilation. Now only having problems if eating too fast.     Abdominal pain -gas pains, bloating. Cramping all r/t BMs.   GI bleeding - denies hematochezia, hematemesis, melena, BRBPR, black/tarry stools, and coffee ground emesis  NSAID usage -advil pm HS for 7-8 months.      ROS:  Constitutional: No fevers, no chills, No unintentional weight loss, no fatigue,   ENT: + allergies-has not needed allergy meds since being gluten free  CV: No chest pain, no palpitations, no perif. edema, no sob on exertion  Pulm: no cough, No shortness of breath, no wheezes, no sputum.  Ophtho: No vision changes  GI: see HPI; also no nausea, no vomiting, no change in appetite  Derm: No rash  Heme: No lymphadenopathy, No bruising  MSK:+ mild arthritis, no muscle pain, no muscle weakness  : No dysuria, No hematuria  Endo: No hot or cold intolerance  Neuro: No syncope, No seizure,     PMHX:  has a past medical history of Atypical ductal hyperplasia, breast (2008), Breast cancer (08/2016),  Breast cyst (approx. 40 years ago), Cancer, GERD (gastroesophageal reflux disease), History of thyroid cancer (), Lobular carcinoma in situ (not certain of 2016 diagnosis), Mitral valve prolapse, and Psychiatric problem.    PSHX:  has a past surgical history that includes  section; Tubal ligation; thyroid removed; CYST REMOVED FROM RIGHT BREAST IN ; left breast wire localization excisional biopsy with bracketed wires using 3 wires and excision through 1 incision. ; Thyroid surgery; Hernia repair; Breast cyst excision (Right, approx. 40 years ago); Breast lumpectomy (Right, ); Breast biopsy (Right, 2016); Breast biopsy (Left, ); ESOPHAGOGASTRODUODENOSCOPY (EGD) (N/A, 2017); INJECTION-STEROID-EPIDURAL-TRANSFORAMINAL (Right, 2017); LUMPECTOMY-BREAST Right with Wire Localization (Right, 2016); BIOPSY-LYMPH NODE [place] Right (Right, 2016); and ESOPHAGOGASTRODUODENOSCOPY (EGD) (N/A, 10/2/2015).    The patient's social and family histories were reviewed by me and updated in the appropriate section of the electronic medical record.    Allergies   Allergen Reactions    Codeine Nausea Only    Sulfa (Sulfonamide Antibiotics) Nausea Only       Current Outpatient Medications   Medication Sig    calcium-vitamin D3 500 MG, 1,250MG, 200 UNITS (CALCIUM-VITAMIN D) 500 mg(1,250mg) -200 unit per tablet Take 4 tablets by mouth 2 (two) times daily with meals.     fish oil-omega-3 fatty acids 300-1,000 mg capsule Take 2 g by mouth once daily.    letrozole (FEMARA) 2.5 mg Tab Take 1 tablet (2.5 mg total) by mouth once daily.    MAGNESIUM CARBONATE ORAL Take by mouth.    multivitamin capsule Take 2 capsules by mouth once daily.     potassium chloride SA (K-DUR,KLOR-CON) 10 MEQ tablet Take 20 mEq by mouth once daily.    SYNTHROID 125 mcg tablet Take 1 tablet (125 mcg total) by mouth once daily.    ZYLET 0.3-0.5 % DrpS SHAKE WELL AND INT 1 GTT INTO OU TID    omeprazole (PRILOSEC) 40  "MG capsule Take 1 capsule (40 mg total) by mouth every morning.    triamcinolone acetonide 0.1% (KENALOG) 0.1 % cream Apply topically 2 (two) times daily. for 10 days     No current facility-administered medications for this visit.          Objective Findings:    Vital Signs:  /80   Pulse 88   Ht 5' 5" (1.651 m)   Wt 70.1 kg (154 lb 8.7 oz)   BMI 25.72 kg/m²  Body mass index is 25.72 kg/m².    Physical Exam:  General Appearance: Well appearing in no acute distress  Head:   Normocephalic, without obvious abnormality  Eyes:    No scleral icterus, EOMI  Throat: Lips, mucosa, and tongue normal; teeth and gums normal  Lungs: CTA bilaterally in anterior and posterior fields, no wheezes, no crackles.  Heart:  Regular rate and rhythm, S1, S2 normal, no murmurs heard  Abdomen: Soft, non tender, non distended with positive bowel sounds in all four quadrants. No hepatosplenomegaly, ascites, or mass  Extremities:    2+ radial pulses, no clubbing, cyanosis or edema  Skin: No rash to exposed areas  Neurologic: A&Ox4      Labs:  Lab Results   Component Value Date    WBC 10.69 07/04/2017    HGB 13.9 07/04/2017    HCT 44 07/04/2017     07/04/2017    ALT 12 07/04/2017    AST 14 07/04/2017     07/04/2017    K 4.0 07/04/2017     07/04/2017    CREATININE 0.8 07/04/2017    BUN 17 07/04/2017    CO2 24 07/04/2017    TSH 0.032 (L) 07/25/2017    INR 0.9 07/04/2017       Endoscopy:   12/11/2017 EGD Dr. Cabrera-mild Schatzki ring. Dilated to 18 mm. GEJ bx (reflux esophagitis, chronic gastritis, no metaplasia, no dysplasia). Med HH.   10/2015 EGD Dr. Cabrera--HH. Fundic gland polyp-benign.irreg z line  7/2009 Colon Dr. Barnett- WNL. Repeat in 10 years (2019).  5/13/2009 EGD Dr. Lemos-Benign esophageal stricture w/ balloon dilation. HH.   5/1/2009 EGD Dr. Lemos- LA grade D esophagitis, food in somach  Assessment:    1. Gastroesophageal reflux disease without esophagitis    2. Diarrhea, unspecified type    3. " Abdominal bloating    4. Generalized abdominal pain          Recommendations:  1.  GERD-increase omeprazole to 40 mg daily. Continue zantac 150 mg hs.   2. Diarrhea- stools r/o infections. Avoid nsaids as per handout provided. Will consider EGD/colon if stool testing unrevealing and no improvement in s/s.   3. 4. Abdominal pain-r/t to BMs. Stools studies as above. H. Pylori serology. Will consider bentyl if no infection. EGD/colon if testing unrevealing and no improvement in s/s.     F/u pending results.       Thank you for allowing me to participate in the care of Sherry KRISTINA Coombs, APRN, FNP-C

## 2018-09-06 LAB — H PYLORI IGG SERPL QL IA: NEGATIVE

## 2018-09-07 ENCOUNTER — OFFICE VISIT (OUTPATIENT)
Dept: URGENT CARE | Facility: CLINIC | Age: 63
End: 2018-09-07
Payer: COMMERCIAL

## 2018-09-07 VITALS
HEART RATE: 79 BPM | DIASTOLIC BLOOD PRESSURE: 80 MMHG | WEIGHT: 154 LBS | TEMPERATURE: 98 F | SYSTOLIC BLOOD PRESSURE: 123 MMHG | OXYGEN SATURATION: 99 % | HEIGHT: 65 IN | RESPIRATION RATE: 18 BRPM | BODY MASS INDEX: 25.66 KG/M2

## 2018-09-07 DIAGNOSIS — N30.01 ACUTE CYSTITIS WITH HEMATURIA: Primary | ICD-10-CM

## 2018-09-07 DIAGNOSIS — R30.0 BURNING WITH URINATION: ICD-10-CM

## 2018-09-07 LAB
BILIRUB UR QL STRIP: NEGATIVE
GLUCOSE UR QL STRIP: NEGATIVE
KETONES UR QL STRIP: NEGATIVE
LEUKOCYTE ESTERASE UR QL STRIP: POSITIVE
PH, POC UA: 5 (ref 5–8)
POC BLOOD, URINE: POSITIVE
POC NITRATES, URINE: NEGATIVE
PROT UR QL STRIP: POSITIVE
SP GR UR STRIP: 1.01 (ref 1–1.03)
UROBILINOGEN UR STRIP-ACNC: NORMAL (ref 0.1–1.1)

## 2018-09-07 PROCEDURE — 3008F BODY MASS INDEX DOCD: CPT | Mod: CPTII,S$GLB,, | Performed by: FAMILY MEDICINE

## 2018-09-07 PROCEDURE — 99214 OFFICE O/P EST MOD 30 MIN: CPT | Mod: 25,S$GLB,, | Performed by: FAMILY MEDICINE

## 2018-09-07 PROCEDURE — 81003 URINALYSIS AUTO W/O SCOPE: CPT | Mod: QW,S$GLB,, | Performed by: FAMILY MEDICINE

## 2018-09-07 RX ORDER — NITROFURANTOIN 25; 75 MG/1; MG/1
100 CAPSULE ORAL 2 TIMES DAILY
Qty: 14 CAPSULE | Refills: 0 | Status: SHIPPED | OUTPATIENT
Start: 2018-09-07 | End: 2018-09-14

## 2018-09-07 NOTE — PATIENT INSTRUCTIONS

## 2018-09-07 NOTE — PROGRESS NOTES
"Subjective:       Patient ID: Sherry Torres is a 62 y.o. female.    Vitals:  height is 5' 5" (1.651 m) and weight is 69.9 kg (154 lb). Her oral temperature is 98 °F (36.7 °C). Her blood pressure is 123/80 and her pulse is 79. Her respiration is 18 and oxygen saturation is 99%.     Chief Complaint: Urinary Tract Infection    Urinary Tract Infection    This is a new problem. The current episode started today. The problem occurs every urination. The problem has been unchanged. The quality of the pain is described as burning and aching. The pain is at a severity of 7/10. The pain is moderate. There has been no fever. She is sexually active. Associated symptoms include frequency and urgency. Pertinent negatives include no behavior changes, chills, discharge, flank pain, hematuria, hesitancy, nausea, possible pregnancy, sweats, vomiting, weight loss, bubble bath use, constipation, rash or withholding. She has tried nothing for the symptoms. The treatment provided no relief. Her past medical history is significant for a urological procedure. There is no history of catheterization, diabetes insipidus, diabetes mellitus, genitourinary reflux, hypertension, kidney stones, recurrent UTIs, a single kidney, STD or urinary stasis.     Review of Systems   Constitution: Negative for chills, fever and weight loss.   Skin: Negative for itching and rash.   Musculoskeletal: Negative for back pain.   Gastrointestinal: Negative for abdominal pain, constipation, nausea and vomiting.   Genitourinary: Positive for frequency and urgency. Negative for dysuria, flank pain, genital sores, hematuria, hesitancy, missed menses, non-menstrual bleeding and pelvic pain.       Objective:      Physical Exam   Constitutional: She is oriented to person, place, and time. She appears well-developed and well-nourished.   HENT:   Head: Normocephalic and atraumatic.   Eyes: EOM are normal. Pupils are equal, round, and reactive to light.   Neck: Normal " range of motion. Neck supple. No JVD present. No tracheal deviation present. No thyromegaly present.   Cardiovascular: Normal rate, regular rhythm and normal heart sounds. Exam reveals no gallop and no friction rub.   No murmur heard.  Pulmonary/Chest: Breath sounds normal. No respiratory distress. She has no wheezes. She has no rales. She exhibits no tenderness.   Abdominal: Soft. Bowel sounds are normal. She exhibits no distension and no mass. There is no tenderness. There is no rebound and no guarding. No hernia.   Genitourinary:   Genitourinary Comments: Bladder tenderness, no cva tenderness.   Musculoskeletal: Normal range of motion. She exhibits no edema, tenderness or deformity.   Lymphadenopathy:     She has no cervical adenopathy.   Neurological: She is alert and oriented to person, place, and time. She displays normal reflexes. No cranial nerve deficit. She exhibits normal muscle tone. Coordination normal.   Skin: Skin is warm. Capillary refill takes less than 2 seconds. No rash noted. No erythema. No pallor.   Psychiatric: She has a normal mood and affect. Her behavior is normal. Judgment and thought content normal.   Vitals reviewed.      Assessment:       1. Acute cystitis with hematuria    2. Burning with urination        Plan:         Acute cystitis with hematuria    Burning with urination  -     POCT Urinalysis, Dipstick, Automated, W/O Scope  -     Urine culture  -     nitrofurantoin, macrocrystal-monohydrate, (MACROBID) 100 MG capsule; Take 1 capsule (100 mg total) by mouth 2 (two) times daily. for 7 days  Dispense: 14 capsule; Refill: 0          Patient Instructions     Bladder Infection, Female (Adult)    Urine is normally doesn't have any bacteria in it. But bacteria can get into the urinary tract from the skin around the rectum. Or they can travel in the blood from elsewhere in the body. Once they are in your urinary tract, they can cause infection in the urethra (urethritis), the bladder  "(cystitis), or the kidneys (pyelonephritis).  The most common place for an infection is in the bladder. This is called a bladder infection. This is one of the most common infections in women. Most bladder infections are easily treated. They are not serious unless the infection spreads to the kidney.  The phrases "bladder infection," "UTI," and "cystitis" are often used to describe the same thing. But they are not always the same. Cystitis is an inflammation of the bladder. The most common cause of cystitis is an infection.  Symptoms  The infection causes inflammation in the urethra and bladder. This causes many of the symptoms. The most common symptoms of a bladder infection are:  · Pain or burning when urinating  · Having to urinate more often than usual  · Urgent need to urinate  · Only a small amount of urine comes out  · Blood in urine  · Abdominal discomfort. This is usually in the lower abdomen above the pubic bone.  · Cloudy urine  · Strong- or bad-smelling urine  · Unable to urinate (urinary retention)  · Unable to hold urine in (urinary incontinence)  · Fever  · Loss of appetite  · Confusion (in older adults)  Causes  Bladder infections are not contagious. You can't get one from someone else, from a toilet seat, or from sharing a bath.  The most common cause of bladder infections is bacteria from the bowels. The bacteria get onto the skin around the opening of the urethra. From there, they can get into the urine and travel up to the bladder, causing inflammation and infection. This usually happens because of:  · Wiping improperly after urinating. Always wipe from front to back.  · Bowel incontinence  · Pregnancy  · Procedures such as having a catheter inserted  · Older age  · Not emptying your bladder. This can allow bacteria a chance to grow in your urine.  · Dehydration  · Constipation  · Sex  · Use of a diaphragm for birth control   Treatment  Bladder infections are diagnosed by a urine test. They are " treated with antibiotics and usually clear up quickly without complications. Treatment helps prevent a more serious kidney infection.  Medicines  Medicines can help in the treatment of a bladder infection:  · Take antibiotics until they are used up, even if you feel better. It is important to finish them to make sure the infection has cleared.  · You can use acetaminophen or ibuprofen for pain, fever, or discomfort, unless another medicine was prescribed. If you have chronic liver or kidney disease, talk with your healthcare provider before using these medicines. Also talk with your provider if you've ever had a stomach ulcer or gastrointestinal bleeding, or are taking blood-thinner medicines.  · If you are given phenazopydridine to reduce burning with urination, it will cause your urine to become a bright orange color. This can stain clothing.  Care and prevention  These self-care steps can help prevent future infections:  · Drink plenty of fluids to prevent dehydration and flush out your bladder. Do this unless you must restrict fluids for other health reasons, or your doctor told you not to.  · Proper cleaning after going to the bathroom is important. Wipe from front to back after using the toilet to prevent the spread of bacteria.  · Urinate more often. Don't try to hold urine in for a long time.  · Wear loose-fitting clothes and cotton underwear. Avoid tight-fitting pants.  · Improve your diet and prevent constipation. Eat more fresh fruit and vegetables, and fiber, and less junk and fatty foods.  · Avoid sex until your symptoms are gone.  · Avoid caffeine, alcohol, and spicy foods. These can irritate your bladder.  · Urinate right after intercourse to flush out your bladder.  · If you use birth control pills and have frequent bladder infections, discuss it with your doctor.  Follow-up care  Call your healthcare provider if all symptoms are not gone after 3 days of treatment. This is especially important if you  have repeat infections.  If a culture was done, you will be told if your treatment needs to be changed. If directed, you can call to find out the results.  If X-rays were done, you will be told if the results will affect your treatment.  Call 911  Call 911 if any of the following occur:  · Trouble breathing  · Hard to wake up or confusion  · Fainting or loss of consciousness  · Rapid heart rate  When to seek medical advice  Call your healthcare provider right away if any of these occur:  · Fever of 100.4ºF (38.0ºC) or higher, or as directed by your healthcare provider  · Symptoms are not better by the third day of treatment  · Back or belly (abdominal) pain that gets worse  · Repeated vomiting, or unable to keep medicine down  · Weakness or dizziness  · Vaginal discharge  · Pain, redness, or swelling in the outer vaginal area (labia)  Date Last Reviewed: 10/1/2016  © 2713-7671 [employer]. [address] [place] [address]. All rights reserved. This information is not intended as a substitute for professional medical care. Always follow your healthcare professional's instructions.      Follow up with your doctor in a few days as needed.  Return to the urgent care or go to the ER if symptoms get worse.    Ulysses Johnson MD

## 2018-09-10 ENCOUNTER — PATIENT MESSAGE (OUTPATIENT)
Dept: GASTROENTEROLOGY | Facility: CLINIC | Age: 63
End: 2018-09-10

## 2018-09-12 ENCOUNTER — OFFICE VISIT (OUTPATIENT)
Dept: OBSTETRICS AND GYNECOLOGY | Facility: CLINIC | Age: 63
End: 2018-09-12
Attending: OBSTETRICS & GYNECOLOGY
Payer: COMMERCIAL

## 2018-09-12 VITALS
SYSTOLIC BLOOD PRESSURE: 130 MMHG | DIASTOLIC BLOOD PRESSURE: 72 MMHG | HEIGHT: 65 IN | BODY MASS INDEX: 25.64 KG/M2 | WEIGHT: 153.88 LBS

## 2018-09-12 DIAGNOSIS — Z01.419 ENCOUNTER FOR GYNECOLOGICAL EXAMINATION WITHOUT ABNORMAL FINDING: ICD-10-CM

## 2018-09-12 DIAGNOSIS — Z12.4 SCREENING FOR MALIGNANT NEOPLASM OF CERVIX: Primary | ICD-10-CM

## 2018-09-12 PROCEDURE — 87624 HPV HI-RISK TYP POOLED RSLT: CPT

## 2018-09-12 PROCEDURE — 99396 PREV VISIT EST AGE 40-64: CPT | Mod: S$GLB,,, | Performed by: OBSTETRICS & GYNECOLOGY

## 2018-09-12 PROCEDURE — 88175 CYTOPATH C/V AUTO FLUID REDO: CPT

## 2018-09-12 NOTE — PROGRESS NOTES
SUBJECTIVE:   62 y.o. female   for annual routine Pap and checkup. No LMP recorded. Patient is postmenopausal..  She has no unusual complaints and reports she is tolerating her Femara well- minimal hot flashes and joint pain.        Past Medical History:   Diagnosis Date    Atypical ductal hyperplasia, breast 2008    left side    Breast cancer 2016    right side    Breast cyst approx. 40 years ago    Cancer     GERD (gastroesophageal reflux disease)     History of thyroid cancer     Lobular carcinoma in situ not certain of 2016 diagnosis    Mitral valve prolapse     Psychiatric problem      Past Surgical History:   Procedure Laterality Date    BIOPSY-LYMPH NODE [place] Right Right 2016    Performed [facility] at [facility] OR 2ND FLR    BREAST BIOPSY Right 2016    BREAST BIOPSY Left     exc bx    BREAST CYST EXCISION Right approx. 40 years ago    BREAST LUMPECTOMY Right     w/radiation     SECTION      CYST REMOVED FROM RIGHT BREAST IN       ESOPHAGOGASTRODUODENOSCOPY (EGD) N/A 2017    Performed by Shine Cabrera [facility] at [facility] ENDO (4TH FLR)    ESOPHAGOGASTRODUODENOSCOPY (EGD) N/A 10/2/2015    Performed by Shine [facility] at [facility] ENDO (4TH FLR)    HERNIA REPAIR      INJECTION-STEROID-EPIDURAL-TRANSFORAMINAL Right 2017    [facility] PAIN MGT    left breast wire localization excisional biopsy with bracketed wires using 3 wires and excision through 1 incision.       LUMPECTOMY-BREAST Right with Wire Localization Right 2016    Performed by [facility] at [facility] OR 2ND FLR    thyroid removed      THYROID SURGERY      TUBAL LIGATION       Social History     Socioeconomic History    Marital status:      Spouse name: Not on file    Number of children: 2    Years of education: Not on file    Highest education level: Not on file   Social Needs    Financial resource strain: Not on file     Food insecurity - worry: Not on file    Food insecurity - inability: Not on file    Transportation needs - medical: Not on file    Transportation needs - non-medical: Not on file   Occupational History    Not on file   Tobacco Use    Smoking status: Former Smoker    Smokeless tobacco: Never Used    Tobacco comment: socally a few years in college. none since 30 years ago   Substance and Sexual Activity    Alcohol use: Yes     Comment: few drinks on the weekend    Drug use: No    Sexual activity: Yes     Partners: Male     Birth control/protection: Post-menopausal   Other Topics Concern    Patient feels they ought to cut down on drinking/drug use No    Patient annoyed by others criticizing their drinking/drug use No    Patient has felt bad or guilty about drinking/drug use No    Patient has had a drink/used drugs as an eye opener in the AM No   Social History Narrative    ,  x 40 years, 2 children, 2 grandchildren, [employer]     Family History   Problem Relation Age of Onset    Osteoporosis Mother     Stroke Mother     Dementia Father     Breast cancer Maternal Grandmother 88    Breast cancer Other         70-80's    Colon cancer Neg Hx     Colon polyps Neg Hx     Rectal cancer Neg Hx     Stomach cancer Neg Hx     Esophageal cancer Neg Hx     Liver cancer Neg Hx     Liver disease Neg Hx     Cirrhosis Neg Hx     Inflammatory bowel disease Neg Hx     Celiac disease Neg Hx     Crohn's disease Neg Hx     Ulcerative colitis Neg Hx     Ovarian cancer Neg Hx     Cancer Neg Hx      OB History    Para Term  AB Living   4 4 2         SAB TAB Ectopic Multiple Live Births                  # Outcome Date GA Lbr Tin/2nd Weight Sex Delivery Anes PTL Lv   4 Term            3 Term            2 Para      CS-LTranv      1 Para      CS-LTranv                 Current Outpatient Medications   Medication Sig Dispense Refill    calcium-vitamin D3 500 MG, 1,250MG, 200  UNITS (CALCIUM-VITAMIN D) 500 mg(1,250mg) -200 unit per tablet Take 4 tablets by mouth 2 (two) times daily with meals.       fish oil-omega-3 fatty acids 300-1,000 mg capsule Take 2 g by mouth once daily.      letrozole (FEMARA) 2.5 mg Tab Take 1 tablet (2.5 mg total) by mouth once daily. 90 tablet 3    MAGNESIUM CARBONATE ORAL Take by mouth.      multivitamin capsule Take 2 capsules by mouth once daily.       nitrofurantoin, macrocrystal-monohydrate, (MACROBID) 100 MG capsule Take 1 capsule (100 mg total) by mouth 2 (two) times daily. for 7 days 14 capsule 0    omeprazole (PRILOSEC) 40 MG capsule Take 1 capsule (40 mg total) by mouth every morning. 90 capsule 3    potassium chloride SA (K-DUR,KLOR-CON) 10 MEQ tablet Take 20 mEq by mouth once daily.      SYNTHROID 125 mcg tablet Take 1 tablet (125 mcg total) by mouth once daily. 30 tablet 12    triamcinolone acetonide 0.1% (KENALOG) 0.1 % cream Apply topically 2 (two) times daily. for 10 days 15 g 0    ZYLET 0.3-0.5 % DrpS SHAKE WELL AND INT 1 GTT INTO OU TID  0     No current facility-administered medications for this visit.      Allergies: Codeine and Sulfa (sulfonamide antibiotics)     The ASCVD Risk score (G[place]cond[place] SIENA Jr., et al., 2013) failed to calculate for the following reasons:    Cannot find a previous HDL lab    Cannot find a previous total cholesterol lab      ROS:  Constitutional: no weight loss, weight gain, fever, fatigue  Eyes:  No vision changes, glasses/contacts  ENT/Mouth: No ulcers, sinus problems, ears ringing, headache  Cardiovascular: No inability to lie flat, chest pain, exercise intolerance, swelling, heart palpitations  Respiratory: No wheezing, coughing blood, shortness of breath, or cough  Gastrointestinal: No diarrhea, bloody stool, nausea/vomiting, constipation, gas, hemorrhoids  Genitourinary: No blood in urine, painful urination, urgency of urination, frequency of urination, incomplete emptying, incontinence, abnormal bleeding,  painful periods, heavy periods, vaginal discharge, vaginal odor, painful intercourse, sexual problems, bleeding after intercourse.  Musculoskeletal: No muscle weakness  Skin/Breast: breast cancer  Neurological: No passing out, seizures, numbness, headache  Endocrine: No diabetes, hypothyroid, hyperthyroid, hot flashes, hair loss, abnormal hair growth, acne  Psychiatric: No depression, crying  Hematologic: No bruises, bleeding, swollen lymph nodes, anemia.      Physical Exam:   Constitutional: She is oriented to person, place, and time. She appears well-developed and well-nourished.      Neck: Normal range of motion. No tracheal deviation present. No thyromegaly present.    Cardiovascular: Exam reveals no edema.     Pulmonary/Chest: Effort normal. She exhibits no mass, no tenderness, no deformity and no retraction. Right breast exhibits no inverted nipple, no mass, no nipple discharge, no skin change, no tenderness, presence, no bleeding and no swelling. Left breast exhibits no inverted nipple, no mass, no nipple discharge, no skin change, no tenderness, presence, no bleeding and no swelling. Breasts are symmetrical.        Abdominal: Soft. She exhibits no distension and no mass. There is no tenderness. There is no rebound and no guarding. No hernia. Hernia confirmed negative in the left inguinal area.     Genitourinary: Vagina normal and uterus normal. Rectal exam shows no external hemorrhoid. There is no rash, tenderness or lesion on the right labia. There is no rash, tenderness or lesion on the left labia. Uterus is not deviated. Cervix is normal. No no adexnal prolapse. Right adnexum displays no mass, no tenderness and no fullness. Left adnexum displays no mass, no tenderness and no fullness. No tenderness, bleeding, rectocele, cystocele or unspecified prolapse of vaginal walls in the vagina. No vaginal discharge found. Cervix exhibits no motion tenderness, no discharge and no friability.            Musculoskeletal: Normal range of motion and moves all extremeties. She exhibits no edema.      Lymphadenopathy:        Right: No inguinal adenopathy present.        Left: No inguinal adenopathy present.    Neurological: She is alert and oriented to person, place, and time.    Skin: No rash noted. No erythema. No pallor.    Psychiatric: She has a normal mood and affect. Her behavior is normal. Judgment and thought content normal.         ASSESSMENT:   well woman  History of breast cancer  PLAN:   pap smear  return annually or prn

## 2018-09-13 ENCOUNTER — TELEPHONE (OUTPATIENT)
Dept: URGENT CARE | Facility: CLINIC | Age: 63
End: 2018-09-13

## 2018-09-13 LAB
BACTERIA UR CULT: ABNORMAL
BACTERIA UR CULT: ABNORMAL
OTHER ANTIBIOTIC SUSC ISLT: ABNORMAL

## 2018-09-13 NOTE — TELEPHONE ENCOUNTER
Pt returned call and was informed that she was on the correct antibiotic.  Pt had no further questions  ----- Message from Teja Garg MD sent at 9/13/2018  8:57 AM CDT -----  Call patient and let know culture shows she is on correct antibiotic

## 2018-09-13 NOTE — TELEPHONE ENCOUNTER
Left voicemail with pt for her to call back.  ----- Message from Teja Garg MD sent at 9/13/2018  8:57 AM CDT -----  Call patient and let know culture shows she is on correct antibiotic

## 2018-09-17 LAB
HPV HR 12 DNA CVX QL NAA+PROBE: NEGATIVE
HPV16 AG SPEC QL: NEGATIVE
HPV18 DNA SPEC QL NAA+PROBE: NEGATIVE

## 2018-10-30 ENCOUNTER — OFFICE VISIT (OUTPATIENT)
Dept: HEMATOLOGY/ONCOLOGY | Facility: CLINIC | Age: 63
End: 2018-10-30
Payer: COMMERCIAL

## 2018-10-30 VITALS
OXYGEN SATURATION: 99 % | DIASTOLIC BLOOD PRESSURE: 73 MMHG | TEMPERATURE: 98 F | WEIGHT: 156.94 LBS | HEART RATE: 73 BPM | SYSTOLIC BLOOD PRESSURE: 139 MMHG | BODY MASS INDEX: 26.15 KG/M2 | HEIGHT: 65 IN | RESPIRATION RATE: 16 BRPM

## 2018-10-30 DIAGNOSIS — C50.411 MALIGNANT NEOPLASM OF UPPER-OUTER QUADRANT OF RIGHT BREAST IN FEMALE, ESTROGEN RECEPTOR POSITIVE: Primary | ICD-10-CM

## 2018-10-30 DIAGNOSIS — Z17.0 MALIGNANT NEOPLASM OF UPPER-OUTER QUADRANT OF RIGHT BREAST IN FEMALE, ESTROGEN RECEPTOR POSITIVE: Primary | ICD-10-CM

## 2018-10-30 PROCEDURE — 99213 OFFICE O/P EST LOW 20 MIN: CPT | Mod: S$GLB,,, | Performed by: PHYSICIAN ASSISTANT

## 2018-10-30 PROCEDURE — 3008F BODY MASS INDEX DOCD: CPT | Mod: CPTII,S$GLB,, | Performed by: PHYSICIAN ASSISTANT

## 2018-10-30 PROCEDURE — 99999 PR PBB SHADOW E&M-EST. PATIENT-LVL IV: CPT | Mod: PBBFAC,,, | Performed by: PHYSICIAN ASSISTANT

## 2018-10-30 NOTE — PROGRESS NOTES
Subjective:       Patient ID: Sherry Torres is a 62 y.o. female.    Chief Complaint: Malignant neoplasm of upper-outer quadrant of right breast i    Ms Torres is a 62-year-old female seen in follow-up  of right breast cancer.   She had stage II A, strongly ER and NM positive and HER-2 negative disease. She is on letrozole therapy.    She has an occasional rash on her right breast that resolves with use of kenalog cream.    Overall feeling quite well. No fever, chills, nausea, vomiting, shortness of breath.  She exercises regularly. She is heading to [place] next weekend to watch her daughter run the marathon and she will be participating in the [employer].        Last mammogram April 26, 2018-negative  Breast history:      She noted an abnormality on self examination at the end of July or early August 2016.  On August 8 she underwent diagnostic mammogram which showed an irregular mass was plated margins in the middle right breast and o'clock position.  By ultrasound this was a solid 1.7 x 1.0 x 1.5 cm mass.     On August 9 a core needle biopsy showed infiltrating ductal carcinoma which was well differentiated (histologic grade 2, nuclear grade 2, mitotic index 1).  The tumor was 100% ER positive, 70% NM positive and HER-2 1+.  On August 25 right breast lumpectomy and sentinel lymph node biopsy was performed.  That showed a 14 mm low-grade infiltrating ductal carcinoma.    The sentinel lymph node was positive for 1.5 mm micrometastasis.       Final pathological stage TI cN1 stage II    Mammaprint genomic assay  showed that she was low risk.  Score was +0.336 with a 5 year risk of distant recurrence at 5% and a 10 year risk at 10%.       She completed radiation in December 2016 and began Letrozole at that time.          Review of Systems   Constitutional: Negative for appetite change and unexpected weight change.   HENT: Negative for congestion.    Eyes: Negative for visual disturbance.   Respiratory: Negative for  cough and shortness of breath.    Cardiovascular: Negative for chest pain.   Gastrointestinal: Positive for diarrhea (notes is worse with alcohol). Negative for abdominal pain.   Genitourinary: Negative for frequency.   Musculoskeletal: Negative for back pain.   Skin: Negative for rash.   Neurological: Positive for headaches (related to seasonal allergies).   Hematological: Negative for adenopathy.   Psychiatric/Behavioral: The patient is not nervous/anxious.        Objective:      Physical Exam   Constitutional: She is oriented to person, place, and time. She appears well-developed and well-nourished. No distress.   NAD  Presents alone   HENT:   Head: Normocephalic.   Mouth/Throat: Oropharynx is clear and moist. No oropharyngeal exudate.   Eyes: Conjunctivae are normal. Pupils are equal, round, and reactive to light. No scleral icterus.   Neck: Normal range of motion. Neck supple. No thyromegaly present.   Cardiovascular: Normal rate and regular rhythm.   Pulmonary/Chest: Effort normal and breath sounds normal. No respiratory distress.   Right breast with well healed lumpectomy and SLNB incisions. No mass, nodule or skin changes. Left breast without mass, nodule or skin changes. No axillary or supraclavicular adenopathy.    Abdominal: Soft. Bowel sounds are normal. She exhibits no distension and no mass. There is no tenderness.   Musculoskeletal: Normal range of motion. She exhibits no edema or tenderness.   No spinal or paraspinal tenderness to palpation     Lymphadenopathy:     She has no cervical adenopathy.   Neurological: She is alert and oriented to person, place, and time. No cranial nerve deficit.   Skin: Skin is warm and dry.   Psychiatric: She has a normal mood and affect. Her behavior is normal. Judgment and thought content normal.   Vitals reviewed.      Assessment:       1. Malignant neoplasm of upper-outer quadrant of right breast in female, estrogen receptor positive        Plan:       Continue  Letrozole and return in April with annual mammogram.  Patient knows to call in interim if any issues.    Distress Screening Results: Psychosocial Distress screening score of Distress Score: 5 noted and reviewed. No intervention indicated.

## 2018-12-14 ENCOUNTER — OFFICE VISIT (OUTPATIENT)
Dept: URGENT CARE | Facility: CLINIC | Age: 63
End: 2018-12-14
Payer: COMMERCIAL

## 2018-12-14 VITALS
SYSTOLIC BLOOD PRESSURE: 125 MMHG | HEART RATE: 95 BPM | BODY MASS INDEX: 30.63 KG/M2 | OXYGEN SATURATION: 98 % | TEMPERATURE: 97 F | RESPIRATION RATE: 19 BRPM | HEIGHT: 60 IN | DIASTOLIC BLOOD PRESSURE: 75 MMHG | WEIGHT: 156 LBS

## 2018-12-14 DIAGNOSIS — N39.0 URINARY TRACT INFECTION WITH HEMATURIA, SITE UNSPECIFIED: Primary | ICD-10-CM

## 2018-12-14 DIAGNOSIS — R31.9 URINARY TRACT INFECTION WITH HEMATURIA, SITE UNSPECIFIED: Primary | ICD-10-CM

## 2018-12-14 DIAGNOSIS — R30.0 DYSURIA: ICD-10-CM

## 2018-12-14 LAB
BILIRUB UR QL STRIP: NEGATIVE
GLUCOSE UR QL STRIP: NEGATIVE
KETONES UR QL STRIP: NEGATIVE
LEUKOCYTE ESTERASE UR QL STRIP: POSITIVE
PH, POC UA: 5 (ref 5–8)
POC BLOOD, URINE: POSITIVE
POC NITRATES, URINE: NEGATIVE
PROT UR QL STRIP: POSITIVE
SP GR UR STRIP: 1.02 (ref 1–1.03)
UROBILINOGEN UR STRIP-ACNC: ABNORMAL (ref 0.1–1.1)

## 2018-12-14 PROCEDURE — 1101F PT FALLS ASSESS-DOCD LE1/YR: CPT | Mod: CPTII,S$GLB,, | Performed by: NURSE PRACTITIONER

## 2018-12-14 PROCEDURE — 3008F BODY MASS INDEX DOCD: CPT | Mod: CPTII,S$GLB,, | Performed by: NURSE PRACTITIONER

## 2018-12-14 PROCEDURE — 81003 URINALYSIS AUTO W/O SCOPE: CPT | Mod: QW,S$GLB,, | Performed by: NURSE PRACTITIONER

## 2018-12-14 PROCEDURE — 99214 OFFICE O/P EST MOD 30 MIN: CPT | Mod: 25,S$GLB,, | Performed by: NURSE PRACTITIONER

## 2018-12-14 RX ORDER — NITROFURANTOIN 25; 75 MG/1; MG/1
100 CAPSULE ORAL 2 TIMES DAILY
Qty: 14 CAPSULE | Refills: 0 | Status: SHIPPED | OUTPATIENT
Start: 2018-12-14 | End: 2018-12-21

## 2018-12-14 RX ORDER — OMEPRAZOLE 40 MG/1
40 CAPSULE, DELAYED RELEASE ORAL DAILY
COMMUNITY
End: 2019-08-20

## 2018-12-14 NOTE — PROGRESS NOTES
Subjective:       Patient ID: Sherry Torres is a 63 y.o. female.    Vitals:  height is 5' (1.524 m) and weight is 70.8 kg (156 lb). Her oral temperature is 97.2 °F (36.2 °C). Her blood pressure is 125/75 and her pulse is 95. Her respiration is 19 and oxygen saturation is 98%.     Chief Complaint: Urinary Tract Infection    Urinary Tract Infection    This is a new problem. The current episode started today. The problem occurs every urination. The problem has been unchanged. The patient is experiencing no pain. There has been no fever. The fever has been present for less than 1 day. She is sexually active. Pertinent negatives include no chills, frequency, hematuria, nausea, urgency, vomiting or rash. She has tried nothing for the symptoms. The treatment provided no relief.       Constitution: Negative for chills and fever.   Neck: Negative for painful lymph nodes.   Gastrointestinal: Negative for abdominal pain, nausea and vomiting.   Genitourinary: Negative for dysuria, frequency, urgency, urine decreased, hematuria, history of kidney stones, painful menstruation, irregular menstruation, missed menses, heavy menstrual bleeding, ovarian cysts, genital trauma, vaginal pain, vaginal discharge, vaginal bleeding, vaginal odor, painful intercourse, genital sore, painful ejaculation and pelvic pain.   Musculoskeletal: Negative for back pain.   Skin: Negative for rash and lesion.   Hematologic/Lymphatic: Negative for swollen lymph nodes.       Objective:      Physical Exam   Constitutional: She is oriented to person, place, and time. She appears well-developed and well-nourished.   HENT:   Head: Normocephalic and atraumatic.   Right Ear: External ear normal.   Left Ear: External ear normal.   Nose: Nose normal.   Mouth/Throat: Mucous membranes are normal.   Eyes: Conjunctivae and lids are normal.   Neck: Trachea normal and full passive range of motion without pain. Neck supple.   Cardiovascular: Normal rate, regular  rhythm and normal heart sounds.   Pulmonary/Chest: Effort normal and breath sounds normal. No respiratory distress.   Abdominal: Soft. Normal appearance and bowel sounds are normal. She exhibits no distension, no abdominal bruit, no pulsatile midline mass and no mass. There is no tenderness. There is no rigidity, no rebound, no guarding and no CVA tenderness.   Musculoskeletal: Normal range of motion. She exhibits no edema.   Neurological: She is alert and oriented to person, place, and time. She has normal strength.   Skin: Skin is warm, dry and intact. She is not diaphoretic. No pallor.   Psychiatric: She has a normal mood and affect. Her speech is normal and behavior is normal. Judgment and thought content normal. Cognition and memory are normal.   Nursing note and vitals reviewed.      Results for orders placed or performed in visit on 12/14/18   POCT Urinalysis, Dipstick, Automated, W/O Scope   Result Value Ref Range    POC Blood, Urine Positive (A) Negative    POC Bilirubin, Urine Negative Negative    POC Urobilinogen, Urine  0.1 - 1.1    POC Ketones, Urine Negative Negative    POC Protein, Urine Positive (A) Negative    POC Nitrates, Urine Negative Negative    POC Glucose, Urine Negative Negative    pH, UA 5.0 5 - 8    POC Specific Gravity, Urine 1.025 1.003 - 1.029    POC Leukocytes, Urine Positive (A) Negative     Assessment:       1. Urinary tract infection without hematuria, site unspecified    2. Dysuria        Plan:         Urinary tract infection without hematuria, site unspecified  -     nitrofurantoin, macrocrystal-monohydrate, (MACROBID) 100 MG capsule; Take 1 capsule (100 mg total) by mouth 2 (two) times daily. for 7 days  Dispense: 14 capsule; Refill: 0    Dysuria  -     Culture, Urine  -     POCT Urinalysis, Dipstick, Automated, W/O Scope    Urine culture sent.      Patient Instructions   Follow up with urology  Please return here or go to the Emergency Department for any concerns or worsening of  "condition.  If you were prescribed antibiotics, please take them to completion.  If you were prescribed a narcotic medication, do not drive or operate heavy equipment or machinery while taking these medications.  Please follow up with your primary care doctor or specialist as needed.  Please drink plenty of fluids.  Please get plenty of rest.  If you were prescribed Pyridium (phenazopyridine), please be aware that if you wear contact lens that this medication may stain your contacts.  While taking this medication it is recommended that you do not wear your contacts until 24 hours after your last dose.    Push fluids aggressively to improve symptoms and wash through the infection.  Cranberry juice can help relieve symptoms  If you  smoke, please stop smoking.    Please follow up with your primary care doctor or specialist as needed.    John Jackson MD  [phone]        Bladder Infection, Female (Adult)    Urine is normally doesn't have any bacteria in it. But bacteria can get into the urinary tract from the skin around the rectum. Or they can travel in the blood from elsewhere in the body. Once they are in your urinary tract, they can cause infection in the urethra (urethritis), the bladder (cystitis), or the kidneys (pyelonephritis).  The most common place for an infection is in the bladder. This is called a bladder infection. This is one of the most common infections in women. Most bladder infections are easily treated. They are not serious unless the infection spreads to the kidney.  The phrases "bladder infection," "UTI," and "cystitis" are often used to describe the same thing. But they are not always the same. Cystitis is an inflammation of the bladder. The most common cause of cystitis is an infection.  Symptoms  The infection causes inflammation in the urethra and bladder. This causes many of the symptoms. The most common symptoms of a bladder infection are:  · Pain or burning when " urinating  · Having to urinate more often than usual  · Urgent need to urinate  · Only a small amount of urine comes out  · Blood in urine  · Abdominal discomfort. This is usually in the lower abdomen above the pubic bone.  · Cloudy urine  · Strong- or bad-smelling urine  · Unable to urinate (urinary retention)  · Unable to hold urine in (urinary incontinence)  · Fever  · Loss of appetite  · Confusion (in older adults)  Causes  Bladder infections are not contagious. You can't get one from someone else, from a toilet seat, or from sharing a bath.  The most common cause of bladder infections is bacteria from the bowels. The bacteria get onto the skin around the opening of the urethra. From there, they can get into the urine and travel up to the bladder, causing inflammation and infection. This usually happens because of:  · Wiping improperly after urinating. Always wipe from front to back.  · Bowel incontinence  · Pregnancy  · Procedures such as having a catheter inserted  · Older age  · Not emptying your bladder. This can allow bacteria a chance to grow in your urine.  · Dehydration  · Constipation  · Sex  · Use of a diaphragm for birth control   Treatment  Bladder infections are diagnosed by a urine test. They are treated with antibiotics and usually clear up quickly without complications. Treatment helps prevent a more serious kidney infection.  Medicines  Medicines can help in the treatment of a bladder infection:  · Take antibiotics until they are used up, even if you feel better. It is important to finish them to make sure the infection has cleared.  · You can use acetaminophen or ibuprofen for pain, fever, or discomfort, unless another medicine was prescribed. If you have chronic liver or kidney disease, talk with your healthcare provider before using these medicines. Also talk with your provider if you've ever had a stomach ulcer or gastrointestinal bleeding, or are taking blood-thinner medicines.  · If you  are given phenazopydridine to reduce burning with urination, it will cause your urine to become a bright orange color. This can stain clothing.  Care and prevention  These self-care steps can help prevent future infections:  · Drink plenty of fluids to prevent dehydration and flush out your bladder. Do this unless you must restrict fluids for other health reasons, or your doctor told you not to.  · Proper cleaning after going to the bathroom is important. Wipe from front to back after using the toilet to prevent the spread of bacteria.  · Urinate more often. Don't try to hold urine in for a long time.  · Wear loose-fitting clothes and cotton underwear. Avoid tight-fitting pants.  · Improve your diet and prevent constipation. Eat more fresh fruit and vegetables, and fiber, and less junk and fatty foods.  · Avoid sex until your symptoms are gone.  · Avoid caffeine, alcohol, and spicy foods. These can irritate your bladder.  · Urinate right after intercourse to flush out your bladder.  · If you use birth control pills and have frequent bladder infections, discuss it with your doctor.  Follow-up care  Call your healthcare provider if all symptoms are not gone after 3 days of treatment. This is especially important if you have repeat infections.  If a culture was done, you will be told if your treatment needs to be changed. If directed, you can call to find out the results.  If X-rays were done, you will be told if the results will affect your treatment.  Call 911  Call 911 if any of the following occur:  · Trouble breathing  · Hard to wake up or confusion  · Fainting or loss of consciousness  · Rapid heart rate  When to seek medical advice  Call your healthcare provider right away if any of these occur:  · Fever of 100.4ºF (38.0ºC) or higher, or as directed by your healthcare provider  · Symptoms are not better by the third day of treatment  · Back or belly (abdominal) pain that gets worse  · Repeated vomiting, or  unable to keep medicine down  · Weakness or dizziness  · Vaginal discharge  · Pain, redness, or swelling in the outer vaginal area (labia)  Date Last Reviewed: 10/1/2016  © 5947-1351 [employer]. [address] [place] [address], [place] [address]. All rights reserved. This information is not intended as a substitute for professional medical care. Always follow your healthcare professional's instructions.    Please drink plenty of fluids.  Please get plenty of rest.  Clear liquid diet as instructed for 2 - 3 days or until symptoms improve.  Please return here or go to the Emergency Department for any concerns or worsening of condition.  If you were prescribed antibiotics, please take them to completion.  If not allergic, please take over the counter Tylenol (Acetaminophen) as directed for control of pain and/or fever.  Motrin (advil) or Alleve may help a fever, but they may also worsen your Nausea and Vomiting.    Please follow up with your primary care doctor or specialist as needed.    John Jackson MD  [phone]    If you  smoke, please stop smoking.

## 2018-12-14 NOTE — PATIENT INSTRUCTIONS
"Follow up with urology  Please return here or go to the Emergency Department for any concerns or worsening of condition.  If you were prescribed antibiotics, please take them to completion.  If you were prescribed a narcotic medication, do not drive or operate heavy equipment or machinery while taking these medications.  Please follow up with your primary care doctor or specialist as needed.  Please drink plenty of fluids.  Please get plenty of rest.  If you were prescribed Pyridium (phenazopyridine), please be aware that if you wear contact lens that this medication may stain your contacts.  While taking this medication it is recommended that you do not wear your contacts until 24 hours after your last dose.    Push fluids aggressively to improve symptoms and wash through the infection.  Cranberry juice can help relieve symptoms  If you  smoke, please stop smoking.    Please follow up with your primary care doctor or specialist as needed.    John Jackson MD  [phone]        Bladder Infection, Female (Adult)    Urine is normally doesn't have any bacteria in it. But bacteria can get into the urinary tract from the skin around the rectum. Or they can travel in the blood from elsewhere in the body. Once they are in your urinary tract, they can cause infection in the urethra (urethritis), the bladder (cystitis), or the kidneys (pyelonephritis).  The most common place for an infection is in the bladder. This is called a bladder infection. This is one of the most common infections in women. Most bladder infections are easily treated. They are not serious unless the infection spreads to the kidney.  The phrases "bladder infection," "UTI," and "cystitis" are often used to describe the same thing. But they are not always the same. Cystitis is an inflammation of the bladder. The most common cause of cystitis is an infection.  Symptoms  The infection causes inflammation in the urethra and bladder. This causes many of " the symptoms. The most common symptoms of a bladder infection are:  · Pain or burning when urinating  · Having to urinate more often than usual  · Urgent need to urinate  · Only a small amount of urine comes out  · Blood in urine  · Abdominal discomfort. This is usually in the lower abdomen above the pubic bone.  · Cloudy urine  · Strong- or bad-smelling urine  · Unable to urinate (urinary retention)  · Unable to hold urine in (urinary incontinence)  · Fever  · Loss of appetite  · Confusion (in older adults)  Causes  Bladder infections are not contagious. You can't get one from someone else, from a toilet seat, or from sharing a bath.  The most common cause of bladder infections is bacteria from the bowels. The bacteria get onto the skin around the opening of the urethra. From there, they can get into the urine and travel up to the bladder, causing inflammation and infection. This usually happens because of:  · Wiping improperly after urinating. Always wipe from front to back.  · Bowel incontinence  · Pregnancy  · Procedures such as having a catheter inserted  · Older age  · Not emptying your bladder. This can allow bacteria a chance to grow in your urine.  · Dehydration  · Constipation  · Sex  · Use of a diaphragm for birth control   Treatment  Bladder infections are diagnosed by a urine test. They are treated with antibiotics and usually clear up quickly without complications. Treatment helps prevent a more serious kidney infection.  Medicines  Medicines can help in the treatment of a bladder infection:  · Take antibiotics until they are used up, even if you feel better. It is important to finish them to make sure the infection has cleared.  · You can use acetaminophen or ibuprofen for pain, fever, or discomfort, unless another medicine was prescribed. If you have chronic liver or kidney disease, talk with your healthcare provider before using these medicines. Also talk with your provider if you've ever had a  stomach ulcer or gastrointestinal bleeding, or are taking blood-thinner medicines.  · If you are given phenazopydridine to reduce burning with urination, it will cause your urine to become a bright orange color. This can stain clothing.  Care and prevention  These self-care steps can help prevent future infections:  · Drink plenty of fluids to prevent dehydration and flush out your bladder. Do this unless you must restrict fluids for other health reasons, or your doctor told you not to.  · Proper cleaning after going to the bathroom is important. Wipe from front to back after using the toilet to prevent the spread of bacteria.  · Urinate more often. Don't try to hold urine in for a long time.  · Wear loose-fitting clothes and cotton underwear. Avoid tight-fitting pants.  · Improve your diet and prevent constipation. Eat more fresh fruit and vegetables, and fiber, and less junk and fatty foods.  · Avoid sex until your symptoms are gone.  · Avoid caffeine, alcohol, and spicy foods. These can irritate your bladder.  · Urinate right after intercourse to flush out your bladder.  · If you use birth control pills and have frequent bladder infections, discuss it with your doctor.  Follow-up care  Call your healthcare provider if all symptoms are not gone after 3 days of treatment. This is especially important if you have repeat infections.  If a culture was done, you will be told if your treatment needs to be changed. If directed, you can call to find out the results.  If X-rays were done, you will be told if the results will affect your treatment.  Call 911  Call 911 if any of the following occur:  · Trouble breathing  · Hard to wake up or confusion  · Fainting or loss of consciousness  · Rapid heart rate  When to seek medical advice  Call your healthcare provider right away if any of these occur:  · Fever of 100.4ºF (38.0ºC) or higher, or as directed by your healthcare provider  · Symptoms are not better by the third day  of treatment  · Back or belly (abdominal) pain that gets worse  · Repeated vomiting, or unable to keep medicine down  · Weakness or dizziness  · Vaginal discharge  · Pain, redness, or swelling in the outer vaginal area (labia)  Date Last Reviewed: 10/1/2016  © 9160-7246 [employer]. [address] [place] [address]. All rights reserved. This information is not intended as a substitute for professional medical care. Always follow your healthcare professional's instructions.    Please drink plenty of fluids.  Please get plenty of rest.  Clear liquid diet as instructed for 2 - 3 days or until symptoms improve.  Please return here or go to the Emergency Department for any concerns or worsening of condition.  If you were prescribed antibiotics, please take them to completion.  If not allergic, please take over the counter Tylenol (Acetaminophen) as directed for control of pain and/or fever.  Motrin (advil) or Alleve may help a fever, but they may also worsen your Nausea and Vomiting.    Please follow up with your primary care doctor or specialist as needed.    John Jackson MD  [phone]    If you  smoke, please stop smoking.

## 2018-12-18 ENCOUNTER — TELEPHONE (OUTPATIENT)
Dept: URGENT CARE | Facility: CLINIC | Age: 63
End: 2018-12-18

## 2018-12-18 LAB
BACTERIA UR CULT: ABNORMAL
BACTERIA UR CULT: ABNORMAL
OTHER ANTIBIOTIC SUSC ISLT: ABNORMAL

## 2018-12-18 NOTE — TELEPHONE ENCOUNTER
The patient has contacted a message is left for her to contact the clinic with her urine culture results.

## 2018-12-23 ENCOUNTER — OFFICE VISIT (OUTPATIENT)
Dept: URGENT CARE | Facility: CLINIC | Age: 63
End: 2018-12-23
Payer: COMMERCIAL

## 2018-12-23 VITALS
OXYGEN SATURATION: 98 % | BODY MASS INDEX: 29.45 KG/M2 | DIASTOLIC BLOOD PRESSURE: 91 MMHG | HEIGHT: 61 IN | SYSTOLIC BLOOD PRESSURE: 146 MMHG | WEIGHT: 156 LBS | TEMPERATURE: 98 F | HEART RATE: 92 BPM

## 2018-12-23 DIAGNOSIS — J06.9 VIRAL URI WITH COUGH: Primary | ICD-10-CM

## 2018-12-23 PROCEDURE — 96372 THER/PROPH/DIAG INJ SC/IM: CPT | Mod: S$GLB,,, | Performed by: NURSE PRACTITIONER

## 2018-12-23 RX ORDER — BETAMETHASONE SODIUM PHOSPHATE AND BETAMETHASONE ACETATE 3; 3 MG/ML; MG/ML
6 INJECTION, SUSPENSION INTRA-ARTICULAR; INTRALESIONAL; INTRAMUSCULAR; SOFT TISSUE
Status: COMPLETED | OUTPATIENT
Start: 2018-12-23 | End: 2018-12-23

## 2018-12-23 RX ORDER — PROMETHAZINE HYDROCHLORIDE AND DEXTROMETHORPHAN HYDROBROMIDE 6.25; 15 MG/5ML; MG/5ML
5 SYRUP ORAL NIGHTLY PRN
Qty: 180 ML | Refills: 0 | Status: SHIPPED | OUTPATIENT
Start: 2018-12-23 | End: 2019-01-02

## 2018-12-23 RX ADMIN — BETAMETHASONE SODIUM PHOSPHATE AND BETAMETHASONE ACETATE 6 MG: 3; 3 INJECTION, SUSPENSION INTRA-ARTICULAR; INTRALESIONAL; INTRAMUSCULAR; SOFT TISSUE at 01:12

## 2018-12-23 NOTE — PATIENT INSTRUCTIONS
If you were prescribed a narcotic medication, do not drive or operate heavy equipment or machinery while taking these medications. Start taking mucinex D and flonase daily.     You must understand that you've received an Urgent Care treatment only and that you may be released before all your medical problems are known or treated. You, the patient, will arrange for follow up care as instructed.  If your condition worsens we recommend that you receive another evaluation at the emergency room immediately or contact your primary medical clinics after hours call service to discuss your concerns.  Please return here or go to the Emergency Department for any concerns or worsening of condition.      Viral Upper Respiratory Illness (Adult)  You have a viral upper respiratory illness (URI), which is another term for the common cold. This illness is contagious during the first few days. It is spread through the air by coughing and sneezing. It may also be spread by direct contact (touching the sick person and then touching your own eyes, nose, or mouth). Frequent handwashing will decrease risk of spread. Most viral illnesses go away within 7 to 10 days with rest and simple home remedies. Sometimes the illness may last for several weeks. Antibiotics will not kill a virus, and they are generally not prescribed for this condition.    Home care  · If symptoms are severe, rest at home for the first 2 to 3 days. When you resume activity, don't let yourself get too tired.  · Avoid being exposed to cigarette smoke (yours or others).  · You may use acetaminophen or ibuprofen to control pain and fever, unless another medicine was prescribed. (Note: If you have chronic liver or kidney disease, have ever had a stomach ulcer or gastrointestinal bleeding, or are taking blood-thinning medicines, talk with your healthcare provider before using these medicines.) Aspirin should never be given to anyone under 18 years of age who is ill with a  viral infection or fever. It may cause severe liver or brain damage.  · Your appetite may be poor, so a light diet is fine. Avoid dehydration by drinking 6 to 8 glasses of fluids per day (water, soft drinks, juices, tea, or soup). Extra fluids will help loosen secretions in the nose and lungs.  · Over-the-counter cold medicines will not shorten the length of time youre sick, but they may be helpful for the following symptoms: cough, sore throat, and nasal and sinus congestion. (Note: Do not use decongestants if you have high blood pressure.)  Follow-up care  Follow up with your healthcare provider, or as advised.  When to seek medical advice  Call your healthcare provider right away if any of these occur:  · Cough with lots of colored sputum (mucus)  · Severe headache; face, neck, or ear pain  · Difficulty swallowing due to throat pain  · Fever of 100.4°F (38°C)  Call 911, or get immediate medical care  Call emergency services right away if any of these occur:  · Chest pain, shortness of breath, wheezing, or difficulty breathing  · Coughing up blood  · Inability to swallow due to throat pain  Date Last Reviewed: 9/13/2015  © 4322-6491 [employer]. [address] [place] [address], [place] [address]. All rights reserved. This information is not intended as a substitute for professional medical care. Always follow your healthcare professional's instructions.

## 2019-03-08 ENCOUNTER — OFFICE VISIT (OUTPATIENT)
Dept: URGENT CARE | Facility: CLINIC | Age: 64
End: 2019-03-08
Payer: COMMERCIAL

## 2019-03-08 VITALS
RESPIRATION RATE: 19 BRPM | SYSTOLIC BLOOD PRESSURE: 148 MMHG | BODY MASS INDEX: 24.99 KG/M2 | OXYGEN SATURATION: 98 % | DIASTOLIC BLOOD PRESSURE: 88 MMHG | HEART RATE: 90 BPM | HEIGHT: 65 IN | TEMPERATURE: 98 F | WEIGHT: 150 LBS

## 2019-03-08 DIAGNOSIS — R05.9 COUGH: ICD-10-CM

## 2019-03-08 DIAGNOSIS — J20.8 VIRAL BRONCHITIS: Primary | ICD-10-CM

## 2019-03-08 PROCEDURE — 1101F PR PT FALLS ASSESS DOC 0-1 FALLS W/OUT INJ PAST YR: ICD-10-PCS | Mod: CPTII,S$GLB,, | Performed by: NURSE PRACTITIONER

## 2019-03-08 PROCEDURE — 3008F PR BODY MASS INDEX (BMI) DOCUMENTED: ICD-10-PCS | Mod: CPTII,S$GLB,, | Performed by: NURSE PRACTITIONER

## 2019-03-08 PROCEDURE — 1101F PT FALLS ASSESS-DOCD LE1/YR: CPT | Mod: CPTII,S$GLB,, | Performed by: NURSE PRACTITIONER

## 2019-03-08 PROCEDURE — 3008F BODY MASS INDEX DOCD: CPT | Mod: CPTII,S$GLB,, | Performed by: NURSE PRACTITIONER

## 2019-03-08 PROCEDURE — 99214 PR OFFICE/OUTPT VISIT, EST, LEVL IV, 30-39 MIN: ICD-10-PCS | Mod: S$GLB,,, | Performed by: NURSE PRACTITIONER

## 2019-03-08 PROCEDURE — 99214 OFFICE O/P EST MOD 30 MIN: CPT | Mod: S$GLB,,, | Performed by: NURSE PRACTITIONER

## 2019-03-08 RX ORDER — METHYLPREDNISOLONE 4 MG/1
4 TABLET ORAL
Qty: 1 PACKAGE | Refills: 0 | Status: SHIPPED | OUTPATIENT
Start: 2019-03-08 | End: 2019-03-14

## 2019-03-08 RX ORDER — ALBUTEROL SULFATE 90 UG/1
2 AEROSOL, METERED RESPIRATORY (INHALATION) EVERY 6 HOURS PRN
Qty: 1 INHALER | Refills: 0 | Status: SHIPPED | OUTPATIENT
Start: 2019-03-08 | End: 2020-11-25

## 2019-03-08 RX ORDER — PROMETHAZINE HYDROCHLORIDE AND DEXTROMETHORPHAN HYDROBROMIDE 6.25; 15 MG/5ML; MG/5ML
5 SYRUP ORAL NIGHTLY PRN
Qty: 120 ML | Refills: 0 | Status: SHIPPED | OUTPATIENT
Start: 2019-03-08 | End: 2019-05-28

## 2019-03-08 NOTE — PATIENT INSTRUCTIONS
A cold is caused by a virus that can settle in your nose, throat or lungs. This causes  a runny or stuffy nose and sneezing. You may also have a sore throat, cough, headache, fever and muscle aches. Different cold viruses last different lengths  of time, but the average time is 2 to 14 days.    Seek immediate medical care if you develop fever, chest pain, or shortness of breath.     Treatment  There is no cure for the common cold.     Antibiotics may be used to treat signs of a secondary infection, but they do not treat  the cold virus. Try these tips to  keep yourself comfortable:  -Get plenty of rest.  -Drink plenty of fluids, at least 8 large glasses of fluid a day. Good fluidchoices are water, fruit juices high in Vitamin C, tea, gelatin, or broths and soups. These help to keep mucus thin and ease congestion.  -Use salt water gargle, cough drops or throat sprays to relieve throat pain. Mi ¼ to ½ teaspoon of salt in 1 cup of warm water for a salt water gargle  solution.  -Use petroleum jelly or lip balm around lips and nose to prevent chapping.  -Use saline nose drops or spray to help ease congestion.    Over the Counter (OTC) Medicines:  Take over the counter medicines as needed to ease your signs.  Read labels carefully.  Use a product that treats only the signs that you have. Ask your pharmacist  for recommendations. Be sure to ask about possible interactions with other  medicines you are taking.  Common medicines used to treat signs of a cold include:    #Antihistamines that dry secretions in your nose and lungs. Some of these  may cause you to feel drowsy. Talk to your pharmacist before use if you  have glaucoma or an enlarged prostate.  Names of some medicines in this group include:  - Diphenhydramine  - Brompheniramine  - Chlorpheniramine  - Clemastine    # Decongestants that tighten blood vessels in your nose to decrease  stuffiness and pressure. Use nasal spray decongestants for up to three days  only.  Longer use can make congestion worse. Talk to your pharmacist  before use if you have high blood pressure, heart disease, diabetes or an  enlarged prostate.    Names of some medicines in this group include:  - Pseudoephedrine (Sudafed)- kept behind the counter and requires identification  to purchase in limited quantities because it can be used to make illegal  drugs  - Phenylephrine  - Oxymetazoline nasal spray (Afrin)  - Cough suppressant, also called antitussive, such as dextromethorphan.  This medicine decreases your reflex and sensitivity to cough. This  medicine may be kept behind the pharmacy counter for purchase.  - Expectorant, sometimes called mucolytic, such as guaifenesin (mucinex). This  medicine thins mucus secretions in the lungs to make it easier for you to  cough up and out. (Be sure to drink plenty of fluids when taking this medication)  Cold and cough medicines often contain more than one type of medicine.  Ask the pharmacist for help to confirm that you are not using more than one  product with the same or similar ingredient. For example, some cold and  cough medicines have acetaminophen or ibuprofen in them to help lower a  fever or ease muscle aches. Do not take extra acetaminophen (Tylenol) or  ibuprofen (Advil, Motrin) if the cold or cough medicine has it as an  ingredient. Too much medicine could be harmful.    Take the correct dose as listed on the package. Do not take more than  recommended.    Use a Humidifier:  A cool mist humidifier can make breathing easier by thinning mucus. Do not use  a steam humidifier as hot water can cause burns if spilled.  Place the humidifier a few feet from the bed. Drain and clean each day with  soap and water to prevent bacteria and mold from growing.  Indoor humidity should not be above 50%. Stop using the humidifier if you  notice moisture on windows, walls or pictures.  You do not need to add any medicine to the humidifier.  If you cannot get a  humidifier, place a pan of water next to heating vents and  refill the water level daily. The water will evaporate and add moisture to the  Room.    How to prevent the spread of colds  -Wash your hands with soap and water or use alcohol based hand   often. Dry hands wet from washing with soap on a paper towel instead of cloth towel.  -Cough or sneeze into your elbow to avoid spreading germs.  -Wipe down common surfaces, such as door knobs and faucet handles, with a disinfectant spray.  -Do not share cups or utensils.       -Albuterol as needed for coughing and wheezing.  -Medrol dose pack.  -Cough syrup to take at night.  -Continue Mucinex DM daily.  -IF symptoms worsen or do not improve follow up with your primary care doctor.   Please follow up with your Primary care provider within 2-5 days if your signs and symptoms have not resolved or worsen.     If your condition worsens or fails to improve we recommend that you receive another evaluation at the emergency room immediately or contact your primary medical clinic to discuss your concerns.   You must understand that you have received an Urgent Care treatment only and that you may be released before all of your medical problems are known or treated. You, the patient, will arrange for follow up care as instructed.

## 2019-03-08 NOTE — PROGRESS NOTES
"Subjective:       Patient ID: Sherry Torres is a 63 y.o. female.    Vitals:  height is 5' 5" (1.651 m) and weight is 68 kg (150 lb). Her oral temperature is 98 °F (36.7 °C). Her blood pressure is 148/88 (abnormal) and her pulse is 90. Her respiration is 19 and oxygen saturation is 98%.     Chief Complaint: Cough    Symptoms began Wednesday night.  Pt is up at night coughing.  Chest pain is from persistent cough.  Fever is alleviated with Tylenol.  Pt had a smoothie prior to arrival and is afebrile.      URI    This is a new problem. The current episode started in the past 7 days. The problem has been gradually worsening. The maximum temperature recorded prior to her arrival was 101 - 101.9 F. The fever has been present for 1 to 2 days. Associated symptoms include abdominal pain, chest pain, congestion, coughing, ear pain, headaches, a plugged ear sensation, rhinorrhea, sinus pain and sneezing. Pertinent negatives include no diarrhea, dysuria, joint pain, joint swelling, nausea, neck pain, rash, sore throat, swollen glands, vomiting or wheezing. She has tried acetaminophen (+ mucinex D) for the symptoms. The treatment provided mild relief.       Constitution: Positive for chills, sweating, fatigue and fever.   HENT: Positive for ear pain, congestion, postnasal drip, sinus pain and sinus pressure. Negative for sore throat and voice change.    Neck: Negative for neck pain and painful lymph nodes.   Cardiovascular: Positive for chest pain.   Eyes: Negative for eye redness.   Respiratory: Positive for cough and sputum production. Negative for bloody sputum, COPD, shortness of breath, stridor, wheezing and asthma.    Gastrointestinal: Positive for abdominal pain. Negative for nausea, vomiting and diarrhea.   Genitourinary: Negative for dysuria.   Musculoskeletal: Positive for muscle ache.   Skin: Negative for rash.   Allergic/Immunologic: Positive for sneezing. Negative for seasonal allergies and asthma. "   Neurological: Positive for headaches.   Hematologic/Lymphatic: Negative for swollen lymph nodes.       Objective:      Physical Exam   Constitutional: She is oriented to person, place, and time. She appears well-developed and well-nourished. She is cooperative.  Non-toxic appearance. She does not appear ill. No distress.   HENT:   Head: Normocephalic and atraumatic.   Right Ear: Hearing, tympanic membrane, external ear and ear canal normal.   Left Ear: Hearing, tympanic membrane, external ear and ear canal normal.   Nose: Mucosal edema and rhinorrhea present. No nasal deformity. No epistaxis. Right sinus exhibits no maxillary sinus tenderness and no frontal sinus tenderness. Left sinus exhibits no maxillary sinus tenderness and no frontal sinus tenderness.   Mouth/Throat: Uvula is midline and mucous membranes are normal. No trismus in the jaw. Normal dentition. No uvula swelling. Posterior oropharyngeal erythema present.   Eyes: Conjunctivae and lids are normal. No scleral icterus.   Sclera clear bilat   Neck: Trachea normal, full passive range of motion without pain and phonation normal. Neck supple.   Cardiovascular: Normal rate, regular rhythm, normal heart sounds, intact distal pulses and normal pulses.   Pulmonary/Chest: Effort normal. No respiratory distress. She has wheezes in the right upper field and the left upper field.   Abdominal: Soft. Normal appearance and bowel sounds are normal. She exhibits no distension. There is no tenderness.   Musculoskeletal: Normal range of motion. She exhibits no edema or deformity.   Neurological: She is alert and oriented to person, place, and time. She exhibits normal muscle tone. Coordination normal.   Skin: Skin is warm, dry and intact. She is not diaphoretic. No pallor.   Psychiatric: She has a normal mood and affect. Her speech is normal and behavior is normal. Judgment and thought content normal. Cognition and memory are normal.   Nursing note and vitals  reviewed.      Assessment:       1. Viral bronchitis    2. Cough        Plan:         Viral bronchitis  -     methylPREDNISolone (MEDROL DOSEPACK) 4 mg tablet; Take 1 tablet (4 mg total) by mouth as needed (Take as directed). Take as directed  Dispense: 1 Package; Refill: 0  -     albuterol (PROVENTIL/VENTOLIN HFA) 90 mcg/actuation inhaler; Inhale 2 puffs into the lungs every 6 (six) hours as needed for Wheezing or Shortness of Breath. Rescue  Dispense: 1 Inhaler; Refill: 0    Cough  -     albuterol (PROVENTIL/VENTOLIN HFA) 90 mcg/actuation inhaler; Inhale 2 puffs into the lungs every 6 (six) hours as needed for Wheezing or Shortness of Breath. Rescue  Dispense: 1 Inhaler; Refill: 0  -     promethazine-dextromethorphan (PROMETHAZINE-DM) 6.25-15 mg/5 mL Syrp; Take 5 mLs by mouth nightly as needed.  Dispense: 120 mL; Refill: 0      Patient Instructions   A cold is caused by a virus that can settle in your nose, throat or lungs. This causes  a runny or stuffy nose and sneezing. You may also have a sore throat, cough, headache, fever and muscle aches. Different cold viruses last different lengths  of time, but the average time is 2 to 14 days.    Seek immediate medical care if you develop fever, chest pain, or shortness of breath.     Treatment  There is no cure for the common cold.     Antibiotics may be used to treat signs of a secondary infection, but they do not treat  the cold virus. Try these tips to  keep yourself comfortable:  -Get plenty of rest.  -Drink plenty of fluids, at least 8 large glasses of fluid a day. Good fluidchoices are water, fruit juices high in Vitamin C, tea, gelatin, or broths and soups. These help to keep mucus thin and ease congestion.  -Use salt water gargle, cough drops or throat sprays to relieve throat pain. Mi ¼ to ½ teaspoon of salt in 1 cup of warm water for a salt water gargle  solution.  -Use petroleum jelly or lip balm around lips and nose to prevent chapping.  -Use saline nose  drops or spray to help ease congestion.    Over the Counter (OTC) Medicines:  Take over the counter medicines as needed to ease your signs.  Read labels carefully.  Use a product that treats only the signs that you have. Ask your pharmacist  for recommendations. Be sure to ask about possible interactions with other  medicines you are taking.  Common medicines used to treat signs of a cold include:    #Antihistamines that dry secretions in your nose and lungs. Some of these  may cause you to feel drowsy. Talk to your pharmacist before use if you  have glaucoma or an enlarged prostate.  Names of some medicines in this group include:  - Diphenhydramine  - Brompheniramine  - Chlorpheniramine  - Clemastine    # Decongestants that tighten blood vessels in your nose to decrease  stuffiness and pressure. Use nasal spray decongestants for up to three days  only. Longer use can make congestion worse. Talk to your pharmacist  before use if you have high blood pressure, heart disease, diabetes or an  enlarged prostate.    Names of some medicines in this group include:  - Pseudoephedrine (Sudafed)- kept behind the counter and requires identification  to purchase in limited quantities because it can be used to make illegal  drugs  - Phenylephrine  - Oxymetazoline nasal spray (Afrin)  - Cough suppressant, also called antitussive, such as dextromethorphan.  This medicine decreases your reflex and sensitivity to cough. This  medicine may be kept behind the pharmacy counter for purchase.  - Expectorant, sometimes called mucolytic, such as guaifenesin (mucinex). This  medicine thins mucus secretions in the lungs to make it easier for you to  cough up and out. (Be sure to drink plenty of fluids when taking this medication)  Cold and cough medicines often contain more than one type of medicine.  Ask the pharmacist for help to confirm that you are not using more than one  product with the same or similar ingredient. For example, some  cold and  cough medicines have acetaminophen or ibuprofen in them to help lower a  fever or ease muscle aches. Do not take extra acetaminophen (Tylenol) or  ibuprofen (Advil, Motrin) if the cold or cough medicine has it as an  ingredient. Too much medicine could be harmful.    Take the correct dose as listed on the package. Do not take more than  recommended.    Use a Humidifier:  A cool mist humidifier can make breathing easier by thinning mucus. Do not use  a steam humidifier as hot water can cause burns if spilled.  Place the humidifier a few feet from the bed. Drain and clean each day with  soap and water to prevent bacteria and mold from growing.  Indoor humidity should not be above 50%. Stop using the humidifier if you  notice moisture on windows, walls or pictures.  You do not need to add any medicine to the humidifier.  If you cannot get a humidifier, place a pan of water next to heating vents and  refill the water level daily. The water will evaporate and add moisture to the  Room.    How to prevent the spread of colds  -Wash your hands with soap and water or use alcohol based hand   often. Dry hands wet from washing with soap on a paper towel instead of cloth towel.  -Cough or sneeze into your elbow to avoid spreading germs.  -Wipe down common surfaces, such as door knobs and faucet handles, with a disinfectant spray.  -Do not share cups or utensils.       -Albuterol as needed for coughing and wheezing.  -Medrol dose pack.  -Cough syrup to take at night.  -Continue Mucinex DM daily.  -IF symptoms worsen or do not improve follow up with your primary care doctor.   Please follow up with your Primary care provider within 2-5 days if your signs and symptoms have not resolved or worsen.     If your condition worsens or fails to improve we recommend that you receive another evaluation at the emergency room immediately or contact your primary medical clinic to discuss your concerns.   You must understand  that you have received an Urgent Care treatment only and that you may be released before all of your medical problems are known or treated. You, the patient, will arrange for follow up care as instructed.

## 2019-05-06 ENCOUNTER — HOSPITAL ENCOUNTER (OUTPATIENT)
Dept: RADIOLOGY | Facility: HOSPITAL | Age: 64
Discharge: HOME | End: 2019-05-06
Attending: PHYSICIAN ASSISTANT
Payer: COMMERCIAL

## 2019-05-06 DIAGNOSIS — Z17.0 MALIGNANT NEOPLASM OF UPPER-OUTER QUADRANT OF RIGHT BREAST IN FEMALE, ESTROGEN RECEPTOR POSITIVE: ICD-10-CM

## 2019-05-06 DIAGNOSIS — C50.411 MALIGNANT NEOPLASM OF UPPER-OUTER QUADRANT OF RIGHT BREAST IN FEMALE, ESTROGEN RECEPTOR POSITIVE: ICD-10-CM

## 2019-05-06 PROCEDURE — 77066 MAMMO DIGITAL DIAGNOSTIC BILAT WITH TOMOSYNTHESIS_CAD: ICD-10-PCS | Mod: 26,,, | Performed by: RADIOLOGY

## 2019-05-06 PROCEDURE — 77062 BREAST TOMOSYNTHESIS BI: CPT | Mod: 26,,, | Performed by: RADIOLOGY

## 2019-05-06 PROCEDURE — 77066 DX MAMMO INCL CAD BI: CPT | Mod: 26,,, | Performed by: RADIOLOGY

## 2019-05-06 PROCEDURE — 77062 MAMMO DIGITAL DIAGNOSTIC BILAT WITH TOMOSYNTHESIS_CAD: ICD-10-PCS | Mod: 26,,, | Performed by: RADIOLOGY

## 2019-05-06 PROCEDURE — 77066 DX MAMMO INCL CAD BI: CPT | Mod: TC,PO

## 2019-05-13 NOTE — PROGRESS NOTES
Subjective:       Patient ID: Sherry Torres is a 63 y.o. female.    Chief Complaint: No chief complaint on file.    HPI    Ms Torres is a 63-year-old female seen in follow-up  of right breast cancer.   She had stage II A, strongly ER and NJ positive and HER-2 negative disease. She is on letrozole therapy.    Major complaint has been some occasional right-sided sciatica.  Otherwise, she has been feeling well.      Breast history:    She noted an abnormality on self examination at the end of July or early August 2016.  On August 8 she underwent diagnostic mammogram which showed an irregular mass was plated margins in the middle right breast and o'clock position.  By ultrasound this was a solid 1.7 x 1.0 x 1.5 cm mass.     On August 9, 2016 a core needle biopsy showed infiltrating ductal carcinoma which was well differentiated (histologic grade 2, nuclear grade 2, mitotic index 1).  The tumor was 100% ER positive, 70% NJ positive and HER-2 1+.  On August 25 right breast lumpectomy and sentinel lymph node biopsy was performed.  That showed a 14 mm low-grade infiltrating ductal carcinoma.    The sentinel lymph node was positive for 1.5 mm micrometastasis.       Final pathological stage TI cN1 stage II    Mammaprint genomic assay  showed that she was low risk.  Score was +0.336 with a 5 year risk of distant recurrence at 5% and a 10 year risk at 10%.       She completed radiation in December 2016 and began Letrozole at that time.  Review of Systems   Constitutional: Negative for appetite change and unexpected weight change.   Eyes: Negative for visual disturbance.   Respiratory: Positive for cough (Following URI, using an inhaler). Negative for shortness of breath.    Cardiovascular: Negative for chest pain.   Gastrointestinal: Negative for abdominal pain and diarrhea.   Genitourinary: Negative for frequency.   Musculoskeletal: Positive for back pain.   Skin: Negative for rash.   Neurological: Positive for  headaches.   Hematological: Negative for adenopathy.   Psychiatric/Behavioral: The patient is not nervous/anxious.        Objective:      Physical Exam   Constitutional: She is oriented to person, place, and time. She appears well-developed and well-nourished.   HENT:   Mouth/Throat: No oropharyngeal exudate.   Cardiovascular: Normal rate and regular rhythm.   Pulmonary/Chest: Effort normal and breath sounds normal. Right breast exhibits no mass, no nipple discharge and no skin change. Left breast exhibits no mass, no nipple discharge and no skin change.       Abdominal: Soft. She exhibits no mass. There is no tenderness.   Lymphadenopathy:     She has no cervical adenopathy.   Neurological: She is alert and oriented to person, place, and time.   Psychiatric: She has a normal mood and affect. Her behavior is normal. Thought content normal.   Vitals reviewed.      Assessment:     mammogram on May 6th was negative for malignancy  1. Malignant neoplasm of upper-outer quadrant of right breast in female, estrogen receptor positive        Plan:       Continue letrozole and return to clinic 4 months time.  Referral to healthy back program

## 2019-05-14 ENCOUNTER — TELEPHONE (OUTPATIENT)
Dept: HEMATOLOGY/ONCOLOGY | Facility: CLINIC | Age: 64
End: 2019-05-14

## 2019-05-14 ENCOUNTER — OFFICE VISIT (OUTPATIENT)
Dept: HEMATOLOGY/ONCOLOGY | Facility: CLINIC | Age: 64
End: 2019-05-14
Payer: COMMERCIAL

## 2019-05-14 VITALS
WEIGHT: 153.88 LBS | BODY MASS INDEX: 25.64 KG/M2 | HEART RATE: 71 BPM | DIASTOLIC BLOOD PRESSURE: 72 MMHG | RESPIRATION RATE: 20 BRPM | OXYGEN SATURATION: 99 % | SYSTOLIC BLOOD PRESSURE: 142 MMHG | HEIGHT: 65 IN | TEMPERATURE: 98 F

## 2019-05-14 DIAGNOSIS — C50.411 MALIGNANT NEOPLASM OF UPPER-OUTER QUADRANT OF RIGHT BREAST IN FEMALE, ESTROGEN RECEPTOR POSITIVE: Primary | ICD-10-CM

## 2019-05-14 DIAGNOSIS — G89.29 CHRONIC RIGHT-SIDED LOW BACK PAIN WITH RIGHT-SIDED SCIATICA: ICD-10-CM

## 2019-05-14 DIAGNOSIS — M54.41 CHRONIC RIGHT-SIDED LOW BACK PAIN WITH RIGHT-SIDED SCIATICA: ICD-10-CM

## 2019-05-14 DIAGNOSIS — Z17.0 MALIGNANT NEOPLASM OF UPPER-OUTER QUADRANT OF RIGHT BREAST IN FEMALE, ESTROGEN RECEPTOR POSITIVE: Primary | ICD-10-CM

## 2019-05-14 PROCEDURE — 3008F BODY MASS INDEX DOCD: CPT | Mod: CPTII,S$GLB,, | Performed by: INTERNAL MEDICINE

## 2019-05-14 PROCEDURE — 3008F PR BODY MASS INDEX (BMI) DOCUMENTED: ICD-10-PCS | Mod: CPTII,S$GLB,, | Performed by: INTERNAL MEDICINE

## 2019-05-14 PROCEDURE — 99213 OFFICE O/P EST LOW 20 MIN: CPT | Mod: S$GLB,,, | Performed by: INTERNAL MEDICINE

## 2019-05-14 PROCEDURE — 99999 PR PBB SHADOW E&M-EST. PATIENT-LVL IV: ICD-10-PCS | Mod: PBBFAC,,, | Performed by: INTERNAL MEDICINE

## 2019-05-14 PROCEDURE — 1101F PT FALLS ASSESS-DOCD LE1/YR: CPT | Mod: CPTII,S$GLB,, | Performed by: INTERNAL MEDICINE

## 2019-05-14 PROCEDURE — 1101F PR PT FALLS ASSESS DOC 0-1 FALLS W/OUT INJ PAST YR: ICD-10-PCS | Mod: CPTII,S$GLB,, | Performed by: INTERNAL MEDICINE

## 2019-05-14 PROCEDURE — 99999 PR PBB SHADOW E&M-EST. PATIENT-LVL IV: CPT | Mod: PBBFAC,,, | Performed by: INTERNAL MEDICINE

## 2019-05-14 PROCEDURE — 99213 PR OFFICE/OUTPT VISIT, EST, LEVL III, 20-29 MIN: ICD-10-PCS | Mod: S$GLB,,, | Performed by: INTERNAL MEDICINE

## 2019-05-14 NOTE — TELEPHONE ENCOUNTER
----- Message from Phillip Mckee MD sent at 5/14/2019 10:55 AM CDT -----  Back Clinic at [facility]

## 2019-05-26 NOTE — PROGRESS NOTES
Subjective:      Patient ID: Sherry Torres is a 63 y.o. female.    Chief Complaint: Low-back Pain and Leg Pain      HPI  (Celestre)    History of breast and thyroid CA, anxiety/depression, GERD.     Started with back pain/leg pain 2-3 years ago. She had Right L5 and S1 TF ARNOLDO by Dr. Mcfarland on 5/18/17 and did great until November of last year. Now with minimal LBP with constant bilateral leg pain to her knees. Leg pain > LBP. Right leg pain > left leg pain. Pain is worse with walking. She can wake up at night in pain. Some improvement with stretching. She rates her pain as a 5 on a scale of 1-10. Minimal numbness in her legs. No tingling or weakness. Pain is stabbing and shooting in nature. She notes locking in the right knee. No giving way or catching.     Has GI upset with NSAIDs/muscle relaxers. She takes prn tylenol. No PT or surgery on her back.       Review of Systems   Constitution: Positive for night sweats. Negative for fever, malaise/fatigue, weight gain and weight loss.   HENT: Positive for tinnitus. Negative for hearing loss, nosebleeds and odynophagia.    Eyes: Negative for blurred vision and double vision.   Cardiovascular: Negative for chest pain, irregular heartbeat and palpitations.   Respiratory: Negative for cough, hemoptysis, shortness of breath and wheezing.    Endocrine: Negative for cold intolerance and polydipsia.   Hematologic/Lymphatic: Does not bruise/bleed easily.   Skin: Negative for dry skin, poor wound healing, rash and suspicious lesions.   Musculoskeletal: Positive for back pain.        See HPI for pertinent positives.   Gastrointestinal: Negative for bloating, abdominal pain, constipation, diarrhea, hematochezia, melena, nausea and vomiting.   Genitourinary: Negative for bladder incontinence, dysuria, hematuria, hesitancy and incomplete emptying.   Neurological: Positive for headaches and paresthesias. Negative for disturbances in coordination, dizziness, focal weakness, loss of  balance, numbness, seizures and weakness.        Positive for numbness in face.    Psychiatric/Behavioral: Negative for depression and hallucinations. The patient is nervous/anxious.            Objective:        General: Sherry is well-developed, well-nourished, appears stated age, in no acute distress, alert and oriented to time, place and person.     General    Vitals reviewed.  Constitutional: She is oriented to person, place, and time. She appears well-developed and well-nourished.   Pulmonary/Chest: Effort normal.   Abdominal: She exhibits no distension.   Neurological: She is alert and oriented to person, place, and time.   Psychiatric: She has a normal mood and affect. Her behavior is normal. Judgment and thought content normal.           Gait: normal, tandem walking is normal and she is able to heel/toe stand.     On exam of the lumbar spine, Inspection of back is normal, No tenderness noted    Skin in lumbar region is warm to the touch without visible rashes.     muscle tone normal without spasm, limited range of motion without pain  Patient denies pain with lumbar ROM.    Strength testing of the bilateral LEs shows  Right hip abduction:  +5/5  Left hip abduction:  +5/5  Right hip flexion:  +5/5   Left hip flexion:  +5/5  Right hip extensors:  +5/5  Left hip extensors:  +5/5  Right quadriceps:  +5/5  Left quadriceps:  +5/5  Right hamstring:  +5/5  Left hamstring:  +5/5  Right dorsiflexion:  +5/5  Left dorsiflexion:  +5/5  Right plantar flexion:  +5/5  Left plantar flexion:  +5/5   Right EHL:  +5/5   Left EHL:  +5/5    negative clonus of bilateral LEs.     negative straight leg raise on bilateral LEs.     DTRs:  Right patellar:  +2     Left patellar:  +2  Right achilles:  +2   Left achilles:  +2    Sensation is grossly intact in L2, L3, L4, L5, and S1 distribution.    She has medial joint line tenderness in right > left knee. No gross instability of either knee with reasonable ROM.     Right hip has no pain  with IR/ER.  Left hip has no pain with IR/ER.      On exam of bilateral UEs, patient has full painfree ROM with no signs of clubbing, laxity, cyanosis, edema, instability, weakness, or tenderness.       XRAY INTERPRETATION:  MRI of lumbar spine dated 5/1/17 and on outside CD is personally reviewed and shows facet hypertrophy L3-S1. No significant central stenosis.     CD returned to patient.         Assessment:       1. Thoracic and lumbosacral neuritis    2. Chronic bilateral low back pain with bilateral sciatica    3. Facet hypertrophy of lumbar region    4. Other spondylosis with radiculopathy, lumbar region    5. Chronic pain of both knees    6. Malignant neoplasm of upper-outer quadrant of right breast in female, estrogen receptor positive    7. History of thyroid cancer           Plan:       Orders Placed This Encounter    Procedure Order to [facility]    X-ray Knee Ortho Bilateral       Started with back pain/leg pain 2-3 years ago. She had Right L5 and S1 TF ARNOLDO by Dr. Mcfarland on 5/18/17 and did great until November of last year. Now with minimal LBP with constant bilateral leg pain to her knees. Leg pain > LBP. Right leg pain > left leg pain. Previous lumbar MRI from 5/1/17 shows facet hypertrophy and lumbar spondylosis. She had both diagnostic and therapeutic relief with last ARNOLDO. She has some medial joint line tenderness in right < left knee and may have component of knee pain as well. Treatment options reviewed with patient along with above lumbar MRI. Following plan made:     - Set up for repeat right L5 TF and right S1 TF ARNOLDO with pain management (Bruna).   - XRs of bilateral knees and lumbar spine on her way out. Will put results on [facility].   - No medications- she has GI upset with most NSAIDS/muscle relaxers.   - Consider PT at next visit for lumbar spine and possibly for knees as well.   - If no improvement with ARNOLDO, she will need updated lumbar MRI with and without contrast due to  h/o breast/thyroid CA.     Follow-up: Follow up in about 6 weeks (around 7/9/2019). If there are any questions prior to this, the patient was instructed to contact the office.

## 2019-05-27 ENCOUNTER — TELEPHONE (OUTPATIENT)
Dept: SPINE | Facility: CLINIC | Age: 64
End: 2019-05-27

## 2019-05-27 DIAGNOSIS — M54.5 LOW BACK PAIN, UNSPECIFIED BACK PAIN LATERALITY, UNSPECIFIED CHRONICITY, WITH SCIATICA PRESENCE UNSPECIFIED: Primary | ICD-10-CM

## 2019-05-28 ENCOUNTER — OFFICE VISIT (OUTPATIENT)
Dept: SPINE | Facility: CLINIC | Age: 64
End: 2019-05-28
Attending: INTERNAL MEDICINE
Payer: COMMERCIAL

## 2019-05-28 ENCOUNTER — TELEPHONE (OUTPATIENT)
Dept: PAIN MEDICINE | Facility: CLINIC | Age: 64
End: 2019-05-28

## 2019-05-28 ENCOUNTER — APPOINTMENT (OUTPATIENT)
Dept: RADIOLOGY | Facility: OTHER | Age: 64
End: 2019-05-28
Attending: PHYSICIAN ASSISTANT
Payer: COMMERCIAL

## 2019-05-28 VITALS
DIASTOLIC BLOOD PRESSURE: 74 MMHG | SYSTOLIC BLOOD PRESSURE: 146 MMHG | HEART RATE: 71 BPM | BODY MASS INDEX: 25.49 KG/M2 | HEIGHT: 65 IN | WEIGHT: 153 LBS

## 2019-05-28 DIAGNOSIS — M54.42 CHRONIC BILATERAL LOW BACK PAIN WITH BILATERAL SCIATICA: ICD-10-CM

## 2019-05-28 DIAGNOSIS — G89.29 CHRONIC BILATERAL LOW BACK PAIN WITH BILATERAL SCIATICA: ICD-10-CM

## 2019-05-28 DIAGNOSIS — Z85.850 HISTORY OF THYROID CANCER: ICD-10-CM

## 2019-05-28 DIAGNOSIS — M47.26 OTHER SPONDYLOSIS WITH RADICULOPATHY, LUMBAR REGION: ICD-10-CM

## 2019-05-28 DIAGNOSIS — M54.14 THORACIC AND LUMBOSACRAL NEURITIS: Primary | ICD-10-CM

## 2019-05-28 DIAGNOSIS — M54.17 THORACIC AND LUMBOSACRAL NEURITIS: Primary | ICD-10-CM

## 2019-05-28 DIAGNOSIS — M25.561 CHRONIC PAIN OF BOTH KNEES: ICD-10-CM

## 2019-05-28 DIAGNOSIS — M25.562 CHRONIC PAIN OF BOTH KNEES: ICD-10-CM

## 2019-05-28 DIAGNOSIS — M54.41 CHRONIC BILATERAL LOW BACK PAIN WITH BILATERAL SCIATICA: ICD-10-CM

## 2019-05-28 DIAGNOSIS — C50.411 MALIGNANT NEOPLASM OF UPPER-OUTER QUADRANT OF RIGHT BREAST IN FEMALE, ESTROGEN RECEPTOR POSITIVE: ICD-10-CM

## 2019-05-28 DIAGNOSIS — M47.816 FACET HYPERTROPHY OF LUMBAR REGION: ICD-10-CM

## 2019-05-28 DIAGNOSIS — G89.29 CHRONIC PAIN OF BOTH KNEES: ICD-10-CM

## 2019-05-28 DIAGNOSIS — Z17.0 MALIGNANT NEOPLASM OF UPPER-OUTER QUADRANT OF RIGHT BREAST IN FEMALE, ESTROGEN RECEPTOR POSITIVE: ICD-10-CM

## 2019-05-28 PROCEDURE — 1101F PR PT FALLS ASSESS DOC 0-1 FALLS W/OUT INJ PAST YR: ICD-10-PCS | Mod: CPTII,S$GLB,, | Performed by: PHYSICIAN ASSISTANT

## 2019-05-28 PROCEDURE — 1101F PT FALLS ASSESS-DOCD LE1/YR: CPT | Mod: CPTII,S$GLB,, | Performed by: PHYSICIAN ASSISTANT

## 2019-05-28 PROCEDURE — 3008F PR BODY MASS INDEX (BMI) DOCUMENTED: ICD-10-PCS | Mod: CPTII,S$GLB,, | Performed by: PHYSICIAN ASSISTANT

## 2019-05-28 PROCEDURE — 73562 X-RAY EXAM OF KNEE 3: CPT | Mod: TC,50

## 2019-05-28 PROCEDURE — 73562 X-RAY EXAM OF KNEE 3: CPT | Mod: 26,LT,, | Performed by: RADIOLOGY

## 2019-05-28 PROCEDURE — 99204 OFFICE O/P NEW MOD 45 MIN: CPT | Mod: S$GLB,,, | Performed by: PHYSICIAN ASSISTANT

## 2019-05-28 PROCEDURE — 99999 PR PBB SHADOW E&M-EST. PATIENT-LVL IV: CPT | Mod: PBBFAC,,, | Performed by: PHYSICIAN ASSISTANT

## 2019-05-28 PROCEDURE — 73562 X-RAY EXAM OF KNEE 3: CPT | Mod: 26,RT,, | Performed by: RADIOLOGY

## 2019-05-28 PROCEDURE — 3008F BODY MASS INDEX DOCD: CPT | Mod: CPTII,S$GLB,, | Performed by: PHYSICIAN ASSISTANT

## 2019-05-28 PROCEDURE — 99999 PR PBB SHADOW E&M-EST. PATIENT-LVL IV: ICD-10-PCS | Mod: PBBFAC,,, | Performed by: PHYSICIAN ASSISTANT

## 2019-05-28 PROCEDURE — 99204 PR OFFICE/OUTPT VISIT, NEW, LEVL IV, 45-59 MIN: ICD-10-PCS | Mod: S$GLB,,, | Performed by: PHYSICIAN ASSISTANT

## 2019-05-28 PROCEDURE — 73562 XR KNEE ORTHO BILAT: ICD-10-PCS | Mod: 26,RT,, | Performed by: RADIOLOGY

## 2019-05-28 NOTE — LETTER
May 28, 2019      Phillip Mckee MD  [address]  [facility] [address]           [address]  [address]  [facility] [address]  Phone: [phone]  Fax: [phone]          Patient: Sherry Torres   MR Number: [id number]   YOB: 1955   Date of Visit: 5/28/2019       Dear Dr. Phillip Mckee:    Thank you for referring Sherry Torres to me for evaluation. Attached you will find relevant portions of my assessment and plan of care.    If you have questions, please do not hesitate to call me. I look forward to following Sherry Torres along with you.    Sincerely,    Graciela Khan PA-C    Enclosure  CC:  No Recipients    If you would like to receive this communication electronically, please contact [email or web address] or [phone] to request more information on [employer] Link access.    For providers and/or their staff who would like to refer a patient to [facility], please contact us through our one-stop-shop provider referral line, [facility] [place], at [phone].    If you feel you have received this communication in error or would no longer like to receive these types of communications, please e-mail [email or web address]

## 2019-05-28 NOTE — PATIENT INSTRUCTIONS
It was a pleasure to meet you today!    Pain management will be calling you to set up another injection with Dr. Mcfarland. This will be the same one that you had in 2017.     I want to get xrays of your knees and will put results on [facility].     If no improvement with above injection, we may need to get updated lumbar imaging.     I will see you in 6 weeks, but email me if you need anything prior to that visit.     Graciela

## 2019-05-28 NOTE — TELEPHONE ENCOUNTER
Contacted patient to schedule procedure. Date, time, and instructions given and mailed. Patient verbalized understanding.

## 2019-07-01 ENCOUNTER — HOSPITAL ENCOUNTER (OUTPATIENT)
Facility: OTHER | Age: 64
Discharge: HOME | End: 2019-07-01
Attending: ANESTHESIOLOGY | Admitting: ANESTHESIOLOGY
Payer: COMMERCIAL

## 2019-07-01 VITALS
RESPIRATION RATE: 18 BRPM | HEART RATE: 88 BPM | HEIGHT: 65 IN | WEIGHT: 150 LBS | DIASTOLIC BLOOD PRESSURE: 65 MMHG | SYSTOLIC BLOOD PRESSURE: 124 MMHG | OXYGEN SATURATION: 96 % | BODY MASS INDEX: 24.99 KG/M2 | TEMPERATURE: 100 F

## 2019-07-01 DIAGNOSIS — G89.29 CHRONIC PAIN: ICD-10-CM

## 2019-07-01 DIAGNOSIS — M54.16 LUMBAR RADICULOPATHY: Primary | ICD-10-CM

## 2019-07-01 PROCEDURE — 64484 PRA INJECT ANES/STEROID FORAMEN LUMBAR/SACRAL W IMG GUIDE ,EA ADD LEVEL: ICD-10-PCS | Mod: RT,,, | Performed by: ANESTHESIOLOGY

## 2019-07-01 PROCEDURE — 25500020 PHARM REV CODE 255: Performed by: ANESTHESIOLOGY

## 2019-07-01 PROCEDURE — 64483 PR EPIDURAL INJ, ANES/STEROID, TRANSFORAMINAL, LUMB/SACR, SNGL LEVL: ICD-10-PCS | Mod: RT,,, | Performed by: ANESTHESIOLOGY

## 2019-07-01 PROCEDURE — 63600175 PHARM REV CODE 636 W HCPCS: Performed by: ANESTHESIOLOGY

## 2019-07-01 PROCEDURE — 64483 NJX AA&/STRD TFRM EPI L/S 1: CPT | Performed by: ANESTHESIOLOGY

## 2019-07-01 PROCEDURE — 25000003 PHARM REV CODE 250: Performed by: ANESTHESIOLOGY

## 2019-07-01 PROCEDURE — 64483 NJX AA&/STRD TFRM EPI L/S 1: CPT | Mod: RT,,, | Performed by: ANESTHESIOLOGY

## 2019-07-01 PROCEDURE — 64484 NJX AA&/STRD TFRM EPI L/S EA: CPT | Mod: RT,,, | Performed by: ANESTHESIOLOGY

## 2019-07-01 PROCEDURE — 64484 NJX AA&/STRD TFRM EPI L/S EA: CPT | Performed by: ANESTHESIOLOGY

## 2019-07-01 RX ORDER — DEXAMETHASONE SODIUM PHOSPHATE 100 MG/10ML
INJECTION INTRAMUSCULAR; INTRAVENOUS
Status: DISCONTINUED | OUTPATIENT
Start: 2019-07-01 | End: 2019-07-01 | Stop reason: HOSPADM

## 2019-07-01 RX ORDER — ALPRAZOLAM 0.5 MG/1
1 TABLET ORAL
Status: COMPLETED | OUTPATIENT
Start: 2019-07-01 | End: 2019-07-01

## 2019-07-01 RX ORDER — SODIUM CHLORIDE 9 MG/ML
500 INJECTION, SOLUTION INTRAVENOUS CONTINUOUS
Status: DISCONTINUED | OUTPATIENT
Start: 2019-07-01 | End: 2019-07-01 | Stop reason: HOSPADM

## 2019-07-01 RX ORDER — LIDOCAINE HYDROCHLORIDE 10 MG/ML
INJECTION INFILTRATION; PERINEURAL
Status: DISCONTINUED | OUTPATIENT
Start: 2019-07-01 | End: 2019-07-01 | Stop reason: HOSPADM

## 2019-07-01 RX ADMIN — ALPRAZOLAM 1 MG: 0.5 TABLET ORAL at 12:07

## 2019-07-01 NOTE — DISCHARGE INSTRUCTIONS
Thank you for allowing us to care for you today. You may receive a survey about the care we provided. Your feedback is valuable and helps us provide excellent care throughout the community.     Home Care Instructions for Pain Management:    1. DIET:   You may resume your normal diet today.   2. BATHING:   You may shower with luke warm water. No tub baths or anything that will soak injection sites under water for the next 24 hours.  3. DRESSING:   You may remove your bandage today.   4. ACTIVITY LEVEL:   You may resume your normal activities 24 hrs after your procedure. Nothing strenuous today.  5. MEDICATIONS:   You may resume your normal medications today. To restart blood thinners, ask your doctor.  6. DRIVING    If you have received any sedatives by mouth today, you may not drive for 12 hours.    If you have received any sedation through your IV, you may not drive for 24 hrs.   7. SPECIAL INSTRUCTIONS:   No heat to the injection site for 24 hrs including, hot bath or shower, heating pad, moist heat, or hot tubs.    Use ice pack to injection site for any pain or discomfort.  Apply ice packs for 20 minute intervals as needed.    IF you have diabetes, be sure to monitor your blood sugar more closely. IF your injection contained steroids your blood sugar levels may become higher than normal.    If you are still having pain upon discharge:  Your pain may improve over the next 48 hours. The anesthetic (numbing medication) works immediately to 48 hours. IF your injection contained a steroid (anti-inflammatory medication), it takes approximately 3 days to start feeling relief and 7-10 days to see your greatest results from the medication. It is possible you may need subsequent injections. This would be discussed at your follow up appointment with pain management or your referring doctor.      PLEASE CALL YOUR DOCTOR IF:  1. Redness or swelling around the injection site.  2. Fever of 101 degrees or more  3. Drainage  (pus) from the injection site.  4. For any continuous bleeding (some dried blood over the incision is normal.)    FOR EMERGENCIES:   If any unusual problems or difficulties occur during clinic hours, call [phone] or 537.

## 2019-07-01 NOTE — OP NOTE
Date of Service: 07/01/2019    PCP: John Jackson MD    Referring Physician:    Time-out taken to identify patient and procedure side prior to starting the procedure.   I attest that I have reviewed the patient's home medications prior to the procedure and no contraindication have been identified. I  re-evaluated the patient after the patient was positioned for the procedure in the procedure room immediately before the procedural time-out. The vital signs are current and represent the current state of the patient which has not significantly changed since the preprocedure assessment.                                                           PROCEDURE: Right L5 and S1 transforaminal epidural steroid injection under fluoroscopy    REASON FOR PROCEDURE: Right Thoracic and lumbosacral neuritis [M54.14, M54.17]  1. Lumbar radiculopathy    2. Chronic pain      POSTPROCEDURE DIAGNOSIS:   Thoracic and lumbosacral neuritis [M54.14, M54.17]    1. Lumbar radiculopathy    2. Chronic pain           PHYSICIAN: Nadya Mcfarland MD  ASSISTANTS:Bob Samuel MD Fellow  Kasi Delgado MD Fellow  Adelfo Grant,  Resident        MEDICATIONS INJECTED:  Preservative-free dexamethasone 10mg, Xylocaine 1% MPF 3-5ml. 3ml per level. Preservative free, sterile normal saline is used to get larger volume as needed.  LOCAL ANESTHETIC INJECTED:  Xylocaine 1% 9ml with Sodium Bicarbonate 1ml. 3ml per site.    SEDATION MEDICATIONS: None    ESTIMATED BLOOD LOSS:  None.    COMPLICATIONS:  None.    TECHNIQUE:   Laying in a prone position, the patient was prepped and draped in the usual sterile fashion using ChloraPrep and fenestrated drape.  The area to be injected was determined under fluoroscopic guidance.  Local anesthetic was given by raising a wheel and going down to the hub of a 27-gauge 1.25 inch needle.  The 3.5inch 22-gauge spinal needle was introduced towards the transverse process of each above named nerve root level.  The needle  was walked medially then hinged into the neural foramen.  Omnipaque was injected to confirm appropriate placement and that there was no vascular runoff.  The medication was then injected after applying negative pressure. The patient tolerated the procedure well.    PAIN BEFORE THE PROCEDURE: 7/10.    PAIN AFTER THE PROCEDURE: 1/10.    The patient was monitored after the procedure.  Patient was given post procedure and discharge instructions to follow at home.  We will see the patient back in two weeks or the patient may call to inform of status. The patient was discharged in a stable condition.

## 2019-07-07 NOTE — PROGRESS NOTES
Subjective:      Patient ID: Sherry Torres is a 63 y.o. female.    Chief Complaint: Follow-up and Knee Pain      HPI  (Celestre)    History of breast and thyroid CA, anxiety/depression, GERD.     She had Right L5 and S1 TF ARNOLDO by Dr. Mcfarland on 5/18/17 and did great until November of last year. Previous lumbar MRI from 5/1/17 shows facet hypertrophy and lumbar spondylosis. She had both diagnostic and therapeutic relief with last ARNOLDO. Also with known mild arthritic changes in both knees as well.     She had repeat right L5 and S1 TF ARNOLDO with Dr. Mcfarland on 7/1/19. She is here for follow up.     She had great relief with above ARNOLDO. Her LBP and bilateral leg pain is gone (she is close to 100% better). She continues with constant right knee pain. She has locking, catching, and giving way of the right knee. Pain is sharp and aching. She rates her pain as an 8 on a scale of 1-10. Pain is worse when she is on her feet and better when she can rest.     Has GI upset with NSAIDs/muscle relaxers. She takes prn tylenol.       Review of Systems   Constitution: Negative for chills, fever, night sweats and weight gain.   Gastrointestinal: Negative for bowel incontinence, nausea and vomiting.   Genitourinary: Negative for bladder incontinence.   Neurological: Negative for disturbances in coordination and loss of balance.           Objective:        General: Sherry is well-developed, well-nourished, appears stated age, in no acute distress, alert and oriented to time, place and person.     Ortho/SPM Exam     Patient sits comfortably in the exam room and answers questions appropriately. Grossly patient is able to move bilateral LEs without difficulty. Ambulates normally.     She has medial joint line tenderness in right knee. She has crepitus with reasonable ROM. She has pain with McMurrays. No instability noted.         Assessment:       1. Right knee pain, unspecified chronicity    2. Chronic bilateral low back pain with  bilateral sciatica    3. Thoracic and lumbosacral neuritis    4. Facet hypertrophy of lumbar region    5. Other spondylosis with radiculopathy, lumbar region    6. Malignant neoplasm of upper-outer quadrant of right breast in female, estrogen receptor positive    7. History of thyroid cancer           Plan:       Orders Placed This Encounter    Ambulatory referral to Orthopedics    diclofenac sodium (VOLTAREN) 1 % Gel       She had close to 100% improvement in LBP and bilateral leg pain s/p above ARNOLDO. Previous lumbar MRI from 5/1/17 shows facet hypertrophy and lumbar spondylosis.     Primary complaint today is right knee pain that appears right knee mediated. Known mild degenerative changes in right knee. She has locking, catching, and popping. Symptoms suspicious for meniscus tear. Treatment options reviewed with patient and following plan made:     - Referral to ortho for evaluation of right knee pain.   - She has GI upset with most NSAIDS/muscle relaxers. New prescription for voltaren gel to pharmacy. Reviewed dosing and side effects.   - Consider repeat lumbar ARNOLDO if symptoms return. If no improvement with ARNOLDO, she will need updated lumbar MRI with and without contrast due to h/o breast/thyroid CA.     Follow-up: Follow up if symptoms worsen or fail to improve. If there are any questions prior to this, the patient was instructed to contact the office.

## 2019-07-09 ENCOUNTER — HOSPITAL ENCOUNTER (OUTPATIENT)
Dept: RADIOLOGY | Facility: HOSPITAL | Age: 64
Discharge: HOME | End: 2019-07-09
Attending: PHYSICIAN ASSISTANT
Payer: COMMERCIAL

## 2019-07-09 ENCOUNTER — OFFICE VISIT (OUTPATIENT)
Dept: SPINE | Facility: CLINIC | Age: 64
End: 2019-07-09
Payer: COMMERCIAL

## 2019-07-09 ENCOUNTER — OFFICE VISIT (OUTPATIENT)
Dept: ORTHOPEDICS | Facility: CLINIC | Age: 64
End: 2019-07-09
Payer: COMMERCIAL

## 2019-07-09 VITALS
BODY MASS INDEX: 24.99 KG/M2 | HEART RATE: 80 BPM | DIASTOLIC BLOOD PRESSURE: 69 MMHG | HEIGHT: 65 IN | SYSTOLIC BLOOD PRESSURE: 133 MMHG | WEIGHT: 150 LBS

## 2019-07-09 VITALS
BODY MASS INDEX: 25.37 KG/M2 | SYSTOLIC BLOOD PRESSURE: 131 MMHG | DIASTOLIC BLOOD PRESSURE: 87 MMHG | WEIGHT: 152.25 LBS | HEIGHT: 65 IN | HEART RATE: 75 BPM

## 2019-07-09 DIAGNOSIS — M47.816 FACET HYPERTROPHY OF LUMBAR REGION: ICD-10-CM

## 2019-07-09 DIAGNOSIS — M17.11 PRIMARY OSTEOARTHRITIS OF RIGHT KNEE: Primary | ICD-10-CM

## 2019-07-09 DIAGNOSIS — R52 PAIN: ICD-10-CM

## 2019-07-09 DIAGNOSIS — M47.26 OTHER SPONDYLOSIS WITH RADICULOPATHY, LUMBAR REGION: ICD-10-CM

## 2019-07-09 DIAGNOSIS — M54.14 THORACIC AND LUMBOSACRAL NEURITIS: ICD-10-CM

## 2019-07-09 DIAGNOSIS — M54.42 CHRONIC BILATERAL LOW BACK PAIN WITH BILATERAL SCIATICA: ICD-10-CM

## 2019-07-09 DIAGNOSIS — M25.561 RIGHT KNEE PAIN, UNSPECIFIED CHRONICITY: Primary | ICD-10-CM

## 2019-07-09 DIAGNOSIS — G89.29 CHRONIC BILATERAL LOW BACK PAIN WITH BILATERAL SCIATICA: ICD-10-CM

## 2019-07-09 DIAGNOSIS — Z17.0 MALIGNANT NEOPLASM OF UPPER-OUTER QUADRANT OF RIGHT BREAST IN FEMALE, ESTROGEN RECEPTOR POSITIVE: ICD-10-CM

## 2019-07-09 DIAGNOSIS — M54.17 THORACIC AND LUMBOSACRAL NEURITIS: ICD-10-CM

## 2019-07-09 DIAGNOSIS — C50.411 MALIGNANT NEOPLASM OF UPPER-OUTER QUADRANT OF RIGHT BREAST IN FEMALE, ESTROGEN RECEPTOR POSITIVE: ICD-10-CM

## 2019-07-09 DIAGNOSIS — M54.41 CHRONIC BILATERAL LOW BACK PAIN WITH BILATERAL SCIATICA: ICD-10-CM

## 2019-07-09 DIAGNOSIS — Z85.850 HISTORY OF THYROID CANCER: ICD-10-CM

## 2019-07-09 PROCEDURE — 73564 XR KNEE ORTHO RIGHT WITH FLEXION: ICD-10-PCS | Mod: 26,RT,, | Performed by: RADIOLOGY

## 2019-07-09 PROCEDURE — 1101F PR PT FALLS ASSESS DOC 0-1 FALLS W/OUT INJ PAST YR: ICD-10-PCS | Mod: CPTII,S$GLB,, | Performed by: PHYSICIAN ASSISTANT

## 2019-07-09 PROCEDURE — 99214 OFFICE O/P EST MOD 30 MIN: CPT | Mod: 25,S$GLB,, | Performed by: PHYSICIAN ASSISTANT

## 2019-07-09 PROCEDURE — 20610 PR DRAIN/INJECT LARGE JOINT/BURSA: ICD-10-PCS | Mod: RT,S$GLB,, | Performed by: PHYSICIAN ASSISTANT

## 2019-07-09 PROCEDURE — 20610 DRAIN/INJ JOINT/BURSA W/O US: CPT | Mod: RT,S$GLB,, | Performed by: PHYSICIAN ASSISTANT

## 2019-07-09 PROCEDURE — 3008F PR BODY MASS INDEX (BMI) DOCUMENTED: ICD-10-PCS | Mod: CPTII,S$GLB,, | Performed by: PHYSICIAN ASSISTANT

## 2019-07-09 PROCEDURE — 1101F PT FALLS ASSESS-DOCD LE1/YR: CPT | Mod: CPTII,S$GLB,, | Performed by: PHYSICIAN ASSISTANT

## 2019-07-09 PROCEDURE — 99999 PR PBB SHADOW E&M-EST. PATIENT-LVL IV: ICD-10-PCS | Mod: PBBFAC,,, | Performed by: PHYSICIAN ASSISTANT

## 2019-07-09 PROCEDURE — 99999 PR PBB SHADOW E&M-EST. PATIENT-LVL V: CPT | Mod: PBBFAC,,, | Performed by: PHYSICIAN ASSISTANT

## 2019-07-09 PROCEDURE — 99999 PR PBB SHADOW E&M-EST. PATIENT-LVL IV: CPT | Mod: PBBFAC,,, | Performed by: PHYSICIAN ASSISTANT

## 2019-07-09 PROCEDURE — 99999 PR PBB SHADOW E&M-EST. PATIENT-LVL V: ICD-10-PCS | Mod: PBBFAC,,, | Performed by: PHYSICIAN ASSISTANT

## 2019-07-09 PROCEDURE — 73564 X-RAY EXAM KNEE 4 OR MORE: CPT | Mod: 26,RT,, | Performed by: RADIOLOGY

## 2019-07-09 PROCEDURE — 3008F BODY MASS INDEX DOCD: CPT | Mod: CPTII,S$GLB,, | Performed by: PHYSICIAN ASSISTANT

## 2019-07-09 PROCEDURE — 73562 X-RAY EXAM OF KNEE 3: CPT | Mod: TC,RT

## 2019-07-09 PROCEDURE — 99214 PR OFFICE/OUTPT VISIT, EST, LEVL IV, 30-39 MIN: ICD-10-PCS | Mod: 25,S$GLB,, | Performed by: PHYSICIAN ASSISTANT

## 2019-07-09 PROCEDURE — 73562 X-RAY EXAM OF KNEE 3: CPT | Mod: 26,59,LT, | Performed by: RADIOLOGY

## 2019-07-09 PROCEDURE — 73562 XR KNEE ORTHO RIGHT WITH FLEXION: ICD-10-PCS | Mod: 26,59,LT, | Performed by: RADIOLOGY

## 2019-07-09 RX ORDER — DICLOFENAC SODIUM 10 MG/G
2 GEL TOPICAL 3 TIMES DAILY
Qty: 5 TUBE | Refills: 2 | Status: SHIPPED | OUTPATIENT
Start: 2019-07-09 | End: 2023-09-04

## 2019-07-09 RX ORDER — TRIAMCINOLONE ACETONIDE 40 MG/ML
40 INJECTION, SUSPENSION INTRA-ARTICULAR; INTRAMUSCULAR
Status: COMPLETED | OUTPATIENT
Start: 2019-07-09 | End: 2019-07-09

## 2019-07-09 RX ADMIN — TRIAMCINOLONE ACETONIDE 40 MG: 40 INJECTION, SUSPENSION INTRA-ARTICULAR; INTRAMUSCULAR at 09:07

## 2019-07-09 NOTE — LETTER
July 9, 2019      Graciela Khan PA-C  [address]  [facility] [address]           [facility]  [address]  [facility] [address]  Phone: [phone]          Patient: Sherry Torres   MR Number: [id number]   YOB: 1955   Date of Visit: 7/9/2019       Dear Graciela Khan:    Thank you for referring Sherry Torres to me for evaluation. Attached you will find relevant portions of my assessment and plan of care.    If you have questions, please do not hesitate to call me. I look forward to following Sherry Torres along with you.    Sincerely,    Inés Baron PA-C    Enclosure  CC:  No Recipients    If you would like to receive this communication electronically, please contact [email or web address] or [phone] to request more information on [email or web address] Link access.    For providers and/or their staff who would like to refer a patient to [facility], please contact us through our one-stop-shop provider referral line, [facility], at [phone].    If you feel you have received this communication in error or would no longer like to receive these types of communications, please e-mail [email or web address]

## 2019-07-09 NOTE — PROGRESS NOTES
Subjective:      Patient ID: Sherry Torres is a 63 y.o. female.    Chief Complaint: Pain of the Right Knee    HPI  63 year old female presents with chief complaint of right knee pain. No trauma. She reports having knee pain several years ago and she saw an orthopedist who told her it was her back. She received an epidural injection that helped. Knee pain returned several months ago and she received another epidural last week but it did not help her knee. Pain is medial. It is worse with walking and going from sit to stand. She has soreness in the mornings and it sometimes wakes her up at night. She cannot take po nsaids because it upsets her stomach. She says she had a cortisone injection in her knee about 5 years ago that gave her good relief. She denies swelling. She reports weakness, popping, locking, catching, and giving way. No PT has been done. She does not use assistive devices.   Review of Systems   Constitution: Negative for chills, fever and night sweats.   Cardiovascular: Negative for chest pain.   Respiratory: Negative for cough and shortness of breath.    Hematologic/Lymphatic: Does not bruise/bleed easily.   Skin: Negative for color change.   Gastrointestinal: Negative for heartburn.   Genitourinary: Negative for dysuria.   Neurological: Negative for numbness and paresthesias.   Psychiatric/Behavioral: Negative for altered mental status.   Allergic/Immunologic: Negative for persistent infections.         Objective:            Ortho/SPM Exam  General :   alert, appears stated age and cooperative   Gait: Normal. The patient can bear weight on the injured extremity.   Right Lower Extremity  Hip Palpation:  no tenderness over the greater  trochanter   Hip ROM: 100% of normal    Knee Effusion:  None.   Ecchymosis:  none   Knee ROM:  0 to 90 degrees with subpatellar   crepitance.   Patella:  Patella does track normally.  Patellar apprehension test: negative  Patellar compression test: negative    Tenderness: medial joint line   Stability:  Lachman's test: negative  Posterior drawer: negative  Medial collateral ligament: negative  Lateral collateral ligament: negative         Gigi's Test:  positive    Sensation:   intact to light touch   Pulses: normal DP and PT pulses         X-ray: ordered and reviewed by myself. Mild medial tibiofemoral height loss similar to the prior exam.  Lateral tibiofemoral joint appears better preserved.  Patellofemoral joint shows some osteophytosis of the medial patellofemoral facet.  No joint effusion.  No fracture.  Soft tissues appear unremarkable.         Assessment:       Encounter Diagnosis   Name Primary?    Primary osteoarthritis of right knee Yes          Plan:       Discussed treatment options with patient. She is not interested in surgery at this time. She would like to try a knee injection. Order placed for PT. RTC if symptoms worsen or do not improve.     PROCEDURE:  I have explained the risks, benefits, and alternatives of the procedure in detail.  The patient voices understanding and all questions have been answered.  The patient agrees to proceed as planned. So after I performed a sterile prep of the skin in the normal fashion the right knee is injected using a 22 gauge needle from the anteromedial approach with a combination of 4cc 1% plain lidocaine and 40 mg of kenalog.  The patient is cautioned and immediate relief of pain is secondary to the local anesthetic and will be temporary.  After the anesthetic wears off there may be a increase in pain that may last for a few hours or a few days and they should use ice to help alleviate this flair up of pain.

## 2019-07-09 NOTE — PATIENT INSTRUCTIONS
It was good to see you again today.     I am glad your back is feeling better. I agree that your right knee pain is coming from your right knee. I want you to see orthopaedics at [facility] for this. I sent voltaren gel to your pharmacy to use on the knee. If it is not covered by your insurance you can try over the counter biofreeze or two old goats.     I did not make you a follow up with me for your back, but email me if you need anything.     Graciela

## 2019-07-10 ENCOUNTER — CLINICAL SUPPORT (OUTPATIENT)
Dept: REHABILITATION | Facility: HOSPITAL | Age: 64
End: 2019-07-10
Payer: COMMERCIAL

## 2019-07-10 DIAGNOSIS — M25.561 RIGHT KNEE PAIN, UNSPECIFIED CHRONICITY: ICD-10-CM

## 2019-07-10 PROCEDURE — 97530 THERAPEUTIC ACTIVITIES: CPT | Mod: PO

## 2019-07-10 PROCEDURE — 97161 PT EVAL LOW COMPLEX 20 MIN: CPT | Mod: PO

## 2019-07-10 NOTE — PROGRESS NOTES
[facility]  Physical Therapy Initial Evaluation    Name: Sherry Torres  Clinic Number: [id number]    Therapy Diagnosis:   Encounter Diagnosis   Name Primary?    Right knee pain, unspecified chronicity      Physician: Inés Baron PA-C    Physician Orders: PT Eval and Treat   Medical Diagnosis from Referral: Osteoarthritis  Evaluation Date: 7/10/2019  Authorization Period Expiration: 2019  Plan of Care Expiration: 2019  Visit # / Visits authorized:     Time In: 12:30  Time Out: 1:30  Total Billable Time: 60 minutes    Precautions: Standard    Subjective   Date of onset: r knee pain began 3 months ago  History of current condition - Sherry reports: Pt has had low back and R LE Sx since .  Had an epidural that took care of her back and leg pain in .     Medical History:   Past Medical History:   Diagnosis Date    Atypical ductal hyperplasia, breast     left side    Breast cancer 2016    right side    Breast cyst approx. 40 years ago    Cancer     GERD (gastroesophageal reflux disease)     History of thyroid cancer     Lobular carcinoma in situ not certain of 2016 diagnosis    Mitral valve prolapse     Psychiatric problem        Surgical History:   Sherry Torres  has a past surgical history that includes  section; Tubal ligation; thyroid removed; CYST REMOVED FROM RIGHT BREAST IN ; left breast wire localization excisional biopsy with bracketed wires using 3 wires and excision through 1 incision. ; Thyroid surgery; Hernia repair; Breast cyst excision (Right, approx. 40 years ago); Breast lumpectomy (Right, ); Breast biopsy (Right, 2016); Breast biopsy (Left, ); and Transforaminal epidural injection of steroid (Right, 2019).    Medications:   Sherry has a current medication list which includes the following prescription(s): albuterol, calcium-vitamin d3, diclofenac sodium, fish oil-omega-3 fatty acids, fluzone  quad [id number] (pf), letrozole, magnesium carbonate, multivitamin, omeprazole, potassium chloride sa, synthroid, and triamcinolone acetonide 0.1%.    Allergies:   Review of patient's allergies indicates:   Allergen Reactions    Codeine Nausea Only    Sulfa (sulfonamide antibiotics) Nausea Only        Imaging, X-Rays:     Prior Therapy: yes  Social History: Pt lives in a two story home with bedroom down, but laundry up stairs.  Work is on the 3 rd floor with an elevator. lives with their spouse  Occupation:   Prior Level of Function: Independent in ADL's and walking 2 miles 3-4 times per week.  Current Level of Function: Pt with R knee pain making it difficult to climb stairs and she has not been walking her normal 2 miles 3-4 times per week  Pain:  Current 3/10, worst 9/10, best 3/10   Location: right knee   Description: Aching  Aggravating Factors: Walking  Easing Factors: ice and rest    Pts goals: get back to walking 2 miles 3-4 times/week    Objective   Observation: Pt was able to ambulate into the clinic independently without an assistive device.    Posture: WFL      Range of Motion:   Knee Left active Left Passive Right Active R passive   Flexion 140 140 109 109   Extension 0 0 0 0           Lower Extremity Strength  Right LE  Left LE    Knee extension: 3+/5 Knee extension: 4+/5   Knee flexion: 4-/5 Knee flexion: 4+/5   Hip flexion: nt Hip flexion: nt   Hip extension:  3/5 Hip extension: 4+/5   Hip abduction: 3/5 Hip abduction: 4+/5   Hip adduction: 3/5 Hip adduction 4+/5   Ankle dorsiflexion: 3+/5 Ankle dorsiflexion: 5/5   Ankle plantarflexion: nt Ankle plantarflexion: nt           Special Tests:   Left Right   Valgus Stress Test (-) (-)   Varus Stress test (-) (-)   Lachman's test (-) (-)   Posterior Lachman nt nt   Wyatt's Test nt nt   Apley's Compression nt nt   Apley's Distraction nt nt   Arriola's compression test nt nt   Thessaly's Test nt nt   Patella Grind nt nt           Function:    - SLS  R: Poor  - SLS L: Fair  - Squat: nt   - Sit <--> Stand:I   - Bed Mobility: I        Joint Mobility: hypomobile    Palpation: TTPP R medial joint line    Sensation: intact      Edema: none      CMS Impairment/Limitation/Restriction for FOTO knee Survey    Therapist reviewed FOTO scores for Sherry Torres on 7/10/2019.   FOTO documents entered into [employer] - see Media section.    Limitation Score: 55%  Category: Mobility    Current : CK = at least 40% but < 60% impaired, limited or restricted  Goal: CK = at least 40% but < 60% impaired, limited or restricted  Discharge:          TREATMENT   Treatment Time In: 1:10  Treatment Time Out: 1:30  Total Treatment time separate from Evaluation: 20 minutes    Sherry received therapeutic exercises to develop strength, endurance and core stabilization for 20 minutes including:  B hip add with ball 10 x 3  B hip abd with band 10 x 3  Clams with R LE 10 x 2  Pelvic tilts 10 x 2  Quad sets with towel roll 10 x 2    Home Exercises and Patient Education Provided    Education provided:   - yes    Written Home Exercises Provided: yes.  Exercises were reviewed and Sherry was able to demonstrate them prior to the end of the session.  Sherry demonstrated good  understanding of the education provided.     See EMR under Patient Instructions for exercises provided 7/10/2019.    Assessment   Sherry is a 63 y.o. female referred to outpatient Physical Therapy with a medical diagnosis of Osteoarthritis R knee. Pt presents with limited R knee flexion and R LE strength    Pt prognosis is Good.   Pt will benefit from skilled outpatient Physical Therapy to address the deficits stated above and in the chart below, provide pt/family education, and to maximize pt's level of independence.     Plan of care discussed with patient: Yes  Pt's spiritual, cultural and educational needs considered and patient is agreeable to the plan of care and goals as stated below:     Anticipated Barriers for therapy:  none    Medical Necessity is demonstrated by the following  History  Co-morbidities and personal factors that may impact the plan of care Co-morbidities:   history of cancer    Personal Factors:   no deficits     low   Examination  Body Structures and Functions, activity limitations and participation restrictions that may impact the plan of care Body Regions:   lower extremities    Body Systems:    ROM  strength  balance    Participation Restrictions:   Running and climbing stairs    Activity limitations:   Learning and applying knowledge  no deficits    General Tasks and Commands  no deficits    Communication  no deficits    Mobility  lifting and carrying objects  moving around using equipment (WC)    Self care  no deficits    Domestic Life  doing house work (cleaning house, washing dishes, laundry)    Interactions/Relationships  no deficits    Life Areas  no deficits    Community and Social Life  recreation and leisure         low   Clinical Presentation stable and uncomplicated low   Decision Making/ Complexity Score: low     Goals:  Short Term Goals: 4 weeks   Pt independent in a HEP to address R LE function.  Improve R knee flexion to 120 degrees  Improve R SLS balance to fair +    Long Term Goals: 8 weeks   Pt to report R knee pain at </= 2/10 with ADL's  Pt with full pain free R knee ROM  Improve R LE MMT by 1 muscle grade  Pt to report improved functional abilities through an improved score on the FOTO knee survey    Plan   Plan of care Certification: 7/10/2019 to 9/9/2019.    Outpatient Physical Therapy 2 times weekly for 8 weeks to include the following interventions: Electrical Stimulation Russian/IFC, Gait Training, Manual Therapy, Moist Heat/ Ice, Neuromuscular Re-ed, Orthotic Management and Training, Patient Education, Self Care, Therapeutic Activites, Therapeutic Exercise and Dry needling.     Andrade Murphy, PT

## 2019-07-10 NOTE — PLAN OF CARE
[facility]  Physical Therapy Initial Evaluation    Name: Sherry Torres  Clinic Number: [id number]    Therapy Diagnosis:   Encounter Diagnosis   Name Primary?    Right knee pain, unspecified chronicity      Physician: Inés Baron PA-C    Physician Orders: PT Eval and Treat   Medical Diagnosis from Referral: Osteoarthritis  Evaluation Date: 7/10/2019  Authorization Period Expiration: 2019  Plan of Care Expiration: 2019  Visit # / Visits authorized:     Time In: 12:30  Time Out: 1:30  Total Billable Time: 60 minutes    Precautions: Standard    Subjective   Date of onset: r knee pain began 3 months ago  History of current condition - Sherry reports: Pt has had low back and R LE Sx since .  Had an epidural that took care of her back and leg pain in .     Medical History:   Past Medical History:   Diagnosis Date    Atypical ductal hyperplasia, breast     left side    Breast cancer 2016    right side    Breast cyst approx. 40 years ago    Cancer     GERD (gastroesophageal reflux disease)     History of thyroid cancer     Lobular carcinoma in situ not certain of 2016 diagnosis    Mitral valve prolapse     Psychiatric problem        Surgical History:   Sherry Torres  has a past surgical history that includes  section; Tubal ligation; thyroid removed; CYST REMOVED FROM RIGHT BREAST IN ; left breast wire localization excisional biopsy with bracketed wires using 3 wires and excision through 1 incision. ; Thyroid surgery; Hernia repair; Breast cyst excision (Right, approx. 40 years ago); Breast lumpectomy (Right, ); Breast biopsy (Right, 2016); Breast biopsy (Left, ); and Transforaminal epidural injection of steroid (Right, 2019).    Medications:   Sherry has a current medication list which includes the following prescription(s): albuterol, calcium-vitamin d3, diclofenac sodium, fish oil-omega-3 fatty acids, fluzone  quad 7760-4331 (pf), letrozole, magnesium carbonate, multivitamin, omeprazole, potassium chloride sa, synthroid, and triamcinolone acetonide 0.1%.    Allergies:   Review of patient's allergies indicates:   Allergen Reactions    Codeine Nausea Only    Sulfa (sulfonamide antibiotics) Nausea Only        Imaging, X-Rays:     Prior Therapy: yes  Social History: Pt lives in a two story home with bedroom down, but laundry up stairs.  Work is on the 3 rd floor with an elevator. lives with their spouse  Occupation:   Prior Level of Function: Independent in ADL's and walking 2 miles 3-4 times per week.  Current Level of Function: Pt with R knee pain making it difficult to climb stairs and she has not been walking her normal 2 miles 3-4 times per week  Pain:  Current 3/10, worst 9/10, best 3/10   Location: right knee   Description: Aching  Aggravating Factors: Walking  Easing Factors: ice and rest    Pts goals: get back to walking 2 miles 3-4 times/week    Objective   Observation: Pt was able to ambulate into the clinic independently without an assistive device.    Posture: WFL      Range of Motion:   Knee Left active Left Passive Right Active R passive   Flexion 140 140 109 109   Extension 0 0 0 0           Lower Extremity Strength  Right LE  Left LE    Knee extension: 3+/5 Knee extension: 4+/5   Knee flexion: 4-/5 Knee flexion: 4+/5   Hip flexion: nt Hip flexion: nt   Hip extension:  3/5 Hip extension: 4+/5   Hip abduction: 3/5 Hip abduction: 4+/5   Hip adduction: 3/5 Hip adduction 4+/5   Ankle dorsiflexion: 3+/5 Ankle dorsiflexion: 5/5   Ankle plantarflexion: nt Ankle plantarflexion: nt           Special Tests:   Left Right   Valgus Stress Test (-) (-)   Varus Stress test (-) (-)   Lachman's test (-) (-)   Posterior Lachman nt nt   Wyatt's Test nt nt   Apley's Compression nt nt   Apley's Distraction nt nt   Arriola's compression test nt nt   Thessaly's Test nt nt   Patella Grind nt nt           Function:    - SLS  R: Poor  - SLS L: Fair  - Squat: nt   - Sit <--> Stand:I   - Bed Mobility: I        Joint Mobility: hypomobile    Palpation: TTPP R medial joint line    Sensation: intact      Edema: none      CMS Impairment/Limitation/Restriction for FOTO knee Survey    Therapist reviewed FOTO scores for Sherry Torres on 7/10/2019.   FOTO documents entered into [employer] - see Media section.    Limitation Score: 55%  Category: Mobility    Current : CK = at least 40% but < 60% impaired, limited or restricted  Goal: CK = at least 40% but < 60% impaired, limited or restricted  Discharge:          TREATMENT   Treatment Time In: 1:10  Treatment Time Out: 1:30  Total Treatment time separate from Evaluation: 20 minutes    Sherry received therapeutic exercises to develop strength, endurance and core stabilization for 20 minutes including:  B hip add with ball 10 x 3  B hip abd with band 10 x 3  Clams with R LE 10 x 2  Pelvic tilts 10 x 2  Quad sets with towel roll 10 x 2    Home Exercises and Patient Education Provided    Education provided:   - yes    Written Home Exercises Provided: yes.  Exercises were reviewed and Sherry was able to demonstrate them prior to the end of the session.  Sherry demonstrated good  understanding of the education provided.     See EMR under Patient Instructions for exercises provided 7/10/2019.    Assessment   Sherry is a 63 y.o. female referred to outpatient Physical Therapy with a medical diagnosis of Osteoarthritis R knee. Pt presents with limited R knee flexion and R LE strength    Pt prognosis is Good.   Pt will benefit from skilled outpatient Physical Therapy to address the deficits stated above and in the chart below, provide pt/family education, and to maximize pt's level of independence.     Plan of care discussed with patient: Yes  Pt's spiritual, cultural and educational needs considered and patient is agreeable to the plan of care and goals as stated below:     Anticipated Barriers for therapy:  none    Medical Necessity is demonstrated by the following  History  Co-morbidities and personal factors that may impact the plan of care Co-morbidities:   history of cancer    Personal Factors:   no deficits     low   Examination  Body Structures and Functions, activity limitations and participation restrictions that may impact the plan of care Body Regions:   lower extremities    Body Systems:    ROM  strength  balance    Participation Restrictions:   Running and climbing stairs    Activity limitations:   Learning and applying knowledge  no deficits    General Tasks and Commands  no deficits    Communication  no deficits    Mobility  lifting and carrying objects  moving around using equipment (WC)    Self care  no deficits    Domestic Life  doing house work (cleaning house, washing dishes, laundry)    Interactions/Relationships  no deficits    Life Areas  no deficits    Community and Social Life  recreation and leisure         low   Clinical Presentation stable and uncomplicated low   Decision Making/ Complexity Score: low     Goals:  Short Term Goals: 4 weeks   Pt independent in a HEP to address R LE function.  Improve R knee flexion to 120 degrees  Improve R SLS balance to fair +    Long Term Goals: 8 weeks   Pt to report R knee pain at </= 2/10 with ADL's  Pt with full pain free R knee ROM  Improve R LE MMT by 1 muscle grade  Pt to report improved functional abilities through an improved score on the FOTO knee survey    Plan   Plan of care Certification: 7/10/2019 to 9/9/2019.    Outpatient Physical Therapy 2 times weekly for 8 weeks to include the following interventions: Electrical Stimulation Russian/IFC, Gait Training, Manual Therapy, Moist Heat/ Ice, Neuromuscular Re-ed, Orthotic Management and Training, Patient Education, Self Care, Therapeutic Activites, Therapeutic Exercise and Dry needling.     Andrade Murphy, PT

## 2019-07-18 ENCOUNTER — CLINICAL SUPPORT (OUTPATIENT)
Dept: REHABILITATION | Facility: HOSPITAL | Age: 64
End: 2019-07-18
Payer: COMMERCIAL

## 2019-07-18 DIAGNOSIS — M25.561 RIGHT KNEE PAIN, UNSPECIFIED CHRONICITY: ICD-10-CM

## 2019-07-18 PROCEDURE — 97110 THERAPEUTIC EXERCISES: CPT | Mod: PO

## 2019-07-18 NOTE — PROGRESS NOTES
Physical Therapy Daily Treatment Note     Name: Sherry Hardinerly  Clinic Number: [id number]    Therapy Diagnosis:   Encounter Diagnosis   Name Primary?    Right knee pain, unspecified chronicity      Physician: Inés Baron PA-C    Visit Date: 7/18/2019    Physician Orders: PT Eval and Treat   Medical Diagnosis from Referral: Osteoarthritis  Evaluation Date: 7/10/2019  Authorization Period Expiration: 12/31/2019  Plan of Care Expiration: 9/9/2019  Visit # / Visits authorized: 2/ 30     Time In: 1:00 PM  Time Out: 1:56 PM  Total Billable Time: 50 minutes     Precautions: Standard    Subjective     Pt reports: her R knee is feeling better.   She was compliant with home exercise program.  Response to previous treatment: no adverse effects   Functional change: none     Pain: 3/10  Location: right knee      Objective       Sherry received therapeutic exercises to develop strength, endurance and core stabilization for 55 minutes including:    B hip add with ball 10 x 3  B hip abd with band 10 x 3  Clams with R LE x 10 - stopped d/t pain   Pelvic tilts 10 x 3  Glute sets x 20   Quad sets 10 x 2  SAQ 2x10   Bridges 2x10   SLR 2x10 - attempted but held d't reports of pulling.   LAQ 2x10   Standing HS curls 2x10 each/LE   Standing Hip abduction 2x10 each/LE  Lateral band walking at counter 3 laps OTB around knees   Shuttle Press 1.5 bands, 3 x 10     Recumbent Bike x 5 minutes, L2       Home Exercises Provided and Patient Education Provided     Education provided:   - Cont Current HEP    Written Home Exercises Provided: Patient instructed to cont prior HEP.  Exercises were reviewed and Sherry was able to demonstrate them prior to the end of the session.  Sherry demonstrated good  understanding of the education provided.     See EMR under Patient Instructions for exercises provided prior visit.    Assessment     Patient tolerated initial session post eval well today. Patient with reports of knee pain with clamshells  therefore exercise was discontinued for today after the first set. Patient also reported knee pull with SLR therefore exercise was stopped. Patient with no other reports of pain throughout treatment. Patient tolerated addition of standing exercises, shuttle press and bike well today. Will cont to progress per patient's tolerance.   Sherry is progressing well towards her goals.   Pt prognosis is Good.     Pt will continue to benefit from skilled outpatient physical therapy to address the deficits listed in the problem list box on initial evaluation, provide pt/family education and to maximize pt's level of independence in the home and community environment.     Pt's spiritual, cultural and educational needs considered and pt agreeable to plan of care and goals.     Anticipated barriers to physical therapy: none    Goals:  Short Term Goals: 4 weeks   Pt independent in a HEP to address R LE function.  Improve R knee flexion to 120 degrees  Improve R SLS balance to fair +     Long Term Goals: 8 weeks   Pt to report R knee pain at </= 2/10 with ADL's  Pt with full pain free R knee ROM  Improve R LE MMT by 1 muscle grade  Pt to report improved functional abilities through an improved score on the FOTO knee survey    Plan     Cont PT POC.     Jessica Munoz, PTA

## 2019-07-25 ENCOUNTER — CLINICAL SUPPORT (OUTPATIENT)
Dept: REHABILITATION | Facility: HOSPITAL | Age: 64
End: 2019-07-25
Payer: COMMERCIAL

## 2019-07-25 DIAGNOSIS — M25.561 RIGHT KNEE PAIN, UNSPECIFIED CHRONICITY: Primary | ICD-10-CM

## 2019-07-25 PROCEDURE — 97110 THERAPEUTIC EXERCISES: CPT | Mod: PO

## 2019-07-25 NOTE — PROGRESS NOTES
"  Physical Therapy Daily Treatment Note     Name: Sherry Torres  Clinic Number: [id number]    Therapy Diagnosis:   Encounter Diagnosis   Name Primary?    Right knee pain, unspecified chronicity Yes     Physician: Insé Baron PA-C    Visit Date: 7/25/2019    Physician Orders: PT Eval and Treat   Medical Diagnosis from Referral: Osteoarthritis  Evaluation Date: 7/10/2019  Authorization Period Expiration: 12/31/2019  Plan of Care Expiration: 9/9/2019  Visit # / Visits authorized: 3/ 30     Time In: 1:00 PM  Time Out: 2:51 PM  Total Billable Time: 30 minutes     Precautions: Standard    Subjective     Pt reports: she has been walking a couple miles and reports it's a little sore when she is done but she ices and that helps.   She was compliant with home exercise program.  Response to previous treatment: no adverse effects   Functional change: none     Pain: 2/10  Location: right knee      Objective       Sherry received therapeutic exercises to develop strength, endurance and core stabilization for 50 minutes including:    B hip add with ball 10 x 3  B hip abd with band 10 x 3  Clams with R LE 2 x 10  Quad sets 10 x 2  Bridges 3x10   SLR 2x10   Lateral band walking 3x10 feet: OTB around knees   Shuttle Press 1.5 bands, 3 x 10   Mini squats at TRX 2x10   Attempted tap downs on 4" step however patient reports pain  Step ups to 8 inch step x 10 with eccentric control   SLS 3x30 - at precor bar for occasional UE support   DKTC with green physioball x20       Home Exercises Provided and Patient Education Provided     Education provided:   - Cont Current HEP    Written Home Exercises Provided: Patient instructed to cont prior HEP.  Exercises were reviewed and Sherry was able to demonstrate them prior to the end of the session.  Sherry demonstrated good  understanding of the education provided.     See EMR under Patient Instructions for exercises provided prior visit.    Assessment     Patient tolerated session " well today and was able to progress with multiple exercises. Patient only reports pain with lateral tap down and therefore exercise was discontinued for today. Will progress per patient's tolerance.  Sherry is progressing well towards her goals.   Pt prognosis is Good.     Pt will continue to benefit from skilled outpatient physical therapy to address the deficits listed in the problem list box on initial evaluation, provide pt/family education and to maximize pt's level of independence in the home and community environment.     Pt's spiritual, cultural and educational needs considered and pt agreeable to plan of care and goals.     Anticipated barriers to physical therapy: none    Goals:  Short Term Goals: 4 weeks   Pt independent in a HEP to address R LE function.  Improve R knee flexion to 120 degrees  Improve R SLS balance to fair +     Long Term Goals: 8 weeks   Pt to report R knee pain at </= 2/10 with ADL's  Pt with full pain free R knee ROM  Improve R LE MMT by 1 muscle grade  Pt to report improved functional abilities through an improved score on the FOTO knee survey    Plan     Cont PT POC.     Jessica Munoz, PTA

## 2019-07-26 ENCOUNTER — TELEPHONE (OUTPATIENT)
Dept: GASTROENTEROLOGY | Facility: CLINIC | Age: 64
End: 2019-07-26

## 2019-07-26 ENCOUNTER — TELEPHONE (OUTPATIENT)
Dept: ENDOCRINOLOGY | Facility: CLINIC | Age: 64
End: 2019-07-26

## 2019-07-26 DIAGNOSIS — E89.0 POST-SURGICAL HYPOTHYROIDISM: Primary | ICD-10-CM

## 2019-07-26 NOTE — TELEPHONE ENCOUNTER
----- Message from Joellen Holloway sent at 7/26/2019 10:43 AM CDT -----  Contact: patient portal request  Hello, I am forwarding the original message from the patient portal.  Please call pt to schedule.  Thanks!     Original Message      ----- Message -----     From: Sherry Torres     Sent: 7/26/2019 10:05 AM CDT       To: Patient Appointment Schedule Request Mailing List  Subject: Appointment Request    Appointment Request From: Sherry Torres    With Provider: John Jackson MD [Eric Richardson - Endo/Diab/Metab]    Preferred Date Range: Any    Preferred Times: Any time    Reason for visit: annual post-thyroid cancer check    Comments:  metabolism

## 2019-07-26 NOTE — TELEPHONE ENCOUNTER
From: Sherry Torres   Sent: 7/26/2019  10:09 AM   To: [facility]astro Clinical Staff   Subject: Appointment Request                                Appointment Request From: Sherry Torres      With Provider: Albania Coombs NP [Eric Richardson - Gastroenterology]      Preferred Date Range: Any      Preferred Times: Any time      Reason for visit: Existing Patient      Comments:   I need a refill on my medication.  It's working well and I have no current problems.

## 2019-07-29 ENCOUNTER — TELEPHONE (OUTPATIENT)
Dept: GASTROENTEROLOGY | Facility: CLINIC | Age: 64
End: 2019-07-29

## 2019-07-29 NOTE — TELEPHONE ENCOUNTER
To: [facility]astro Clinical Staff   Subject: Appointment Request                                Appointment Request From: Sherry Torres      With Provider: Albania Coombs NP [Eric Richardson - Gastroenterology]      Preferred Date Range: Any      Preferred Times: Any time      Reason for visit: Existing Patient      Comments:   refill

## 2019-07-31 ENCOUNTER — CLINICAL SUPPORT (OUTPATIENT)
Dept: REHABILITATION | Facility: HOSPITAL | Age: 64
End: 2019-07-31
Payer: COMMERCIAL

## 2019-07-31 DIAGNOSIS — M25.561 RIGHT KNEE PAIN, UNSPECIFIED CHRONICITY: ICD-10-CM

## 2019-07-31 PROCEDURE — 97110 THERAPEUTIC EXERCISES: CPT | Mod: PO

## 2019-07-31 NOTE — PROGRESS NOTES
"  Physical Therapy Daily Treatment Note     Name: Sherry Torres  Clinic Number: [id number]    Therapy Diagnosis:   Encounter Diagnosis   Name Primary?    Right knee pain, unspecified chronicity      Physician: Inés Baron PA-C    Visit Date: 7/31/2019    Physician Orders: PT Eval and Treat   Medical Diagnosis from Referral: Osteoarthritis  Evaluation Date: 7/10/2019  Authorization Period Expiration: 12/31/2019  Plan of Care Expiration: 9/9/2019  Visit # / Visits authorized: 3/ 30     Time In: 3:30 PM  Time Out: 4:30 PM  Total Billable Time: 30 minutes     Precautions: Standard    Subjective   R knee ROM    Extension 0 degrees   Pt reports: she continues to ice her knee after walking in the evenings.     She was compliant with home exercise program.  Response to previous treatment: no adverse effects   Functional change: none     Pain: 2/10  Location: right knee      Objective       Sherry received therapeutic exercises to develop strength, endurance and core stabilization for 50 minutes including:  Stationary bike x 10 min at level 1  B hip add with ball 10 x 3  Clams with R LE 3 x 10  Bridges 3x10   SLR 2x10 on R  Lateral band walking 4 x 20 feet: OTB around knees   Shuttle Press 1.5 bands x 10 and 2 x 10 with 2 bands  Mini squats at TRX 2x10   Tap downs on 2" step x 10  Step ups to 8 inch step x 20 with eccentric control   SLS 3x30 - at precor bar for occasional UE support         Home Exercises Provided and Patient Education Provided     Education provided:   - Cont Current HEP    Written Home Exercises Provided: Patient instructed to cont prior HEP.  Exercises were reviewed and Sherry was able to demonstrate them prior to the end of the session.  Sherry demonstrated good  understanding of the education provided.     See EMR under Patient Instructions for exercises provided prior visit.    Assessment     Patient tolerated session well today and was able to increase to 3 sets with most exercises. " "Patient was able to tolerate lateral tap down with the R foot on a 2" block. Exercises were consolidated discontinuing some less strenuous activities.  Progress per patient's tolerance.  Sherry is progressing well towards her goals.   Pt prognosis is Good.     Pt will continue to benefit from skilled outpatient physical therapy to address the deficits listed in the problem list box on initial evaluation, provide pt/family education and to maximize pt's level of independence in the home and community environment.     Pt's spiritual, cultural and educational needs considered and pt agreeable to plan of care and goals.     Anticipated barriers to physical therapy: none    Goals:  Short Term Goals: 4 weeks   Pt independent in a HEP to address R LE function.  Improve R knee flexion to 120 degrees  Improve R SLS balance to fair +     Long Term Goals: 8 weeks   Pt to report R knee pain at </= 2/10 with ADL's  Pt with full pain free R knee ROM  Improve R LE MMT by 1 muscle grade  Pt to report improved functional abilities through an improved score on the FOTO knee survey    Plan     Cont PT POC.     Andrade Murphy, PT   "

## 2019-08-01 ENCOUNTER — TELEPHONE (OUTPATIENT)
Dept: GASTROENTEROLOGY | Facility: CLINIC | Age: 64
End: 2019-08-01

## 2019-08-01 DIAGNOSIS — E89.0 POST-SURGICAL HYPOTHYROIDISM: ICD-10-CM

## 2019-08-01 DIAGNOSIS — Z85.850 HISTORY OF THYROID CANCER: ICD-10-CM

## 2019-08-01 RX ORDER — LEVOTHYROXINE SODIUM 125 UG/1
125 TABLET ORAL DAILY
Qty: 30 TABLET | Refills: 5 | Status: SHIPPED | OUTPATIENT
Start: 2019-08-01 | End: 2019-08-14

## 2019-08-01 NOTE — TELEPHONE ENCOUNTER
----- Message from Gabbi Frye sent at 8/1/2019 10:53 AM CDT -----  Contact: Self  She is needing a refill of her omeprazole (PRILOSEC) 40 MG capsule called in to the pharmacy on file. She also needs to make a follow up appt with you as well. I don't show any available appts. Please call pt back to discuss.

## 2019-08-02 ENCOUNTER — CLINICAL SUPPORT (OUTPATIENT)
Dept: REHABILITATION | Facility: HOSPITAL | Age: 64
End: 2019-08-02
Payer: COMMERCIAL

## 2019-08-02 DIAGNOSIS — M25.561 RIGHT KNEE PAIN, UNSPECIFIED CHRONICITY: ICD-10-CM

## 2019-08-02 PROCEDURE — 97110 THERAPEUTIC EXERCISES: CPT | Mod: PO

## 2019-08-02 NOTE — PROGRESS NOTES
"  Physical Therapy Daily Treatment Note     Name: Sherry Torres  Clinic Number: [id number]    Therapy Diagnosis:   Encounter Diagnosis   Name Primary?    Right knee pain, unspecified chronicity      Physician: Inés Baron PA-C    Visit Date: 8/2/2019    Physician Orders: PT Eval and Treat   Medical Diagnosis from Referral: Osteoarthritis  Evaluation Date: 7/10/2019  Authorization Period Expiration: 12/31/2019  Plan of Care Expiration: 9/9/2019  Visit # / Visits authorized: 3/ 30     Time In: 1:30 PM  Time Out: 2:15 PM  Total Billable Time: 30 minutes     Precautions: Standard    Subjective   R knee ROM    Extension 0 degrees   Pt reports: she continues to ice her knee after walking in the evenings.     She was compliant with home exercise program.  Response to previous treatment: no adverse effects   Functional change: none     Pain: 2/10  Location: right knee      Objective       Sherry received therapeutic exercises to develop strength, endurance and core stabilization for 50 minutes including:  Stationary bike x 8 min at level 1  B hip add with ball 10 x 3  Clams with R LE 3 x 10  Bridges 3x10 - np  SLR 2x10 on R  Lateral band walking 4 x 20 feet: OTB around knees   B Shuttle Press 2 x 10 with 2 bands  R Shuttle leg press 10 x 2 with one black band  Mini squats at TRX 2x10 - np  Tap downs on 2" step 2 x 10  Step ups to 6 inch step x 20 with eccentric control   SLS 3x30 - at precor bar for occasional UE support        Home Exercises Provided and Patient Education Provided     Education provided:   - Cont Current HEP    Written Home Exercises Provided: Patient instructed to cont prior HEP.  Exercises were reviewed and Sherry was able to demonstrate them prior to the end of the session.  Sherry demonstrated good  understanding of the education provided.     See EMR under Patient Instructions for exercises provided prior visit.    Assessment     Patient tolerated session well today.  She did not perform " some exercises due to time constraints. Progress per patient's tolerance.  Sherry is progressing well towards her goals.   Pt prognosis is Good.     Pt will continue to benefit from skilled outpatient physical therapy to address the deficits listed in the problem list box on initial evaluation, provide pt/family education and to maximize pt's level of independence in the home and community environment.     Pt's spiritual, cultural and educational needs considered and pt agreeable to plan of care and goals.     Anticipated barriers to physical therapy: none    Goals:  Short Term Goals: 4 weeks   Pt independent in a HEP to address R LE function.  Improve R knee flexion to 120 degrees  Improve R SLS balance to fair +     Long Term Goals: 8 weeks   Pt to report R knee pain at </= 2/10 with ADL's  Pt with full pain free R knee ROM  Improve R LE MMT by 1 muscle grade  Pt to report improved functional abilities through an improved score on the FOTO knee survey    Plan     Cont PT POC.     Andrade Murphy, PT

## 2019-08-05 ENCOUNTER — LAB VISIT (OUTPATIENT)
Dept: LAB | Facility: HOSPITAL | Age: 64
End: 2019-08-05
Attending: NURSE PRACTITIONER
Payer: COMMERCIAL

## 2019-08-05 DIAGNOSIS — E89.0 POST-SURGICAL HYPOTHYROIDISM: ICD-10-CM

## 2019-08-05 LAB
T4 FREE SERPL-MCNC: 1.8 NG/DL (ref 0.71–1.51)
TSH SERPL DL<=0.005 MIU/L-ACNC: <0.01 UIU/ML (ref 0.4–4)

## 2019-08-05 PROCEDURE — 36415 COLL VENOUS BLD VENIPUNCTURE: CPT

## 2019-08-05 PROCEDURE — 86800 THYROGLOBULIN ANTIBODY: CPT

## 2019-08-05 PROCEDURE — 84439 ASSAY OF FREE THYROXINE: CPT

## 2019-08-05 PROCEDURE — 84443 ASSAY THYROID STIM HORMONE: CPT

## 2019-08-06 ENCOUNTER — TELEPHONE (OUTPATIENT)
Dept: ENDOCRINOLOGY | Facility: CLINIC | Age: 64
End: 2019-08-06

## 2019-08-06 NOTE — TELEPHONE ENCOUNTER
----- Message from Loraine Keller NP sent at 2019  3:18 PM CDT -----  Please call the patient with the followin.  What dosage of levothyroxine is this lady taking and how is she taking it?  Thank you,   Loraine

## 2019-08-09 NOTE — PROGRESS NOTES
Subjective:      Patient ID: Sherry Torres is a 63 y.o. female.    Chief Complaint:  Hypothyroidism    History of Present Illness  Sherry Torres is here for follow up of post surgical hypothyroidism.  Previously seen by Dr. Jackson.  Last seen 7/25/17.  This is their first visit with me.    She notes that she was diagnosed in 8/2016 with breast cancer.  Required lumpectomy and radiation (33 rounds, completed 12/2016).     With regards to hypothyroidism:  She had thyroid cancer surgery in 2006 by Dr. Pardo. It was stage III: T2, N0, M0. 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 She has had 3 total body scans, 2007, 2008 and 2009, all negative. She was treated with 100 mCi I-131 postsurgery.     Ref. Range 8/5/2019 12:58   TSH Latest Ref Range: 0.400 - 4.000 uIU/mL <0.010 (L)   Free T4 Latest Ref Range: 0.71 - 1.51 ng/dL 1.80 (H)   Thyroglobulin Interpretation Unknown SEE BELOW   Thyroglobulin Antibody Screen Latest Ref Range: <4.0 IU/mL <1.8   Thyroglobulin, Tumor Marker Latest Units: ng/mL <0.1     Thyroid US 7/27/17  Status post thyroidectomy.  No sonographic evidence of malignancy.    Current medication:  Synthroid 125mcg daily     Takes thyroid medication properly without food first thing in the morning.    Current Symptoms:   - Weight gain  - Fatigue   - Constipation   - Hair loss  + Brittle nails  - Mental fog   - cp, palpations or sob  + Heat intolerance  - Cold intolerance    Last BMD dated: 2/6/17  Osteopenia of the lumbar spine. L1 and L2 are denser than surrounding vertebrae thus falsely elevating BMD at the lumbar spine.  There has been a significant decrease in the total hip and lumbar spine BMD from the previous study.  RECOMMENDATIONS:  1) Adequate calcium and Vitamin D therapy  2) Appropriate exercise  3) Consider repeat BMD in 2- 4 years.  4) Consider lateral thoracic and lumbar spine X-rays to evaluate variation in vertebral densities.    Review of Systems   Constitutional:  "Negative for fatigue.   Eyes: Negative for visual disturbance.   Respiratory: Negative for shortness of breath.    Cardiovascular: Negative for chest pain.   Gastrointestinal: Negative for abdominal pain.   Endocrine: Positive for heat intolerance.   Musculoskeletal: Negative for arthralgias.   Skin: Negative for wound.   Neurological: Negative for headaches.   Hematological: Does not bruise/bleed easily.   Psychiatric/Behavioral: Negative for sleep disturbance.     Objective:   Physical Exam   Cardiovascular: Normal rate.   No edema present   Pulmonary/Chest: Effort normal.   Abdominal: Soft.   Vitals reviewed.    Visit Vitals  /78 (BP Location: Right arm, Patient Position: Sitting, BP Method: Medium (Manual))   Resp 16   Ht 5' 5" (1.651 m)   Wt 68.3 kg (150 lb 9.2 oz)   BMI 25.06 kg/m²     Body mass index is 25.06 kg/m².    Lab Review:   No results found for: HGBA1C  No results found for: CHOL, HDL, LDLCALC, TRIG, CHOLHDL  Lab Results   Component Value Date     07/04/2017    K 4.0 07/04/2017     07/04/2017    CO2 24 07/04/2017     07/04/2017    BUN 17 07/04/2017    CREATININE 0.8 07/04/2017    CALCIUM 9.1 07/04/2017    PROT 7.1 07/04/2017    ALBUMIN 3.8 07/04/2017    BILITOT 0.6 07/04/2017    ALKPHOS 45 (L) 07/04/2017    AST 14 07/04/2017    ALT 12 07/04/2017    ANIONGAP 11 07/04/2017    ESTGFRAFRICA >60.0 07/04/2017    EGFRNONAA >60.0 07/04/2017    TSH <0.010 (L) 08/05/2019     Vit D, 25-Hydroxy   Date Value Ref Range Status   02/24/2015 25 (L) 30 - 96 ng/mL Final     Comment:     Vitamin D deficiency.........<10 ng/mL                              Vitamin D insufficiency......10-29 ng/mL       Vitamin D sufficiency........> or equal to 30 ng/mL  Vitamin D toxicity............>100 ng/mL       Assessment and Plan     1. Post-surgical hypothyroidism  TSH    T4, free    Comprehensive metabolic panel    CBC auto differential    US Soft Tissue Head Neck Thyroid    DXA Bone Density Spine And Hip "    SYNTHROID 125 mcg tablet   2. History of thyroid cancer  TSH    T4, free    Comprehensive metabolic panel    CBC auto differential    US Soft Tissue Head Neck Thyroid    SYNTHROID 125 mcg tablet   3. Malignant neoplasm of upper-outer quadrant of right breast in female, estrogen receptor positive       Post-surgical hypothyroidism  -- Labs in 6 weeks  -- Medication Changes:   DECREASE: Synthroid 125mcg daily for 6 days and 1/2 tablet one day a week. (noting suppressed TSH) TDD ~ 116mcg  -- Goal is a low normal TSH  -- Avoid exogenous hyperthyroidism as this can accelerate bone loss and increase risk of CV complications.  -- Advised to take LT4 on an empty stomach with water and to wait 30-45 minutes before eating or taking other medications   -- Reviewed usual times of thyroid hormone changes  -- Reviewed that symptoms of hypothyroidism may not correlate with tsh, and a normal TSH is the goal of therapy. Symptoms are not a justification for over treatment.    History of thyroid cancer  -- Schedule thyroid US.   -- TG undetectable    Breast cancer of upper-outer quadrant of right female breast  -- Continue following with oncology.    Follow up in about 1 year (around 8/14/2020).

## 2019-08-14 ENCOUNTER — OFFICE VISIT (OUTPATIENT)
Dept: ENDOCRINOLOGY | Facility: CLINIC | Age: 64
End: 2019-08-14
Payer: COMMERCIAL

## 2019-08-14 VITALS
DIASTOLIC BLOOD PRESSURE: 78 MMHG | BODY MASS INDEX: 25.08 KG/M2 | SYSTOLIC BLOOD PRESSURE: 122 MMHG | RESPIRATION RATE: 16 BRPM | HEIGHT: 65 IN | WEIGHT: 150.56 LBS

## 2019-08-14 DIAGNOSIS — E89.0 POST-SURGICAL HYPOTHYROIDISM: Primary | ICD-10-CM

## 2019-08-14 DIAGNOSIS — C50.411 MALIGNANT NEOPLASM OF UPPER-OUTER QUADRANT OF RIGHT BREAST IN FEMALE, ESTROGEN RECEPTOR POSITIVE: ICD-10-CM

## 2019-08-14 DIAGNOSIS — Z85.850 HISTORY OF THYROID CANCER: ICD-10-CM

## 2019-08-14 DIAGNOSIS — Z17.0 MALIGNANT NEOPLASM OF UPPER-OUTER QUADRANT OF RIGHT BREAST IN FEMALE, ESTROGEN RECEPTOR POSITIVE: ICD-10-CM

## 2019-08-14 PROCEDURE — 3008F PR BODY MASS INDEX (BMI) DOCUMENTED: ICD-10-PCS | Mod: CPTII,S$GLB,, | Performed by: NURSE PRACTITIONER

## 2019-08-14 PROCEDURE — 99999 PR PBB SHADOW E&M-EST. PATIENT-LVL IV: CPT | Mod: PBBFAC,,, | Performed by: NURSE PRACTITIONER

## 2019-08-14 PROCEDURE — 3008F BODY MASS INDEX DOCD: CPT | Mod: CPTII,S$GLB,, | Performed by: NURSE PRACTITIONER

## 2019-08-14 PROCEDURE — 99214 PR OFFICE/OUTPT VISIT, EST, LEVL IV, 30-39 MIN: ICD-10-PCS | Mod: S$GLB,,, | Performed by: NURSE PRACTITIONER

## 2019-08-14 PROCEDURE — 99214 OFFICE O/P EST MOD 30 MIN: CPT | Mod: S$GLB,,, | Performed by: NURSE PRACTITIONER

## 2019-08-14 PROCEDURE — 99999 PR PBB SHADOW E&M-EST. PATIENT-LVL IV: ICD-10-PCS | Mod: PBBFAC,,, | Performed by: NURSE PRACTITIONER

## 2019-08-14 PROCEDURE — 1101F PT FALLS ASSESS-DOCD LE1/YR: CPT | Mod: CPTII,S$GLB,, | Performed by: NURSE PRACTITIONER

## 2019-08-14 PROCEDURE — 1101F PR PT FALLS ASSESS DOC 0-1 FALLS W/OUT INJ PAST YR: ICD-10-PCS | Mod: CPTII,S$GLB,, | Performed by: NURSE PRACTITIONER

## 2019-08-14 RX ORDER — LEVOTHYROXINE SODIUM 125 UG/1
TABLET ORAL
Qty: 30 TABLET | Refills: 5
Start: 2019-08-14 | End: 2019-11-07

## 2019-08-14 NOTE — ASSESSMENT & PLAN NOTE
-- Labs in 6 weeks  -- Medication Changes:   DECREASE: Synthroid 125mcg daily for 6 days and 1/2 tablet one day a week. (noting suppressed TSH) TDD ~ 116mcg  -- Goal is a low normal TSH  -- Avoid exogenous hyperthyroidism as this can accelerate bone loss and increase risk of CV complications.  -- Advised to take LT4 on an empty stomach with water and to wait 30-45 minutes before eating or taking other medications   -- Reviewed usual times of thyroid hormone changes  -- Reviewed that symptoms of hypothyroidism may not correlate with tsh, and a normal TSH is the goal of therapy. Symptoms are not a justification for over treatment.

## 2019-08-20 ENCOUNTER — TELEPHONE (OUTPATIENT)
Dept: ENDOSCOPY | Facility: HOSPITAL | Age: 64
End: 2019-08-20

## 2019-08-20 ENCOUNTER — OFFICE VISIT (OUTPATIENT)
Dept: GASTROENTEROLOGY | Facility: CLINIC | Age: 64
End: 2019-08-20
Payer: COMMERCIAL

## 2019-08-20 VITALS
HEIGHT: 65 IN | WEIGHT: 151.44 LBS | HEART RATE: 72 BPM | BODY MASS INDEX: 25.23 KG/M2 | DIASTOLIC BLOOD PRESSURE: 82 MMHG | SYSTOLIC BLOOD PRESSURE: 132 MMHG

## 2019-08-20 DIAGNOSIS — Z12.11 COLON CANCER SCREENING: ICD-10-CM

## 2019-08-20 DIAGNOSIS — Z12.11 SPECIAL SCREENING FOR MALIGNANT NEOPLASMS, COLON: Primary | ICD-10-CM

## 2019-08-20 DIAGNOSIS — K21.9 GERD WITHOUT ESOPHAGITIS: Primary | ICD-10-CM

## 2019-08-20 PROCEDURE — 3008F PR BODY MASS INDEX (BMI) DOCUMENTED: ICD-10-PCS | Mod: CPTII,S$GLB,, | Performed by: NURSE PRACTITIONER

## 2019-08-20 PROCEDURE — 3008F BODY MASS INDEX DOCD: CPT | Mod: CPTII,S$GLB,, | Performed by: NURSE PRACTITIONER

## 2019-08-20 PROCEDURE — 1101F PR PT FALLS ASSESS DOC 0-1 FALLS W/OUT INJ PAST YR: ICD-10-PCS | Mod: CPTII,S$GLB,, | Performed by: NURSE PRACTITIONER

## 2019-08-20 PROCEDURE — 1101F PT FALLS ASSESS-DOCD LE1/YR: CPT | Mod: CPTII,S$GLB,, | Performed by: NURSE PRACTITIONER

## 2019-08-20 PROCEDURE — 99214 OFFICE O/P EST MOD 30 MIN: CPT | Mod: S$GLB,,, | Performed by: NURSE PRACTITIONER

## 2019-08-20 PROCEDURE — 99214 PR OFFICE/OUTPT VISIT, EST, LEVL IV, 30-39 MIN: ICD-10-PCS | Mod: S$GLB,,, | Performed by: NURSE PRACTITIONER

## 2019-08-20 PROCEDURE — 99999 PR PBB SHADOW E&M-EST. PATIENT-LVL III: CPT | Mod: PBBFAC,,, | Performed by: NURSE PRACTITIONER

## 2019-08-20 PROCEDURE — 99999 PR PBB SHADOW E&M-EST. PATIENT-LVL III: ICD-10-PCS | Mod: PBBFAC,,, | Performed by: NURSE PRACTITIONER

## 2019-08-20 RX ORDER — OMEPRAZOLE 40 MG/1
40 CAPSULE, DELAYED RELEASE ORAL EVERY MORNING
Qty: 30 CAPSULE | Refills: 11 | Status: SHIPPED | OUTPATIENT
Start: 2019-08-20 | End: 2020-05-28

## 2019-08-20 RX ORDER — SODIUM, POTASSIUM,MAG SULFATES 17.5-3.13G
1 SOLUTION, RECONSTITUTED, ORAL ORAL ONCE
Qty: 1 BOTTLE | Refills: 0 | Status: SHIPPED | OUTPATIENT
Start: 2019-08-20 | End: 2019-08-20

## 2019-08-20 NOTE — PROGRESS NOTES
[facility] Consultation Note    Reason for Consult:  The primary encounter diagnosis was GERD without esophagitis. A diagnosis of Colon cancer screening was also pertinent to this visit.    PCP:   John Jackson       Referring MD:  No referring provider defined for this encounter.    HPI:  This is a 63 y.o. female here for evaluation of GERD. She is an established pt last seen by FRANCIE Coombs NP 2018. Visit was reviewed. She is new to me.    Omeprazole 40 mg every AM. GERD is very well controlled on this. She does not need Zantac at night.  Alcohol will flare however. Recently went to [facility] with daughter ports her reflux had flared.  However since she has been back home her reflux is very stable.  Denies dysphagia.  Denies N/v, melena, hematochezia.  Reports good bowel habits.  No abdominal pain    ROS:  Constitutional: No fevers, chills, No weight loss  ENT: + allergies  CV: No chest pain  Pulm: No cough, No shortness of breath  Ophtho: No vision changes  GI: see HPI  Derm: No rash  MSK: + joint pain. Knee. Taking PT. Receives steroid injections  : No dysuria, No hematuria  Neuro: No syncope, No seizure  Psych: No anxiety, No depression    Medical History:  has a past medical history of Atypical ductal hyperplasia, breast (), Breast cancer (2016), Breast cyst (approx. 40 years ago), Cancer, GERD (gastroesophageal reflux disease), History of thyroid cancer (), Lobular carcinoma in situ (not certain of 2016 diagnosis), Mitral valve prolapse, and Psychiatric problem.    Surgical History:  has a past surgical history that includes  section; Tubal ligation; thyroid removed; CYST REMOVED FROM RIGHT BREAST IN ; left breast wire localization excisional biopsy with bracketed wires using 3 wires and excision through 1 incision. ; Thyroid surgery; Hernia repair; Breast cyst excision (Right, approx. 40 years ago); Breast lumpectomy (Right, ); Breast biopsy (Right,  08/2016); Breast biopsy (Left, 2008); and Transforaminal epidural injection of steroid (Right, 7/1/2019).    Family History: family history includes Breast cancer in her other; Breast cancer (age of onset: 88) in her maternal grandmother; Dementia in her father; Osteoporosis in her mother; Stroke in her mother..     Social History:  reports that she has quit smoking. She has never used smokeless tobacco. She reports that she drinks alcohol. She reports that she does not use drugs.    Review of patient's allergies indicates:   Allergen Reactions    Codeine Nausea Only    Sulfa (sulfonamide antibiotics) Nausea Only       Current Outpatient Medications on File Prior to Visit   Medication Sig Dispense Refill    calcium-vitamin D3 500 MG, 1,250MG, 200 UNITS (CALCIUM-VITAMIN D) 500 mg(1,250mg) -200 unit per tablet Take 4 tablets by mouth 2 (two) times daily with meals.       diclofenac sodium (VOLTAREN) 1 % Gel Apply 2 g topically 3 (three) times daily. 5 Tube 2    fish oil-omega-3 fatty acids 300-1,000 mg capsule Take 2 g by mouth once daily.      FLUZONE QUAD 5725-0527, PF, 60 mcg (15 mcg x 4)/0.5 mL Syrg Inject 60 mcg as directed.  0    letrozole (FEMARA) 2.5 mg Tab Take 1 tablet (2.5 mg total) by mouth once daily. 90 tablet 3    MAGNESIUM CARBONATE ORAL Take by mouth.      multivitamin capsule Take 2 capsules by mouth once daily.       potassium chloride SA (K-DUR,KLOR-CON) 10 MEQ tablet Take 20 mEq by mouth once daily.      SYNTHROID 125 mcg tablet Take 1 tablet daily 6 days a week and 1/2 tablet one day a week. 30 tablet 5    [DISCONTINUED] omeprazole (PRILOSEC) 40 MG capsule Take 40 mg by mouth once daily.      albuterol (PROVENTIL/VENTOLIN HFA) 90 mcg/actuation inhaler Inhale 2 puffs into the lungs every 6 (six) hours as needed for Wheezing or Shortness of Breath. Rescue 1 Inhaler 0    triamcinolone acetonide 0.1% (KENALOG) 0.1 % cream Apply topically 2 (two) times daily. for 10 days 15 g 0     No  "current facility-administered medications on file prior to visit.          Objective Findings:    Vital Signs:  /82 (BP Location: Right arm)   Pulse 72   Ht 5' 5" (1.651 m)   Wt 68.7 kg (151 lb 7.3 oz)   BMI 25.20 kg/m²   Body mass index is 25.2 kg/m².    Physical Exam:  General Appearance: Well appearing in no acute distress  Head:   Normocephalic, without obvious abnormality  Eyes:    No scleral icterus  ENT: Neck supple  Extremities: No edema  Skin: No rash  Neurologic: AAO x 3      Labs:  Lab Results   Component Value Date    WBC 10.69 07/04/2017    HGB 13.9 07/04/2017    HCT 44 07/04/2017     07/04/2017    ALT 12 07/04/2017    AST 14 07/04/2017     07/04/2017    K 4.0 07/04/2017     07/04/2017    CREATININE 0.8 07/04/2017    BUN 17 07/04/2017    CO2 24 07/04/2017    TSH <0.010 (L) 08/05/2019    INR 0.9 07/04/2017       Imaging:  None reviewed  Endoscopy:    Screening Colonoscopy in 2009 reviewed.  She is due for repeat screening.    EGD in 2017 reviewed  Mild Schatzki ring, dilated up to 18 mm. GE                         junction biopsied to evaluate for Olivia's.                        - Medium-sized hiatal hernia.    Assessment:    Ms. Cook is a 63 y.o. WF with:    1. GERD without esophagitis    2. Colon cancer screening       She is stable with omeprazole 40 mg QAM. Endocrine is managing her bone density scans.   She does have a hx of dysphagia and Schatzki ring that required Dilation in 2017. She is not having dysphagia at this time.  She is due for screening colonoscopy.    Recommendations:  1.  Continue with PPI qam  2. Colonoscopy.    No follow-ups on file.      Order summary:  Orders Placed This Encounter    omeprazole (PRILOSEC) 40 MG capsule    Case request GI: COLONOSCOPY         Thank you so much for allowing me to participate in the care of ROOSEVELT Segovia        "

## 2019-09-25 ENCOUNTER — LAB VISIT (OUTPATIENT)
Dept: LAB | Facility: HOSPITAL | Age: 64
End: 2019-09-25
Payer: COMMERCIAL

## 2019-09-25 ENCOUNTER — PATIENT MESSAGE (OUTPATIENT)
Dept: ENDOCRINOLOGY | Facility: CLINIC | Age: 64
End: 2019-09-25

## 2019-09-25 DIAGNOSIS — E89.0 POST-SURGICAL HYPOTHYROIDISM: Primary | ICD-10-CM

## 2019-09-25 DIAGNOSIS — E89.0 POST-SURGICAL HYPOTHYROIDISM: ICD-10-CM

## 2019-09-25 DIAGNOSIS — Z85.850 HISTORY OF THYROID CANCER: ICD-10-CM

## 2019-09-25 LAB
ALBUMIN SERPL BCP-MCNC: 4.1 G/DL (ref 3.5–5.2)
ALP SERPL-CCNC: 53 U/L (ref 55–135)
ALT SERPL W/O P-5'-P-CCNC: 18 U/L (ref 10–44)
ANION GAP SERPL CALC-SCNC: 9 MMOL/L (ref 8–16)
AST SERPL-CCNC: 15 U/L (ref 10–40)
BASOPHILS # BLD AUTO: 0.04 K/UL (ref 0–0.2)
BASOPHILS NFR BLD: 0.7 % (ref 0–1.9)
BILIRUB SERPL-MCNC: 0.5 MG/DL (ref 0.1–1)
BUN SERPL-MCNC: 8 MG/DL (ref 8–23)
CALCIUM SERPL-MCNC: 9.8 MG/DL (ref 8.7–10.5)
CHLORIDE SERPL-SCNC: 106 MMOL/L (ref 95–110)
CO2 SERPL-SCNC: 28 MMOL/L (ref 23–29)
CREAT SERPL-MCNC: 0.8 MG/DL (ref 0.5–1.4)
DIFFERENTIAL METHOD: ABNORMAL
EOSINOPHIL # BLD AUTO: 0.2 K/UL (ref 0–0.5)
EOSINOPHIL NFR BLD: 3.4 % (ref 0–8)
ERYTHROCYTE [DISTWIDTH] IN BLOOD BY AUTOMATED COUNT: 13.2 % (ref 11.5–14.5)
EST. GFR  (AFRICAN AMERICAN): >60 ML/MIN/1.73 M^2
EST. GFR  (NON AFRICAN AMERICAN): >60 ML/MIN/1.73 M^2
GLUCOSE SERPL-MCNC: 104 MG/DL (ref 70–110)
HCT VFR BLD AUTO: 41.8 % (ref 37–48.5)
HGB BLD-MCNC: 14.1 G/DL (ref 12–16)
IMM GRANULOCYTES # BLD AUTO: 0.02 K/UL (ref 0–0.04)
IMM GRANULOCYTES NFR BLD AUTO: 0.4 % (ref 0–0.5)
LYMPHOCYTES # BLD AUTO: 1.6 K/UL (ref 1–4.8)
LYMPHOCYTES NFR BLD: 27.5 % (ref 18–48)
MCH RBC QN AUTO: 30.7 PG (ref 27–31)
MCHC RBC AUTO-ENTMCNC: 33.7 G/DL (ref 32–36)
MCV RBC AUTO: 91 FL (ref 82–98)
MONOCYTES # BLD AUTO: 0.5 K/UL (ref 0.3–1)
MONOCYTES NFR BLD: 9.4 % (ref 4–15)
NEUTROPHILS # BLD AUTO: 3.3 K/UL (ref 1.8–7.7)
NEUTROPHILS NFR BLD: 58.6 % (ref 38–73)
NRBC BLD-RTO: 0 /100 WBC
PLATELET # BLD AUTO: 384 K/UL (ref 150–350)
PMV BLD AUTO: 9 FL (ref 9.2–12.9)
POTASSIUM SERPL-SCNC: 4.7 MMOL/L (ref 3.5–5.1)
PROT SERPL-MCNC: 7.5 G/DL (ref 6–8.4)
RBC # BLD AUTO: 4.6 M/UL (ref 4–5.4)
SODIUM SERPL-SCNC: 143 MMOL/L (ref 136–145)
T4 FREE SERPL-MCNC: 1.42 NG/DL (ref 0.71–1.51)
TSH SERPL DL<=0.005 MIU/L-ACNC: 0.03 UIU/ML (ref 0.4–4)
WBC # BLD AUTO: 5.64 K/UL (ref 3.9–12.7)

## 2019-09-25 PROCEDURE — 84439 ASSAY OF FREE THYROXINE: CPT

## 2019-09-25 PROCEDURE — 80053 COMPREHEN METABOLIC PANEL: CPT

## 2019-09-25 PROCEDURE — 84443 ASSAY THYROID STIM HORMONE: CPT

## 2019-09-25 PROCEDURE — 85025 COMPLETE CBC W/AUTO DIFF WBC: CPT

## 2019-09-25 PROCEDURE — 36415 COLL VENOUS BLD VENIPUNCTURE: CPT

## 2019-09-25 NOTE — TELEPHONE ENCOUNTER
Synthroid 125mcg TSH undetectable.  Decreased by 1/2 tablet one day.  TSH 0.027 decrease to 125mcg daily 6 days a week

## 2019-09-26 ENCOUNTER — OFFICE VISIT (OUTPATIENT)
Dept: HEMATOLOGY/ONCOLOGY | Facility: CLINIC | Age: 64
End: 2019-09-26
Payer: COMMERCIAL

## 2019-09-26 VITALS
BODY MASS INDEX: 25.49 KG/M2 | RESPIRATION RATE: 20 BRPM | HEIGHT: 65 IN | HEART RATE: 78 BPM | WEIGHT: 153 LBS | SYSTOLIC BLOOD PRESSURE: 129 MMHG | DIASTOLIC BLOOD PRESSURE: 74 MMHG | OXYGEN SATURATION: 99 %

## 2019-09-26 DIAGNOSIS — C50.411 MALIGNANT NEOPLASM OF UPPER-OUTER QUADRANT OF RIGHT BREAST IN FEMALE, ESTROGEN RECEPTOR POSITIVE: Primary | ICD-10-CM

## 2019-09-26 DIAGNOSIS — Z17.0 MALIGNANT NEOPLASM OF UPPER-OUTER QUADRANT OF RIGHT BREAST IN FEMALE, ESTROGEN RECEPTOR POSITIVE: Primary | ICD-10-CM

## 2019-09-26 PROCEDURE — 1101F PR PT FALLS ASSESS DOC 0-1 FALLS W/OUT INJ PAST YR: ICD-10-PCS | Mod: CPTII,S$GLB,, | Performed by: PHYSICIAN ASSISTANT

## 2019-09-26 PROCEDURE — 99213 PR OFFICE/OUTPT VISIT, EST, LEVL III, 20-29 MIN: ICD-10-PCS | Mod: S$GLB,,, | Performed by: PHYSICIAN ASSISTANT

## 2019-09-26 PROCEDURE — 3008F BODY MASS INDEX DOCD: CPT | Mod: CPTII,S$GLB,, | Performed by: PHYSICIAN ASSISTANT

## 2019-09-26 PROCEDURE — 99999 PR PBB SHADOW E&M-EST. PATIENT-LVL IV: CPT | Mod: PBBFAC,,, | Performed by: PHYSICIAN ASSISTANT

## 2019-09-26 PROCEDURE — 99999 PR PBB SHADOW E&M-EST. PATIENT-LVL IV: ICD-10-PCS | Mod: PBBFAC,,, | Performed by: PHYSICIAN ASSISTANT

## 2019-09-26 PROCEDURE — 99213 OFFICE O/P EST LOW 20 MIN: CPT | Mod: S$GLB,,, | Performed by: PHYSICIAN ASSISTANT

## 2019-09-26 PROCEDURE — 3008F PR BODY MASS INDEX (BMI) DOCUMENTED: ICD-10-PCS | Mod: CPTII,S$GLB,, | Performed by: PHYSICIAN ASSISTANT

## 2019-09-26 PROCEDURE — 1101F PT FALLS ASSESS-DOCD LE1/YR: CPT | Mod: CPTII,S$GLB,, | Performed by: PHYSICIAN ASSISTANT

## 2019-09-26 NOTE — PROGRESS NOTES
Subjective:       Patient ID: Sherry Torres is a 63 y.o. female.    Chief Complaint: Malignant neoplasm of upper-outer quadrant of right breast i    Ms Torres is a 63-year-old female seen in follow-up  of right breast cancer.   She had stage II A, strongly ER and IL positive and HER-2 negative disease. She is on letrozole therapy.      Overall feeling quite well, work has been very busy ().  No fever, chills, nausea, vomiting, shortness of breath.  She exercises regularly.       Mammogram from 5/6/2019 was unremarkable.    Breast history:      She noted an abnormality on self examination at the end of July or early August 2016.  On August 8 she underwent diagnostic mammogram which showed an irregular mass was plated margins in the middle right breast and o'clock position.  By ultrasound this was a solid 1.7 x 1.0 x 1.5 cm mass.     On August 9 a core needle biopsy showed infiltrating ductal carcinoma which was well differentiated (histologic grade 2, nuclear grade 2, mitotic index 1).  The tumor was 100% ER positive, 70% IL positive and HER-2 1+.  On August 25 right breast lumpectomy and sentinel lymph node biopsy was performed.  That showed a 14 mm low-grade infiltrating ductal carcinoma.    The sentinel lymph node was positive for 1.5 mm micrometastasis.       Final pathological stage TI cN1 stage II    Mammaprint genomic assay  showed that she was low risk.  Score was +0.336 with a 5 year risk of distant recurrence at 5% and a 10 year risk at 10%.       She completed radiation in December 2016 and began Letrozole at that time.        Review of Systems   Constitutional: Negative for appetite change, fatigue and unexpected weight change.   Eyes: Negative for visual disturbance.   Respiratory: Negative for cough and shortness of breath.    Cardiovascular: Negative for chest pain.   Gastrointestinal: Negative for abdominal pain and diarrhea.   Genitourinary: Negative for frequency.   Musculoskeletal:  Positive for back pain (chronic).   Skin: Negative for rash.   Neurological: Positive for headaches (intermittent, from stress).   Hematological: Negative for adenopathy.   Psychiatric/Behavioral: The patient is not nervous/anxious.        Objective:      Physical Exam   Constitutional: She is oriented to person, place, and time. She appears well-developed and well-nourished. No distress.   NAD  Presents alone   HENT:   Head: Normocephalic.   Mouth/Throat: Oropharynx is clear and moist. No oropharyngeal exudate.   Eyes: Pupils are equal, round, and reactive to light. Conjunctivae are normal. No scleral icterus.   Neck: Normal range of motion. Neck supple. No thyromegaly present.   Cardiovascular: Normal rate and regular rhythm.   Pulmonary/Chest: Effort normal and breath sounds normal. No respiratory distress.   Right breast with well healed lumpectomy and SLNB incisions. No mass, nodule or skin changes. Left breast without mass, nodule or skin changes. No axillary or supraclavicular adenopathy.    Abdominal: Soft. Bowel sounds are normal. She exhibits no distension and no mass. There is no tenderness.   Musculoskeletal: Normal range of motion. She exhibits no edema or tenderness.   No spinal or paraspinal tenderness to palpation     Lymphadenopathy:     She has no cervical adenopathy.   Neurological: She is alert and oriented to person, place, and time. No cranial nerve deficit.   Skin: Skin is warm and dry.   Psychiatric: She has a normal mood and affect. Her behavior is normal. Judgment and thought content normal.   Vitals reviewed.      Assessment:       1. Malignant neoplasm of upper-outer quadrant of right breast in female, estrogen receptor positive        Plan:       Clinically doing well without evidence of disease. Continue Letrozole and return to clinic in 6 months (patient is 3 years from diagnosis)

## 2019-10-02 ENCOUNTER — HOSPITAL ENCOUNTER (OUTPATIENT)
Dept: ENDOCRINOLOGY | Facility: CLINIC | Age: 64
Discharge: HOME | End: 2019-10-02
Attending: NURSE PRACTITIONER
Payer: COMMERCIAL

## 2019-10-02 DIAGNOSIS — E89.0 POST-SURGICAL HYPOTHYROIDISM: ICD-10-CM

## 2019-10-02 DIAGNOSIS — Z85.850 HISTORY OF THYROID CANCER: ICD-10-CM

## 2019-10-02 PROCEDURE — 76536 US EXAM OF HEAD AND NECK: CPT | Mod: S$GLB,,, | Performed by: INTERNAL MEDICINE

## 2019-10-02 PROCEDURE — 76536 US SOFT TISSUE HEAD NECK THYROID: ICD-10-PCS | Mod: S$GLB,,, | Performed by: INTERNAL MEDICINE

## 2019-10-03 ENCOUNTER — TELEPHONE (OUTPATIENT)
Dept: OBSTETRICS AND GYNECOLOGY | Facility: CLINIC | Age: 64
End: 2019-10-03

## 2019-10-03 NOTE — TELEPHONE ENCOUNTER
----- Message from Martha Florez MA sent at 10/3/2019 10:32 AM CDT -----  Contact: NICK KABA [[id number]]      ----- Message -----  From: Katarzyna Guevara  Sent: 10/3/2019  10:22 AM CDT  To: Cecille GRIFFITH Staff    Name of Who is Calling: NICK KABA [[id number]]      What is the request in detail: Patient is calling to reschedule her 10/4 appointment due to an emergency.      Can the clinic reply by [facility]: Y      What Number to Call Back if not in [facility]: [phone]

## 2019-10-03 NOTE — TELEPHONE ENCOUNTER
RN phoned patient to re-schedule her appt. RN lvm with return contact information provided. ALICIA Estrada.

## 2019-10-03 NOTE — TELEPHONE ENCOUNTER
----- Message from Katarzyna Guevara sent at 10/3/2019 10:22 AM CDT -----  Contact: NICK KABA [[id number]]  Name of Who is Calling: NICK KABA [[id number]]      What is the request in detail: Patient is calling to reschedule her 10/4 appointment due to an emergency.      Can the clinic reply by [facility]: Y      What Number to Call Back if not in [facility]: [phone]

## 2019-10-08 ENCOUNTER — ANESTHESIA (OUTPATIENT)
Dept: ENDOSCOPY | Facility: HOSPITAL | Age: 64
End: 2019-10-08
Payer: COMMERCIAL

## 2019-10-08 ENCOUNTER — ANESTHESIA EVENT (OUTPATIENT)
Dept: ENDOSCOPY | Facility: HOSPITAL | Age: 64
End: 2019-10-08
Payer: COMMERCIAL

## 2019-10-08 ENCOUNTER — HOSPITAL ENCOUNTER (OUTPATIENT)
Facility: HOSPITAL | Age: 64
Discharge: HOME | End: 2019-10-08
Attending: INTERNAL MEDICINE | Admitting: INTERNAL MEDICINE
Payer: COMMERCIAL

## 2019-10-08 VITALS
BODY MASS INDEX: 24.99 KG/M2 | RESPIRATION RATE: 14 BRPM | SYSTOLIC BLOOD PRESSURE: 127 MMHG | HEIGHT: 65 IN | WEIGHT: 150 LBS | HEART RATE: 64 BPM | DIASTOLIC BLOOD PRESSURE: 73 MMHG | OXYGEN SATURATION: 99 % | TEMPERATURE: 98 F

## 2019-10-08 DIAGNOSIS — Z12.11 COLON CANCER SCREENING: Primary | ICD-10-CM

## 2019-10-08 PROCEDURE — 63600175 PHARM REV CODE 636 W HCPCS: Performed by: NURSE ANESTHETIST, CERTIFIED REGISTERED

## 2019-10-08 PROCEDURE — E9220 PRA ENDO ANESTHESIA: ICD-10-PCS | Mod: ,,, | Performed by: NURSE ANESTHETIST, CERTIFIED REGISTERED

## 2019-10-08 PROCEDURE — E9220 PRA ENDO ANESTHESIA: HCPCS | Mod: ,,, | Performed by: NURSE ANESTHETIST, CERTIFIED REGISTERED

## 2019-10-08 PROCEDURE — G0121 COLON CA SCRN NOT HI RSK IND: HCPCS | Performed by: INTERNAL MEDICINE

## 2019-10-08 PROCEDURE — 63600175 PHARM REV CODE 636 W HCPCS: Performed by: INTERNAL MEDICINE

## 2019-10-08 PROCEDURE — G0121 COLON CA SCRN NOT HI RSK IND: HCPCS | Mod: ,,, | Performed by: INTERNAL MEDICINE

## 2019-10-08 PROCEDURE — G0121 COLON CA SCRN NOT HI RSK IND: ICD-10-PCS | Mod: ,,, | Performed by: INTERNAL MEDICINE

## 2019-10-08 PROCEDURE — 37000009 HC ANESTHESIA EA ADD 15 MINS: Performed by: INTERNAL MEDICINE

## 2019-10-08 PROCEDURE — 37000008 HC ANESTHESIA 1ST 15 MINUTES: Performed by: INTERNAL MEDICINE

## 2019-10-08 RX ORDER — SODIUM CHLORIDE 0.9 % (FLUSH) 0.9 %
10 SYRINGE (ML) INJECTION
Status: DISCONTINUED | OUTPATIENT
Start: 2019-10-08 | End: 2019-10-08 | Stop reason: HOSPADM

## 2019-10-08 RX ORDER — LIDOCAINE HCL/PF 100 MG/5ML
SYRINGE (ML) INTRAVENOUS
Status: DISCONTINUED | OUTPATIENT
Start: 2019-10-08 | End: 2019-10-08

## 2019-10-08 RX ORDER — SODIUM CHLORIDE 9 MG/ML
INJECTION, SOLUTION INTRAVENOUS CONTINUOUS
Status: DISCONTINUED | OUTPATIENT
Start: 2019-10-08 | End: 2019-10-08 | Stop reason: HOSPADM

## 2019-10-08 RX ORDER — PROPOFOL 10 MG/ML
VIAL (ML) INTRAVENOUS CONTINUOUS PRN
Status: DISCONTINUED | OUTPATIENT
Start: 2019-10-08 | End: 2019-10-08

## 2019-10-08 RX ORDER — SODIUM CHLORIDE 9 MG/ML
INJECTION, SOLUTION INTRAVENOUS CONTINUOUS
Status: CANCELLED | OUTPATIENT
Start: 2019-10-08

## 2019-10-08 RX ORDER — PROPOFOL 10 MG/ML
VIAL (ML) INTRAVENOUS
Status: DISCONTINUED | OUTPATIENT
Start: 2019-10-08 | End: 2019-10-08

## 2019-10-08 RX ADMIN — SODIUM CHLORIDE: 0.9 INJECTION, SOLUTION INTRAVENOUS at 11:10

## 2019-10-08 RX ADMIN — PROPOFOL 200 MCG/KG/MIN: 10 INJECTION, EMULSION INTRAVENOUS at 11:10

## 2019-10-08 RX ADMIN — LIDOCAINE HYDROCHLORIDE 40 MG: 20 INJECTION, SOLUTION INTRAVENOUS at 11:10

## 2019-10-08 RX ADMIN — PROPOFOL 80 MG: 10 INJECTION, EMULSION INTRAVENOUS at 11:10

## 2019-10-08 NOTE — ANESTHESIA POSTPROCEDURE EVALUATION
Anesthesia Post Evaluation    Patient: Sherry Torres    Procedure(s) Performed: Procedure(s) (LRB):  COLONOSCOPY (N/A)    Final Anesthesia Type: general  Patient location during evaluation: GI PACU  Patient participation: Yes- Able to Participate  Level of consciousness: awake and alert  Post-procedure vital signs: reviewed and stable  Pain management: adequate  Airway patency: patent  PONV status at discharge: No PONV  Anesthetic complications: no      Cardiovascular status: hemodynamically stable  Respiratory status: unassisted, spontaneous ventilation and room air  Hydration status: euvolemic  Follow-up not needed.          Vitals Value Taken Time   /73 10/8/2019 12:15 PM   Temp 36.8 °C (98.2 °F) 10/8/2019 12:15 PM   Pulse 64 10/8/2019 12:15 PM   Resp 14 10/8/2019 12:15 PM   SpO2 99 % 10/8/2019 12:15 PM         Event Time     Out of Recovery 12:23:27          Pain/Sheldon Score: Sheldon Score: 10 (10/8/2019 12:15 PM)

## 2019-10-08 NOTE — ANESTHESIA RELEASE NOTE
"Anesthesia Release from PACU Note    Patient: Sherry Torres    Procedure(s) Performed: Procedure(s) (LRB):  COLONOSCOPY (N/A)    Anesthesia type: general    Post pain: Adequate analgesia    Post assessment: no apparent anesthetic complications and tolerated procedure well    Last Vitals:   Visit Vitals  /73 (BP Location: Left arm, Patient Position: Lying)   Pulse 64   Temp 36.8 °C (98.2 °F) (Temporal)   Resp 14   Ht 5' 5" (1.651 m)   Wt 68 kg (150 lb)   SpO2 99%   Breastfeeding? No   BMI 24.96 kg/m²       Post vital signs: stable    Level of consciousness: awake and alert     Nausea/Vomiting: no nausea/no vomiting    Complications: none    Airway Patency: patent    Respiratory: unassisted, spontaneous ventilation, room air    Cardiovascular: stable and blood pressure at baseline    Hydration: euvolemic  "

## 2019-10-08 NOTE — H&P
Short Stay Endoscopy History and Physical    PCP - John Jackson MD  Referring Physician - Katelynn Diaz, NP  [address] [place] [address]    Procedure - Colonoscopy  ASA - per anesthesia  Mallampati - per anesthesia  History of Anesthesia problems - no  Family history Anesthesia problems -  no   Plan of anesthesia - General    HPI  63 y.o. female with past history of thyroid and breast cancers in remission, last colonoscopy in  with no polyps, no family history of colon cancer, not on anti-coagulation, here for screening colonoscopy.    Reason for procedure: Screening colonoscopy     ROS:  Constitutional: No fevers, chills, No weight loss  CV: No chest pain  Pulm: No cough, No shortness of breath  GI: see HPI    Medical History:  has a past medical history of Atypical ductal hyperplasia, breast (), Breast cancer (2016), Breast cyst (approx. 40 years ago), Cancer, GERD (gastroesophageal reflux disease), History of thyroid cancer (), Lobular carcinoma in situ (not certain of  diagnosis), Mitral valve prolapse, and Psychiatric problem.    Surgical History:  has a past surgical history that includes  section; Tubal ligation; thyroid removed; CYST REMOVED FROM RIGHT BREAST IN ; left breast wire localization excisional biopsy with bracketed wires using 3 wires and excision through 1 incision. ; Thyroid surgery; Hernia repair; Breast cyst excision (Right, approx. 40 years ago); Breast lumpectomy (Right, ); Breast biopsy (Right, 2016); Breast biopsy (Left, ); and Transforaminal epidural injection of steroid (Right, 2019).    Family History: family history includes Breast cancer in her other; Breast cancer (age of onset: 88) in her maternal grandmother; Dementia in her father; Osteoporosis in her mother; Stroke in her mother., see HPI    Social History:  reports that she has quit smoking. She has never used smokeless tobacco. She reports that she drinks  alcohol. She reports that she does not use drugs.    Review of patient's allergies indicates:   Allergen Reactions    Codeine Nausea Only    Sulfa (sulfonamide antibiotics) Nausea Only       Medications:   Medications Prior to Admission   Medication Sig Dispense Refill Last Dose    albuterol (PROVENTIL/VENTOLIN HFA) 90 mcg/actuation inhaler Inhale 2 puffs into the lungs every 6 (six) hours as needed for Wheezing or Shortness of Breath. Rescue 1 Inhaler 0 Past Month at Unknown time    calcium-vitamin D3 500 MG, 1,250MG, 200 UNITS (CALCIUM-VITAMIN D) 500 mg(1,250mg) -200 unit per tablet Take 4 tablets by mouth 2 (two) times daily with meals.    10/7/2019 at Unknown time    diclofenac sodium (VOLTAREN) 1 % Gel Apply 2 g topically 3 (three) times daily. 5 Tube 2 Past Month at Unknown time    fish oil-omega-3 fatty acids 300-1,000 mg capsule Take 2 g by mouth once daily.   10/7/2019 at Unknown time    FLUZONE QUAD 3188-3960, PF, 60 mcg (15 mcg x 4)/0.5 mL Syrg Inject 60 mcg as directed.  0 Past Month at Unknown time    letrozole (FEMARA) 2.5 mg Tab Take 1 tablet (2.5 mg total) by mouth once daily. 90 tablet 3 10/7/2019 at Unknown time    MAGNESIUM CARBONATE ORAL Take by mouth.   10/7/2019 at Unknown time    multivitamin capsule Take 2 capsules by mouth once daily.    10/7/2019 at Unknown time    omeprazole (PRILOSEC) 40 MG capsule Take 1 capsule (40 mg total) by mouth every morning. 30 capsule 11 10/7/2019 at Unknown time    potassium chloride SA (K-DUR,KLOR-CON) 10 MEQ tablet Take 20 mEq by mouth once daily.   10/7/2019 at Unknown time    SYNTHROID 125 mcg tablet Take 1 tablet daily 6 days a week and 1/2 tablet one day a week. 30 tablet 5 10/7/2019 at Unknown time    triamcinolone acetonide 0.1% (KENALOG) 0.1 % cream Apply topically 2 (two) times daily. for 10 days 15 g 0 Not Taking       Physical Exam:    Vital Signs:   Vitals:    10/08/19 1057   BP: 135/83   Pulse: 88   Resp: 16   Temp: 98.2 °F (36.8 °C)        General Appearance: Well appearing in no acute distress  Abdomen: Soft, non tender, non distended with normal bowel sounds, no masses    Labs:  Lab Results   Component Value Date    WBC 5.64 09/25/2019    HGB 14.1 09/25/2019    HCT 41.8 09/25/2019     (H) 09/25/2019    ALT 18 09/25/2019    AST 15 09/25/2019     09/25/2019    K 4.7 09/25/2019     09/25/2019    CREATININE 0.8 09/25/2019    BUN 8 09/25/2019    CO2 28 09/25/2019    TSH 0.027 (L) 09/25/2019    INR 0.9 07/04/2017       I have explained the risks and benefits of this endoscopic procedure to the patient including but not limited to bleeding, inflammation, infection, perforation, and death.      Darci Hope MD

## 2019-10-08 NOTE — ANESTHESIA PREPROCEDURE EVALUATION
10/08/2019  Sherry Torres is a 63 y.o., female here for screening colonoscopy.    Past Medical History:   Diagnosis Date    Atypical ductal hyperplasia, breast     left side    Breast cancer 2016    right side    Breast cyst approx. 40 years ago    Cancer     GERD (gastroesophageal reflux disease)     History of thyroid cancer     Lobular carcinoma in situ not certain of 2016 diagnosis    Mitral valve prolapse     Psychiatric problem      Past Surgical History:   Procedure Laterality Date    BREAST BIOPSY Right 2016    BREAST BIOPSY Left     exc bx    BREAST CYST EXCISION Right approx. 40 years ago    BREAST LUMPECTOMY Right     w/radiation     SECTION      CYST REMOVED FROM RIGHT BREAST IN       HERNIA REPAIR      left breast wire localization excisional biopsy with bracketed wires using 3 wires and excision through 1 incision.       thyroid removed      THYROID SURGERY      TRANSFORAMINAL EPIDURAL INJECTION OF STEROID Right 2019    Procedure: Injection,steroid,epidural,transforaminal approach LUMBAR TRANSFORAMINAL RIGHT L5 AND S1 TF ARNOLDO;  Surgeon: Nadya Mcfarland MD;  Location: [facility];  Service: Pain Management;  Laterality: Right;  NEEDS CONSENT    TUBAL LIGATION         Anesthesia Evaluation    I have reviewed the Patient Summary Reports.     I have reviewed the Medications.     Review of Systems  Anesthesia Hx:   Denies Personal Hx of Anesthesia complications.   Social:  Former Smoker    Hematology/Oncology:         -- Cancer in past history: Breast surgery and chemotherapy  Oncology Comments: Thyroid cancer     Cardiovascular:   Valvular problems/Murmurs, MVP ECG has been reviewed.    Pulmonary:  Pulmonary Normal    Hepatic/GI:   GERD, well controlled    Musculoskeletal:  Spine Disorders: lumbar    Neurological:   Neuromuscular Disease,  Headaches    Endocrine:   Hypothyroidism    Psych:   anxiety depression          Physical Exam  General:  Well nourished    Airway/Jaw/Neck:  Airway Findings: Mouth Opening: Normal Tongue: Normal  General Airway Assessment: Adult  Mallampati: II  TM Distance: Normal, at least 6 cm  Jaw/Neck Findings:  Neck ROM: Normal ROM  Neck Findings:     Eyes/Ears/Nose:  EYES/EARS/NOSE FINDINGS: Normal   Dental:  Dental Findings: In tact   Chest/Lungs:  Chest/Lungs Findings: Clear to auscultation, Normal Respiratory Rate     Heart/Vascular:  Heart Findings: Rate: Normal  Rhythm: Regular Rhythm  Sounds: Normal        Mental Status:  Mental Status Findings:  Cooperative, Alert and Oriented         Anesthesia Plan  Type of Anesthesia, risks & benefits discussed:  Anesthesia Type:  general  Patient's Preference: GA  Intra-op Monitoring Plan: standard ASA monitors  Intra-op Monitoring Plan Comments:   Post Op Pain Control Plan: IV/PO Opioids PRN  Post Op Pain Control Plan Comments:   Induction:   IV  Beta Blocker:  Patient is not currently on a Beta-Blocker (No further documentation required).       Informed Consent: Patient understands risks and agrees with Anesthesia plan.  Questions answered. Anesthesia consent signed with patient.  ASA Score: 2     Day of Surgery Review of History & Physical: I have interviewed and examined the patient. I have reviewed the patient's H&P dated: 9/27. There are no significant changes.  H&P update referred to the provider.         Ready For Surgery From Anesthesia Perspective.

## 2019-10-08 NOTE — [employer] PATIENT INSTRUCTIONS
Discharge Summary/Instructions after an Endoscopic Procedure  Patient Name: Sherry Torres  Patient MRN: [id number]  Patient YOB: 1955 Tuesday, October 08, 2019  Eliceo Holloway MD  RESTRICTIONS:  During your procedure today, you received medications for sedation.  These   medications may affect your judgment, balance and coordination.  Therefore,   for 24 hours, you have the following restrictions:   - DO NOT drive a car, operate machinery, make legal/financial decisions,   sign important papers or drink alcohol.    ACTIVITY:  Today: no heavy lifting, straining or running due to procedural   sedation/anesthesia.  The following day: return to full activity including work.  DIET:  Eat and drink normally unless instructed otherwise.     TREATMENT FOR COMMON SIDE EFFECTS:  - Mild abdominal pain, nausea, belching, bloating or excessive gas:  rest,   eat lightly and use a heating pad.  - Sore Throat: treat with throat lozenges and/or gargle with warm salt   water.  - Because air was used during the procedure, expelling large amounts of air   from your rectum or belching is normal.  - If a bowel prep was taken, you may not have a bowel movement for 1-3 days.    This is normal.  SYMPTOMS TO WATCH FOR AND REPORT TO YOUR PHYSICIAN:  1. Abdominal pain or bloating, other than gas cramps.  2. Chest pain.  3. Back pain.  4. Signs of infection such as: chills or fever occurring within 24 hours   after the procedure.  5. Rectal bleeding, which would show as bright red, maroon, or black stools.   (A tablespoon of blood from the rectum is not serious, especially if   hemorrhoids are present.)  6. Vomiting.  7. Weakness or dizziness.  GO DIRECTLY TO THE NEAREST EMERGENCY ROOM IF YOU HAVE ANY OF THE FOLLOWING:      Difficulty breathing              Chills and/or fever over 101 F   Persistent vomiting and/or vomiting blood   Severe abdominal pain   Severe chest pain   Black, tarry stools   Bleeding- more than one  tablespoon   Any other symptom or condition that you feel may need urgent attention  Your doctor recommends these additional instructions:  If any biopsies were taken, your doctors clinic will contact you in 1 to 2   weeks with any results.  - Patient has a contact number available for emergencies.  The signs and   symptoms of potential delayed complications were discussed with the   patient.  Return to normal activities tomorrow.  Written discharge   instructions were provided to the patient.   - Discharge patient to home (ambulatory).   - Resume previous diet.   - Continue present medications.   - Repeat colonoscopy in 10 years for screening purposes.  For questions, problems or results please call your physician - Eliceo Holloway MD at Work:  [phone].  [facility], EMERGENCY ROOM PHONE NUMBER: [phone]  IF A COMPLICATION OR EMERGENCY SITUATION ARISES AND YOU ARE UNABLE TO REACH   YOUR PHYSICIAN - GO DIRECTLY TO THE EMERGENCY ROOM.  Eliceo Holloway MD  10/8/2019 11:40:44 AM  This report has been verified and signed electronically.  [employer]

## 2019-10-08 NOTE — TRANSFER OF CARE
"Anesthesia Transfer of Care Note    Patient: Sherry Torres    Procedure(s) Performed: Procedure(s) (LRB):  COLONOSCOPY (N/A)    Patient location: GI    Anesthesia Type: general    Transport from OR: Transported from OR on room air with adequate spontaneous ventilation    Post pain: adequate analgesia    Post assessment: no apparent anesthetic complications and tolerated procedure well    Post vital signs: stable    Level of consciousness: awake and alert    Nausea/Vomiting: no nausea/vomiting    Complications: none    Transfer of care protocol was followed      Last vitals:   Visit Vitals  /83 (BP Location: Left arm, Patient Position: Lying)   Pulse 88   Temp 36.8 °C (98.2 °F) (Temporal)   Resp 16   Ht 5' 5" (1.651 m)   Wt 68 kg (150 lb)   SpO2 97%   Breastfeeding? No   BMI 24.96 kg/m²     "

## 2019-10-10 ENCOUNTER — OFFICE VISIT (OUTPATIENT)
Dept: OBSTETRICS AND GYNECOLOGY | Facility: CLINIC | Age: 64
End: 2019-10-10
Attending: OBSTETRICS & GYNECOLOGY
Payer: COMMERCIAL

## 2019-10-10 VITALS
SYSTOLIC BLOOD PRESSURE: 128 MMHG | DIASTOLIC BLOOD PRESSURE: 72 MMHG | HEIGHT: 65 IN | WEIGHT: 153.69 LBS | BODY MASS INDEX: 25.61 KG/M2

## 2019-10-10 DIAGNOSIS — Z00.00 WELLNESS EXAMINATION: ICD-10-CM

## 2019-10-10 DIAGNOSIS — Z13.21 ENCOUNTER FOR VITAMIN DEFICIENCY SCREENING: ICD-10-CM

## 2019-10-10 DIAGNOSIS — Z79.811 USE OF AROMATASE INHIBITORS: ICD-10-CM

## 2019-10-10 DIAGNOSIS — Z13.1 SCREENING FOR DIABETES MELLITUS: ICD-10-CM

## 2019-10-10 DIAGNOSIS — Z01.419 ENCOUNTER FOR GYNECOLOGICAL EXAMINATION WITHOUT ABNORMAL FINDING: ICD-10-CM

## 2019-10-10 DIAGNOSIS — Z13.220 SCREENING FOR CHOLESTEROL LEVEL: Primary | ICD-10-CM

## 2019-10-10 PROCEDURE — 99397 PER PM REEVAL EST PAT 65+ YR: CPT | Mod: S$GLB,,, | Performed by: OBSTETRICS & GYNECOLOGY

## 2019-10-10 PROCEDURE — 99397 PR PREVENTIVE VISIT,EST,65 & OVER: ICD-10-PCS | Mod: S$GLB,,, | Performed by: OBSTETRICS & GYNECOLOGY

## 2019-10-10 NOTE — PROGRESS NOTES
SUBJECTIVE:   63 y.o. female   for annual routine checkup. No LMP recorded. Patient is postmenopausal..  She has a history of breast cancer and is currently taking Letrozole. She reports minimal side effects. .    She has not had genetic testing  She has not had her cholesterol checked in years    Past Medical History:   Diagnosis Date    Atypical ductal hyperplasia, breast     left side    Breast cancer 2016    right side    Breast cyst approx. 40 years ago    Cancer     GERD (gastroesophageal reflux disease)     History of thyroid cancer     Lobular carcinoma in situ not certain of 2016 diagnosis    Mitral valve prolapse     Psychiatric problem      Past Surgical History:   Procedure Laterality Date    BREAST BIOPSY Right 2016    BREAST BIOPSY Left     exc bx    BREAST CYST EXCISION Right approx. 40 years ago    BREAST LUMPECTOMY Right     w/radiation     SECTION      COLONOSCOPY N/A 10/8/2019    Procedure: COLONOSCOPY;  Surgeon: Eliceo Holloway MD;  Location: [address]);  Service: Endoscopy;  Laterality: N/A;  Okay for Freeman or Holloway. However, she needs it done before end , so if they are not available before then, okay for any provided.    CYST REMOVED FROM RIGHT BREAST IN       HERNIA REPAIR      left breast wire localization excisional biopsy with bracketed wires using 3 wires and excision through 1 incision.       thyroid removed      THYROID SURGERY      TRANSFORAMINAL EPIDURAL INJECTION OF STEROID Right 2019    Procedure: Injection,steroid,epidural,transforaminal approach LUMBAR TRANSFORAMINAL RIGHT L5 AND S1 TF ARNOLDO;  Surgeon: Nadya Mcfarland MD;  Location: [facility];  Service: Pain Management;  Laterality: Right;  NEEDS CONSENT    TUBAL LIGATION       Social History     Socioeconomic History    Marital status:      Spouse name: Not on file    Number of children: 2    Years of education: Not on file    Highest  education level: Not on file   Occupational History    Not on file   Social Needs    Financial resource strain: Not on file    Food insecurity:     Worry: Not on file     Inability: Not on file    Transportation needs:     Medical: Not on file     Non-medical: Not on file   Tobacco Use    Smoking status: Former Smoker    Smokeless tobacco: Never Used    Tobacco comment: socally a few years in college. none since 30 years ago   Substance and Sexual Activity    Alcohol use: Yes     Comment: few drinks on the weekend    Drug use: No    Sexual activity: Yes     Partners: Male     Birth control/protection: Post-menopausal   Lifestyle    Physical activity:     Days per week: Not on file     Minutes per session: Not on file    Stress: Not on file   Relationships    Social connections:     Talks on phone: Not on file     Gets together: Not on file     Attends Evangelical service: Not on file     Active member of club or organization: Not on file     Attends meetings of clubs or organizations: Not on file     Relationship status: Not on file   Other Topics Concern    Patient feels they ought to cut down on drinking/drug use No    Patient annoyed by others criticizing their drinking/drug use No    Patient has felt bad or guilty about drinking/drug use No    Patient has had a drink/used drugs as an eye opener in the AM No   Social History Narrative    ,  x 40 years, 2 children, 2 grandchildren, [employer]     Family History   Problem Relation Age of Onset    Osteoporosis Mother     Stroke Mother     Dementia Father     Breast cancer Maternal Grandmother 88    Breast cancer Other         70-80's    Colon cancer Neg Hx     Colon polyps Neg Hx     Rectal cancer Neg Hx     Stomach cancer Neg Hx     Esophageal cancer Neg Hx     Liver cancer Neg Hx     Liver disease Neg Hx     Cirrhosis Neg Hx     Inflammatory bowel disease Neg Hx     Celiac disease Neg Hx     Crohn's disease Neg Hx      Ulcerative colitis Neg Hx     Ovarian cancer Neg Hx     Cancer Neg Hx      OB History    Para Term  AB Living   2 2 0         SAB TAB Ectopic Multiple Live Births                  # Outcome Date GA Lbr Tin/2nd Weight Sex Delivery Anes PTL Lv   2 Para      CS-LTranv      1 Para      CS-LTranv              Current Outpatient Medications   Medication Sig Dispense Refill    albuterol (PROVENTIL/VENTOLIN HFA) 90 mcg/actuation inhaler Inhale 2 puffs into the lungs every 6 (six) hours as needed for Wheezing or Shortness of Breath. Rescue 1 Inhaler 0    calcium-vitamin D3 500 MG, 1,250MG, 200 UNITS (CALCIUM-VITAMIN D) 500 mg(1,250mg) -200 unit per tablet Take 4 tablets by mouth 2 (two) times daily with meals.       diclofenac sodium (VOLTAREN) 1 % Gel Apply 2 g topically 3 (three) times daily. 5 Tube 2    fish oil-omega-3 fatty acids 300-1,000 mg capsule Take 2 g by mouth once daily.      FLUZONE QUAD 3014-2479, PF, 60 mcg (15 mcg x 4)/0.5 mL Syrg Inject 60 mcg as directed.  0    letrozole (FEMARA) 2.5 mg Tab Take 1 tablet (2.5 mg total) by mouth once daily. 90 tablet 3    MAGNESIUM CARBONATE ORAL Take by mouth.      multivitamin capsule Take 2 capsules by mouth once daily.       omeprazole (PRILOSEC) 40 MG capsule Take 1 capsule (40 mg total) by mouth every morning. 30 capsule 11    potassium chloride SA (K-DUR,KLOR-CON) 10 MEQ tablet Take 20 mEq by mouth once daily.      SYNTHROID 125 mcg tablet Take 1 tablet daily 6 days a week and 1/2 tablet one day a week. 30 tablet 5    triamcinolone acetonide 0.1% (KENALOG) 0.1 % cream Apply topically 2 (two) times daily. for 10 days 15 g 0     No current facility-administered medications for this visit.      Allergies: Codeine and Sulfa (sulfonamide antibiotics)     The ASCVD Risk score ([place] WREN Jr., et al., 2013) failed to calculate for the following reasons:    Cannot find a previous HDL lab    Cannot find a previous total cholesterol  lab      ROS:  Constitutional: no weight loss, weight gain, fever, fatigue  Eyes:  No vision changes, glasses/contacts  ENT/Mouth: No ulcers, sinus problems, ears ringing, headache  Cardiovascular: No inability to lie flat, chest pain, exercise intolerance, swelling, heart palpitations  Respiratory: No wheezing, coughing blood, shortness of breath, or cough  Gastrointestinal: No diarrhea, bloody stool, nausea/vomiting, constipation, gas, hemorrhoids  Genitourinary: No blood in urine, painful urination, urgency of urination, frequency of urination, incomplete emptying, incontinence, abnormal bleeding, painful periods, heavy periods, vaginal discharge, vaginal odor, painful intercourse, sexual problems, bleeding after intercourse.  Musculoskeletal: No muscle weakness  Skin/Breast: + breast cancer  Neurological: No passing out, seizures, numbness, headache  Endocrine: No diabetes, +hypothyroid, hyperthyroid, hot flashes, hair loss, abnormal hair growth, acne  Psychiatric: No depression, crying  Hematologic: No bruises, bleeding, swollen lymph nodes, anemia.      Physical Exam:   Constitutional: She is oriented to person, place, and time. She appears well-developed and well-nourished.      Neck: Normal range of motion. No tracheal deviation present. No thyromegaly present.    Cardiovascular: Exam reveals no edema.     Pulmonary/Chest: Effort normal. She exhibits no mass, no tenderness, no deformity and no retraction. Right breast exhibits no inverted nipple, no mass, no nipple discharge, no skin change, no tenderness, presence, no bleeding and no swelling. Left breast exhibits no inverted nipple, no mass, no nipple discharge, no skin change, no tenderness, presence, no bleeding and no swelling. Breasts are symmetrical.        Abdominal: Soft. She exhibits no distension and no mass. There is no tenderness. There is no rebound and no guarding. No hernia. Hernia confirmed negative in the left inguinal area.      Genitourinary: Vagina normal and uterus normal. Rectal exam shows no external hemorrhoid. There is no rash, tenderness or lesion on the right labia. There is no rash, tenderness or lesion on the left labia. Uterus is not deviated. Cervix is normal. No no adexnal prolapse. Right adnexum displays no mass, no tenderness and no fullness. Left adnexum displays no mass, no tenderness and no fullness. No tenderness, bleeding, rectocele, cystocele or unspecified prolapse of vaginal walls in the vagina. No vaginal discharge found. Cervix exhibits no motion tenderness, no discharge and no friability.           Musculoskeletal: Normal range of motion and moves all extremeties. She exhibits no edema.      Lymphadenopathy:        Right: No inguinal adenopathy present.        Left: No inguinal adenopathy present.    Neurological: She is alert and oriented to person, place, and time.    Skin: No rash noted. No erythema. No pallor.    Psychiatric: She has a normal mood and affect. Her behavior is normal. Judgment and thought content normal.     Right lumpectomy scar    ASSESSMENT:   well woman  History of breast cancer  Use of aromatase inhibitor  PLAN:   return annually or prn  Counseled patient that-recommendations have changed for genetic testing now it is recommended that all women with breast cancer be offered genetic testing.  We will schedule her in genetic testing clinic  Lipid profile and vitamin-D level.  Counseled her that if her cholesterol is elevated will recommend that she see a primary care physician

## 2019-10-15 ENCOUNTER — TELEPHONE (OUTPATIENT)
Dept: ENDOSCOPY | Facility: HOSPITAL | Age: 64
End: 2019-10-15

## 2019-10-15 ENCOUNTER — LAB VISIT (OUTPATIENT)
Dept: LAB | Facility: OTHER | Age: 64
End: 2019-10-15
Attending: OBSTETRICS & GYNECOLOGY
Payer: COMMERCIAL

## 2019-10-15 ENCOUNTER — CLINICAL SUPPORT (OUTPATIENT)
Dept: GYNECOLOGIC ONCOLOGY | Facility: CLINIC | Age: 64
End: 2019-10-15
Payer: COMMERCIAL

## 2019-10-15 DIAGNOSIS — C50.411 BREAST CANCER OF UPPER-OUTER QUADRANT OF RIGHT FEMALE BREAST: Primary | ICD-10-CM

## 2019-10-15 DIAGNOSIS — C50.411 BREAST CANCER OF UPPER-OUTER QUADRANT OF RIGHT FEMALE BREAST: ICD-10-CM

## 2019-10-15 PROCEDURE — 99999 PR PBB SHADOW E&M-EST. PATIENT-LVL I: CPT | Mod: PBBFAC,,,

## 2019-10-15 PROCEDURE — 36415 COLL VENOUS BLD VENIPUNCTURE: CPT

## 2019-10-15 PROCEDURE — 99999 PR PBB SHADOW E&M-EST. PATIENT-LVL I: ICD-10-PCS | Mod: PBBFAC,,,

## 2019-11-06 ENCOUNTER — LAB VISIT (OUTPATIENT)
Dept: LAB | Facility: HOSPITAL | Age: 64
End: 2019-11-06
Payer: COMMERCIAL

## 2019-11-06 DIAGNOSIS — Z13.21 ENCOUNTER FOR VITAMIN DEFICIENCY SCREENING: ICD-10-CM

## 2019-11-06 DIAGNOSIS — Z13.220 SCREENING FOR CHOLESTEROL LEVEL: ICD-10-CM

## 2019-11-06 DIAGNOSIS — E89.0 POST-SURGICAL HYPOTHYROIDISM: ICD-10-CM

## 2019-11-06 DIAGNOSIS — Z13.1 SCREENING FOR DIABETES MELLITUS: ICD-10-CM

## 2019-11-06 LAB
25(OH)D3+25(OH)D2 SERPL-MCNC: 21 NG/ML (ref 30–96)
CHOLEST SERPL-MCNC: 286 MG/DL (ref 120–199)
CHOLEST/HDLC SERPL: 4.1 {RATIO} (ref 2–5)
ESTIMATED AVG GLUCOSE: 114 MG/DL (ref 68–131)
HBA1C MFR BLD HPLC: 5.6 % (ref 4–5.6)
HDLC SERPL-MCNC: 69 MG/DL (ref 40–75)
HDLC SERPL: 24.1 % (ref 20–50)
LDLC SERPL CALC-MCNC: 174.4 MG/DL (ref 63–159)
NONHDLC SERPL-MCNC: 217 MG/DL
T4 FREE SERPL-MCNC: 1.55 NG/DL (ref 0.71–1.51)
TRIGL SERPL-MCNC: 213 MG/DL (ref 30–150)
TSH SERPL DL<=0.005 MIU/L-ACNC: 0.04 UIU/ML (ref 0.4–4)

## 2019-11-06 PROCEDURE — 84439 ASSAY OF FREE THYROXINE: CPT

## 2019-11-06 PROCEDURE — 36415 COLL VENOUS BLD VENIPUNCTURE: CPT

## 2019-11-06 PROCEDURE — 82306 VITAMIN D 25 HYDROXY: CPT

## 2019-11-06 PROCEDURE — 80061 LIPID PANEL: CPT

## 2019-11-06 PROCEDURE — 83036 HEMOGLOBIN GLYCOSYLATED A1C: CPT

## 2019-11-06 PROCEDURE — 84443 ASSAY THYROID STIM HORMONE: CPT

## 2019-11-07 ENCOUNTER — PATIENT MESSAGE (OUTPATIENT)
Dept: ENDOCRINOLOGY | Facility: CLINIC | Age: 64
End: 2019-11-07

## 2019-11-07 ENCOUNTER — TELEPHONE (OUTPATIENT)
Dept: OBSTETRICS AND GYNECOLOGY | Facility: CLINIC | Age: 64
End: 2019-11-07

## 2019-11-07 ENCOUNTER — PATIENT MESSAGE (OUTPATIENT)
Dept: OBSTETRICS AND GYNECOLOGY | Facility: CLINIC | Age: 64
End: 2019-11-07

## 2019-11-07 DIAGNOSIS — E89.0 POST-SURGICAL HYPOTHYROIDISM: Primary | ICD-10-CM

## 2019-11-07 DIAGNOSIS — Z13.220 SCREENING FOR CHOLESTEROL LEVEL: Primary | ICD-10-CM

## 2019-11-07 RX ORDER — LEVOTHYROXINE SODIUM 88 UG/1
88 TABLET ORAL DAILY
Qty: 30 TABLET | Refills: 11 | Status: SHIPPED | OUTPATIENT
Start: 2019-11-07 | End: 2020-02-26 | Stop reason: SDUPTHER

## 2019-11-07 NOTE — TELEPHONE ENCOUNTER
TSH 0.045 Free T4 1.55 on Synthroid 125mcg tablet 6 days a week.   Decrease to Synthroid 88mcg daily and repeat labs in 4 weeks.

## 2019-11-07 NOTE — TELEPHONE ENCOUNTER
Patient notes eating prior to her Lipid screening. Repeat lipid orders entered. Patient aware of fasting instructions prior to blood draw 12/05/2019. ALICIA Estrada.

## 2019-11-20 ENCOUNTER — TELEPHONE (OUTPATIENT)
Dept: GYNECOLOGIC ONCOLOGY | Facility: CLINIC | Age: 64
End: 2019-11-20

## 2019-11-20 NOTE — TELEPHONE ENCOUNTER
Informed pt no clinically significant genetic mutations identified and an increased. Discussed variant of uncertain significance with pt and informed her [facility]'s number can be found on final result page to speak to a genetic counselor for additional questions. Pt requested results be mailed; address confirmed. Results mailed.  Referring provider, Cecille notified of results.

## 2019-12-05 ENCOUNTER — PATIENT MESSAGE (OUTPATIENT)
Dept: ENDOCRINOLOGY | Facility: CLINIC | Age: 64
End: 2019-12-05

## 2019-12-05 ENCOUNTER — LAB VISIT (OUTPATIENT)
Dept: LAB | Facility: HOSPITAL | Age: 64
End: 2019-12-05
Payer: COMMERCIAL

## 2019-12-05 DIAGNOSIS — Z13.220 SCREENING FOR CHOLESTEROL LEVEL: ICD-10-CM

## 2019-12-05 DIAGNOSIS — E89.0 POST-SURGICAL HYPOTHYROIDISM: ICD-10-CM

## 2019-12-05 DIAGNOSIS — E89.0 POST-SURGICAL HYPOTHYROIDISM: Primary | ICD-10-CM

## 2019-12-05 LAB
CHOLEST SERPL-MCNC: 282 MG/DL (ref 120–199)
CHOLEST/HDLC SERPL: 4.5 {RATIO} (ref 2–5)
HDLC SERPL-MCNC: 63 MG/DL (ref 40–75)
HDLC SERPL: 22.3 % (ref 20–50)
LDLC SERPL CALC-MCNC: 196.6 MG/DL (ref 63–159)
NONHDLC SERPL-MCNC: 219 MG/DL
T4 FREE SERPL-MCNC: 1.37 NG/DL (ref 0.71–1.51)
TRIGL SERPL-MCNC: 112 MG/DL (ref 30–150)
TSH SERPL DL<=0.005 MIU/L-ACNC: 0.21 UIU/ML (ref 0.4–4)

## 2019-12-05 PROCEDURE — 36415 COLL VENOUS BLD VENIPUNCTURE: CPT

## 2019-12-05 PROCEDURE — 84439 ASSAY OF FREE THYROXINE: CPT

## 2019-12-05 PROCEDURE — 80061 LIPID PANEL: CPT

## 2019-12-05 PROCEDURE — 84443 ASSAY THYROID STIM HORMONE: CPT

## 2019-12-05 NOTE — TELEPHONE ENCOUNTER
Continue LT4 88mcg repeat labs in 4 weeks.  Free T4 in range with low TSH. Will give it more time to see if TSH normalizes.

## 2019-12-17 ENCOUNTER — TELEPHONE (OUTPATIENT)
Dept: PRIMARY CARE CLINIC | Facility: CLINIC | Age: 64
End: 2019-12-17

## 2019-12-17 NOTE — TELEPHONE ENCOUNTER
----- Message from Lidia Muhammad sent at 12/17/2019 10:21 AM CST -----  Contact: NICK KABA   Name of Who is Calling: NICK KABA       What is the request in detail:New patient waseferred by her gyn to scheduled a appointment she had fasting blood work done and it showed elevated cholesterol      Can the clinic reply by [facility]: no      What Number to Call Back if not in [facility]: [phone]

## 2020-01-09 ENCOUNTER — PATIENT MESSAGE (OUTPATIENT)
Dept: OBSTETRICS AND GYNECOLOGY | Facility: CLINIC | Age: 65
End: 2020-01-09

## 2020-01-09 ENCOUNTER — PATIENT MESSAGE (OUTPATIENT)
Dept: ENDOCRINOLOGY | Facility: CLINIC | Age: 65
End: 2020-01-09

## 2020-01-09 LAB — INTEGRATED BRAC ANALYSIS: NORMAL

## 2020-01-10 NOTE — TELEPHONE ENCOUNTER
"Spoke with pt and informed her that Dr. Verduzco would like for her to know " Unfortunately I am not taking new pts right now but I can strongly recommend Dr Mcgill or Dr Landon to see this patient."  Scheduled pt a appt on 2/10/2020 @ 0930am with Dr. Mcgill. Pt verbalized understanding   "

## 2020-01-10 NOTE — TELEPHONE ENCOUNTER
Unfortunately I am not taking new pts right now but I can strongly recommend Dr Mcgill or Dr Landon to see this patient.

## 2020-02-05 ENCOUNTER — LAB VISIT (OUTPATIENT)
Dept: LAB | Facility: HOSPITAL | Age: 65
End: 2020-02-05
Payer: COMMERCIAL

## 2020-02-05 DIAGNOSIS — E89.0 POST-SURGICAL HYPOTHYROIDISM: ICD-10-CM

## 2020-02-05 LAB
T4 FREE SERPL-MCNC: 1.31 NG/DL (ref 0.71–1.51)
TSH SERPL DL<=0.005 MIU/L-ACNC: 0.34 UIU/ML (ref 0.4–4)

## 2020-02-05 PROCEDURE — 84439 ASSAY OF FREE THYROXINE: CPT

## 2020-02-05 PROCEDURE — 36415 COLL VENOUS BLD VENIPUNCTURE: CPT

## 2020-02-05 PROCEDURE — 84443 ASSAY THYROID STIM HORMONE: CPT

## 2020-02-10 ENCOUNTER — OFFICE VISIT (OUTPATIENT)
Dept: INTERNAL MEDICINE | Facility: CLINIC | Age: 65
End: 2020-02-10
Payer: COMMERCIAL

## 2020-02-10 VITALS
SYSTOLIC BLOOD PRESSURE: 124 MMHG | HEIGHT: 65 IN | WEIGHT: 155.88 LBS | OXYGEN SATURATION: 99 % | DIASTOLIC BLOOD PRESSURE: 82 MMHG | BODY MASS INDEX: 25.97 KG/M2 | HEART RATE: 81 BPM

## 2020-02-10 DIAGNOSIS — C50.411 MALIGNANT NEOPLASM OF UPPER-OUTER QUADRANT OF RIGHT BREAST IN FEMALE, ESTROGEN RECEPTOR POSITIVE: ICD-10-CM

## 2020-02-10 DIAGNOSIS — Z76.89 ENCOUNTER TO ESTABLISH CARE WITH NEW DOCTOR: Primary | ICD-10-CM

## 2020-02-10 DIAGNOSIS — E78.5 HYPERLIPIDEMIA, UNSPECIFIED HYPERLIPIDEMIA TYPE: ICD-10-CM

## 2020-02-10 DIAGNOSIS — L70.9 ACNE, UNSPECIFIED ACNE TYPE: ICD-10-CM

## 2020-02-10 DIAGNOSIS — Z17.0 MALIGNANT NEOPLASM OF UPPER-OUTER QUADRANT OF RIGHT BREAST IN FEMALE, ESTROGEN RECEPTOR POSITIVE: ICD-10-CM

## 2020-02-10 DIAGNOSIS — K21.9 GASTROESOPHAGEAL REFLUX DISEASE, ESOPHAGITIS PRESENCE NOT SPECIFIED: ICD-10-CM

## 2020-02-10 DIAGNOSIS — Z85.850 HISTORY OF THYROID CANCER: ICD-10-CM

## 2020-02-10 DIAGNOSIS — E89.0 POST-SURGICAL HYPOTHYROIDISM: ICD-10-CM

## 2020-02-10 DIAGNOSIS — Z11.59 ENCOUNTER FOR HEPATITIS C SCREENING TEST FOR LOW RISK PATIENT: ICD-10-CM

## 2020-02-10 PROBLEM — Z78.9 IMPAIRED MOTOR CONTROL: Status: RESOLVED | Noted: 2018-01-02 | Resolved: 2020-02-10

## 2020-02-10 PROBLEM — R51.9 NEW ONSET OF HEADACHES: Status: RESOLVED | Noted: 2018-01-02 | Resolved: 2020-02-10

## 2020-02-10 PROBLEM — R55 SYNCOPE AND COLLAPSE: Status: RESOLVED | Noted: 2017-12-28 | Resolved: 2020-02-10

## 2020-02-10 PROCEDURE — 1159F MED LIST DOCD IN RCRD: CPT | Mod: S$GLB,,, | Performed by: FAMILY MEDICINE

## 2020-02-10 PROCEDURE — 1159F PR MEDICATION LIST DOCUMENTED IN MEDICAL RECORD: ICD-10-PCS | Mod: S$GLB,,, | Performed by: FAMILY MEDICINE

## 2020-02-10 PROCEDURE — 99999 PR PBB SHADOW E&M-EST. PATIENT-LVL III: ICD-10-PCS | Mod: PBBFAC,,, | Performed by: FAMILY MEDICINE

## 2020-02-10 PROCEDURE — 1101F PT FALLS ASSESS-DOCD LE1/YR: CPT | Mod: CPTII,S$GLB,, | Performed by: FAMILY MEDICINE

## 2020-02-10 PROCEDURE — 99204 OFFICE O/P NEW MOD 45 MIN: CPT | Mod: S$GLB,,, | Performed by: FAMILY MEDICINE

## 2020-02-10 PROCEDURE — 1126F AMNT PAIN NOTED NONE PRSNT: CPT | Mod: S$GLB,,, | Performed by: FAMILY MEDICINE

## 2020-02-10 PROCEDURE — 1101F PR PT FALLS ASSESS DOC 0-1 FALLS W/OUT INJ PAST YR: ICD-10-PCS | Mod: CPTII,S$GLB,, | Performed by: FAMILY MEDICINE

## 2020-02-10 PROCEDURE — 1126F PR PAIN SEVERITY QUANTIFIED, NO PAIN PRESENT: ICD-10-PCS | Mod: S$GLB,,, | Performed by: FAMILY MEDICINE

## 2020-02-10 PROCEDURE — 99999 PR PBB SHADOW E&M-EST. PATIENT-LVL III: CPT | Mod: PBBFAC,,, | Performed by: FAMILY MEDICINE

## 2020-02-10 PROCEDURE — 99204 PR OFFICE/OUTPT VISIT, NEW, LEVL IV, 45-59 MIN: ICD-10-PCS | Mod: S$GLB,,, | Performed by: FAMILY MEDICINE

## 2020-02-10 RX ORDER — ATORVASTATIN CALCIUM 80 MG/1
80 TABLET, FILM COATED ORAL DAILY
Qty: 90 TABLET | Refills: 4 | Status: SHIPPED | OUTPATIENT
Start: 2020-02-10 | End: 2020-02-20 | Stop reason: SDUPTHER

## 2020-02-10 RX ORDER — TRETINOIN 0.25 MG/G
CREAM TOPICAL NIGHTLY
Qty: 45 G | Refills: 1 | Status: SHIPPED | OUTPATIENT
Start: 2020-02-10 | End: 2022-02-21

## 2020-02-14 ENCOUNTER — HOSPITAL ENCOUNTER (OUTPATIENT)
Dept: RADIOLOGY | Facility: CLINIC | Age: 65
Discharge: HOME | End: 2020-02-14
Attending: NURSE PRACTITIONER
Payer: COMMERCIAL

## 2020-02-14 DIAGNOSIS — E89.0 POST-SURGICAL HYPOTHYROIDISM: ICD-10-CM

## 2020-02-14 PROCEDURE — 77080 DEXA BONE DENSITY SPINE HIP: ICD-10-PCS | Mod: 26,,, | Performed by: INTERNAL MEDICINE

## 2020-02-14 PROCEDURE — 77080 DXA BONE DENSITY AXIAL: CPT | Mod: TC

## 2020-02-14 PROCEDURE — 77080 DXA BONE DENSITY AXIAL: CPT | Mod: 26,,, | Performed by: INTERNAL MEDICINE

## 2020-02-19 ENCOUNTER — PATIENT MESSAGE (OUTPATIENT)
Dept: GASTROENTEROLOGY | Facility: CLINIC | Age: 65
End: 2020-02-19

## 2020-02-19 ENCOUNTER — PATIENT MESSAGE (OUTPATIENT)
Dept: ENDOCRINOLOGY | Facility: CLINIC | Age: 65
End: 2020-02-19

## 2020-02-19 ENCOUNTER — PATIENT MESSAGE (OUTPATIENT)
Dept: INTERNAL MEDICINE | Facility: CLINIC | Age: 65
End: 2020-02-19

## 2020-02-19 ENCOUNTER — PATIENT MESSAGE (OUTPATIENT)
Dept: HEMATOLOGY/ONCOLOGY | Facility: CLINIC | Age: 65
End: 2020-02-19

## 2020-02-19 DIAGNOSIS — E78.5 HYPERLIPIDEMIA, UNSPECIFIED HYPERLIPIDEMIA TYPE: ICD-10-CM

## 2020-02-19 DIAGNOSIS — Z17.0 MALIGNANT NEOPLASM OF UPPER-OUTER QUADRANT OF RIGHT BREAST IN FEMALE, ESTROGEN RECEPTOR POSITIVE: ICD-10-CM

## 2020-02-19 DIAGNOSIS — C50.411 MALIGNANT NEOPLASM OF UPPER-OUTER QUADRANT OF RIGHT BREAST IN FEMALE, ESTROGEN RECEPTOR POSITIVE: ICD-10-CM

## 2020-02-19 RX ORDER — LETROZOLE 2.5 MG/1
2.5 TABLET, FILM COATED ORAL DAILY
Qty: 90 TABLET | Refills: 3 | Status: SHIPPED | OUTPATIENT
Start: 2020-02-19 | End: 2023-04-03

## 2020-02-20 RX ORDER — ATORVASTATIN CALCIUM 80 MG/1
80 TABLET, FILM COATED ORAL DAILY
Qty: 90 TABLET | Refills: 4 | Status: SHIPPED | OUTPATIENT
Start: 2020-02-20

## 2020-02-20 NOTE — PROGRESS NOTES
Pt arrived for tele-genetics appointment n 10/15/19. Pt watched [facility] Education Tool Video necessary to obtain informed consent for genetic testing. Pt agreed to proceed with genetic counseling via telephone. Patient spoke to [facility] genetic counselor, pre test counseling was performed and personal/family history obtained. Lab requisition, demographic information, insurance card and family pedigree/hisoty report all printed, packaged in test kit and sent to lab with patient. Pt expressed verbal understanding that our dept would reach out to her when results are received, in approximately 2-3 weeks.

## 2020-02-26 DIAGNOSIS — E89.0 POST-SURGICAL HYPOTHYROIDISM: ICD-10-CM

## 2020-02-27 RX ORDER — LEVOTHYROXINE SODIUM 88 UG/1
88 TABLET ORAL DAILY
Qty: 30 TABLET | Refills: 11 | Status: SHIPPED | OUTPATIENT
Start: 2020-02-27 | End: 2021-01-27 | Stop reason: SDUPTHER

## 2020-03-23 ENCOUNTER — PATIENT MESSAGE (OUTPATIENT)
Dept: HEMATOLOGY/ONCOLOGY | Facility: CLINIC | Age: 65
End: 2020-03-23

## 2020-03-24 ENCOUNTER — DOCUMENTATION ONLY (OUTPATIENT)
Dept: REHABILITATION | Facility: HOSPITAL | Age: 65
End: 2020-03-24

## 2020-03-24 NOTE — PROGRESS NOTES
Outpatient Therapy Discharge Summary     Name: Sherry Torres  Clinic Number: [id number]    Therapy Diagnosis: Right knee pain, unspecified chronicity  Physician: Inés Baron PA-C    Physician Orders: PT Eval and Treat   Medical Diagnosis from Referral: Osteoarthritis  Evaluation Date: 7/10/2019      Assessment    Goals:   Short Term Goals: 4 weeks   Pt independent in a HEP to address R LE function.  Improve R knee flexion to 120 degrees  Improve R SLS balance to fair +     Long Term Goals: 8 weeks   Pt to report R knee pain at </= 2/10 with ADL's  Pt with full pain free R knee ROM  Improve R LE MMT by 1 muscle grade  Pt to report improved functional abilities through an improved score on the FOTO knee survey    Discharge reason: Patient has not attended therapy since 8/2/2019    Plan   This patient is discharged from Physical Therapy      Andrade Murphy, PT

## 2020-03-26 ENCOUNTER — PATIENT MESSAGE (OUTPATIENT)
Dept: DERMATOLOGY | Facility: CLINIC | Age: 65
End: 2020-03-26

## 2020-03-26 NOTE — TELEPHONE ENCOUNTER
Spoke with patient and scheduled virtual visit as requested. Instructions for virtual visit were sent to patient.

## 2020-03-30 ENCOUNTER — OFFICE VISIT (OUTPATIENT)
Dept: DERMATOLOGY | Facility: CLINIC | Age: 65
End: 2020-03-30
Payer: COMMERCIAL

## 2020-03-30 ENCOUNTER — PATIENT MESSAGE (OUTPATIENT)
Dept: DERMATOLOGY | Facility: CLINIC | Age: 65
End: 2020-03-30

## 2020-03-30 DIAGNOSIS — L70.0 ACNE VULGARIS: Primary | ICD-10-CM

## 2020-03-30 PROCEDURE — 1159F PR MEDICATION LIST DOCUMENTED IN MEDICAL RECORD: ICD-10-PCS | Mod: S$GLB,,, | Performed by: DERMATOLOGY

## 2020-03-30 PROCEDURE — 99214 PR OFFICE/OUTPT VISIT, EST, LEVL IV, 30-39 MIN: ICD-10-PCS | Mod: 95,,, | Performed by: DERMATOLOGY

## 2020-03-30 PROCEDURE — 1101F PT FALLS ASSESS-DOCD LE1/YR: CPT | Mod: CPTII,S$GLB,, | Performed by: DERMATOLOGY

## 2020-03-30 PROCEDURE — 1159F MED LIST DOCD IN RCRD: CPT | Mod: S$GLB,,, | Performed by: DERMATOLOGY

## 2020-03-30 PROCEDURE — 99214 OFFICE O/P EST MOD 30 MIN: CPT | Mod: 95,,, | Performed by: DERMATOLOGY

## 2020-03-30 PROCEDURE — 1101F PR PT FALLS ASSESS DOC 0-1 FALLS W/OUT INJ PAST YR: ICD-10-PCS | Mod: CPTII,S$GLB,, | Performed by: DERMATOLOGY

## 2020-03-30 RX ORDER — DOXYCYCLINE 100 MG/1
TABLET ORAL
Qty: 30 TABLET | Refills: 1 | Status: SHIPPED | OUTPATIENT
Start: 2020-03-30 | End: 2020-05-25

## 2020-03-30 RX ORDER — CLINDAMYCIN PHOSPHATE 11.9 MG/ML
SOLUTION TOPICAL
Qty: 30 ML | Refills: 3 | Status: SHIPPED | OUTPATIENT
Start: 2020-03-30 | End: 2021-02-09 | Stop reason: SDUPTHER

## 2020-03-30 NOTE — PROGRESS NOTES
Subjective:       Patient ID:  Sherry Torres is a 64 y.o. female who presents for   Chief Complaint   Patient presents with    Acne     The patient location is: home  The chief complaint leading to consultation is: home  Visit type: virtual visit with synchronous audio and video  Total time spent with patient: 8 minutes  Each patient to whom I provide medical services by telemedicine is:  (1) informed of the relationship between the physician and patient and the respective role of any other health care provider with respect to management of the patient; and (2) notified that he or she may decline to receive medical services by telemedicine and may withdraw from such care at any time.      Acne  - Initial  Affected locations: face  Duration: 6 weeks  Signs / symptoms: redness, peeling, dryness, inflamed, irritated, itching and spreading  Severity: moderate to severe  Timing: constant  Aggravated by: stress  Relieving factors/Treatments tried: antihistamines, OTC acne medication and OTC moisturizer (Differin gel)  Improvement on treatment: mild      Review of Systems   Eyes: Negative for eye irritation.   Skin: Positive for itching and dry skin. Negative for rash.   Psychiatric/Behavioral: Positive for high stress.        Objective:    Physical Exam   Constitutional: She appears well-developed and well-nourished. No distress.   HENT:   Mouth/Throat: Lips normal.    Eyes: Lids are normal.  No conjunctival no injection.   Neurological: She is alert and oriented to person, place, and time. She is not disoriented.   Psychiatric: She has a normal mood and affect.   Skin:   Areas Examined (abnormalities noted in diagram):   Head / Face Inspection Performed  Neck Inspection Performed  Chest / Axilla Inspection Performed             Diagram Legend     Erythematous scaling macule/papule c/w actinic keratosis       Vascular papule c/w angioma      Pigmented verrucoid papule/plaque c/w seborrheic keratosis      Yellow  umbilicated papule c/w sebaceous hyperplasia      Irregularly shaped tan macule c/w lentigo     1-2 mm smooth white papules consistent with Milia      Movable subcutaneous cyst with punctum c/w epidermal inclusion cyst      Subcutaneous movable cyst c/w pilar cyst      Firm pink to brown papule c/w dermatofibroma      Pedunculated fleshy papule(s) c/w skin tag(s)      Evenly pigmented macule c/w junctional nevus     Mildly variegated pigmented, slightly irregular-bordered macule c/w mildly atypical nevus      Flesh colored to evenly pigmented papule c/w intradermal nevus       Pink pearly papule/plaque c/w basal cell carcinoma      Erythematous hyperkeratotic cursted plaque c/w SCC      Surgical scar with no sign of skin cancer recurrence      Open and closed comedones      Inflammatory papules and pustules      Verrucoid papule consistent consistent with wart     Erythematous eczematous patches and plaques     Dystrophic onycholytic nail with subungual debris c/w onychomycosis     Umbilicated papule    Erythematous-base heme-crusted tan verrucoid plaque consistent with inflamed seborrheic keratosis     Erythematous Silvery Scaling Plaque c/w Psoriasis     See annotation      Assessment / Plan:        Acne vulgaris  Discussed benefits and risks of doxycyline therapy including but not limited to GI discomfort, esophageal irritation/ulceration, and increased sun sensitivity. Patient was counseled to take medicine with meals and at least 1 hour before lying down.  -     doxycycline monohydrate 100 mg Tab; Take 1 po qday pc. Take with plenty of water.  Dispense: 30 tablet; Refill: 1  -     clindamycin (CLEOCIN T) 1 % external solution; Apply to affected areas of face BID prn acne.  Dispense: 30 mL; Refill: 3  Recommended a benzoyl peroxide (2-5%) wash, such as PanOxyl 4% Creamy Wash or Neutrogena Clear Pore Cleanser/Mask. Reminded patient that benzoyl peroxide can bleach towels, sheets, and clothing if not rinsed well  from the skin.  Continue a pea-sized amount of Differin gel to entire face qhs.      Follow up in about 3 months (around 6/30/2020) for follow up, or sooner if symptoms worsening or not improving.

## 2020-03-30 NOTE — PATIENT INSTRUCTIONS
Acne Treatment     Retinoids (e.g. adapalene, tretinoin, tazarotene) are vitamin A derivatives that are the mainstay of acne therapy. The skin often becomes dry, red, or irritated when first using them--this is a normal period of adjustment.   o Use only a pea-sized amount for the entire face to avoid excess irritation.   o If your skin is sensitive, begin by using the medication two nights per week or every other night for the first couple of months until your skin adjusts.   o Use as much oil-free moisturizer as needed to help your skin adjust to the retinoid.   There is no miracle, overnight cure for acne. It may take 6-8 weeks to start seeing some improvement, and you should continue to improve over the following months. It is important that you keep your follow up appointments so that any medication changes can be made if necessary.   Your acne may get worse before it gets better. This is normal! Just hang in there, incorporate the meds into your daily routine, and trust that the medication will work.    Do not scrub your face. Aggressive scrubbing can make acne worse.   Do not pick or squeeze your pimples, as this will cause scarring. Acne is temporary, but scars are permanent.   Antibiotics: Antibiotics are sometimes prescribed to decrease acne by reducing inflammation. They are not a good long-term solution; they have side effects as all meds do; and they should always be used along with the topical treatments that are recommended.   Waxing: Stop using the retinoid 1 week prior to any waxing, as skin is more likely to tear.   Diet: Avoid eating foods with a high glycemic load/index (high sugar, simple carbs) which can worsen acne. Also, avoid drinking a lot of skim or low fat milk, and avoid the whey protein found in most protein shakes and bars, as these can also worsen acne.   Makeup: Use only oil-free, non-comedogenic makeup. Brands to consider include Neutrogena, Tarte, Bare Minerals, Maricel  [place], Nita Amol, Clinique. (Avoid MAC.)   For female patients: Discontinue all oral and topical acne medications if you become pregnant or are planning to become pregnant. Notify our office, and we will direct you to medications that are safe to use during pregnancy.    Morning acne regimen:  ?  1. Wash face with gentle cleanser. See below for suggestions.  ?  2. Apply a thin film of clindamycin to individual breakouts, or to the entire face if needed.  ?  3. If skin feels dry, apply a fragrance-free moisturizer, such as CeraVe moisturizing cream.  ?  4. Apply a broad-spectrum sunscreen with SPF 30 or higher.    Evening acne regimen:  ?  1. Wash face with benzoyl peroxide cleanser. See below for suggestions.  ?  2. Apply a thin film of clindamycin to individual breakouts, or to the entire face if needed.  ?  3. Apply a pea-sized amount of Differin adapalene gel (retinoid) to the entire face.  ?  4. Apply a fragrance-free moisturizer, such as CeraVe moisturizing cream.    Cleanser options:  o Gentle cleansers: CeraVe foaming wash, CeraVe hydrating cleanser, Neutrogena Ultra Gentle cleanser, Cetaphil cleanser  o Benzoyl peroxide (2-5%): PanOxyl 4% Creamy Wash, Neutrogena Clear Pore Cleanser/Mask             *Note that benzoyl peroxide can bleach towels, sheets, and clothing if not rinsed well from the skin. May be best to keep it in the shower.*  o Salicylic acid (0.5-2%): CeraVe Renewing SA Cleanser, Neutrogena Oil-Free Acne Wash, Neutrogena Acne Proofing Gel Cleanser

## 2020-05-07 ENCOUNTER — PATIENT MESSAGE (OUTPATIENT)
Dept: HEMATOLOGY/ONCOLOGY | Facility: CLINIC | Age: 65
End: 2020-05-07

## 2020-05-07 ENCOUNTER — PATIENT MESSAGE (OUTPATIENT)
Dept: INTERNAL MEDICINE | Facility: CLINIC | Age: 65
End: 2020-05-07

## 2020-05-12 ENCOUNTER — PATIENT MESSAGE (OUTPATIENT)
Dept: ORTHOPEDICS | Facility: CLINIC | Age: 65
End: 2020-05-12

## 2020-05-12 DIAGNOSIS — C50.411 MALIGNANT NEOPLASM OF UPPER-OUTER QUADRANT OF RIGHT BREAST IN FEMALE, ESTROGEN RECEPTOR POSITIVE: Primary | ICD-10-CM

## 2020-05-12 DIAGNOSIS — Z17.0 MALIGNANT NEOPLASM OF UPPER-OUTER QUADRANT OF RIGHT BREAST IN FEMALE, ESTROGEN RECEPTOR POSITIVE: Primary | ICD-10-CM

## 2020-05-18 ENCOUNTER — OFFICE VISIT (OUTPATIENT)
Dept: ORTHOPEDICS | Facility: CLINIC | Age: 65
End: 2020-05-18
Payer: COMMERCIAL

## 2020-05-18 VITALS
BODY MASS INDEX: 25.26 KG/M2 | TEMPERATURE: 98 F | SYSTOLIC BLOOD PRESSURE: 131 MMHG | DIASTOLIC BLOOD PRESSURE: 92 MMHG | WEIGHT: 151.81 LBS | HEART RATE: 75 BPM

## 2020-05-18 DIAGNOSIS — M17.11 PRIMARY OSTEOARTHRITIS OF RIGHT KNEE: Primary | ICD-10-CM

## 2020-05-18 PROCEDURE — 99213 OFFICE O/P EST LOW 20 MIN: CPT | Mod: 25,S$GLB,, | Performed by: PHYSICIAN ASSISTANT

## 2020-05-18 PROCEDURE — 1159F PR MEDICATION LIST DOCUMENTED IN MEDICAL RECORD: ICD-10-PCS | Mod: S$GLB,,, | Performed by: PHYSICIAN ASSISTANT

## 2020-05-18 PROCEDURE — 20610 DRAIN/INJ JOINT/BURSA W/O US: CPT | Mod: RT,S$GLB,, | Performed by: PHYSICIAN ASSISTANT

## 2020-05-18 PROCEDURE — 1101F PR PT FALLS ASSESS DOC 0-1 FALLS W/OUT INJ PAST YR: ICD-10-PCS | Mod: CPTII,S$GLB,, | Performed by: PHYSICIAN ASSISTANT

## 2020-05-18 PROCEDURE — 20610 PR DRAIN/INJECT LARGE JOINT/BURSA: ICD-10-PCS | Mod: RT,S$GLB,, | Performed by: PHYSICIAN ASSISTANT

## 2020-05-18 PROCEDURE — 99999 PR PBB SHADOW E&M-EST. PATIENT-LVL IV: ICD-10-PCS | Mod: PBBFAC,,, | Performed by: PHYSICIAN ASSISTANT

## 2020-05-18 PROCEDURE — 99213 PR OFFICE/OUTPT VISIT, EST, LEVL III, 20-29 MIN: ICD-10-PCS | Mod: 25,S$GLB,, | Performed by: PHYSICIAN ASSISTANT

## 2020-05-18 PROCEDURE — 1125F PR PAIN SEVERITY QUANTIFIED, PAIN PRESENT: ICD-10-PCS | Mod: S$GLB,,, | Performed by: PHYSICIAN ASSISTANT

## 2020-05-18 PROCEDURE — 1101F PT FALLS ASSESS-DOCD LE1/YR: CPT | Mod: CPTII,S$GLB,, | Performed by: PHYSICIAN ASSISTANT

## 2020-05-18 PROCEDURE — 1125F AMNT PAIN NOTED PAIN PRSNT: CPT | Mod: S$GLB,,, | Performed by: PHYSICIAN ASSISTANT

## 2020-05-18 PROCEDURE — 99999 PR PBB SHADOW E&M-EST. PATIENT-LVL IV: CPT | Mod: PBBFAC,,, | Performed by: PHYSICIAN ASSISTANT

## 2020-05-18 PROCEDURE — 1159F MED LIST DOCD IN RCRD: CPT | Mod: S$GLB,,, | Performed by: PHYSICIAN ASSISTANT

## 2020-05-18 RX ORDER — ZINC GLUCONATE 50 MG
50 TABLET ORAL DAILY
COMMUNITY

## 2020-05-18 RX ORDER — TRIAMCINOLONE ACETONIDE 40 MG/ML
40 INJECTION, SUSPENSION INTRA-ARTICULAR; INTRAMUSCULAR
Status: COMPLETED | OUTPATIENT
Start: 2020-05-18 | End: 2020-05-18

## 2020-05-18 RX ADMIN — TRIAMCINOLONE ACETONIDE 40 MG: 40 INJECTION, SUSPENSION INTRA-ARTICULAR; INTRAMUSCULAR at 03:05

## 2020-05-18 NOTE — PROGRESS NOTES
Subjective:      Patient ID: Sherry Torres is a 64 y.o. female.    Chief Complaint: Pain of the Right Knee    HPI  64 year old female presents with chief complaint of right knee pain x 6 weeks. She denies trauma. She has h/o mild OA. She had cortisone injection last July that gave her good relief. She also did PT which helped. Her strength is better. She continues HEP. She uses voltaren gel with mild relief. She reports swelling. She cannot take po nsaids because it upsets her stomach.  Review of Systems   Constitution: Negative for chills, fever and night sweats.   Cardiovascular: Negative for chest pain.   Respiratory: Negative for cough and shortness of breath.    Hematologic/Lymphatic: Does not bruise/bleed easily.   Skin: Negative for color change.   Gastrointestinal: Negative for heartburn.   Genitourinary: Negative for dysuria.   Neurological: Negative for numbness and paresthesias.   Psychiatric/Behavioral: Negative for altered mental status.   Allergic/Immunologic: Negative for persistent infections.         Objective:            General    Vitals reviewed.  Constitutional: She is oriented to person, place, and time. She appears well-developed and well-nourished.   Cardiovascular: Normal rate.    Neurological: She is alert and oriented to person, place, and time.             Right Knee Exam:  ROM 0-120 degrees  +crepitus  No effusion  TTP medial joint line      X-ray: reviewed by myself. Mild medial tibiofemoral height loss similar to the prior exam.  Lateral tibiofemoral joint appears better preserved.  Patellofemoral joint shows some osteophytosis of the medial patellofemoral facet.  No joint effusion.  No fracture.  Soft tissues appear unremarkable.        Assessment:       Encounter Diagnosis   Name Primary?    Primary osteoarthritis of right knee Yes          Plan:       Patient would like to repeat cortisone injection. RTC prn.     PROCEDURE:  I have explained the risks, benefits, and  alternatives of the procedure in detail.  The patient voices understanding and all questions have been answered.  The patient agrees to proceed as planned. So after I performed a sterile prep of the skin in the normal fashion the right knee is injected using a 22 gauge needle from the anteromedial approach with a combination of 4cc 1% plain lidocaine and 40 mg of kenalog.  The patient is cautioned and immediate relief of pain is secondary to the local anesthetic and will be temporary.  After the anesthetic wears off there may be a increase in pain that may last for a few hours or a few days and they should use ice to help alleviate this flair up of pain.

## 2020-05-20 ENCOUNTER — HOSPITAL ENCOUNTER (OUTPATIENT)
Dept: RADIOLOGY | Facility: HOSPITAL | Age: 65
Discharge: HOME | End: 2020-05-20
Attending: INTERNAL MEDICINE
Payer: MEDICARE

## 2020-05-20 ENCOUNTER — TELEPHONE (OUTPATIENT)
Dept: RADIOLOGY | Facility: HOSPITAL | Age: 65
End: 2020-05-20

## 2020-05-20 ENCOUNTER — HOSPITAL ENCOUNTER (OUTPATIENT)
Dept: RADIOLOGY | Facility: HOSPITAL | Age: 65
Discharge: HOME | End: 2020-05-20
Attending: INTERNAL MEDICINE
Payer: COMMERCIAL

## 2020-05-20 DIAGNOSIS — C50.411 MALIGNANT NEOPLASM OF UPPER-OUTER QUADRANT OF RIGHT BREAST IN FEMALE, ESTROGEN RECEPTOR POSITIVE: ICD-10-CM

## 2020-05-20 DIAGNOSIS — Z17.0 MALIGNANT NEOPLASM OF UPPER-OUTER QUADRANT OF RIGHT BREAST IN FEMALE, ESTROGEN RECEPTOR POSITIVE: ICD-10-CM

## 2020-05-20 PROCEDURE — 76642 ULTRASOUND BREAST LIMITED: CPT | Mod: 26,59,LT, | Performed by: RADIOLOGY

## 2020-05-20 PROCEDURE — 77062 BREAST TOMOSYNTHESIS BI: CPT | Mod: 26,,, | Performed by: RADIOLOGY

## 2020-05-20 PROCEDURE — 77066 DX MAMMO INCL CAD BI: CPT | Mod: 26,,, | Performed by: RADIOLOGY

## 2020-05-20 PROCEDURE — 77062 MAMMO DIGITAL DIAGNOSTIC BILAT WITH TOMOSYNTHESIS_CAD: ICD-10-PCS | Mod: 26,,, | Performed by: RADIOLOGY

## 2020-05-20 PROCEDURE — 77066 MAMMO DIGITAL DIAGNOSTIC BILAT WITH TOMOSYNTHESIS_CAD: ICD-10-PCS | Mod: 26,,, | Performed by: RADIOLOGY

## 2020-05-20 PROCEDURE — 77066 DX MAMMO INCL CAD BI: CPT | Mod: TC,PO

## 2020-05-20 PROCEDURE — 76642 US BREAST LEFT LIMITED: ICD-10-PCS | Mod: 26,59,LT, | Performed by: RADIOLOGY

## 2020-05-20 PROCEDURE — 76642 ULTRASOUND BREAST LIMITED: CPT | Mod: TC,PO,LT

## 2020-05-20 NOTE — TELEPHONE ENCOUNTER
Spoke with patient. Reviewed breast biopsy procedure and reviewed instructions for breast biopsy. Patient expressed understanding and all questions were answered. Provided patient with my phone number to call for any further concerns or questions.   Patient scheduled breast biopsy at the [facility] on 5/25/2020.

## 2020-05-22 ENCOUNTER — TELEPHONE (OUTPATIENT)
Dept: HEMATOLOGY/ONCOLOGY | Facility: CLINIC | Age: 65
End: 2020-05-22

## 2020-05-22 NOTE — TELEPHONE ENCOUNTER
Called pt to confirm appointment with Dr. Mckee on 5/25 at 8:30 am. Pt did not answer, left a detailedmessage

## 2020-05-22 NOTE — PROGRESS NOTES
Subjective:       Patient ID: Sherry Torres is a 64 y.o. female.    Chief Complaint: No chief complaint on file.    HPI Ms Torres is a 63-year-old female seen in follow-up  of right breast cancer.   She had stage II A, strongly ER and AR positive and HER-2 negative disease. She is on letrozole therapy.    Physically she has been feeling well.  Her mammogram last week did show an abnormality which led to an ultrasound which showed a new left breast density.  She is due to have a biopsy of that today.      Breast history:      She noted an abnormality on self examination at the end of July or early August 2016.  On August 8 she underwent diagnostic mammogram which showed an irregular mass was plated margins in the middle right breast and o'clock position.  By ultrasound this was a solid 1.7 x 1.0 x 1.5 cm mass.     On August 9 a core needle biopsy showed infiltrating ductal carcinoma which was well differentiated (histologic grade 2, nuclear grade 2, mitotic index 1).  The tumor was 100% ER positive, 70% AR positive and HER-2 1+.  On August 25 right breast lumpectomy and sentinel lymph node biopsy was performed.  That showed a 14 mm low-grade infiltrating ductal carcinoma.    The sentinel lymph node was positive for 1.5 mm micrometastasis.       Final pathological stage TI cN1 stage II    Mammaprint genomic assay  showed that she was low risk.  Score was +0.336 with a 5 year risk of distant recurrence at 5% and a 10 year risk at 10%.       She completed radiation in December 2016 and began Letrozole at that time.  Review of Systems   Constitutional: Negative for appetite change and unexpected weight change.   Eyes: Negative for visual disturbance.   Respiratory: Negative for cough and shortness of breath.    Cardiovascular: Negative for chest pain.   Gastrointestinal: Negative for abdominal pain and diarrhea.   Genitourinary: Negative for frequency.   Musculoskeletal: Positive for back pain.   Skin: Negative  for rash.   Neurological: Negative for headaches.   Hematological: Negative for adenopathy.   Psychiatric/Behavioral: The patient is nervous/anxious.        Objective:      Physical Exam   Constitutional: She is oriented to person, place, and time. She appears well-developed and well-nourished.   Cardiovascular: Normal rate and regular rhythm.   Pulmonary/Chest: Effort normal and breath sounds normal. Right breast exhibits no mass, no nipple discharge and no skin change. Left breast exhibits no mass, no nipple discharge and no skin change.       Abdominal: Soft. She exhibits no mass.   Lymphadenopathy:     She has no cervical adenopathy.     She has no axillary adenopathy.        Right: No supraclavicular adenopathy present.        Left: No supraclavicular adenopathy present.   Neurological: She is alert and oriented to person, place, and time.   Psychiatric: She has a normal mood and affect. Her behavior is normal. Thought content normal.   Vitals reviewed.      Assessment:     Mammo Digital Diagnostic Bilat w/ Randy  Left  Asymmetry: There is an asymmetry seen in the upper region of the left breast on the MLO view. Compared to the previous study, the asymmetry appeared more defined on additional images, but upon spot compression had a stable appearance.       Right  There is no evidence of suspicious masses, calcifications, or other abnormal findings in the right breast.  Post-lumpectomy change in the upper outer right breast is stable.         US Breast Left Limited  Left  Mass: There is a 9 mm x 7 mm x 4 mm oval, parallel, hypoechoic mass with circumscribed margins seen in the left breast at 12 o'clock, 3 cm from the nipple. The mass correlates with the stable asymmetry seen on the mammogram and is benign.  US left breast -  6 mm x 6 mm x 3 mm oval, isoechoic mass with indistinct margins seen in the left breast at 2 o'clock, 4 cm from the nipple.  This has no mammographic correlate and is of unknown chronicity  and therefore suspicious  1. Malignant neoplasm of upper-outer quadrant of right breast in female, estrogen receptor positive        Plan:       Breast biopsy as planned.  Continue letrozole and return to clinic in 6 months.

## 2020-05-25 ENCOUNTER — OFFICE VISIT (OUTPATIENT)
Dept: HEMATOLOGY/ONCOLOGY | Facility: CLINIC | Age: 65
End: 2020-05-25
Payer: COMMERCIAL

## 2020-05-25 ENCOUNTER — HOSPITAL ENCOUNTER (OUTPATIENT)
Dept: RADIOLOGY | Facility: HOSPITAL | Age: 65
Discharge: HOME | End: 2020-05-25
Attending: INTERNAL MEDICINE
Payer: COMMERCIAL

## 2020-05-25 VITALS
RESPIRATION RATE: 16 BRPM | DIASTOLIC BLOOD PRESSURE: 75 MMHG | BODY MASS INDEX: 25.61 KG/M2 | SYSTOLIC BLOOD PRESSURE: 135 MMHG | HEART RATE: 75 BPM | HEIGHT: 65 IN | TEMPERATURE: 99 F | OXYGEN SATURATION: 100 % | WEIGHT: 153.69 LBS

## 2020-05-25 DIAGNOSIS — Z17.0 MALIGNANT NEOPLASM OF UPPER-OUTER QUADRANT OF RIGHT BREAST IN FEMALE, ESTROGEN RECEPTOR POSITIVE: Primary | ICD-10-CM

## 2020-05-25 DIAGNOSIS — C50.411 MALIGNANT NEOPLASM OF UPPER-OUTER QUADRANT OF RIGHT BREAST IN FEMALE, ESTROGEN RECEPTOR POSITIVE: Primary | ICD-10-CM

## 2020-05-25 DIAGNOSIS — R92.8 ABNORMAL MAMMOGRAM: ICD-10-CM

## 2020-05-25 PROCEDURE — 1101F PT FALLS ASSESS-DOCD LE1/YR: CPT | Mod: CPTII,S$GLB,, | Performed by: INTERNAL MEDICINE

## 2020-05-25 PROCEDURE — 27201068 US BREAST BIOPSY WITH IMAGING 1ST SITE LEFT: Mod: PO

## 2020-05-25 PROCEDURE — 99213 PR OFFICE/OUTPT VISIT, EST, LEVL III, 20-29 MIN: ICD-10-PCS | Mod: S$GLB,,, | Performed by: INTERNAL MEDICINE

## 2020-05-25 PROCEDURE — 1159F MED LIST DOCD IN RCRD: CPT | Mod: S$GLB,,, | Performed by: INTERNAL MEDICINE

## 2020-05-25 PROCEDURE — 99999 PR PBB SHADOW E&M-EST. PATIENT-LVL III: ICD-10-PCS | Mod: PBBFAC,,, | Performed by: INTERNAL MEDICINE

## 2020-05-25 PROCEDURE — 99213 OFFICE O/P EST LOW 20 MIN: CPT | Mod: S$GLB,,, | Performed by: INTERNAL MEDICINE

## 2020-05-25 PROCEDURE — 1159F PR MEDICATION LIST DOCUMENTED IN MEDICAL RECORD: ICD-10-PCS | Mod: S$GLB,,, | Performed by: INTERNAL MEDICINE

## 2020-05-25 PROCEDURE — 99499 UNLISTED E&M SERVICE: CPT | Mod: S$PBB,,, | Performed by: INTERNAL MEDICINE

## 2020-05-25 PROCEDURE — 88305 TISSUE EXAM BY PATHOLOGIST: CPT | Performed by: PATHOLOGY

## 2020-05-25 PROCEDURE — 88305 TISSUE EXAM BY PATHOLOGIST: ICD-10-PCS | Mod: 26,,, | Performed by: PATHOLOGY

## 2020-05-25 PROCEDURE — 77065 DX MAMMO INCL CAD UNI: CPT | Mod: TC,PO,LT

## 2020-05-25 PROCEDURE — 77065 DX MAMMO INCL CAD UNI: CPT | Mod: 26,LT,, | Performed by: RADIOLOGY

## 2020-05-25 PROCEDURE — 77065 MAMMO DIGITAL DIAGNOSTIC LEFT WITH CAD: ICD-10-PCS | Mod: 26,LT,, | Performed by: RADIOLOGY

## 2020-05-25 PROCEDURE — 99499 RISK ADDL DX/OHS AUDIT: ICD-10-PCS | Mod: S$PBB,,, | Performed by: INTERNAL MEDICINE

## 2020-05-25 PROCEDURE — 19083 US BREAST BIOPSY WITH IMAGING 1ST SITE LEFT: ICD-10-PCS | Mod: LT,,, | Performed by: RADIOLOGY

## 2020-05-25 PROCEDURE — 19083 BX BREAST 1ST LESION US IMAG: CPT | Mod: LT,,, | Performed by: RADIOLOGY

## 2020-05-25 PROCEDURE — 1126F PR PAIN SEVERITY QUANTIFIED, NO PAIN PRESENT: ICD-10-PCS | Mod: S$GLB,,, | Performed by: INTERNAL MEDICINE

## 2020-05-25 PROCEDURE — 25000003 PHARM REV CODE 250: Mod: PO | Performed by: INTERNAL MEDICINE

## 2020-05-25 PROCEDURE — 1126F AMNT PAIN NOTED NONE PRSNT: CPT | Mod: S$GLB,,, | Performed by: INTERNAL MEDICINE

## 2020-05-25 PROCEDURE — 88305 TISSUE EXAM BY PATHOLOGIST: CPT | Mod: 26,,, | Performed by: PATHOLOGY

## 2020-05-25 PROCEDURE — 99999 PR PBB SHADOW E&M-EST. PATIENT-LVL III: CPT | Mod: PBBFAC,,, | Performed by: INTERNAL MEDICINE

## 2020-05-25 PROCEDURE — 1101F PR PT FALLS ASSESS DOC 0-1 FALLS W/OUT INJ PAST YR: ICD-10-PCS | Mod: CPTII,S$GLB,, | Performed by: INTERNAL MEDICINE

## 2020-05-25 RX ORDER — LIDOCAINE HYDROCHLORIDE AND EPINEPHRINE 20; 10 MG/ML; UG/ML
20 INJECTION, SOLUTION INFILTRATION; PERINEURAL ONCE
Status: COMPLETED | OUTPATIENT
Start: 2020-05-25 | End: 2020-05-25

## 2020-05-25 RX ORDER — LIDOCAINE HYDROCHLORIDE 10 MG/ML
5 INJECTION, SOLUTION EPIDURAL; INFILTRATION; INTRACAUDAL; PERINEURAL ONCE
Status: COMPLETED | OUTPATIENT
Start: 2020-05-25 | End: 2020-05-25

## 2020-05-25 RX ADMIN — LIDOCAINE HYDROCHLORIDE 30 MG: 10 INJECTION, SOLUTION EPIDURAL; INFILTRATION; INTRACAUDAL; PERINEURAL at 01:05

## 2020-05-25 RX ADMIN — LIDOCAINE HYDROCHLORIDE,EPINEPHRINE BITARTRATE 8 ML: 20; .01 INJECTION, SOLUTION INFILTRATION; PERINEURAL at 01:05

## 2020-05-27 ENCOUNTER — TELEPHONE (OUTPATIENT)
Dept: SURGERY | Facility: CLINIC | Age: 65
End: 2020-05-27

## 2020-05-27 LAB
FINAL PATHOLOGIC DIAGNOSIS: NORMAL
GROSS: NORMAL

## 2020-05-27 NOTE — TELEPHONE ENCOUNTER
Patient called with results of breast biopsy from 5/25/2020, no atypia/benign, all questions answered, pt advised to follow up in 6 months with repeat imaging per core biopsy protocol.  reviewed biopsy results and results are benign and concordant. Patient verbalized understanding.

## 2020-05-28 ENCOUNTER — OFFICE VISIT (OUTPATIENT)
Dept: GASTROENTEROLOGY | Facility: CLINIC | Age: 65
End: 2020-05-28
Payer: COMMERCIAL

## 2020-05-28 VITALS
BODY MASS INDEX: 24.98 KG/M2 | HEIGHT: 65 IN | HEART RATE: 76 BPM | DIASTOLIC BLOOD PRESSURE: 85 MMHG | WEIGHT: 149.94 LBS | SYSTOLIC BLOOD PRESSURE: 138 MMHG

## 2020-05-28 DIAGNOSIS — K21.9 GERD WITHOUT ESOPHAGITIS: Primary | ICD-10-CM

## 2020-05-28 DIAGNOSIS — K44.9 HIATAL HERNIA: ICD-10-CM

## 2020-05-28 PROCEDURE — 99213 OFFICE O/P EST LOW 20 MIN: CPT | Mod: S$GLB,,, | Performed by: NURSE PRACTITIONER

## 2020-05-28 PROCEDURE — 1101F PT FALLS ASSESS-DOCD LE1/YR: CPT | Mod: CPTII,S$GLB,, | Performed by: NURSE PRACTITIONER

## 2020-05-28 PROCEDURE — 1159F MED LIST DOCD IN RCRD: CPT | Mod: S$GLB,,, | Performed by: NURSE PRACTITIONER

## 2020-05-28 PROCEDURE — 1101F PR PT FALLS ASSESS DOC 0-1 FALLS W/OUT INJ PAST YR: ICD-10-PCS | Mod: CPTII,S$GLB,, | Performed by: NURSE PRACTITIONER

## 2020-05-28 PROCEDURE — 99999 PR PBB SHADOW E&M-EST. PATIENT-LVL III: CPT | Mod: PBBFAC,,, | Performed by: NURSE PRACTITIONER

## 2020-05-28 PROCEDURE — 1126F AMNT PAIN NOTED NONE PRSNT: CPT | Mod: S$GLB,,, | Performed by: NURSE PRACTITIONER

## 2020-05-28 PROCEDURE — 99213 PR OFFICE/OUTPT VISIT, EST, LEVL III, 20-29 MIN: ICD-10-PCS | Mod: S$GLB,,, | Performed by: NURSE PRACTITIONER

## 2020-05-28 PROCEDURE — 1126F PR PAIN SEVERITY QUANTIFIED, NO PAIN PRESENT: ICD-10-PCS | Mod: S$GLB,,, | Performed by: NURSE PRACTITIONER

## 2020-05-28 PROCEDURE — 1159F PR MEDICATION LIST DOCUMENTED IN MEDICAL RECORD: ICD-10-PCS | Mod: S$GLB,,, | Performed by: NURSE PRACTITIONER

## 2020-05-28 PROCEDURE — 99999 PR PBB SHADOW E&M-EST. PATIENT-LVL III: ICD-10-PCS | Mod: PBBFAC,,, | Performed by: NURSE PRACTITIONER

## 2020-05-28 RX ORDER — OMEPRAZOLE 40 MG/1
40 CAPSULE, DELAYED RELEASE ORAL DAILY
Qty: 90 CAPSULE | Refills: 3 | Status: SHIPPED | OUTPATIENT
Start: 2020-05-28 | End: 2021-01-25

## 2020-05-28 NOTE — PROGRESS NOTES
[facility] Consultation Note    Reason for Consult:  The primary encounter diagnosis was GERD without esophagitis. A diagnosis of Hiatal hernia was also pertinent to this visit.    PCP:   Andreina Mcgill       Referring MD:  No referring provider defined for this encounter.    HPI 8/20/2019 w/ Emily NP:  This is a 64 y.o. female here for evaluation of GERD. She is an established pt last seen by FRANCIE Coombs NP 9/2018. Visit was reviewed. She is new to me.    Omeprazole 40 mg every AM. GERD is very well controlled on this. She does not need Zantac at night.  Alcohol will flare however. Recently went to [facility] with daughter ports her reflux had flared.  However since she has been back home her reflux is very stable.  Denies dysphagia.  Denies N/v, melena, hematochezia.  Reports good bowel habits.  No abdominal pain    Interval hx 5/28/2020  Having pyrosis, no regurgitation. She reports sx have been   She reports she can not eat italian foods, no red based sauces. No red wine.   She reports nothing new happened, did not change diet, no travel for flare of her symptoms  Denies dysphagia.  Denies N/v, melena, hematochezia.  Reports good bowel habits.  No abdominal pain  No NSAIDS  Most recently she had to have a breast bx for concern of new breast mass. Bx benign. This has caused a lot of stress with her recently along with returning to work during the COVID pandemic      ROS:  Constitutional: No fevers, chills, No weight loss  ENT: + allergies  CV: No chest pain  Pulm: No cough, No shortness of breath  Ophtho: No vision changes, +blepheritis  GI: see HPI  Derm: No rash  MSK: +had mild knee pain, doing much better. See ortho  : No dysuria, No hematuria  Neuro: No syncope, No seizure  Psych: No anxiety, No depression    Medical History:  has a past medical history of Atypical ductal hyperplasia, breast (2008), Breast cancer (08/2016), Breast cyst (approx. 40 years ago), Cancer, GERD  (gastroesophageal reflux disease), History of thyroid cancer (), Lobular carcinoma in situ (not certain of 2016 diagnosis), Mitral valve prolapse, and Psychiatric problem.    Surgical History:  has a past surgical history that includes  section; Tubal ligation; CYST REMOVED FROM RIGHT BREAST IN ; left breast wire localization excisional biopsy with bracketed wires using 3 wires and excision through 1 incision. ; Hernia repair; Breast lumpectomy (Right, ); Breast biopsy (Right, 2016); Breast biopsy (Left, ); Transforaminal epidural injection of steroid (Right, 2019); Colonoscopy (N/A, 10/8/2019); and Thyroidectomy.    Family History: family history includes Breast cancer (age of onset: 70) in her other; Breast cancer (age of onset: 88) in her maternal grandmother; Dementia in her father; Osteoporosis in her mother..     Social History:  reports that she quit smoking about 35 years ago. Her smoking use included cigarettes. She has never used smokeless tobacco. She reports that she drinks alcohol. She reports that she does not use drugs.    Review of patient's allergies indicates:   Allergen Reactions    Codeine Nausea Only    Sulfa (sulfonamide antibiotics) Nausea Only       Current Outpatient Medications on File Prior to Visit   Medication Sig Dispense Refill    albuterol (PROVENTIL/VENTOLIN HFA) 90 mcg/actuation inhaler Inhale 2 puffs into the lungs every 6 (six) hours as needed for Wheezing or Shortness of Breath. Rescue 1 Inhaler 0    atorvastatin (LIPITOR) 80 MG tablet Take 1 tablet (80 mg total) by mouth once daily. 90 tablet 4    calcium-vitamin D3 500 MG, 1,250MG, 200 UNITS (CALCIUM-VITAMIN D) 500 mg(1,250mg) -200 unit per tablet Take 4 tablets by mouth 2 (two) times daily with meals.       clindamycin (CLEOCIN T) 1 % external solution Apply to affected areas of face BID prn acne. 30 mL 3    diclofenac sodium (VOLTAREN) 1 % Gel Apply 2 g topically 3 (three) times daily. 5  "Tube 2    fish oil-omega-3 fatty acids 300-1,000 mg capsule Take 2 g by mouth once daily.      letrozole (FEMARA) 2.5 mg Tab Take 1 tablet (2.5 mg total) by mouth once daily. 90 tablet 3    MAGNESIUM CARBONATE ORAL Take by mouth.      multivitamin capsule Take 2 capsules by mouth once daily.       potassium chloride SA (K-DUR,KLOR-CON) 10 MEQ tablet Take 20 mEq by mouth once daily.      SYNTHROID 88 mcg tablet Take 1 tablet (88 mcg total) by mouth once daily. 30 tablet 11    tretinoin (RETIN-A) 0.025 % cream Apply topically every evening. To affected area on face for acne 45 g 1    zinc gluconate 50 mg tablet Take 50 mg by mouth once daily.      [DISCONTINUED] omeprazole (PRILOSEC) 40 MG capsule Take 1 capsule (40 mg total) by mouth every morning. 30 capsule 11    FLUZONE QUAD 7618-3920, PF, 60 mcg (15 mcg x 4)/0.5 mL Syrg Inject 60 mcg as directed.  0    triamcinolone acetonide 0.1% (KENALOG) 0.1 % cream Apply topically 2 (two) times daily. for 10 days (Patient not taking: Reported on 5/28/2020) 15 g 0     No current facility-administered medications on file prior to visit.          Objective Findings:    Vital Signs:  /85 (BP Location: Left arm)   Pulse 76   Ht 5' 5" (1.651 m)   Wt 68 kg (149 lb 14.6 oz)   BMI 24.95 kg/m²   Body mass index is 24.95 kg/m².    Physical Exam:  General Appearance: Well appearing in no acute distress  Head:   Normocephalic, without obvious abnormality  Eyes:    No scleral icterus, very mild blepharitis noted.   Heart: HRRR, s1 s2 normal. No murmurs  Lungs: BBS CTA, no adventitious BS auscultated  ENT: Neck supple  Extremities: No edema  Skin: No rash  Neurologic: AAO x 3      Labs reviewed:  Lab Results   Component Value Date    WBC 5.64 09/25/2019    HGB 14.1 09/25/2019    HCT 41.8 09/25/2019     (H) 09/25/2019    CHOL 282 (H) 12/05/2019    TRIG 112 12/05/2019    HDL 63 12/05/2019    ALT 18 09/25/2019    AST 15 09/25/2019     09/25/2019    K 4.7 " 09/25/2019     09/25/2019    CREATININE 0.8 09/25/2019    BUN 8 09/25/2019    CO2 28 09/25/2019    TSH 0.336 (L) 02/05/2020    INR 0.9 07/04/2017    HGBA1C 5.6 11/06/2019       Imaging:  None reviewed    Endoscopy reviewed:    Colonoscopy 10/2019 - Diverticulosis in the sigmoid colon.                        - The examination was otherwise normal.                        - No specimens collected.    Screening Colonoscopy in 2009 reviewed.  She is due for repeat screening.    EGD in 2017 reviewed  Mild Schatzki ring, dilated up to 18 mm. GE                         junction biopsied to evaluate for Olivia's.                        - Medium-sized hiatal hernia.    Assessment:    Ms. Cook is a 63 y.o. WF with:    1. GERD without esophagitis    2. Hiatal hernia       She is stable with omeprazole 40 mg QAM. Endocrine is managing her bone density scans. For now we agreed she will continue PPI at current dose given she is doing well and she likely needs this dose due to her hiatal hernia    Recommendations:  1.  Continue with PPI qam    F/u in one year    Order summary:  Orders Placed This Encounter    omeprazole (PRILOSEC) 40 MG capsule         Thank you so much for allowing me to participate in the care of ROOSEVELT Segovia

## 2020-11-11 NOTE — PROGRESS NOTES
Subjective:       Patient ID: Sherry Torres is a 64 y.o. female.    Chief Complaint: Malignant neoplasm of upper-outer quadrant of right breast i    HPI Ms Torres is a 64-year-old female seen in follow-up  of right breast cancer.   She had stage II A, strongly ER and NY positive and HER-2 negative disease. She is on letrozole therapy.    Eating and drinking well. Still walking in the mornings, recently feel, sprained her wrist.   Needs a short term follow up due to biopsy on breast in May. Hot flashes resolved. Vaginal dryness. Arthritis in the right knee - followed with orthopedics - receives a shot every 9 months      Per Dr. Mckee's previous note  Breast history:      She noted an abnormality on self examination at the end of July or early August 2016.  On August 8 she underwent diagnostic mammogram which showed an irregular mass was plated margins in the middle right breast and o'clock position.  By ultrasound this was a solid 1.7 x 1.0 x 1.5 cm mass.     On August 9 a core needle biopsy showed infiltrating ductal carcinoma which was well differentiated (histologic grade 2, nuclear grade 2, mitotic index 1).  The tumor was 100% ER positive, 70% NY positive and HER-2 1+.  On August 25 right breast lumpectomy and sentinel lymph node biopsy was performed.  That showed a 14 mm low-grade infiltrating ductal carcinoma.    The sentinel lymph node was positive for 1.5 mm micrometastasis.       Final pathological stage TI cN1 stage II    Mammaprint genomic assay  showed that she was low risk.  Score was +0.336 with a 5 year risk of distant recurrence at 5% and a 10 year risk at 10%.       She completed radiation in December 2016 and began Letrozole at that time.    Review of Systems   Constitutional: Negative for activity change, appetite change, chills, diaphoresis, fatigue, fever and unexpected weight change.   HENT: Negative for mouth sores, nosebleeds, sore throat and trouble swallowing.    Eyes: Negative for  visual disturbance.   Respiratory: Negative for cough and shortness of breath.    Cardiovascular: Negative for chest pain, palpitations and leg swelling.   Gastrointestinal: Negative for abdominal distention, abdominal pain, blood in stool, constipation, diarrhea, nausea and vomiting.   Genitourinary: Negative for frequency, hematuria and vaginal bleeding.        Vaginal dryness   Musculoskeletal: Positive for back pain. Negative for arthralgias and myalgias.   Skin: Negative for pallor and rash.   Allergic/Immunologic: Negative for immunocompromised state.   Neurological: Negative for dizziness, weakness, light-headedness, numbness and headaches.   Hematological: Negative for adenopathy. Does not bruise/bleed easily.   Psychiatric/Behavioral: Negative for confusion. The patient is nervous/anxious.        Objective:      Physical Exam  Vitals signs reviewed.   Constitutional:       Appearance: She is well-developed.   Cardiovascular:      Rate and Rhythm: Normal rate and regular rhythm.   Pulmonary:      Effort: Pulmonary effort is normal.      Breath sounds: Normal breath sounds.   Chest:      Breasts:         Right: No mass, nipple discharge or skin change.         Left: No mass, nipple discharge or skin change.       Abdominal:      Palpations: Abdomen is soft. There is no mass.   Lymphadenopathy:      Cervical: No cervical adenopathy.      Upper Body:      Right upper body: No supraclavicular adenopathy.      Left upper body: No supraclavicular adenopathy.   Neurological:      Mental Status: She is alert and oriented to person, place, and time.   Psychiatric:         Behavior: Behavior normal.         Thought Content: Thought content normal.         Assessment:       1. Malignant neoplasm of upper-outer quadrant of right breast in female, estrogen receptor positive    2. Hyperlipidemia, unspecified hyperlipidemia type    3. Gastroesophageal reflux disease, unspecified whether esophagitis present        Plan:        1. Continue letrozole and return to clinic in 6 months.  Mammogram due for close follow up from biopsy in May.  2. Continue current medication and follow up with PCP  3. Continue current medication and follow up with GI       Return to clinic in 6 months with MD appointment and imaging.     Patient is in agreement with the proposed treatment plan. All questions were answered to the patient's satisfaction. Patient knows to call clinic for any new or worsening symptoms and if anything is needed before the next clinic visit.          Keesha Gilmore, OMRENAP-C  Hematology & Medical Oncology   [address] [place] [id number]  ph. [phone]  Fax. [phone]    Patient dicussed with collaborating physician, Dr. Mckee.

## 2020-11-20 ENCOUNTER — TELEPHONE (OUTPATIENT)
Dept: ORTHOPEDICS | Facility: CLINIC | Age: 65
End: 2020-11-20

## 2020-11-20 NOTE — TELEPHONE ENCOUNTER
Spoke to patient in regards to her message. Patient stated that she would like to see Inés Baron today. Stated that she fell and hurt her hand and shoulder. Stated to patient that Inés was not in clinic, and that I can schedule her to someone else. Patient stated that she go go to the urgent care. State to patient that I hope that she feels better. Thanks.

## 2020-11-25 ENCOUNTER — OFFICE VISIT (OUTPATIENT)
Dept: HEMATOLOGY/ONCOLOGY | Facility: CLINIC | Age: 65
End: 2020-11-25
Payer: MEDICARE

## 2020-11-25 VITALS
RESPIRATION RATE: 16 BRPM | DIASTOLIC BLOOD PRESSURE: 79 MMHG | OXYGEN SATURATION: 95 % | BODY MASS INDEX: 25.61 KG/M2 | HEART RATE: 83 BPM | WEIGHT: 153.69 LBS | TEMPERATURE: 98 F | SYSTOLIC BLOOD PRESSURE: 141 MMHG | HEIGHT: 65 IN

## 2020-11-25 DIAGNOSIS — C50.411 MALIGNANT NEOPLASM OF UPPER-OUTER QUADRANT OF RIGHT BREAST IN FEMALE, ESTROGEN RECEPTOR POSITIVE: Primary | ICD-10-CM

## 2020-11-25 DIAGNOSIS — K21.9 GASTROESOPHAGEAL REFLUX DISEASE, UNSPECIFIED WHETHER ESOPHAGITIS PRESENT: ICD-10-CM

## 2020-11-25 DIAGNOSIS — E78.5 HYPERLIPIDEMIA, UNSPECIFIED HYPERLIPIDEMIA TYPE: ICD-10-CM

## 2020-11-25 DIAGNOSIS — Z17.0 MALIGNANT NEOPLASM OF UPPER-OUTER QUADRANT OF RIGHT BREAST IN FEMALE, ESTROGEN RECEPTOR POSITIVE: Primary | ICD-10-CM

## 2020-11-25 PROCEDURE — 3008F PR BODY MASS INDEX (BMI) DOCUMENTED: ICD-10-PCS | Mod: HCNC,CPTII,S$GLB, | Performed by: NURSE PRACTITIONER

## 2020-11-25 PROCEDURE — 3008F BODY MASS INDEX DOCD: CPT | Mod: HCNC,CPTII,S$GLB, | Performed by: NURSE PRACTITIONER

## 2020-11-25 PROCEDURE — 99999 PR PBB SHADOW E&M-EST. PATIENT-LVL IV: CPT | Mod: PBBFAC,HCNC,, | Performed by: NURSE PRACTITIONER

## 2020-11-25 PROCEDURE — 3288F FALL RISK ASSESSMENT DOCD: CPT | Mod: HCNC,CPTII,S$GLB, | Performed by: NURSE PRACTITIONER

## 2020-11-25 PROCEDURE — 99214 PR OFFICE/OUTPT VISIT, EST, LEVL IV, 30-39 MIN: ICD-10-PCS | Mod: HCNC,S$GLB,, | Performed by: NURSE PRACTITIONER

## 2020-11-25 PROCEDURE — 99214 OFFICE O/P EST MOD 30 MIN: CPT | Mod: HCNC,S$GLB,, | Performed by: NURSE PRACTITIONER

## 2020-11-25 PROCEDURE — 1126F PR PAIN SEVERITY QUANTIFIED, NO PAIN PRESENT: ICD-10-PCS | Mod: HCNC,S$GLB,, | Performed by: NURSE PRACTITIONER

## 2020-11-25 PROCEDURE — 1101F PR PT FALLS ASSESS DOC 0-1 FALLS W/OUT INJ PAST YR: ICD-10-PCS | Mod: HCNC,CPTII,S$GLB, | Performed by: NURSE PRACTITIONER

## 2020-11-25 PROCEDURE — 99999 PR PBB SHADOW E&M-EST. PATIENT-LVL IV: ICD-10-PCS | Mod: PBBFAC,HCNC,, | Performed by: NURSE PRACTITIONER

## 2020-11-25 PROCEDURE — 1101F PT FALLS ASSESS-DOCD LE1/YR: CPT | Mod: HCNC,CPTII,S$GLB, | Performed by: NURSE PRACTITIONER

## 2020-11-25 PROCEDURE — 3288F PR FALLS RISK ASSESSMENT DOCUMENTED: ICD-10-PCS | Mod: HCNC,CPTII,S$GLB, | Performed by: NURSE PRACTITIONER

## 2020-11-25 PROCEDURE — 1126F AMNT PAIN NOTED NONE PRSNT: CPT | Mod: HCNC,S$GLB,, | Performed by: NURSE PRACTITIONER

## 2020-12-02 ENCOUNTER — HOSPITAL ENCOUNTER (OUTPATIENT)
Dept: RADIOLOGY | Facility: HOSPITAL | Age: 65
Discharge: HOME | End: 2020-12-02
Attending: INTERNAL MEDICINE
Payer: MEDICARE

## 2020-12-02 DIAGNOSIS — R92.8 ABNORMAL MAMMOGRAM: ICD-10-CM

## 2020-12-02 PROCEDURE — 77061 BREAST TOMOSYNTHESIS UNI: CPT | Mod: 26,HCNC,LT, | Performed by: RADIOLOGY

## 2020-12-02 PROCEDURE — 76642 ULTRASOUND BREAST LIMITED: CPT | Mod: TC,HCNC,LT

## 2020-12-02 PROCEDURE — 76642 ULTRASOUND BREAST LIMITED: CPT | Mod: 26,HCNC,LT, | Performed by: RADIOLOGY

## 2020-12-02 PROCEDURE — 77061 BREAST TOMOSYNTHESIS UNI: CPT | Mod: TC,HCNC,LT

## 2020-12-02 PROCEDURE — 77065 DX MAMMO INCL CAD UNI: CPT | Mod: TC,HCNC,LT

## 2020-12-02 PROCEDURE — 77065 MAMMO DIGITAL DIAGNOSTIC LEFT WITH TOMOSYNTHESIS_CAD: ICD-10-PCS | Mod: 26,HCNC,LT, | Performed by: RADIOLOGY

## 2020-12-02 PROCEDURE — 77065 DX MAMMO INCL CAD UNI: CPT | Mod: 26,HCNC,LT, | Performed by: RADIOLOGY

## 2020-12-02 PROCEDURE — 77061 MAMMO DIGITAL DIAGNOSTIC LEFT WITH TOMOSYNTHESIS_CAD: ICD-10-PCS | Mod: 26,HCNC,LT, | Performed by: RADIOLOGY

## 2020-12-02 PROCEDURE — 76642 US BREAST LEFT LIMITED: ICD-10-PCS | Mod: 26,HCNC,LT, | Performed by: RADIOLOGY

## 2021-01-25 ENCOUNTER — LAB VISIT (OUTPATIENT)
Dept: LAB | Facility: HOSPITAL | Age: 66
End: 2021-01-25
Payer: MEDICARE

## 2021-01-25 ENCOUNTER — OFFICE VISIT (OUTPATIENT)
Dept: GASTROENTEROLOGY | Facility: CLINIC | Age: 66
End: 2021-01-25
Payer: MEDICARE

## 2021-01-25 VITALS
BODY MASS INDEX: 26 KG/M2 | HEART RATE: 73 BPM | DIASTOLIC BLOOD PRESSURE: 86 MMHG | WEIGHT: 156.06 LBS | HEIGHT: 65 IN | SYSTOLIC BLOOD PRESSURE: 134 MMHG

## 2021-01-25 DIAGNOSIS — R11.0 NAUSEA: ICD-10-CM

## 2021-01-25 DIAGNOSIS — R19.5 LOOSE STOOLS: ICD-10-CM

## 2021-01-25 DIAGNOSIS — E89.0 POST-SURGICAL HYPOTHYROIDISM: Primary | ICD-10-CM

## 2021-01-25 DIAGNOSIS — R14.0 BLOATING: ICD-10-CM

## 2021-01-25 DIAGNOSIS — K44.9 HH (HIATUS HERNIA): ICD-10-CM

## 2021-01-25 DIAGNOSIS — K21.9 GASTROESOPHAGEAL REFLUX DISEASE, UNSPECIFIED WHETHER ESOPHAGITIS PRESENT: ICD-10-CM

## 2021-01-25 DIAGNOSIS — E89.0 POST-SURGICAL HYPOTHYROIDISM: ICD-10-CM

## 2021-01-25 LAB
ALBUMIN SERPL BCP-MCNC: 3.9 G/DL (ref 3.5–5.2)
ALP SERPL-CCNC: 41 U/L (ref 55–135)
ALT SERPL W/O P-5'-P-CCNC: 22 U/L (ref 10–44)
ANION GAP SERPL CALC-SCNC: 9 MMOL/L (ref 8–16)
AST SERPL-CCNC: 17 U/L (ref 10–40)
BASOPHILS # BLD AUTO: 0.04 K/UL (ref 0–0.2)
BASOPHILS NFR BLD: 0.7 % (ref 0–1.9)
BILIRUB SERPL-MCNC: 0.4 MG/DL (ref 0.1–1)
BUN SERPL-MCNC: 10 MG/DL (ref 8–23)
CALCIUM SERPL-MCNC: 9.4 MG/DL (ref 8.7–10.5)
CHLORIDE SERPL-SCNC: 104 MMOL/L (ref 95–110)
CO2 SERPL-SCNC: 27 MMOL/L (ref 23–29)
CREAT SERPL-MCNC: 0.8 MG/DL (ref 0.5–1.4)
CRP SERPL-MCNC: 0.9 MG/L (ref 0–8.2)
DIFFERENTIAL METHOD: ABNORMAL
EOSINOPHIL # BLD AUTO: 0.1 K/UL (ref 0–0.5)
EOSINOPHIL NFR BLD: 2.3 % (ref 0–8)
ERYTHROCYTE [DISTWIDTH] IN BLOOD BY AUTOMATED COUNT: 13.3 % (ref 11.5–14.5)
EST. GFR  (AFRICAN AMERICAN): >60 ML/MIN/1.73 M^2
EST. GFR  (NON AFRICAN AMERICAN): >60 ML/MIN/1.73 M^2
GLUCOSE SERPL-MCNC: 101 MG/DL (ref 70–110)
HCT VFR BLD AUTO: 40.6 % (ref 37–48.5)
HGB BLD-MCNC: 13.4 G/DL (ref 12–16)
IGA SERPL-MCNC: 282 MG/DL (ref 40–350)
IMM GRANULOCYTES # BLD AUTO: 0.02 K/UL (ref 0–0.04)
IMM GRANULOCYTES NFR BLD AUTO: 0.4 % (ref 0–0.5)
LYMPHOCYTES # BLD AUTO: 1.3 K/UL (ref 1–4.8)
LYMPHOCYTES NFR BLD: 23.5 % (ref 18–48)
MCH RBC QN AUTO: 31.2 PG (ref 27–31)
MCHC RBC AUTO-ENTMCNC: 33 G/DL (ref 32–36)
MCV RBC AUTO: 95 FL (ref 82–98)
MONOCYTES # BLD AUTO: 0.5 K/UL (ref 0.3–1)
MONOCYTES NFR BLD: 9.7 % (ref 4–15)
NEUTROPHILS # BLD AUTO: 3.5 K/UL (ref 1.8–7.7)
NEUTROPHILS NFR BLD: 63.4 % (ref 38–73)
NRBC BLD-RTO: 0 /100 WBC
PLATELET # BLD AUTO: 399 K/UL (ref 150–350)
PMV BLD AUTO: 9.6 FL (ref 9.2–12.9)
POTASSIUM SERPL-SCNC: 4.5 MMOL/L (ref 3.5–5.1)
PROT SERPL-MCNC: 7.2 G/DL (ref 6–8.4)
RBC # BLD AUTO: 4.29 M/UL (ref 4–5.4)
SODIUM SERPL-SCNC: 140 MMOL/L (ref 136–145)
T4 FREE SERPL-MCNC: 1.28 NG/DL (ref 0.71–1.51)
TSH SERPL DL<=0.005 MIU/L-ACNC: 1.38 UIU/ML (ref 0.4–4)
WBC # BLD AUTO: 5.57 K/UL (ref 3.9–12.7)

## 2021-01-25 PROCEDURE — 1100F PTFALLS ASSESS-DOCD GE2>/YR: CPT | Mod: CPTII,S$GLB,, | Performed by: NURSE PRACTITIONER

## 2021-01-25 PROCEDURE — 1100F PR PT FALLS ASSESS DOC 2+ FALLS/FALL W/INJURY/YR: ICD-10-PCS | Mod: CPTII,S$GLB,, | Performed by: NURSE PRACTITIONER

## 2021-01-25 PROCEDURE — 99214 OFFICE O/P EST MOD 30 MIN: CPT | Mod: S$GLB,,, | Performed by: NURSE PRACTITIONER

## 2021-01-25 PROCEDURE — 99999 PR PBB SHADOW E&M-EST. PATIENT-LVL V: CPT | Mod: PBBFAC,,, | Performed by: NURSE PRACTITIONER

## 2021-01-25 PROCEDURE — 82784 ASSAY IGA/IGD/IGG/IGM EACH: CPT

## 2021-01-25 PROCEDURE — 86677 HELICOBACTER PYLORI ANTIBODY: CPT

## 2021-01-25 PROCEDURE — 83516 IMMUNOASSAY NONANTIBODY: CPT

## 2021-01-25 PROCEDURE — 1159F MED LIST DOCD IN RCRD: CPT | Mod: S$GLB,,, | Performed by: NURSE PRACTITIONER

## 2021-01-25 PROCEDURE — 84439 ASSAY OF FREE THYROXINE: CPT

## 2021-01-25 PROCEDURE — 36415 COLL VENOUS BLD VENIPUNCTURE: CPT

## 2021-01-25 PROCEDURE — 3008F BODY MASS INDEX DOCD: CPT | Mod: CPTII,S$GLB,, | Performed by: NURSE PRACTITIONER

## 2021-01-25 PROCEDURE — 86140 C-REACTIVE PROTEIN: CPT

## 2021-01-25 PROCEDURE — 84443 ASSAY THYROID STIM HORMONE: CPT

## 2021-01-25 PROCEDURE — 3288F PR FALLS RISK ASSESSMENT DOCUMENTED: ICD-10-PCS | Mod: CPTII,S$GLB,, | Performed by: NURSE PRACTITIONER

## 2021-01-25 PROCEDURE — 1159F PR MEDICATION LIST DOCUMENTED IN MEDICAL RECORD: ICD-10-PCS | Mod: S$GLB,,, | Performed by: NURSE PRACTITIONER

## 2021-01-25 PROCEDURE — 1126F AMNT PAIN NOTED NONE PRSNT: CPT | Mod: S$GLB,,, | Performed by: NURSE PRACTITIONER

## 2021-01-25 PROCEDURE — 3008F PR BODY MASS INDEX (BMI) DOCUMENTED: ICD-10-PCS | Mod: CPTII,S$GLB,, | Performed by: NURSE PRACTITIONER

## 2021-01-25 PROCEDURE — 1126F PR PAIN SEVERITY QUANTIFIED, NO PAIN PRESENT: ICD-10-PCS | Mod: S$GLB,,, | Performed by: NURSE PRACTITIONER

## 2021-01-25 PROCEDURE — 99214 PR OFFICE/OUTPT VISIT, EST, LEVL IV, 30-39 MIN: ICD-10-PCS | Mod: S$GLB,,, | Performed by: NURSE PRACTITIONER

## 2021-01-25 PROCEDURE — 3288F FALL RISK ASSESSMENT DOCD: CPT | Mod: CPTII,S$GLB,, | Performed by: NURSE PRACTITIONER

## 2021-01-25 PROCEDURE — 80053 COMPREHEN METABOLIC PANEL: CPT

## 2021-01-25 PROCEDURE — 99999 PR PBB SHADOW E&M-EST. PATIENT-LVL V: ICD-10-PCS | Mod: PBBFAC,,, | Performed by: NURSE PRACTITIONER

## 2021-01-25 PROCEDURE — 85025 COMPLETE CBC W/AUTO DIFF WBC: CPT

## 2021-01-25 RX ORDER — ONDANSETRON 4 MG/1
4 TABLET, ORALLY DISINTEGRATING ORAL EVERY 6 HOURS PRN
Qty: 20 TABLET | Refills: 0 | Status: SHIPPED | OUTPATIENT
Start: 2021-01-25

## 2021-01-25 RX ORDER — IBUPROFEN 200 MG
200 TABLET ORAL EVERY 6 HOURS PRN
COMMUNITY

## 2021-01-25 RX ORDER — OMEPRAZOLE 40 MG/1
40 CAPSULE, DELAYED RELEASE ORAL EVERY MORNING
Qty: 90 CAPSULE | Refills: 3 | Status: SHIPPED | OUTPATIENT
Start: 2021-01-25 | End: 2023-09-04

## 2021-01-26 ENCOUNTER — PATIENT MESSAGE (OUTPATIENT)
Dept: GASTROENTEROLOGY | Facility: CLINIC | Age: 66
End: 2021-01-26

## 2021-01-26 LAB — H PYLORI IGG SERPL QL IA: NEGATIVE

## 2021-01-27 ENCOUNTER — OFFICE VISIT (OUTPATIENT)
Dept: ENDOCRINOLOGY | Facility: CLINIC | Age: 66
End: 2021-01-27
Payer: MEDICARE

## 2021-01-27 VITALS
HEIGHT: 65 IN | OXYGEN SATURATION: 97 % | BODY MASS INDEX: 26.12 KG/M2 | HEART RATE: 68 BPM | SYSTOLIC BLOOD PRESSURE: 117 MMHG | WEIGHT: 156.75 LBS | DIASTOLIC BLOOD PRESSURE: 63 MMHG

## 2021-01-27 DIAGNOSIS — E89.0 POST-SURGICAL HYPOTHYROIDISM: ICD-10-CM

## 2021-01-27 LAB — TTG IGA SER-ACNC: 5 UNITS

## 2021-01-27 PROCEDURE — 1101F PT FALLS ASSESS-DOCD LE1/YR: CPT | Mod: CPTII,S$GLB,, | Performed by: INTERNAL MEDICINE

## 2021-01-27 PROCEDURE — 99499 RISK ADDL DX/OHS AUDIT: ICD-10-PCS | Mod: S$GLB,,, | Performed by: INTERNAL MEDICINE

## 2021-01-27 PROCEDURE — 1101F PR PT FALLS ASSESS DOC 0-1 FALLS W/OUT INJ PAST YR: ICD-10-PCS | Mod: CPTII,S$GLB,, | Performed by: INTERNAL MEDICINE

## 2021-01-27 PROCEDURE — 1126F AMNT PAIN NOTED NONE PRSNT: CPT | Mod: S$GLB,,, | Performed by: INTERNAL MEDICINE

## 2021-01-27 PROCEDURE — 1159F PR MEDICATION LIST DOCUMENTED IN MEDICAL RECORD: ICD-10-PCS | Mod: S$GLB,,, | Performed by: INTERNAL MEDICINE

## 2021-01-27 PROCEDURE — 3288F FALL RISK ASSESSMENT DOCD: CPT | Mod: CPTII,S$GLB,, | Performed by: INTERNAL MEDICINE

## 2021-01-27 PROCEDURE — 3008F PR BODY MASS INDEX (BMI) DOCUMENTED: ICD-10-PCS | Mod: CPTII,S$GLB,, | Performed by: INTERNAL MEDICINE

## 2021-01-27 PROCEDURE — 1159F MED LIST DOCD IN RCRD: CPT | Mod: S$GLB,,, | Performed by: INTERNAL MEDICINE

## 2021-01-27 PROCEDURE — 99999 PR PBB SHADOW E&M-EST. PATIENT-LVL IV: CPT | Mod: PBBFAC,,, | Performed by: INTERNAL MEDICINE

## 2021-01-27 PROCEDURE — 1126F PR PAIN SEVERITY QUANTIFIED, NO PAIN PRESENT: ICD-10-PCS | Mod: S$GLB,,, | Performed by: INTERNAL MEDICINE

## 2021-01-27 PROCEDURE — 3008F BODY MASS INDEX DOCD: CPT | Mod: CPTII,S$GLB,, | Performed by: INTERNAL MEDICINE

## 2021-01-27 PROCEDURE — 99499 UNLISTED E&M SERVICE: CPT | Mod: S$GLB,,, | Performed by: INTERNAL MEDICINE

## 2021-01-27 PROCEDURE — 99999 PR PBB SHADOW E&M-EST. PATIENT-LVL IV: ICD-10-PCS | Mod: PBBFAC,,, | Performed by: INTERNAL MEDICINE

## 2021-01-27 PROCEDURE — 99213 OFFICE O/P EST LOW 20 MIN: CPT | Mod: S$GLB,,, | Performed by: INTERNAL MEDICINE

## 2021-01-27 PROCEDURE — 99213 PR OFFICE/OUTPT VISIT, EST, LEVL III, 20-29 MIN: ICD-10-PCS | Mod: S$GLB,,, | Performed by: INTERNAL MEDICINE

## 2021-01-27 PROCEDURE — 3288F PR FALLS RISK ASSESSMENT DOCUMENTED: ICD-10-PCS | Mod: CPTII,S$GLB,, | Performed by: INTERNAL MEDICINE

## 2021-01-27 RX ORDER — LEVOTHYROXINE SODIUM 88 UG/1
88 TABLET ORAL DAILY
Qty: 90 TABLET | Refills: 3 | Status: SHIPPED | OUTPATIENT
Start: 2021-01-27 | End: 2021-07-20 | Stop reason: SDUPTHER

## 2021-01-29 ENCOUNTER — LAB VISIT (OUTPATIENT)
Dept: LAB | Facility: HOSPITAL | Age: 66
End: 2021-01-29
Payer: MEDICARE

## 2021-01-29 DIAGNOSIS — R11.0 NAUSEA: ICD-10-CM

## 2021-01-29 DIAGNOSIS — K21.9 GASTROESOPHAGEAL REFLUX DISEASE, UNSPECIFIED WHETHER ESOPHAGITIS PRESENT: ICD-10-CM

## 2021-01-29 DIAGNOSIS — E89.0 POST-SURGICAL HYPOTHYROIDISM: ICD-10-CM

## 2021-01-29 DIAGNOSIS — R14.0 BLOATING: ICD-10-CM

## 2021-01-29 DIAGNOSIS — K44.9 HH (HIATUS HERNIA): ICD-10-CM

## 2021-01-29 DIAGNOSIS — R19.5 LOOSE STOOLS: ICD-10-CM

## 2021-01-29 LAB — WBC #/AREA STL HPF: NORMAL /[HPF]

## 2021-01-29 PROCEDURE — 87046 STOOL CULTR AEROBIC BACT EA: CPT

## 2021-01-29 PROCEDURE — 87427 SHIGA-LIKE TOXIN AG IA: CPT | Mod: 59

## 2021-01-29 PROCEDURE — 87209 SMEAR COMPLEX STAIN: CPT

## 2021-01-29 PROCEDURE — 87329 GIARDIA AG IA: CPT

## 2021-01-29 PROCEDURE — 82656 EL-1 FECAL QUAL/SEMIQ: CPT

## 2021-01-29 PROCEDURE — 87045 FECES CULTURE AEROBIC BACT: CPT

## 2021-01-29 PROCEDURE — 89055 LEUKOCYTE ASSESSMENT FECAL: CPT

## 2021-01-31 LAB
CRYPTOSP AG STL QL IA: NEGATIVE
G LAMBLIA AG STL QL IA: NEGATIVE

## 2021-02-01 LAB
E COLI SXT1 STL QL IA: NEGATIVE
E COLI SXT2 STL QL IA: NEGATIVE
O+P STL MICRO: NORMAL

## 2021-02-02 LAB
BACTERIA STL CULT: NORMAL
ELASTASE 1, FECAL: 281 MCG/G

## 2021-02-03 ENCOUNTER — TELEPHONE (OUTPATIENT)
Dept: GASTROENTEROLOGY | Facility: CLINIC | Age: 66
End: 2021-02-03

## 2021-02-08 ENCOUNTER — PATIENT MESSAGE (OUTPATIENT)
Dept: DERMATOLOGY | Facility: CLINIC | Age: 66
End: 2021-02-08

## 2021-02-09 ENCOUNTER — OFFICE VISIT (OUTPATIENT)
Dept: DERMATOLOGY | Facility: CLINIC | Age: 66
End: 2021-02-09
Payer: MEDICARE

## 2021-02-09 VITALS — TEMPERATURE: 98 F

## 2021-02-09 DIAGNOSIS — L70.0 ACNE VULGARIS: ICD-10-CM

## 2021-02-09 PROCEDURE — 1101F PR PT FALLS ASSESS DOC 0-1 FALLS W/OUT INJ PAST YR: ICD-10-PCS | Mod: CPTII,S$GLB,, | Performed by: DERMATOLOGY

## 2021-02-09 PROCEDURE — 1126F AMNT PAIN NOTED NONE PRSNT: CPT | Mod: S$GLB,,, | Performed by: DERMATOLOGY

## 2021-02-09 PROCEDURE — 99214 OFFICE O/P EST MOD 30 MIN: CPT | Mod: S$GLB,,, | Performed by: DERMATOLOGY

## 2021-02-09 PROCEDURE — 1101F PT FALLS ASSESS-DOCD LE1/YR: CPT | Mod: CPTII,S$GLB,, | Performed by: DERMATOLOGY

## 2021-02-09 PROCEDURE — 1126F PR PAIN SEVERITY QUANTIFIED, NO PAIN PRESENT: ICD-10-PCS | Mod: S$GLB,,, | Performed by: DERMATOLOGY

## 2021-02-09 PROCEDURE — 3288F FALL RISK ASSESSMENT DOCD: CPT | Mod: CPTII,S$GLB,, | Performed by: DERMATOLOGY

## 2021-02-09 PROCEDURE — 1159F MED LIST DOCD IN RCRD: CPT | Mod: S$GLB,,, | Performed by: DERMATOLOGY

## 2021-02-09 PROCEDURE — 3288F PR FALLS RISK ASSESSMENT DOCUMENTED: ICD-10-PCS | Mod: CPTII,S$GLB,, | Performed by: DERMATOLOGY

## 2021-02-09 PROCEDURE — 99214 PR OFFICE/OUTPT VISIT, EST, LEVL IV, 30-39 MIN: ICD-10-PCS | Mod: S$GLB,,, | Performed by: DERMATOLOGY

## 2021-02-09 PROCEDURE — 1159F PR MEDICATION LIST DOCUMENTED IN MEDICAL RECORD: ICD-10-PCS | Mod: S$GLB,,, | Performed by: DERMATOLOGY

## 2021-02-09 RX ORDER — CLINDAMYCIN PHOSPHATE 11.9 MG/ML
SOLUTION TOPICAL
Qty: 30 ML | Refills: 3 | Status: SHIPPED | OUTPATIENT
Start: 2021-02-09 | End: 2023-04-03

## 2021-04-12 ENCOUNTER — PATIENT MESSAGE (OUTPATIENT)
Dept: DERMATOLOGY | Facility: CLINIC | Age: 66
End: 2021-04-12

## 2021-04-13 ENCOUNTER — PROCEDURE VISIT (OUTPATIENT)
Dept: DERMATOLOGY | Facility: CLINIC | Age: 66
End: 2021-04-13
Payer: MEDICARE

## 2021-04-13 DIAGNOSIS — Z41.1 ELECTIVE PROCEDURE FOR UNACCEPTABLE COSMETIC APPEARANCE: Primary | ICD-10-CM

## 2021-04-13 PROCEDURE — 99499 NO LOS: ICD-10-PCS | Mod: CSM,S$GLB,, | Performed by: DERMATOLOGY

## 2021-04-13 PROCEDURE — 99499 UNLISTED E&M SERVICE: CPT | Mod: CSM,S$GLB,, | Performed by: DERMATOLOGY

## 2021-04-19 ENCOUNTER — PATIENT MESSAGE (OUTPATIENT)
Dept: DERMATOLOGY | Facility: CLINIC | Age: 66
End: 2021-04-19

## 2021-04-20 ENCOUNTER — PROCEDURE VISIT (OUTPATIENT)
Dept: DERMATOLOGY | Facility: CLINIC | Age: 66
End: 2021-04-20
Payer: MEDICARE

## 2021-04-20 DIAGNOSIS — Z41.1 ELECTIVE PROCEDURE FOR UNACCEPTABLE COSMETIC APPEARANCE: Primary | ICD-10-CM

## 2021-04-20 PROCEDURE — 15788 PR CHEM PEEL, FACE, EPIDERM (VI PEEL PRECISION PLUS): ICD-10-PCS | Mod: CSM,S$GLB,, | Performed by: DERMATOLOGY

## 2021-04-20 PROCEDURE — 99499 UNLISTED E&M SERVICE: CPT | Mod: CSM,S$GLB,, | Performed by: DERMATOLOGY

## 2021-04-20 PROCEDURE — 15788 CHEMICAL PEEL FACIAL EPIDRM: CPT | Mod: CSM,S$GLB,, | Performed by: DERMATOLOGY

## 2021-04-20 PROCEDURE — 99499 NO LOS: ICD-10-PCS | Mod: CSM,S$GLB,, | Performed by: DERMATOLOGY

## 2021-04-25 ENCOUNTER — PATIENT MESSAGE (OUTPATIENT)
Dept: DERMATOLOGY | Facility: CLINIC | Age: 66
End: 2021-04-25

## 2021-04-27 ENCOUNTER — PROCEDURE VISIT (OUTPATIENT)
Dept: DERMATOLOGY | Facility: CLINIC | Age: 66
End: 2021-04-27
Payer: MEDICARE

## 2021-04-27 DIAGNOSIS — Z09 POSTOP CHECK: Primary | ICD-10-CM

## 2021-04-27 PROCEDURE — 99024 POSTOP FOLLOW-UP VISIT: CPT | Mod: S$GLB,,, | Performed by: DERMATOLOGY

## 2021-04-27 PROCEDURE — 99024 PR POST-OP FOLLOW-UP VISIT: ICD-10-PCS | Mod: S$GLB,,, | Performed by: DERMATOLOGY

## 2021-05-24 ENCOUNTER — OFFICE VISIT (OUTPATIENT)
Dept: HEMATOLOGY/ONCOLOGY | Facility: CLINIC | Age: 66
End: 2021-05-24
Payer: MEDICARE

## 2021-05-24 VITALS
RESPIRATION RATE: 18 BRPM | BODY MASS INDEX: 26.15 KG/M2 | OXYGEN SATURATION: 98 % | DIASTOLIC BLOOD PRESSURE: 78 MMHG | HEART RATE: 78 BPM | TEMPERATURE: 99 F | SYSTOLIC BLOOD PRESSURE: 128 MMHG | WEIGHT: 156.94 LBS | HEIGHT: 65 IN

## 2021-05-24 DIAGNOSIS — Z17.0 MALIGNANT NEOPLASM OF UPPER-OUTER QUADRANT OF RIGHT BREAST IN FEMALE, ESTROGEN RECEPTOR POSITIVE: Primary | ICD-10-CM

## 2021-05-24 DIAGNOSIS — C50.411 MALIGNANT NEOPLASM OF UPPER-OUTER QUADRANT OF RIGHT BREAST IN FEMALE, ESTROGEN RECEPTOR POSITIVE: Primary | ICD-10-CM

## 2021-05-24 PROCEDURE — 1101F PR PT FALLS ASSESS DOC 0-1 FALLS W/OUT INJ PAST YR: ICD-10-PCS | Mod: CPTII,S$GLB,, | Performed by: INTERNAL MEDICINE

## 2021-05-24 PROCEDURE — 99499 RISK ADDL DX/OHS AUDIT: ICD-10-PCS | Mod: S$GLB,,, | Performed by: INTERNAL MEDICINE

## 2021-05-24 PROCEDURE — 1159F PR MEDICATION LIST DOCUMENTED IN MEDICAL RECORD: ICD-10-PCS | Mod: S$GLB,,, | Performed by: INTERNAL MEDICINE

## 2021-05-24 PROCEDURE — 1126F AMNT PAIN NOTED NONE PRSNT: CPT | Mod: S$GLB,,, | Performed by: INTERNAL MEDICINE

## 2021-05-24 PROCEDURE — 3008F BODY MASS INDEX DOCD: CPT | Mod: CPTII,S$GLB,, | Performed by: INTERNAL MEDICINE

## 2021-05-24 PROCEDURE — 1159F MED LIST DOCD IN RCRD: CPT | Mod: S$GLB,,, | Performed by: INTERNAL MEDICINE

## 2021-05-24 PROCEDURE — 1101F PT FALLS ASSESS-DOCD LE1/YR: CPT | Mod: CPTII,S$GLB,, | Performed by: INTERNAL MEDICINE

## 2021-05-24 PROCEDURE — 99999 PR PBB SHADOW E&M-EST. PATIENT-LVL IV: CPT | Mod: PBBFAC,,, | Performed by: INTERNAL MEDICINE

## 2021-05-24 PROCEDURE — 3288F FALL RISK ASSESSMENT DOCD: CPT | Mod: CPTII,S$GLB,, | Performed by: INTERNAL MEDICINE

## 2021-05-24 PROCEDURE — 99213 OFFICE O/P EST LOW 20 MIN: CPT | Mod: S$GLB,,, | Performed by: INTERNAL MEDICINE

## 2021-05-24 PROCEDURE — 99499 UNLISTED E&M SERVICE: CPT | Mod: S$GLB,,, | Performed by: INTERNAL MEDICINE

## 2021-05-24 PROCEDURE — 1126F PR PAIN SEVERITY QUANTIFIED, NO PAIN PRESENT: ICD-10-PCS | Mod: S$GLB,,, | Performed by: INTERNAL MEDICINE

## 2021-05-24 PROCEDURE — 99213 PR OFFICE/OUTPT VISIT, EST, LEVL III, 20-29 MIN: ICD-10-PCS | Mod: S$GLB,,, | Performed by: INTERNAL MEDICINE

## 2021-05-24 PROCEDURE — 99999 PR PBB SHADOW E&M-EST. PATIENT-LVL IV: ICD-10-PCS | Mod: PBBFAC,,, | Performed by: INTERNAL MEDICINE

## 2021-05-24 PROCEDURE — 3288F PR FALLS RISK ASSESSMENT DOCUMENTED: ICD-10-PCS | Mod: CPTII,S$GLB,, | Performed by: INTERNAL MEDICINE

## 2021-05-24 PROCEDURE — 3008F PR BODY MASS INDEX (BMI) DOCUMENTED: ICD-10-PCS | Mod: CPTII,S$GLB,, | Performed by: INTERNAL MEDICINE

## 2021-07-15 ENCOUNTER — PATIENT MESSAGE (OUTPATIENT)
Dept: INTERNAL MEDICINE | Facility: CLINIC | Age: 66
End: 2021-07-15

## 2021-07-20 DIAGNOSIS — E89.0 POST-SURGICAL HYPOTHYROIDISM: ICD-10-CM

## 2021-07-20 RX ORDER — LEVOTHYROXINE SODIUM 88 UG/1
88 TABLET ORAL DAILY
Qty: 30 TABLET | Refills: 5 | Status: SHIPPED | OUTPATIENT
Start: 2021-07-20 | End: 2022-03-02 | Stop reason: SDUPTHER

## 2021-08-11 NOTE — PROGRESS NOTES
Order in chart Subjective:       Patient ID: Sherry Torres is a 64 y.o. female.    Chief Complaint: Establish Care    HPI  This patient is new to me.   Sherry Torres is a 64 y.o. year old female with hypothyroid, history of thyroid cancer, HLD, breast cancer, GERD who presents today to establish care.     Patient complains of acne lesions that started about a month ago.  They are mainly located around her mouth and nose.    Hypothyroid - patient has a history of thyroid cancer and thyroidectomy.  Following with Endocrinology. Taking Synthroid 88 mcg daily.   Lab Results   Component Value Date    TSH 0.336 (L) 2020     Hyperlipidemia - patient denies history of high cholesterol in the past.  She is taking letrozole currently, which does have a side effect of hyperlipidemia.  She will need to take letrozole for 2 more years.  Lab Results   Component Value Date    CHOL 282 (H) 2019    CHOL 286 (H) 2019     Lab Results   Component Value Date    HDL 63 2019    HDL 69 2019     Lab Results   Component Value Date    LDLCALC 196.6 (H) 2019    LDLCALC 174.4 (H) 2019     Lab Results   Component Value Date    TRIG 112 2019    TRIG 213 (H) 2019     Lab Results   Component Value Date    CHOLHDL 22.3 2019    CHOLHDL 24.1 2019     Breast cancer, right side - Diagnosed in 2016. Status post lumpectomy, radiation therapy, taking Letrozole currently.    GERD - takes omeprazole. Has had EGD a few years ago.     OB/GYN History     Patient is post-menopausal.    Health Maintenance  Pap smear: 16 - normal   Mammogram: 19 - normal  Colon Cancer Screening: colonoscopy 10/8/19 - repeat in 10 years  DEXA: scheduled   Hepatitis C screening: due  Flu vaccine: UTD  Tetanus vaccine: UTD per patient . No vaccine data in LINKS  PNA vaccine: patient reports having both, No vaccine data in LINKS for PNA 23.   Shingles vaccine: due    I personally reviewed Past Medical  "History, Past Surgical History, Social History, and Family History    Review of Systems   Constitutional: Negative for chills, fatigue, fever and unexpected weight change.   HENT: Negative for congestion, hearing loss, rhinorrhea and sore throat.    Eyes: Negative for visual disturbance.   Respiratory: Negative for cough, shortness of breath and wheezing.    Cardiovascular: Negative for chest pain, palpitations and leg swelling.   Gastrointestinal: Negative for abdominal pain, constipation, diarrhea, nausea and vomiting.   Genitourinary: Negative for dysuria, frequency, menstrual problem and urgency.   Musculoskeletal: Negative for arthralgias and myalgias.   Skin: Negative for rash.   Neurological: Negative for dizziness, syncope and headaches.   Psychiatric/Behavioral: Negative for dysphoric mood and sleep disturbance. The patient is not nervous/anxious.        Objective:      Vitals:    02/10/20 0927   BP: 124/82   Pulse: 81   SpO2: 99%   Weight: 70.7 kg (155 lb 13.8 oz)   Height: 5' 5" (1.651 m)     Physical Exam   Constitutional: She is oriented to person, place, and time. She appears well-developed and well-nourished. No distress.   HENT:   Head: Normocephalic and atraumatic.   Right Ear: Hearing normal.   Left Ear: Hearing normal.   Nose: Nose normal.   Mouth/Throat: Oropharynx is clear and moist and mucous membranes are normal. No oropharyngeal exudate.   Eyes: Pupils are equal, round, and reactive to light. Conjunctivae and lids are normal.   Neck: Normal range of motion. No thyroid mass and no thyromegaly present.   Cardiovascular: Normal rate, regular rhythm, S1 normal, S2 normal and intact distal pulses.   No murmur heard.  No lower extremity edema.    Pulmonary/Chest: Effort normal and breath sounds normal. No respiratory distress.   Abdominal: Soft. Normal appearance and bowel sounds are normal. There is no tenderness.   Lymphadenopathy:     She has no cervical adenopathy.        Right: No " supraclavicular adenopathy present.        Left: No supraclavicular adenopathy present.   Neurological: She is alert and oriented to person, place, and time.   Skin: Skin is warm and dry. No rash noted.   Psychiatric: She has a normal mood and affect. Her behavior is normal. Thought content normal.   Nursing note and vitals reviewed.      Assessment:       1. Encounter to establish care with new doctor    2. Encounter for hepatitis C screening test for low risk patient    3. Hyperlipidemia, unspecified hyperlipidemia type    4. Acne, unspecified acne type    5. Post-surgical hypothyroidism    6. History of thyroid cancer    7. Malignant neoplasm of upper-outer quadrant of right breast in female, estrogen receptor positive    8. Gastroesophageal reflux disease, esophagitis presence not specified        Plan:     Sherry was seen today for establish care.    Diagnoses and all orders for this visit:    Encounter to establish care with new doctor  Recommended patient have shingles vaccine.    Encounter for hepatitis C screening test for low risk patient  -     Hepatitis C antibody; Future    Hyperlipidemia, unspecified hyperlipidemia type  This is a new diagnosis for the patient.  Possibly due to side effect from letrozole therapy.  Due to high level of cholesterol, will start statin.  Labs to recheck in 3 months.  -     Lipid panel; Future  -     Comprehensive metabolic panel; Future  -     atorvastatin (LIPITOR) 80 MG tablet; Take 1 tablet (80 mg total) by mouth once daily.    Acne, unspecified acne type  New diagnosis. Will start treatment with Retin-A. Refer to derm if worsening or not improving.   -     tretinoin (RETIN-A) 0.025 % cream; Apply topically every evening. To affected area on face for acne    Post-surgical hypothyroidism  History of thyroid cancer  Continue current management per Endocrinology.    Malignant neoplasm of upper-outer quadrant of right breast in female, estrogen receptor positive  Continue  current management per Oncology.    Gastroesophageal reflux disease, esophagitis presence not specified  Continue PPI and diet changes.    I personally reviewed the patient's medical record, including physician notes, imaging, and labs that were available to me today.

## 2022-02-09 ENCOUNTER — PATIENT MESSAGE (OUTPATIENT)
Dept: DERMATOLOGY | Facility: CLINIC | Age: 67
End: 2022-02-09
Payer: MEDICARE

## 2022-02-21 ENCOUNTER — PROCEDURE VISIT (OUTPATIENT)
Dept: DERMATOLOGY | Facility: CLINIC | Age: 67
End: 2022-02-21

## 2022-02-21 DIAGNOSIS — Z41.1 ELECTIVE PROCEDURE FOR UNACCEPTABLE COSMETIC APPEARANCE: ICD-10-CM

## 2022-02-21 DIAGNOSIS — L98.8 RHYTIDES: Primary | ICD-10-CM

## 2022-02-21 PROCEDURE — 99499 UNLISTED E&M SERVICE: CPT | Mod: CSM,S$GLB,, | Performed by: DERMATOLOGY

## 2022-02-21 PROCEDURE — 99499 NO LOS: ICD-10-PCS | Mod: CSM,S$GLB,, | Performed by: DERMATOLOGY

## 2022-02-21 RX ORDER — TRETINOIN 0.25 MG/G
CREAM TOPICAL
Qty: 30 G | Refills: 5 | Status: SHIPPED | OUTPATIENT
Start: 2022-02-21 | End: 2023-02-23 | Stop reason: SDUPTHER

## 2022-02-21 NOTE — PATIENT INSTRUCTIONS
Your prescription has been sent to [facility]  They are located in [place] at [address] just before the [address].  If you have any questions, please call the pharmacy at [phone].  Website: [email or web address]

## 2022-02-21 NOTE — PROGRESS NOTES
Patient is here today for cosmetic Botox treatment.   She denies any new medical problems or medications.   She denies any history of adverse reaction to Botox or history of neuromuscular disease.     Discussed benefits and risks of Botox injections, including headache, weakness/paralysis of muscles, asymmetry, eyebrow/lid drooping, pain, bruising, swelling, infection, and rare risk of systemic botulism. Verbal and written consent was obtained.    Patient agreed to proceed with treatment. 52 units total were injected today:  12 in frontalis  20 in glabella (3-5-4-5-3)  12 in bilateral periorbital region  8 in bilateral nasalis    Patient tolerated well with no complications. She was instructed not to rub or massage the treated areas and that she should avoid lying down, bending upside down, and strenuous exercise for the rest of the day.    Botox dilution: 10u / 0.2mL (10u / 0.4mL for frontalis)  Lot #: [id number]  Exp date: 04/2024  ----------------------------------    Rhytides  -     tretinoin (RETIN-A) 0.025 % cream; Compound tretinoin 0.025% / niacinamide 2% cream / hyaluronic acid 0.25%. Apply a pea-sized amount to entire face qhs.  Dispense: 30 g; Refill: 5  Can alternate with tretinoin 0.0125% cream until skin adjusts.

## 2022-03-02 ENCOUNTER — PATIENT MESSAGE (OUTPATIENT)
Dept: ENDOCRINOLOGY | Facility: CLINIC | Age: 67
End: 2022-03-02
Payer: MEDICARE

## 2022-03-02 DIAGNOSIS — E89.0 POST-SURGICAL HYPOTHYROIDISM: ICD-10-CM

## 2022-03-02 RX ORDER — LEVOTHYROXINE SODIUM 88 UG/1
88 TABLET ORAL DAILY
Qty: 30 TABLET | Refills: 1 | Status: SHIPPED | OUTPATIENT
Start: 2022-03-02 | End: 2022-03-07 | Stop reason: SDUPTHER

## 2022-03-07 ENCOUNTER — PATIENT MESSAGE (OUTPATIENT)
Dept: ENDOCRINOLOGY | Facility: CLINIC | Age: 67
End: 2022-03-07
Payer: MEDICARE

## 2022-03-07 DIAGNOSIS — E89.0 POST-SURGICAL HYPOTHYROIDISM: ICD-10-CM

## 2022-03-07 RX ORDER — LEVOTHYROXINE SODIUM 88 UG/1
88 TABLET ORAL DAILY
Qty: 30 TABLET | Refills: 1 | Status: SHIPPED | OUTPATIENT
Start: 2022-03-07 | End: 2022-04-14

## 2022-03-07 RX ORDER — LEVOTHYROXINE SODIUM 88 UG/1
88 TABLET ORAL DAILY
Qty: 30 TABLET | Refills: 1 | Status: SHIPPED | OUTPATIENT
Start: 2022-03-07 | End: 2022-03-07 | Stop reason: SDUPTHER

## 2022-04-25 NOTE — PROGRESS NOTES
Subjective:      Patient ID: Sherry Torres is a 66 y.o. female.    Chief Complaint:  Hypothyroidism    History of Present Illness  Sherry Torres is here for follow up of post surgical hypothyroidism.  Previously seen by me 8/2019.  Last seen By Dr. Chavarria 1/27/2021.      She notes that she was diagnosed in 8/2016 with breast cancer.  Required lumpectomy and radiation.    With regards to hypothyroidism:    She had thyroid cancer surgery in 2006 by Dr. Pardo. It was stage III: T2, N0, M0. 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 She has had 3 total body scans, 2007, 2008 and 2009, all negative. She was treated with 100 mCi I-131 postsurgery.    Lab Results   Component Value Date    TSH 1.382 01/25/2021    FREET4 1.28 01/25/2021       Thyroid US  10/2019  Status post thyroidectomy.  No sonographic evidence of malignancy in this patient with an undetectable serum thyroglobulin.    Current medication:  Synthroid 88mcg daily     Biotin Use: Denies     Takes thyroid medication properly without food first thing in the morning.    Current Symptoms:   Denies unexplained weight changes  Denies Fatigue   Denies Constipation   Denies Hair loss  Denies Brittle nails  Denies Mental fog   Denies cp, palpations or sob  Denies Heat intolerance  Denies Cold intolerance    BMD  2/14/2020  1. Osteopenia: FRAX Score does not support osteoporosis medications  RECOMMENDATIONS of [facility]:  1. Recommend daily calcium intake 8371-4755 mg, dietary sources preferred; Vitamin D 8985-1114 IU daily.  2. Adequate exercise and fall precautions.  3. Repeat BMD in 2-4 years    Vitamin D: multivitamin     Review of Systems   as above    Objective:   Physical Exam  Vitals reviewed.   Cardiovascular:      Rate and Rhythm: Normal rate.      Comments: No edema present  Pulmonary:      Effort: Pulmonary effort is normal.   Abdominal:      Palpations: Abdomen is soft.       Visit Vitals  BP  "121/77   Pulse 65   Ht 5' 5" (1.651 m)   Wt 70.5 kg (155 lb 5 oz)   SpO2 97%   BMI 25.85 kg/m²     Body mass index is 25.85 kg/m².    Lab Review:   Lab Results   Component Value Date    HGBA1C 5.6 11/06/2019     Lab Results   Component Value Date    CHOL 282 (H) 12/05/2019    HDL 63 12/05/2019    LDLCALC 196.6 (H) 12/05/2019    TRIG 112 12/05/2019    CHOLHDL 22.3 12/05/2019     Lab Results   Component Value Date     01/25/2021    K 4.5 01/25/2021     01/25/2021    CO2 27 01/25/2021     01/25/2021    BUN 10 01/25/2021    CREATININE 0.8 01/25/2021    CALCIUM 9.4 01/25/2021    PROT 7.2 01/25/2021    ALBUMIN 3.9 01/25/2021    BILITOT 0.4 01/25/2021    ALKPHOS 41 (L) 01/25/2021    AST 17 01/25/2021    ALT 22 01/25/2021    ANIONGAP 9 01/25/2021    ESTGFRAFRICA >60.0 01/25/2021    EGFRNONAA >60.0 01/25/2021    TSH 1.382 01/25/2021     Vit D, 25-Hydroxy   Date Value Ref Range Status   11/06/2019 21 (L) 30 - 96 ng/mL Final     Comment:     Vitamin D deficiency.........<10 ng/mL                              Vitamin D insufficiency......10-29 ng/mL       Vitamin D sufficiency........> or equal to 30 ng/mL  Vitamin D toxicity............>100 ng/mL       Assessment and Plan     1. Post-surgical hypothyroidism  SYNTHROID 88 mcg tablet    TSH    Thyroglobulin    Comprehensive Metabolic Panel    Vitamin D   2. History of thyroid cancer  SYNTHROID 88 mcg tablet    TSH    Thyroglobulin    Comprehensive Metabolic Panel    Vitamin D     Post-surgical hypothyroidism  -- Labs scheduled.  -- Medication Changes:   CONTINUE  Synthroid 88mcg daily  -- Clinically and biochemically euthyroid.  -- Goal is a low normal TSH.  -- Avoid exogenous hyperthyroidism as this can accelerate bone loss and increase risk of CV complications.  -- Advised to take LT4 on an empty stomach with water and to wait 30-45 minutes before eating or taking other medications   -- Reviewed usual times of thyroid hormone changes  -- Reviewed that " symptoms of hypothyroidism may not correlate with tsh, and a normal TSH is the goal of therapy. Symptoms are not a justification for over treatment.    History of thyroid cancer  -- Check Tg.  -- Repeat thyroid ultrasound if clinical change or Tg change.    Follow up in about 1 year (around 4/26/2023).

## 2022-04-26 ENCOUNTER — OFFICE VISIT (OUTPATIENT)
Dept: ENDOCRINOLOGY | Facility: CLINIC | Age: 67
End: 2022-04-26
Payer: MEDICARE

## 2022-04-26 VITALS
BODY MASS INDEX: 25.88 KG/M2 | OXYGEN SATURATION: 97 % | HEIGHT: 65 IN | DIASTOLIC BLOOD PRESSURE: 77 MMHG | HEART RATE: 65 BPM | SYSTOLIC BLOOD PRESSURE: 121 MMHG | WEIGHT: 155.31 LBS

## 2022-04-26 DIAGNOSIS — Z85.850 HISTORY OF THYROID CANCER: ICD-10-CM

## 2022-04-26 DIAGNOSIS — E89.0 POST-SURGICAL HYPOTHYROIDISM: Primary | ICD-10-CM

## 2022-04-26 PROCEDURE — 99214 PR OFFICE/OUTPT VISIT, EST, LEVL IV, 30-39 MIN: ICD-10-PCS | Mod: S$PBB,,, | Performed by: NURSE PRACTITIONER

## 2022-04-26 PROCEDURE — 99214 OFFICE O/P EST MOD 30 MIN: CPT | Mod: PBBFAC | Performed by: NURSE PRACTITIONER

## 2022-04-26 PROCEDURE — 99214 OFFICE O/P EST MOD 30 MIN: CPT | Mod: S$PBB,,, | Performed by: NURSE PRACTITIONER

## 2022-04-26 PROCEDURE — 99999 PR PBB SHADOW E&M-EST. PATIENT-LVL IV: ICD-10-PCS | Mod: PBBFAC,,, | Performed by: NURSE PRACTITIONER

## 2022-04-26 PROCEDURE — 99999 PR PBB SHADOW E&M-EST. PATIENT-LVL IV: CPT | Mod: PBBFAC,,, | Performed by: NURSE PRACTITIONER

## 2022-04-26 RX ORDER — TRETINOIN 0.1 MG/G
GEL TOPICAL
COMMUNITY
Start: 2022-02-21 | End: 2023-03-31 | Stop reason: ALTCHOICE

## 2022-04-26 RX ORDER — LEVOTHYROXINE SODIUM 88 UG/1
88 TABLET ORAL DAILY
Qty: 90 TABLET | Refills: 0 | Status: SHIPPED | OUTPATIENT
Start: 2022-04-26 | End: 2022-07-31

## 2022-04-26 NOTE — ASSESSMENT & PLAN NOTE
-- Labs scheduled.  -- Medication Changes:   CONTINUE  Synthroid 88mcg daily  -- Clinically and biochemically euthyroid.  -- Goal is a low normal TSH.  -- Avoid exogenous hyperthyroidism as this can accelerate bone loss and increase risk of CV complications.  -- Advised to take LT4 on an empty stomach with water and to wait 30-45 minutes before eating or taking other medications   -- Reviewed usual times of thyroid hormone changes  -- Reviewed that symptoms of hypothyroidism may not correlate with tsh, and a normal TSH is the goal of therapy. Symptoms are not a justification for over treatment.

## 2022-07-12 ENCOUNTER — HOSPITAL ENCOUNTER (OUTPATIENT)
Dept: RADIOLOGY | Facility: HOSPITAL | Age: 67
Discharge: HOME | End: 2022-07-12
Attending: PHYSICIAN ASSISTANT
Payer: MEDICARE

## 2022-07-12 ENCOUNTER — OFFICE VISIT (OUTPATIENT)
Dept: ORTHOPEDICS | Facility: CLINIC | Age: 67
End: 2022-07-12
Payer: MEDICARE

## 2022-07-12 VITALS — BODY MASS INDEX: 25.9 KG/M2 | WEIGHT: 155.44 LBS | HEIGHT: 65 IN

## 2022-07-12 DIAGNOSIS — M17.11 PRIMARY OSTEOARTHRITIS OF RIGHT KNEE: Primary | ICD-10-CM

## 2022-07-12 DIAGNOSIS — M25.561 RIGHT KNEE PAIN, UNSPECIFIED CHRONICITY: ICD-10-CM

## 2022-07-12 PROCEDURE — 99213 OFFICE O/P EST LOW 20 MIN: CPT | Mod: 25,S$PBB,, | Performed by: PHYSICIAN ASSISTANT

## 2022-07-12 PROCEDURE — 73564 X-RAY EXAM KNEE 4 OR MORE: CPT | Mod: 26,RT,, | Performed by: RADIOLOGY

## 2022-07-12 PROCEDURE — 73562 X-RAY EXAM OF KNEE 3: CPT | Mod: TC,LT

## 2022-07-12 PROCEDURE — 99999 PR PBB SHADOW E&M-EST. PATIENT-LVL III: CPT | Mod: PBBFAC,,, | Performed by: PHYSICIAN ASSISTANT

## 2022-07-12 PROCEDURE — 20610 PR DRAIN/INJECT LARGE JOINT/BURSA: ICD-10-PCS | Mod: S$PBB,RT,, | Performed by: PHYSICIAN ASSISTANT

## 2022-07-12 PROCEDURE — 99213 OFFICE O/P EST LOW 20 MIN: CPT | Mod: PBBFAC,25 | Performed by: PHYSICIAN ASSISTANT

## 2022-07-12 PROCEDURE — 20610 DRAIN/INJ JOINT/BURSA W/O US: CPT | Mod: PBBFAC,RT | Performed by: PHYSICIAN ASSISTANT

## 2022-07-12 PROCEDURE — 20610 DRAIN/INJ JOINT/BURSA W/O US: CPT | Mod: S$PBB,RT,, | Performed by: PHYSICIAN ASSISTANT

## 2022-07-12 PROCEDURE — 99999 PR PBB SHADOW E&M-EST. PATIENT-LVL III: ICD-10-PCS | Mod: PBBFAC,,, | Performed by: PHYSICIAN ASSISTANT

## 2022-07-12 PROCEDURE — 73562 X-RAY EXAM OF KNEE 3: CPT | Mod: 26,LT,, | Performed by: RADIOLOGY

## 2022-07-12 PROCEDURE — 99213 PR OFFICE/OUTPT VISIT, EST, LEVL III, 20-29 MIN: ICD-10-PCS | Mod: 25,S$PBB,, | Performed by: PHYSICIAN ASSISTANT

## 2022-07-12 PROCEDURE — 73564 XR KNEE ORTHO RIGHT WITH FLEXION: ICD-10-PCS | Mod: 26,RT,, | Performed by: RADIOLOGY

## 2022-07-12 PROCEDURE — 73562 XR KNEE ORTHO RIGHT WITH FLEXION: ICD-10-PCS | Mod: 26,LT,, | Performed by: RADIOLOGY

## 2022-07-12 RX ADMIN — METHYLPREDNISOLONE ACETATE 80 MG: 80 INJECTION, SUSPENSION INTRALESIONAL; INTRAMUSCULAR; INTRASYNOVIAL; SOFT TISSUE at 07:07

## 2022-07-12 NOTE — PROGRESS NOTES
Subjective:      Patient ID: Sherry Torres is a 66 y.o. female.    Chief Complaint: Pain of the Right Knee    HPI  66 year old female presents with chief complaint of right knee pain for about 1 month. No trauma. She has h/o OA. Pain is at the anterior aspect and it radiates up the lateral thigh. It is worse at night and after increased activity. Voltaren gel helps. She cannot take po nsaids because it upsets her stomach. She does HEP and she walks for exercise. Knee feels weak at times. She had CSI in 2020 with good relief. She denies swelling, popping, locking, catching, and giving way. She does not use assistive devices.   Review of Systems   Constitutional: Negative for chills, fever and night sweats.   Cardiovascular: Negative for chest pain.   Respiratory: Negative for cough and shortness of breath.    Hematologic/Lymphatic: Does not bruise/bleed easily.   Skin: Negative for color change.   Gastrointestinal: Negative for heartburn.   Genitourinary: Negative for dysuria.   Neurological: Negative for numbness and paresthesias.   Psychiatric/Behavioral: Negative for altered mental status.   Allergic/Immunologic: Negative for persistent infections.         Objective:            Ortho/SPM Exam  General :   alert, appears stated age and cooperative   Gait: Normal. The patient can bear weight on the injured extremity.   Right Lower Extremity  Hip Palpation:  no tenderness over the greater  trochanter   Hip ROM: 100% of normal    Knee Effusion:  None.   Ecchymosis:  none   Knee ROM:  0 to 120 degrees with subpatellar   crepitance.   Patella:  Patella does not track normally.  Patellar apprehension test: negative  Patellar compression test: negative   Tenderness: medial joint line   Stability:  Lachman's test: negative  Posterior drawer: negative  Medial collateral ligament: negative  Lateral collateral ligament: negative         Gigi's Test:  positive    Sensation:   intact to light touch   Pulses: normal DP  and PT pulses         X-ray was ordered and independently reviewed by me. OA present.        Assessment:       Encounter Diagnosis   Name Primary?    Primary osteoarthritis of right knee Yes          Plan:       Patient would like to repeat CSI. Continue HEP. RTC prn.     PROCEDURE:  I have explained the risks, benefits, and alternatives of the procedure in detail.  The patient voices understanding and all questions have been answered.  The patient agrees to proceed as planned. So after I performed a sterile prep of the skin in the normal fashion the right knee is injected using a 22 gauge needle from the anterolateral approach with a combination of 4cc 1% plain lidocaine and 80 mg of depo medrol.  The patient is cautioned and immediate relief of pain is secondary to the local anesthetic and will be temporary.  After the anesthetic wears off there may be a increase in pain that may last for a few hours or a few days and they should use ice to help alleviate this flair up of pain.

## 2022-07-22 ENCOUNTER — PATIENT MESSAGE (OUTPATIENT)
Dept: ENDOCRINOLOGY | Facility: CLINIC | Age: 67
End: 2022-07-22
Payer: MEDICARE

## 2022-07-22 RX ORDER — METHYLPREDNISOLONE ACETATE 80 MG/ML
80 INJECTION, SUSPENSION INTRA-ARTICULAR; INTRALESIONAL; INTRAMUSCULAR; SOFT TISSUE
Status: COMPLETED | OUTPATIENT
Start: 2022-07-12 | End: 2022-07-12

## 2022-07-27 ENCOUNTER — LAB VISIT (OUTPATIENT)
Dept: LAB | Facility: HOSPITAL | Age: 67
End: 2022-07-27
Attending: NURSE PRACTITIONER
Payer: MEDICARE

## 2022-07-27 DIAGNOSIS — Z85.850 HISTORY OF THYROID CANCER: ICD-10-CM

## 2022-07-27 DIAGNOSIS — E89.0 POST-SURGICAL HYPOTHYROIDISM: ICD-10-CM

## 2022-07-27 LAB
25(OH)D3+25(OH)D2 SERPL-MCNC: 26 NG/ML (ref 30–96)
ALBUMIN SERPL BCP-MCNC: 4 G/DL (ref 3.5–5.2)
ALP SERPL-CCNC: 39 U/L (ref 55–135)
ALT SERPL W/O P-5'-P-CCNC: 13 U/L (ref 10–44)
ANION GAP SERPL CALC-SCNC: 10 MMOL/L (ref 8–16)
AST SERPL-CCNC: 13 U/L (ref 10–40)
BILIRUB SERPL-MCNC: 0.5 MG/DL (ref 0.1–1)
BUN SERPL-MCNC: 8 MG/DL (ref 8–23)
CALCIUM SERPL-MCNC: 9.4 MG/DL (ref 8.7–10.5)
CHLORIDE SERPL-SCNC: 106 MMOL/L (ref 95–110)
CO2 SERPL-SCNC: 25 MMOL/L (ref 23–29)
CREAT SERPL-MCNC: 0.9 MG/DL (ref 0.5–1.4)
EST. GFR  (AFRICAN AMERICAN): >60 ML/MIN/1.73 M^2
EST. GFR  (NON AFRICAN AMERICAN): >60 ML/MIN/1.73 M^2
GLUCOSE SERPL-MCNC: 78 MG/DL (ref 70–110)
POTASSIUM SERPL-SCNC: 3.8 MMOL/L (ref 3.5–5.1)
PROT SERPL-MCNC: 7.4 G/DL (ref 6–8.4)
SODIUM SERPL-SCNC: 141 MMOL/L (ref 136–145)
T4 FREE SERPL-MCNC: 1.07 NG/DL (ref 0.71–1.51)
TSH SERPL DL<=0.005 MIU/L-ACNC: 8.76 UIU/ML (ref 0.4–4)

## 2022-07-27 PROCEDURE — 84439 ASSAY OF FREE THYROXINE: CPT | Performed by: NURSE PRACTITIONER

## 2022-07-27 PROCEDURE — 36415 COLL VENOUS BLD VENIPUNCTURE: CPT | Performed by: NURSE PRACTITIONER

## 2022-07-27 PROCEDURE — 82306 VITAMIN D 25 HYDROXY: CPT | Performed by: NURSE PRACTITIONER

## 2022-07-27 PROCEDURE — 80053 COMPREHEN METABOLIC PANEL: CPT | Performed by: NURSE PRACTITIONER

## 2022-07-27 PROCEDURE — 84443 ASSAY THYROID STIM HORMONE: CPT | Performed by: NURSE PRACTITIONER

## 2022-07-31 ENCOUNTER — PATIENT MESSAGE (OUTPATIENT)
Dept: ENDOCRINOLOGY | Facility: CLINIC | Age: 67
End: 2022-07-31
Payer: MEDICARE

## 2022-07-31 DIAGNOSIS — E89.0 POST-SURGICAL HYPOTHYROIDISM: Primary | ICD-10-CM

## 2022-07-31 RX ORDER — LEVOTHYROXINE SODIUM 100 UG/1
100 TABLET ORAL
Qty: 30 TABLET | Refills: 6 | Status: SHIPPED | OUTPATIENT
Start: 2022-07-31 | End: 2023-02-27

## 2022-07-31 NOTE — TELEPHONE ENCOUNTER
Component      Latest Ref Rng & Units 7/27/2022   Sodium      136 - 145 mmol/L 141   Potassium      3.5 - 5.1 mmol/L 3.8   Chloride      95 - 110 mmol/L 106   CO2      23 - 29 mmol/L 25   Glucose      70 - 110 mg/dL 78   BUN      8 - 23 mg/dL 8   Creatinine      0.5 - 1.4 mg/dL 0.9   Calcium      8.7 - 10.5 mg/dL 9.4   PROTEIN TOTAL      6.0 - 8.4 g/dL 7.4   Albumin      3.5 - 5.2 g/dL 4.0   BILIRUBIN TOTAL      0.1 - 1.0 mg/dL 0.5   Alkaline Phosphatase      55 - 135 U/L 39 (L)   AST      10 - 40 U/L 13   ALT      10 - 44 U/L 13   Anion Gap      8 - 16 mmol/L 10   eGFR if African American      >60 mL/min/1.73 m:2 >60.0   eGFR if non African American      >60 mL/min/1.73 m:2 >60.0   Thyroglobulin, Tumor Marker      ng/mL <0.1   Thyroglobulin Antibody Screen      <1.8 IU/mL <1.8   Thyroglobulin Interpretation       SEE BELOW   TSH      0.400 - 4.000 uIU/mL 8.761 (H)   Vit D, 25-Hydroxy      30 - 96 ng/mL 26 (L)   Free T4      0.71 - 1.51 ng/dL 1.07

## 2022-10-11 ENCOUNTER — LAB VISIT (OUTPATIENT)
Dept: LAB | Facility: HOSPITAL | Age: 67
End: 2022-10-11
Payer: MEDICARE

## 2022-10-11 DIAGNOSIS — E89.0 POST-SURGICAL HYPOTHYROIDISM: ICD-10-CM

## 2022-10-11 LAB — TSH SERPL DL<=0.005 MIU/L-ACNC: 1.66 UIU/ML (ref 0.4–4)

## 2022-10-11 PROCEDURE — 36415 COLL VENOUS BLD VENIPUNCTURE: CPT | Performed by: NURSE PRACTITIONER

## 2022-10-11 PROCEDURE — 84443 ASSAY THYROID STIM HORMONE: CPT | Performed by: NURSE PRACTITIONER

## 2023-02-23 ENCOUNTER — PATIENT MESSAGE (OUTPATIENT)
Dept: DERMATOLOGY | Facility: CLINIC | Age: 68
End: 2023-02-23
Payer: MEDICARE

## 2023-02-23 DIAGNOSIS — L98.8 RHYTIDES: ICD-10-CM

## 2023-02-24 ENCOUNTER — TELEPHONE (OUTPATIENT)
Dept: GASTROENTEROLOGY | Facility: CLINIC | Age: 68
End: 2023-02-24
Payer: MEDICARE

## 2023-02-24 ENCOUNTER — OFFICE VISIT (OUTPATIENT)
Dept: ORTHOPEDICS | Facility: CLINIC | Age: 68
End: 2023-02-24
Payer: MEDICARE

## 2023-02-24 ENCOUNTER — TELEPHONE (OUTPATIENT)
Dept: SURGERY | Facility: CLINIC | Age: 68
End: 2023-02-24
Payer: MEDICARE

## 2023-02-24 VITALS — HEIGHT: 65 IN | WEIGHT: 157.63 LBS | BODY MASS INDEX: 26.26 KG/M2

## 2023-02-24 DIAGNOSIS — M17.11 PRIMARY OSTEOARTHRITIS OF RIGHT KNEE: Primary | ICD-10-CM

## 2023-02-24 PROCEDURE — 1125F AMNT PAIN NOTED PAIN PRSNT: CPT | Mod: CPTII,S$GLB,, | Performed by: PHYSICIAN ASSISTANT

## 2023-02-24 PROCEDURE — 3288F FALL RISK ASSESSMENT DOCD: CPT | Mod: CPTII,S$GLB,, | Performed by: PHYSICIAN ASSISTANT

## 2023-02-24 PROCEDURE — 1159F PR MEDICATION LIST DOCUMENTED IN MEDICAL RECORD: ICD-10-PCS | Mod: CPTII,S$GLB,, | Performed by: PHYSICIAN ASSISTANT

## 2023-02-24 PROCEDURE — 1125F PR PAIN SEVERITY QUANTIFIED, PAIN PRESENT: ICD-10-PCS | Mod: CPTII,S$GLB,, | Performed by: PHYSICIAN ASSISTANT

## 2023-02-24 PROCEDURE — 3008F BODY MASS INDEX DOCD: CPT | Mod: CPTII,S$GLB,, | Performed by: PHYSICIAN ASSISTANT

## 2023-02-24 PROCEDURE — 99213 PR OFFICE/OUTPT VISIT, EST, LEVL III, 20-29 MIN: ICD-10-PCS | Mod: S$GLB,,, | Performed by: PHYSICIAN ASSISTANT

## 2023-02-24 PROCEDURE — 3288F PR FALLS RISK ASSESSMENT DOCUMENTED: ICD-10-PCS | Mod: CPTII,S$GLB,, | Performed by: PHYSICIAN ASSISTANT

## 2023-02-24 PROCEDURE — 99999 PR PBB SHADOW E&M-EST. PATIENT-LVL IV: ICD-10-PCS | Mod: PBBFAC,,, | Performed by: PHYSICIAN ASSISTANT

## 2023-02-24 PROCEDURE — 99999 PR PBB SHADOW E&M-EST. PATIENT-LVL IV: CPT | Mod: PBBFAC,,, | Performed by: PHYSICIAN ASSISTANT

## 2023-02-24 PROCEDURE — 3008F PR BODY MASS INDEX (BMI) DOCUMENTED: ICD-10-PCS | Mod: CPTII,S$GLB,, | Performed by: PHYSICIAN ASSISTANT

## 2023-02-24 PROCEDURE — 1101F PT FALLS ASSESS-DOCD LE1/YR: CPT | Mod: CPTII,S$GLB,, | Performed by: PHYSICIAN ASSISTANT

## 2023-02-24 PROCEDURE — 1159F MED LIST DOCD IN RCRD: CPT | Mod: CPTII,S$GLB,, | Performed by: PHYSICIAN ASSISTANT

## 2023-02-24 PROCEDURE — 99213 OFFICE O/P EST LOW 20 MIN: CPT | Mod: S$GLB,,, | Performed by: PHYSICIAN ASSISTANT

## 2023-02-24 PROCEDURE — 1101F PR PT FALLS ASSESS DOC 0-1 FALLS W/OUT INJ PAST YR: ICD-10-PCS | Mod: CPTII,S$GLB,, | Performed by: PHYSICIAN ASSISTANT

## 2023-02-24 PROCEDURE — 1160F RVW MEDS BY RX/DR IN RCRD: CPT | Mod: CPTII,S$GLB,, | Performed by: PHYSICIAN ASSISTANT

## 2023-02-24 PROCEDURE — 1160F PR REVIEW ALL MEDS BY PRESCRIBER/CLIN PHARMACIST DOCUMENTED: ICD-10-PCS | Mod: CPTII,S$GLB,, | Performed by: PHYSICIAN ASSISTANT

## 2023-02-24 NOTE — TELEPHONE ENCOUNTER
Left a message with patient regarding her appointment, she needs to see gastro instead, so I will be sending a message to gastro in the meantime to see if they can give her a call and I will cancel her appointment with us for march 1st

## 2023-02-24 NOTE — TELEPHONE ENCOUNTER
Attempted to contact pt regarding message below. Pt didn't answer and I left a detail message to return call to our office.    ----- Message from Guillermo Parham MA sent at 2/24/2023  3:24 PM CST -----  Regarding: patient needs appointment  Hi,  Please contact this patient for a gastro appointment. She incorrectly scheduled herself with colon and rectal for acid reflux issues for march 1st

## 2023-02-24 NOTE — PROGRESS NOTES
Subjective:       Patient ID: Sherry Torres is a 67 y.o. female.    Chief Complaint: Pain of the Left Knee and Pain of the Right Knee    HPI  66 y/o female presents for right knee pain. Denies acute injury or trauma to the area. She was seen last July chronic right knee pain and received CSI. She was pain free for about 5 months. Pain started back in December of 2022 and has gradually worsened. Pain is a 7/10 constant achy and dull pain with associated weakness and locking. Pain is localized to the medial aspect of the knee and worse when weight bearing and in the mornings. Denies pain at night. Denies any popping or cracking sensation in right knee. She has cut back on her exercise regimen and use ice/heat application as needed. She has been taking Tylenol arthritis 1000 mg daily with minimal relief. She tries not to take NSAIDS due to stomach complications. She is concerned overcompensating with left knee and worries that she will have pain later on in the left knee. Denies numbness and tingling. Denies radiation of symptoms. Denies erythema, swelling, warmth, or fever to the joint.       Review of Systems   Constitutional:  Negative for chills and fever.   HENT:  Negative for ear pain and sore throat.    Eyes:  Negative for pain.   Respiratory:  Negative for chest tightness and shortness of breath.    Cardiovascular:  Negative for chest pain.   Gastrointestinal:  Negative for abdominal pain.   Endocrine: Negative for cold intolerance and heat intolerance.   Musculoskeletal:  Positive for arthralgias.   Integumentary:  Negative for wound.   Allergic/Immunologic: Negative for environmental allergies.   Neurological:  Negative for light-headedness and numbness.   Psychiatric/Behavioral:  The patient is not nervous/anxious.        Objective:      Physical Exam  Constitutional:       Appearance: Normal appearance.   HENT:      Head: Normocephalic and atraumatic.      Nose: Nose normal.   Eyes:      Extraocular  Movements: Extraocular movements intact.   Cardiovascular:      Rate and Rhythm: Normal rate.   Pulmonary:      Effort: Pulmonary effort is normal.   Musculoskeletal:      Cervical back: Normal range of motion.      Right knee: Crepitus present. No swelling, effusion or bony tenderness. Normal range of motion. No tenderness. No LCL laxity, MCL laxity, ACL laxity or PCL laxity. Normal patellar mobility. Normal pulse.      Instability Tests: Anterior drawer test negative. Posterior drawer test negative. Medial Gigi test negative and lateral Gigi test negative.      Left knee: Normal.      Comments: AROM Bilateral knees: E:0, F:120  PROM Bilateral knees: E:0, F:130  Graded 5/5 strength bilateral knee flexion/extension. Normal sensation bilaterally. Normal quad set bilaterally. Pain on right knee valgus stress test, no laxity.     Skin:     General: Skin is warm.   Neurological:      General: No focal deficit present.      Mental Status: She is alert and oriented to person, place, and time.   Psychiatric:         Mood and Affect: Mood normal.         Behavior: Behavior normal.         RADIOGRAPHS:  FINDINGS:  X-ray knee ortho right with flexion (7/10/22): Mild joint space narrowing medially can be seen on the right.  Bony structures are intact and no joint effusion is seen on the right.    MEDICAL NECESSITY FOR VISCOSUPPLENTATION: After thorough evaluation of the patient, I have determined that visco-supplementation is medically necessary. The patient has painful DJD of the knee with failure of conservative therapy including lifestyle modifications and rehabilitation exercises. Oral analgesics ? NSAIDS have not adequately controlled symptoms and there is radiographic evidence of joint space narrowing, subchondral sclerosis, and osteophytic changes, or in lack of radiographic evidence, there is arthroscopic or other evidence of chondrosis Kellgren-Jed grade 2 or greater.   Assessment:       Problem List  Items Addressed This Visit    None      Plan:       - Radiographs were taken on 7/10/22 and were personally reviewed. Findings are listed above.   - Discussed all treatment options with patient. Pt decided to complete the series of three Euflexxa injections. Order placed.  - Continue light exercise. Pt is to ice, rest, and elevate as needed.   - Pt is to call back if symptoms worsen or if any new complaint arises.     Follow up in one week for first Euflexxa injection.      SARAH Rodriguez-S3  [facility]   Physician Assistant Program    I have reviewed the patients notes and discussed the physical exam, documentation, diagnosis, and treatment plan with Michelle BENTLEY and I am in agreement with the documentation and treatment plan.

## 2023-02-27 ENCOUNTER — PES CALL (OUTPATIENT)
Dept: ADMINISTRATIVE | Facility: CLINIC | Age: 68
End: 2023-02-27
Payer: MEDICARE

## 2023-02-27 RX ORDER — TRETINOIN 0.25 MG/G
CREAM TOPICAL
Qty: 30 G | Refills: 0 | Status: SHIPPED | OUTPATIENT
Start: 2023-02-27 | End: 2023-03-31 | Stop reason: SDUPTHER

## 2023-03-03 ENCOUNTER — OFFICE VISIT (OUTPATIENT)
Dept: ORTHOPEDICS | Facility: CLINIC | Age: 68
End: 2023-03-03
Payer: MEDICARE

## 2023-03-03 DIAGNOSIS — M17.11 PRIMARY OSTEOARTHRITIS OF RIGHT KNEE: Primary | ICD-10-CM

## 2023-03-03 PROCEDURE — 1160F RVW MEDS BY RX/DR IN RCRD: CPT | Mod: CPTII,S$GLB,, | Performed by: PHYSICIAN ASSISTANT

## 2023-03-03 PROCEDURE — 20610 PR DRAIN/INJECT LARGE JOINT/BURSA: ICD-10-PCS | Mod: RT,S$GLB,, | Performed by: PHYSICIAN ASSISTANT

## 2023-03-03 PROCEDURE — 20610 DRAIN/INJ JOINT/BURSA W/O US: CPT | Mod: RT,S$GLB,, | Performed by: PHYSICIAN ASSISTANT

## 2023-03-03 PROCEDURE — 1159F MED LIST DOCD IN RCRD: CPT | Mod: CPTII,S$GLB,, | Performed by: PHYSICIAN ASSISTANT

## 2023-03-03 PROCEDURE — 99999 PR PBB SHADOW E&M-EST. PATIENT-LVL III: CPT | Mod: PBBFAC,,, | Performed by: PHYSICIAN ASSISTANT

## 2023-03-03 PROCEDURE — 1160F PR REVIEW ALL MEDS BY PRESCRIBER/CLIN PHARMACIST DOCUMENTED: ICD-10-PCS | Mod: CPTII,S$GLB,, | Performed by: PHYSICIAN ASSISTANT

## 2023-03-03 PROCEDURE — 99499 UNLISTED E&M SERVICE: CPT | Mod: S$GLB,,, | Performed by: PHYSICIAN ASSISTANT

## 2023-03-03 PROCEDURE — 99499 NO LOS: ICD-10-PCS | Mod: S$GLB,,, | Performed by: PHYSICIAN ASSISTANT

## 2023-03-03 PROCEDURE — 99999 PR PBB SHADOW E&M-EST. PATIENT-LVL III: ICD-10-PCS | Mod: PBBFAC,,, | Performed by: PHYSICIAN ASSISTANT

## 2023-03-03 PROCEDURE — 1159F PR MEDICATION LIST DOCUMENTED IN MEDICAL RECORD: ICD-10-PCS | Mod: CPTII,S$GLB,, | Performed by: PHYSICIAN ASSISTANT

## 2023-03-03 NOTE — PROGRESS NOTES
Sherry Torres is a 67 y.o. year old her here today for her 1st Euflexxa injection for degenerative changes of her right knee . she was last seen and treated in the clinic on 2/24/2023. There has been no significant change in her medical status since her last visit. No Fever, chills, malaise, or unexplained weight change.      Allergies, Medications, past medical and surgical history were reviewed .    Examination of the knee demonstrates  No evidence of edema, erythema , echymosis strength and range of motion are unchanged from previous visit.    The injection site was identified and the skin was prepared with a betadine solution. The  right knee  joint was injected with 2 ml of Euflexxa solution under sterile technique. Sherry Torres tolerated the procedure well, she was advised to rest the knee today, ice and elevation. I may take 3 -6 weeks following the last injection to get the full benefit of the medication.  I will see her back in 1 week. Sooner if he has any problems or concerns.           .     ICD-10-CM ICD-9-CM   1. Primary osteoarthritis of right knee  M17.11 715.16

## 2023-03-10 ENCOUNTER — OFFICE VISIT (OUTPATIENT)
Dept: ORTHOPEDICS | Facility: CLINIC | Age: 68
End: 2023-03-10
Payer: MEDICARE

## 2023-03-10 DIAGNOSIS — M17.11 PRIMARY OSTEOARTHRITIS OF RIGHT KNEE: Primary | ICD-10-CM

## 2023-03-10 PROCEDURE — 3288F PR FALLS RISK ASSESSMENT DOCUMENTED: ICD-10-PCS | Mod: CPTII,S$GLB,, | Performed by: PHYSICIAN ASSISTANT

## 2023-03-10 PROCEDURE — 1159F MED LIST DOCD IN RCRD: CPT | Mod: CPTII,S$GLB,, | Performed by: PHYSICIAN ASSISTANT

## 2023-03-10 PROCEDURE — 1159F PR MEDICATION LIST DOCUMENTED IN MEDICAL RECORD: ICD-10-PCS | Mod: CPTII,S$GLB,, | Performed by: PHYSICIAN ASSISTANT

## 2023-03-10 PROCEDURE — 20610 DRAIN/INJ JOINT/BURSA W/O US: CPT | Mod: RT,S$GLB,, | Performed by: PHYSICIAN ASSISTANT

## 2023-03-10 PROCEDURE — 1160F RVW MEDS BY RX/DR IN RCRD: CPT | Mod: CPTII,S$GLB,, | Performed by: PHYSICIAN ASSISTANT

## 2023-03-10 PROCEDURE — 1101F PT FALLS ASSESS-DOCD LE1/YR: CPT | Mod: CPTII,S$GLB,, | Performed by: PHYSICIAN ASSISTANT

## 2023-03-10 PROCEDURE — 3288F FALL RISK ASSESSMENT DOCD: CPT | Mod: CPTII,S$GLB,, | Performed by: PHYSICIAN ASSISTANT

## 2023-03-10 PROCEDURE — 99999 PR PBB SHADOW E&M-EST. PATIENT-LVL III: CPT | Mod: PBBFAC,,, | Performed by: PHYSICIAN ASSISTANT

## 2023-03-10 PROCEDURE — 1101F PR PT FALLS ASSESS DOC 0-1 FALLS W/OUT INJ PAST YR: ICD-10-PCS | Mod: CPTII,S$GLB,, | Performed by: PHYSICIAN ASSISTANT

## 2023-03-10 PROCEDURE — 99499 UNLISTED E&M SERVICE: CPT | Mod: S$GLB,,, | Performed by: PHYSICIAN ASSISTANT

## 2023-03-10 PROCEDURE — 1160F PR REVIEW ALL MEDS BY PRESCRIBER/CLIN PHARMACIST DOCUMENTED: ICD-10-PCS | Mod: CPTII,S$GLB,, | Performed by: PHYSICIAN ASSISTANT

## 2023-03-10 PROCEDURE — 99999 PR PBB SHADOW E&M-EST. PATIENT-LVL III: ICD-10-PCS | Mod: PBBFAC,,, | Performed by: PHYSICIAN ASSISTANT

## 2023-03-10 PROCEDURE — 20610 PR DRAIN/INJECT LARGE JOINT/BURSA: ICD-10-PCS | Mod: RT,S$GLB,, | Performed by: PHYSICIAN ASSISTANT

## 2023-03-10 PROCEDURE — 99499 NO LOS: ICD-10-PCS | Mod: S$GLB,,, | Performed by: PHYSICIAN ASSISTANT

## 2023-03-10 NOTE — PROGRESS NOTES
Sherry Torres is a 67 y.o. year old her here today for her 2nd Euflexxa injection for degenerative changes of her right knee . she was last seen and treated in the clinic on 3/3/2023. There has been no significant change in her medical status since her last visit. No Fever, chills, malaise, or unexplained weight change.      Allergies, Medications, past medical and surgical history were reviewed .    Examination of the knee demonstrates  No evidence of edema, erythema , echymosis strength and range of motion are unchanged from previous visit.    The injection site was identified and the skin was prepared with a betadine solution. The  right knee  joint was injected with 2 ml of Euflexxa solution under sterile technique. Sherry Torres tolerated the procedure well, she was advised to rest the knee today, ice and elevation. I may take 3 -6 weeks following the last injection to get the full benefit of the medication.  I will see her back in 1 week. Sooner if he has any problems or concerns.           .     ICD-10-CM ICD-9-CM   1. Primary osteoarthritis of right knee  M17.11 715.16

## 2023-03-17 ENCOUNTER — OFFICE VISIT (OUTPATIENT)
Dept: ORTHOPEDICS | Facility: CLINIC | Age: 68
End: 2023-03-17
Payer: MEDICARE

## 2023-03-17 DIAGNOSIS — M17.11 PRIMARY OSTEOARTHRITIS OF RIGHT KNEE: Primary | ICD-10-CM

## 2023-03-17 PROCEDURE — 99999 PR PBB SHADOW E&M-EST. PATIENT-LVL III: CPT | Mod: PBBFAC,,, | Performed by: PHYSICIAN ASSISTANT

## 2023-03-17 PROCEDURE — 1159F MED LIST DOCD IN RCRD: CPT | Mod: CPTII,S$GLB,, | Performed by: PHYSICIAN ASSISTANT

## 2023-03-17 PROCEDURE — 1101F PR PT FALLS ASSESS DOC 0-1 FALLS W/OUT INJ PAST YR: ICD-10-PCS | Mod: CPTII,S$GLB,, | Performed by: PHYSICIAN ASSISTANT

## 2023-03-17 PROCEDURE — 1101F PT FALLS ASSESS-DOCD LE1/YR: CPT | Mod: CPTII,S$GLB,, | Performed by: PHYSICIAN ASSISTANT

## 2023-03-17 PROCEDURE — 3288F FALL RISK ASSESSMENT DOCD: CPT | Mod: CPTII,S$GLB,, | Performed by: PHYSICIAN ASSISTANT

## 2023-03-17 PROCEDURE — 1159F PR MEDICATION LIST DOCUMENTED IN MEDICAL RECORD: ICD-10-PCS | Mod: CPTII,S$GLB,, | Performed by: PHYSICIAN ASSISTANT

## 2023-03-17 PROCEDURE — 1160F PR REVIEW ALL MEDS BY PRESCRIBER/CLIN PHARMACIST DOCUMENTED: ICD-10-PCS | Mod: CPTII,S$GLB,, | Performed by: PHYSICIAN ASSISTANT

## 2023-03-17 PROCEDURE — 99499 NO LOS: ICD-10-PCS | Mod: S$GLB,,, | Performed by: PHYSICIAN ASSISTANT

## 2023-03-17 PROCEDURE — 1160F RVW MEDS BY RX/DR IN RCRD: CPT | Mod: CPTII,S$GLB,, | Performed by: PHYSICIAN ASSISTANT

## 2023-03-17 PROCEDURE — 99499 UNLISTED E&M SERVICE: CPT | Mod: S$GLB,,, | Performed by: PHYSICIAN ASSISTANT

## 2023-03-17 PROCEDURE — 3288F PR FALLS RISK ASSESSMENT DOCUMENTED: ICD-10-PCS | Mod: CPTII,S$GLB,, | Performed by: PHYSICIAN ASSISTANT

## 2023-03-17 PROCEDURE — 99999 PR PBB SHADOW E&M-EST. PATIENT-LVL III: ICD-10-PCS | Mod: PBBFAC,,, | Performed by: PHYSICIAN ASSISTANT

## 2023-03-17 PROCEDURE — 20610 PR DRAIN/INJECT LARGE JOINT/BURSA: ICD-10-PCS | Mod: RT,S$GLB,, | Performed by: PHYSICIAN ASSISTANT

## 2023-03-17 PROCEDURE — 20610 DRAIN/INJ JOINT/BURSA W/O US: CPT | Mod: RT,S$GLB,, | Performed by: PHYSICIAN ASSISTANT

## 2023-03-17 NOTE — PROGRESS NOTES
Sherry Torres is a 67 y.o. year old her here today for her 3rd Euflexxa injection for degenerative changes of her right knee . she was last seen and treated in the clinic on 3/10/2023. There has been no significant change in her medical status since her last visit. No Fever, chills, malaise, or unexplained weight change.      Allergies, Medications, past medical and surgical history were reviewed .    Examination of the knee demonstrates  No evidence of edema, erythema , echymosis strength and range of motion are unchanged from previous visit.    The injection site was identified and the skin was prepared with a betadine solution. The  right knee  joint was injected with 2 ml of Euflexxa solution under sterile technique. Sherry Torres tolerated the procedure well, she was advised to rest the knee today, ice and elevation. I may take 3 -6 weeks following the last injection to get the full benefit of the medication.  I will see her back in 6 months. Sooner if he has any problems or concerns.           .     ICD-10-CM ICD-9-CM   1. Primary osteoarthritis of right knee  M17.11 715.16

## 2023-03-31 ENCOUNTER — PROCEDURE VISIT (OUTPATIENT)
Dept: DERMATOLOGY | Facility: CLINIC | Age: 68
End: 2023-03-31
Payer: MEDICARE

## 2023-03-31 DIAGNOSIS — L98.8 RHYTIDES: ICD-10-CM

## 2023-03-31 DIAGNOSIS — Z41.1 ENCOUNTER FOR COSMETIC PROCEDURE: Primary | ICD-10-CM

## 2023-03-31 PROCEDURE — 99499 NO LOS: ICD-10-PCS | Mod: CSM,S$GLB,, | Performed by: DERMATOLOGY

## 2023-03-31 PROCEDURE — 99499 UNLISTED E&M SERVICE: CPT | Mod: CSM,S$GLB,, | Performed by: DERMATOLOGY

## 2023-03-31 RX ORDER — TRETINOIN 0.25 MG/G
CREAM TOPICAL
Qty: 30 G | Refills: 11 | Status: SHIPPED | OUTPATIENT
Start: 2023-03-31

## 2023-03-31 NOTE — PROGRESS NOTES
Patient is here today for cosmetic Botox treatment.   She denies any history of adverse reaction to Botox or history of neuromuscular disease.     Discussed benefits and risks of Botox injections, including headache, weakness/paralysis of muscles, asymmetry, eyebrow/lid drooping, pain, bruising, swelling, infection, and rare risk of systemic botulism. Verbal and written consent was obtained.    Patient agreed to proceed with treatment. 50 units total were injected today:  8 in frontalis  22 in glabella (3-5-4-2,5-3)  12 in bilateral periorbital region  8 in bilateral nasalis    Patient tolerated well with no complications. She was instructed not to rub or massage the treated areas and that she should avoid lying down, bending upside down, and strenuous exercise for the rest of the day.  Instructed to wait two weeks to assess response before presenting for a touch up injection, if needed.      Botox dilution: 10u / 0.2mL (10u / 0.4mL for frontalis)  Lot #: [id number]  Exp date: 04/2025  -----------------------------------------------------      Rhytides  -     tretinoin (RETIN-A) 0.025 % cream; Compound tretinoin 0.025% / niacinamide 2% cream / hyaluronic acid 0.25%. Apply a pea-sized amount to entire face qhs.  Dispense: 30 g; Refill: 11

## 2023-04-03 ENCOUNTER — OFFICE VISIT (OUTPATIENT)
Dept: GASTROENTEROLOGY | Facility: CLINIC | Age: 68
End: 2023-04-03
Payer: MEDICARE

## 2023-04-03 VITALS — WEIGHT: 157.44 LBS | BODY MASS INDEX: 26.23 KG/M2 | HEIGHT: 65 IN

## 2023-04-03 DIAGNOSIS — K44.9 HIATAL HERNIA WITH GERD: Primary | ICD-10-CM

## 2023-04-03 DIAGNOSIS — K22.2 SCHATZKI'S RING: ICD-10-CM

## 2023-04-03 DIAGNOSIS — K21.9 HIATAL HERNIA WITH GERD: Primary | ICD-10-CM

## 2023-04-03 PROCEDURE — 3008F BODY MASS INDEX DOCD: CPT | Mod: CPTII,S$GLB,, | Performed by: INTERNAL MEDICINE

## 2023-04-03 PROCEDURE — 1159F PR MEDICATION LIST DOCUMENTED IN MEDICAL RECORD: ICD-10-PCS | Mod: CPTII,S$GLB,, | Performed by: INTERNAL MEDICINE

## 2023-04-03 PROCEDURE — 99214 PR OFFICE/OUTPT VISIT, EST, LEVL IV, 30-39 MIN: ICD-10-PCS | Mod: S$GLB,,, | Performed by: INTERNAL MEDICINE

## 2023-04-03 PROCEDURE — 1101F PR PT FALLS ASSESS DOC 0-1 FALLS W/OUT INJ PAST YR: ICD-10-PCS | Mod: CPTII,S$GLB,, | Performed by: INTERNAL MEDICINE

## 2023-04-03 PROCEDURE — 99999 PR PBB SHADOW E&M-EST. PATIENT-LVL III: CPT | Mod: PBBFAC,,, | Performed by: INTERNAL MEDICINE

## 2023-04-03 PROCEDURE — 3008F PR BODY MASS INDEX (BMI) DOCUMENTED: ICD-10-PCS | Mod: CPTII,S$GLB,, | Performed by: INTERNAL MEDICINE

## 2023-04-03 PROCEDURE — 99999 PR PBB SHADOW E&M-EST. PATIENT-LVL III: ICD-10-PCS | Mod: PBBFAC,,, | Performed by: INTERNAL MEDICINE

## 2023-04-03 PROCEDURE — 3288F PR FALLS RISK ASSESSMENT DOCUMENTED: ICD-10-PCS | Mod: CPTII,S$GLB,, | Performed by: INTERNAL MEDICINE

## 2023-04-03 PROCEDURE — 1159F MED LIST DOCD IN RCRD: CPT | Mod: CPTII,S$GLB,, | Performed by: INTERNAL MEDICINE

## 2023-04-03 PROCEDURE — 1126F PR PAIN SEVERITY QUANTIFIED, NO PAIN PRESENT: ICD-10-PCS | Mod: CPTII,S$GLB,, | Performed by: INTERNAL MEDICINE

## 2023-04-03 PROCEDURE — 1101F PT FALLS ASSESS-DOCD LE1/YR: CPT | Mod: CPTII,S$GLB,, | Performed by: INTERNAL MEDICINE

## 2023-04-03 PROCEDURE — 3288F FALL RISK ASSESSMENT DOCD: CPT | Mod: CPTII,S$GLB,, | Performed by: INTERNAL MEDICINE

## 2023-04-03 PROCEDURE — 1126F AMNT PAIN NOTED NONE PRSNT: CPT | Mod: CPTII,S$GLB,, | Performed by: INTERNAL MEDICINE

## 2023-04-03 PROCEDURE — 99214 OFFICE O/P EST MOD 30 MIN: CPT | Mod: S$GLB,,, | Performed by: INTERNAL MEDICINE

## 2023-04-03 RX ORDER — PANTOPRAZOLE SODIUM 40 MG/1
40 TABLET, DELAYED RELEASE ORAL DAILY
Qty: 30 TABLET | Refills: 11 | Status: SHIPPED | OUTPATIENT
Start: 2023-04-03 | End: 2024-04-02

## 2023-04-03 NOTE — PROGRESS NOTES
GASTROENTEROLOGY CLINIC NOTE    Reason for visit: The encounter diagnosis was Hiatal hernia with GERD.  Referring provider/PCP: Primary Doctor No    HPI:  Sherry Torres is a 67 y.o. female here today for gerd , new to me  Previously followed with tiny [facility] at [facility].    Ongoing symptoms of worsening reflux, she will wake in the middle of the night with acid in the back of her throat.  When she ashvin over she will also feel acid coming up.  She had a prior food impaction she believes about 5 or 6 years ago from dysphagia, she had a endoscopic evaluation at that time.  She also had a balloon dilation of a Schatzki's ring in the past.  Her last endoscopy was EGD back in 2017, I reviewed this image with her, showing a medium size hiatal hernia.  She also knows her mother had a hiatal hernia, she is currently in her 90s her mom is, and she wishes she had her hernia fixed before that time.  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  She is interested in meeting a surgeon in the near future but wants to handle her job issues 1st.  She is taking OTC prilosec daily  She is avoiding eating late at night, she is avoiding dietary triggers such as heavy red sauces etc., she is also sleeping with her head of her bed elevated.    Works as  - working on tay claims currently    Prior Endoscopy:  EGD:  2017   Medium HH  Schatzki dilated to 18  Path - negative for barrets    Colon:  2019 jorgensen -  normal ; 10 years    (Portions of this note were dictated using voice recognition software and may contain dictation related errors in spelling/grammar/syntax not found on text review)    Review of Systems   Constitutional:  Negative for fever and malaise/fatigue.   Respiratory:  Negative for cough and shortness of breath.    Cardiovascular:  Negative for chest pain and palpitations.   Gastrointestinal:  Positive for heartburn and nausea. Negative for abdominal pain, blood in stool and vomiting.    Neurological:  Negative for dizziness and headaches.     Past Medical History: has a past medical history of Atypical ductal hyperplasia, breast, Breast cancer, Breast cyst, Cancer, GERD (gastroesophageal reflux disease), History of thyroid cancer, Lobular carcinoma in situ, Mitral valve prolapse, and Psychiatric problem.    Past Surgical History: has a past surgical history that includes  section; Tubal ligation; CYST REMOVED FROM RIGHT BREAST IN ; left breast wire localization excisional biopsy with bracketed wires using 3 wires and excision through 1 incision. ; Hernia repair; Breast lumpectomy (Right, ); Breast biopsy (Right, 2016); Breast biopsy (Left, ); Transforaminal epidural injection of steroid (Right, 2019); Colonoscopy (N/A, 10/8/2019); and Thyroidectomy.    Family History:family history includes Breast cancer (age of onset: 70) in an other family member; Breast cancer (age of onset: 88) in her maternal grandmother; Dementia in her father; Osteoporosis in her mother.    Allergies:   Review of patient's allergies indicates:   Allergen Reactions    Codeine Nausea Only    Sulfa (sulfonamide antibiotics) Nausea Only       Social History: reports that she quit smoking about 38 years ago. Her smoking use included cigarettes. She has never used smokeless tobacco. She reports current alcohol use. She reports that she does not use drugs.    Home medications:   Current Outpatient Medications on File Prior to Visit   Medication Sig Dispense Refill    acetaminophen/diphenhydramine (TYLENOL PM ORAL) Take by mouth.      atorvastatin (LIPITOR) 80 MG tablet Take 1 tablet (80 mg total) by mouth once daily. 90 tablet 4    calcium-vitamin D3 (OS-NINA 500 + D3) 500 mg-5 mcg (200 unit) per tablet Take 4 tablets by mouth 2 (two) times daily with meals.       diclofenac sodium (VOLTAREN) 1 % Gel Apply 2 g topically 3 (three) times daily. 5 Tube 2    fish oil-omega-3 fatty acids 300-1,000 mg capsule  "Take 2 g by mouth once daily.      ibuprofen (ADVIL,MOTRIN) 200 MG tablet Take 200 mg by mouth every 6 (six) hours as needed for Pain.      levothyroxine (SYNTHROID) 100 MCG tablet Take 1 tablet (100 mcg total) by mouth before breakfast. 30 tablet 3    MAGNESIUM CARBONATE ORAL Take by mouth.      multivitamin capsule Take 2 capsules by mouth once daily.       ondansetron (ZOFRAN-ODT) 4 MG TbDL Take 1 tablet (4 mg total) by mouth every 6 (six) hours as needed. 20 tablet 0    potassium chloride SA (K-DUR,KLOR-CON) 10 MEQ tablet Take 20 mEq by mouth once daily.      tretinoin (RETIN-A) 0.025 % cream Compound tretinoin 0.025% / niacinamide 2% cream / hyaluronic acid 0.25%. Apply a pea-sized amount to entire face qhs. 30 g 11    zinc gluconate 50 mg tablet Take 50 mg by mouth once daily.      omeprazole (PRILOSEC) 40 MG capsule Take 1 capsule (40 mg total) by mouth every morning. 90 capsule 3    [DISCONTINUED] clindamycin (CLEOCIN T) 1 % external solution Apply to affected areas of face BID prn acne. 30 mL 3    [DISCONTINUED] letrozole (FEMARA) 2.5 mg Tab Take 1 tablet (2.5 mg total) by mouth once daily. 90 tablet 3     No current facility-administered medications on file prior to visit.       Vital signs:  Ht 5' 5" (1.651 m)   Wt 71.4 kg (157 lb 6.5 oz)   BMI 26.19 kg/m²     Physical Exam  Vitals reviewed.   Constitutional:       General: She is not in acute distress.  HENT:      Head: Normocephalic and atraumatic.   Eyes:      General: No scleral icterus.     Conjunctiva/sclera: Conjunctivae normal.   Skin:     General: Skin is warm.      Coloration: Skin is not pale.      Findings: No rash.   Neurological:      Mental Status: She is oriented to person, place, and time.      Gait: Gait normal.   Psychiatric:         Mood and Affect: Mood normal.         Behavior: Behavior normal.       I have reviewed prior labs, imaging, notes from last month    Assessment:  1. Hiatal hernia with GERD        Discussed medium  And " gerd.  Gerd despite many lifestyle changes include she has elevated HOB, she has avoided dietary triggers and doesn't eat late  I believe most of her symptoms are related to medium HH    Currently no symptoms of dysphagia.    Plan:  Orders Placed This Encounter    pantoprazole (PROTONIX) 40 MG tablet       Full dose protonix, instructed take first thing in AM    Continue current lifestyle remedies.      RTC come back in about august time and discuss symptoms, consider repeating EGD , consider BRAVO ,consider sending to surgeon for HH repair consideration.    Stephen Serna MD  [facility] [place]

## 2023-04-18 ENCOUNTER — HOSPITAL ENCOUNTER (EMERGENCY)
Facility: HOSPITAL | Age: 68
Discharge: HOME | End: 2023-04-18
Attending: EMERGENCY MEDICINE
Payer: MEDICARE

## 2023-04-18 VITALS
SYSTOLIC BLOOD PRESSURE: 127 MMHG | BODY MASS INDEX: 24.99 KG/M2 | HEIGHT: 65 IN | OXYGEN SATURATION: 100 % | RESPIRATION RATE: 18 BRPM | WEIGHT: 150 LBS | DIASTOLIC BLOOD PRESSURE: 74 MMHG | HEART RATE: 76 BPM | TEMPERATURE: 98 F

## 2023-04-18 DIAGNOSIS — R42 DIZZINESS: ICD-10-CM

## 2023-04-18 DIAGNOSIS — K52.9 GASTROENTERITIS: Primary | ICD-10-CM

## 2023-04-18 LAB
ALBUMIN SERPL BCP-MCNC: 3.8 G/DL (ref 3.5–5.2)
ALP SERPL-CCNC: 40 U/L (ref 55–135)
ALT SERPL W/O P-5'-P-CCNC: 20 U/L (ref 10–44)
ANION GAP SERPL CALC-SCNC: 13 MMOL/L (ref 8–16)
AST SERPL-CCNC: 17 U/L (ref 10–40)
BASOPHILS # BLD AUTO: 0.02 K/UL (ref 0–0.2)
BASOPHILS NFR BLD: 0.4 % (ref 0–1.9)
BILIRUB SERPL-MCNC: 0.6 MG/DL (ref 0.1–1)
BILIRUB UR QL STRIP: NEGATIVE
BUN SERPL-MCNC: 6 MG/DL (ref 8–23)
CALCIUM SERPL-MCNC: 9 MG/DL (ref 8.7–10.5)
CHLORIDE SERPL-SCNC: 104 MMOL/L (ref 95–110)
CLARITY UR REFRACT.AUTO: CLEAR
CO2 SERPL-SCNC: 21 MMOL/L (ref 23–29)
COLOR UR AUTO: COLORLESS
CREAT SERPL-MCNC: 0.8 MG/DL (ref 0.5–1.4)
DIFFERENTIAL METHOD: ABNORMAL
EOSINOPHIL # BLD AUTO: 0 K/UL (ref 0–0.5)
EOSINOPHIL NFR BLD: 0.6 % (ref 0–8)
ERYTHROCYTE [DISTWIDTH] IN BLOOD BY AUTOMATED COUNT: 13.5 % (ref 11.5–14.5)
EST. GFR  (NO RACE VARIABLE): >60 ML/MIN/1.73 M^2
GLUCOSE SERPL-MCNC: 109 MG/DL (ref 70–110)
GLUCOSE UR QL STRIP: NEGATIVE
HCT VFR BLD AUTO: 42.7 % (ref 37–48.5)
HCV AB SERPL QL IA: NORMAL
HGB BLD-MCNC: 14.4 G/DL (ref 12–16)
HGB UR QL STRIP: ABNORMAL
HIV 1+2 AB+HIV1 P24 AG SERPL QL IA: NORMAL
IMM GRANULOCYTES # BLD AUTO: 0.02 K/UL (ref 0–0.04)
IMM GRANULOCYTES NFR BLD AUTO: 0.4 % (ref 0–0.5)
KETONES UR QL STRIP: NEGATIVE
LEUKOCYTE ESTERASE UR QL STRIP: NEGATIVE
LIPASE SERPL-CCNC: 21 U/L (ref 4–60)
LYMPHOCYTES # BLD AUTO: 0.8 K/UL (ref 1–4.8)
LYMPHOCYTES NFR BLD: 16 % (ref 18–48)
MCH RBC QN AUTO: 31.4 PG (ref 27–31)
MCHC RBC AUTO-ENTMCNC: 33.7 G/DL (ref 32–36)
MCV RBC AUTO: 93 FL (ref 82–98)
MONOCYTES # BLD AUTO: 0.4 K/UL (ref 0.3–1)
MONOCYTES NFR BLD: 6.9 % (ref 4–15)
NEUTROPHILS # BLD AUTO: 3.8 K/UL (ref 1.8–7.7)
NEUTROPHILS NFR BLD: 75.7 % (ref 38–73)
NITRITE UR QL STRIP: NEGATIVE
NRBC BLD-RTO: 0 /100 WBC
PH UR STRIP: 6 [PH] (ref 5–8)
PLATELET # BLD AUTO: 351 K/UL (ref 150–450)
PMV BLD AUTO: 9.3 FL (ref 9.2–12.9)
POTASSIUM SERPL-SCNC: 3.7 MMOL/L (ref 3.5–5.1)
PROT SERPL-MCNC: 7.5 G/DL (ref 6–8.4)
PROT UR QL STRIP: NEGATIVE
RBC # BLD AUTO: 4.59 M/UL (ref 4–5.4)
SODIUM SERPL-SCNC: 138 MMOL/L (ref 136–145)
SP GR UR STRIP: 1 (ref 1–1.03)
URN SPEC COLLECT METH UR: ABNORMAL
WBC # BLD AUTO: 5.07 K/UL (ref 3.9–12.7)

## 2023-04-18 PROCEDURE — 96361 HYDRATE IV INFUSION ADD-ON: CPT

## 2023-04-18 PROCEDURE — 63600175 PHARM REV CODE 636 W HCPCS: Performed by: PHYSICIAN ASSISTANT

## 2023-04-18 PROCEDURE — 80053 COMPREHEN METABOLIC PANEL: CPT | Performed by: PHYSICIAN ASSISTANT

## 2023-04-18 PROCEDURE — 83690 ASSAY OF LIPASE: CPT | Performed by: PHYSICIAN ASSISTANT

## 2023-04-18 PROCEDURE — 87389 HIV-1 AG W/HIV-1&-2 AB AG IA: CPT | Performed by: PHYSICIAN ASSISTANT

## 2023-04-18 PROCEDURE — 86803 HEPATITIS C AB TEST: CPT | Performed by: PHYSICIAN ASSISTANT

## 2023-04-18 PROCEDURE — 96372 THER/PROPH/DIAG INJ SC/IM: CPT | Performed by: PHYSICIAN ASSISTANT

## 2023-04-18 PROCEDURE — 85025 COMPLETE CBC W/AUTO DIFF WBC: CPT | Performed by: PHYSICIAN ASSISTANT

## 2023-04-18 PROCEDURE — 81003 URINALYSIS AUTO W/O SCOPE: CPT | Performed by: PHYSICIAN ASSISTANT

## 2023-04-18 PROCEDURE — 99284 EMERGENCY DEPT VISIT MOD MDM: CPT | Mod: ,,, | Performed by: EMERGENCY MEDICINE

## 2023-04-18 PROCEDURE — 93010 EKG 12-LEAD: ICD-10-PCS | Mod: ,,, | Performed by: INTERNAL MEDICINE

## 2023-04-18 PROCEDURE — 93010 ELECTROCARDIOGRAM REPORT: CPT | Mod: ,,, | Performed by: INTERNAL MEDICINE

## 2023-04-18 PROCEDURE — 25000003 PHARM REV CODE 250: Performed by: PHYSICIAN ASSISTANT

## 2023-04-18 PROCEDURE — 99284 EMERGENCY DEPT VISIT MOD MDM: CPT | Mod: 25

## 2023-04-18 PROCEDURE — 93005 ELECTROCARDIOGRAM TRACING: CPT

## 2023-04-18 PROCEDURE — 96375 TX/PRO/DX INJ NEW DRUG ADDON: CPT

## 2023-04-18 PROCEDURE — 99284 PR EMERGENCY DEPT VISIT,LEVEL IV: ICD-10-PCS | Mod: ,,, | Performed by: EMERGENCY MEDICINE

## 2023-04-18 PROCEDURE — 96374 THER/PROPH/DIAG INJ IV PUSH: CPT

## 2023-04-18 RX ORDER — ONDANSETRON 2 MG/ML
4 INJECTION INTRAMUSCULAR; INTRAVENOUS
Status: COMPLETED | OUTPATIENT
Start: 2023-04-18 | End: 2023-04-18

## 2023-04-18 RX ORDER — ONDANSETRON 4 MG/1
4 TABLET, FILM COATED ORAL EVERY 6 HOURS
Qty: 12 TABLET | Refills: 0 | Status: SHIPPED | OUTPATIENT
Start: 2023-04-18 | End: 2023-04-21

## 2023-04-18 RX ORDER — DICYCLOMINE HYDROCHLORIDE 20 MG/1
20 TABLET ORAL 2 TIMES DAILY
Qty: 14 TABLET | Refills: 0 | Status: SHIPPED | OUTPATIENT
Start: 2023-04-18 | End: 2023-04-25

## 2023-04-18 RX ORDER — FAMOTIDINE 10 MG/ML
20 INJECTION INTRAVENOUS
Status: COMPLETED | OUTPATIENT
Start: 2023-04-18 | End: 2023-04-18

## 2023-04-18 RX ORDER — DICYCLOMINE HYDROCHLORIDE 10 MG/ML
20 INJECTION INTRAMUSCULAR
Status: COMPLETED | OUTPATIENT
Start: 2023-04-18 | End: 2023-04-18

## 2023-04-18 RX ADMIN — DICYCLOMINE HYDROCHLORIDE 20 MG: 20 INJECTION INTRAMUSCULAR at 02:04

## 2023-04-18 RX ADMIN — ONDANSETRON 4 MG: 2 INJECTION INTRAMUSCULAR; INTRAVENOUS at 02:04

## 2023-04-18 RX ADMIN — FAMOTIDINE 20 MG: 10 INJECTION, SOLUTION INTRAVENOUS at 02:04

## 2023-04-18 RX ADMIN — SODIUM CHLORIDE, POTASSIUM CHLORIDE, SODIUM LACTATE AND CALCIUM CHLORIDE 1000 ML: 600; 310; 30; 20 INJECTION, SOLUTION INTRAVENOUS at 02:04

## 2023-04-18 NOTE — ED TRIAGE NOTES
Pt presents to the ED with complaints of nausea, vomiting, diarrhea, and abdominal pain. Pt states the onset of symptoms began yesterday. Pt states that she may have food poisoning from eating oysters this past weekend. Pt states that the abdominal pain is not constant and that it comes in waves. comes in waves. Pt also states that she is dizzy and has been having chills from the intensity of vomiting. Pt denies and fever or headache or shortness of breath.   Patient identifiers verified and correct for Sherry Torres  LOC: The patient is awake, alert and aware of environment with an appropriate affect, the patient is oriented x 4 and speaking appropriately.   APPEARANCE: Patient appears comfortable and in no acute distress, patient is clean and well groomed.  SKIN: The skin is warm and dry, color consistent with ethnicity, patient has normal skin turgor and moist mucus membranes, skin intact, no breakdown or bruising noted.   MUSCULOSKELETAL: Patient moving all extremities spontaneously, no swelling noted.  RESPIRATORY: Airway is open and patent, respirations are spontaneous, patient has a normal effort and rate, no accessory muscle use noted, pt placed on  pulse ox with O2 sats noted at 100% on room air.  CARDIAC: Patient has a normal rate and regular rhythm, no edema noted, capillary refill < 3 seconds.   GASTRO: Soft and non tender to palpation, no distention noted, normoactive bowel sounds present in all four quadrants. Pt states bowel movements have been regular.  : Pt denies any pain or frequency with urination.  NEURO: Pt opens eyes spontaneously, behavior appropriate to situation, follows commands, facial expression symmetrical, bilateral hand grasp equal and even, purposeful motor response noted, normal sensation in all extremities when touched with a finger.

## 2023-04-18 NOTE — ED PROVIDER NOTES
Encounter Date: 2023       History     Chief Complaint   Patient presents with    Abdominal Pain    Nausea    Vomiting    Diarrhea     67-year-old female with past medical history of right-sided breast cancer, GERD, thyroid cancer, MVP who presents emergency department for nausea vomiting diarrhea and abdominal pain.  States yesterday morning had severe nonbloody diarrhea about 20 episodes as well as nausea vomiting.  States she is only a Coke and popsicle which she vomited since yesterday.  States they had oysters on Saturday and did eat part of her 's leftover oyster sandwich on .  States on Monday her  also had nausea but did not have any diarrhea.  Reports lower abdominal discomfort which resolves after going to the bathroom and currently denies any abdominal pain.  Denies any recent antibiotic use, fever but does endorse some chills when she vomits.    The history is provided by the patient.   Review of patient's allergies indicates:   Allergen Reactions    Codeine Nausea Only    Sulfa (sulfonamide antibiotics) Nausea Only     Past Medical History:   Diagnosis Date    Atypical ductal hyperplasia, breast     left side    Breast cancer 2016    right side    Breast cyst approx. 40 years ago    Cancer     GERD (gastroesophageal reflux disease)     History of thyroid cancer     Lobular carcinoma in situ not certain of 2016 diagnosis    Mitral valve prolapse     Psychiatric problem      Past Surgical History:   Procedure Laterality Date    BREAST BIOPSY Right 2016    BREAST BIOPSY Left     exc bx    BREAST LUMPECTOMY Right     w/radiation     SECTION      COLONOSCOPY N/A 10/8/2019    Procedure: COLONOSCOPY;  Surgeon: Eliceo Holloway MD;  Location: [address]);  Service: Endoscopy;  Laterality: N/A;  Okay for Freeman or Jewel. However, she needs it done before end , so if they are not available before then, okay for any provided.    CYST REMOVED FROM  RIGHT BREAST IN 1969      HERNIA REPAIR      over C section incision     left breast wire localization excisional biopsy with bracketed wires using 3 wires and excision through 1 incision.       THYROIDECTOMY      TRANSFORAMINAL EPIDURAL INJECTION OF STEROID Right 2019    Procedure: Injection,steroid,epidural,transforaminal approach LUMBAR TRANSFORAMINAL RIGHT L5 AND S1 TF ARNOLDO;  Surgeon: Nadya Mcfarland MD;  Location: [facility];  Service: Pain Management;  Laterality: Right;  NEEDS CONSENT    TUBAL LIGATION       Family History   Problem Relation Age of Onset    Osteoporosis Mother     Dementia Father     Breast cancer Maternal Grandmother 88    Breast cancer Other 70    Colon cancer Neg Hx     Colon polyps Neg Hx     Rectal cancer Neg Hx     Stomach cancer Neg Hx     Esophageal cancer Neg Hx     Liver cancer Neg Hx     Liver disease Neg Hx     Cirrhosis Neg Hx     Inflammatory bowel disease Neg Hx     Celiac disease Neg Hx     Crohn's disease Neg Hx     Ulcerative colitis Neg Hx     Ovarian cancer Neg Hx     Cancer Neg Hx     Melanoma Neg Hx      Social History     Tobacco Use    Smoking status: Former     Types: Cigarettes     Quit date: 2/10/1985     Years since quittin.2    Smokeless tobacco: Never    Tobacco comments:     socally a few years in college. none since 30 years ago   Substance Use Topics    Alcohol use: Yes     Comment: few drinks on the weekend    Drug use: No     Review of Systems   Constitutional:  Positive for chills. Negative for fever.   HENT:  Negative for sore throat.    Respiratory:  Negative for cough and shortness of breath.    Cardiovascular:  Negative for chest pain.   Gastrointestinal:  Positive for abdominal pain, diarrhea and vomiting.   Genitourinary:  Negative for difficulty urinating.   Musculoskeletal: Negative.    Skin: Negative.    Neurological:  Positive for dizziness.   Psychiatric/Behavioral:  Negative for confusion.      Physical Exam     Initial Vitals [23  1219]   BP Pulse Resp Temp SpO2   137/69 89 20 99.7 °F (37.6 °C) 100 %      MAP       --         Physical Exam    Nursing note and vitals reviewed.  Constitutional: She appears well-developed and well-nourished.   HENT:   Head: Normocephalic and atraumatic.   Eyes: Pupils are equal, round, and reactive to light.   Neck: Neck supple.   Normal range of motion.  Cardiovascular:  Normal rate, regular rhythm, normal heart sounds and intact distal pulses.     Exam reveals no gallop and no friction rub.       No murmur heard.  Pulmonary/Chest: Breath sounds normal.   Abdominal: Abdomen is soft. Bowel sounds are normal. She exhibits no distension and no mass. There is no abdominal tenderness. There is no rebound and no guarding.   Musculoskeletal:         General: Normal range of motion.      Cervical back: Normal range of motion and neck supple.     Neurological: She is alert and oriented to person, place, and time. GCS score is 15. GCS eye subscore is 4. GCS verbal subscore is 5. GCS motor subscore is 6.   Skin: Skin is warm and dry. Capillary refill takes less than 2 seconds.   Psychiatric: She has a normal mood and affect.       ED Course   Procedures  Labs Reviewed   CBC W/ AUTO DIFFERENTIAL - Abnormal; Notable for the following components:       Result Value    MCH 31.4 (*)     Lymph # 0.8 (*)     Gran % 75.7 (*)     Lymph % 16.0 (*)     All other components within normal limits   COMPREHENSIVE METABOLIC PANEL - Abnormal; Notable for the following components:    CO2 21 (*)     BUN 6 (*)     Alkaline Phosphatase 40 (*)     All other components within normal limits   URINALYSIS, REFLEX TO URINE CULTURE - Abnormal; Notable for the following components:    Color, UA Colorless (*)     Occult Blood UA Trace (*)     All other components within normal limits    Narrative:     Specimen Source->Urine   HIV 1 / 2 ANTIBODY    Narrative:     Release to patient->Immediate   HEPATITIS C ANTIBODY    Narrative:     Release to  patient->Immediate   LIPASE   ISTAT CHEM8          Imaging Results    None          Medications   dicyclomine injection 20 mg (20 mg Intramuscular Given 4/18/23 6458)   ondansetron injection 4 mg (4 mg Intravenous Given 4/18/23 1413)   famotidine (PF) injection 20 mg (20 mg Intravenous Given 4/18/23 1415)   lactated ringers bolus 1,000 mL (1,000 mLs Intravenous New Bag 4/18/23 1415)     Medical Decision Making:   History:   Old Medical Records: I decided to obtain old medical records.  Initial Assessment:   67 year old female presents to the ED for nausea vomiting and abdominal pain after eating on oyster sandwhich.   Differential Diagnosis:   Gastroenteritis, electrolyte derangement, dehydration, pancreatitis, UTI, diverticulitis  Clinical Tests:   Lab Tests: Ordered and Reviewed  ED Management:  Clinical stories concerning for food poisoning.  High suspicion for this as her  who ate the same sandwich had similar symptoms although with no diarrhea.  Her abdominal exam is benign and she is feeling much better after IV fluids, Bentyl and Zofran.  Low suspicion for diverticulitis.  Will discharge with Bentyl and Zofran for her symptoms and discussed hydration.  Discussed return ED precautions for any new or worsening symptoms.                        Clinical Impression:   Final diagnoses:  [R42] Dizziness  [K52.9] Gastroenteritis (Primary)        ED Disposition Condition    Discharge Stable          ED Prescriptions       Medication Sig Dispense Start Date End Date Auth. Provider    ondansetron (ZOFRAN) 4 MG tablet Take 1 tablet (4 mg total) by mouth every 6 (six) hours. for 3 days 12 tablet 4/18/2023 4/21/2023 Manav Dominguez PA-C    dicyclomine (BENTYL) 20 mg tablet Take 1 tablet (20 mg total) by mouth 2 (two) times daily. for 7 days 14 tablet 4/18/2023 4/25/2023 Manav Dominguez PA-C          Follow-up Information    None          Manav Dominguez PA-C  04/18/23 1801

## 2023-04-18 NOTE — DISCHARGE INSTRUCTIONS
You were seen in the emergency department for nausea vomiting and abdominal pain.  I suspect food poisoning.  Please rest, stay hydrated.  I have given you medications to help with nausea as well as abdominal cramping.  If you develop any new or worsening symptoms she may return to the ED sooner.  You may also use OTC Imodium as needed to slow your diarrhea.  Follow-up with your PCP if your symptoms do not improve.

## 2023-05-12 ENCOUNTER — PATIENT MESSAGE (OUTPATIENT)
Dept: HEMATOLOGY/ONCOLOGY | Facility: CLINIC | Age: 68
End: 2023-05-12
Payer: MEDICARE

## 2023-05-12 ENCOUNTER — HOSPITAL ENCOUNTER (OUTPATIENT)
Dept: RADIOLOGY | Facility: HOSPITAL | Age: 68
Discharge: HOME | End: 2023-05-12
Attending: NURSE PRACTITIONER
Payer: MEDICARE

## 2023-05-12 VITALS — BODY MASS INDEX: 24.99 KG/M2 | HEIGHT: 65 IN | WEIGHT: 150 LBS

## 2023-05-12 DIAGNOSIS — Z12.31 ENCOUNTER FOR SCREENING MAMMOGRAM FOR MALIGNANT NEOPLASM OF BREAST: ICD-10-CM

## 2023-05-12 DIAGNOSIS — Z17.0 MALIGNANT NEOPLASM OF UPPER-OUTER QUADRANT OF RIGHT BREAST IN FEMALE, ESTROGEN RECEPTOR POSITIVE: ICD-10-CM

## 2023-05-12 DIAGNOSIS — C50.411 MALIGNANT NEOPLASM OF UPPER-OUTER QUADRANT OF RIGHT BREAST IN FEMALE, ESTROGEN RECEPTOR POSITIVE: Primary | ICD-10-CM

## 2023-05-12 DIAGNOSIS — Z17.0 MALIGNANT NEOPLASM OF UPPER-OUTER QUADRANT OF RIGHT BREAST IN FEMALE, ESTROGEN RECEPTOR POSITIVE: Primary | ICD-10-CM

## 2023-05-12 DIAGNOSIS — C50.411 MALIGNANT NEOPLASM OF UPPER-OUTER QUADRANT OF RIGHT BREAST IN FEMALE, ESTROGEN RECEPTOR POSITIVE: ICD-10-CM

## 2023-05-12 PROCEDURE — 77067 SCR MAMMO BI INCL CAD: CPT | Mod: 26,,, | Performed by: RADIOLOGY

## 2023-05-12 PROCEDURE — 77063 BREAST TOMOSYNTHESIS BI: CPT | Mod: 26,,, | Performed by: RADIOLOGY

## 2023-05-12 PROCEDURE — 77063 MAMMO DIGITAL SCREENING BILAT WITH TOMO: ICD-10-PCS | Mod: 26,,, | Performed by: RADIOLOGY

## 2023-05-12 PROCEDURE — 77067 SCR MAMMO BI INCL CAD: CPT | Mod: TC

## 2023-05-12 PROCEDURE — 77067 MAMMO DIGITAL SCREENING BILAT WITH TOMO: ICD-10-PCS | Mod: 26,,, | Performed by: RADIOLOGY

## 2023-05-16 ENCOUNTER — OFFICE VISIT (OUTPATIENT)
Dept: HEMATOLOGY/ONCOLOGY | Facility: CLINIC | Age: 68
End: 2023-05-16
Payer: MEDICARE

## 2023-05-16 VITALS
OXYGEN SATURATION: 100 % | WEIGHT: 156.94 LBS | HEIGHT: 65 IN | SYSTOLIC BLOOD PRESSURE: 166 MMHG | BODY MASS INDEX: 26.15 KG/M2 | DIASTOLIC BLOOD PRESSURE: 90 MMHG | TEMPERATURE: 98 F | HEART RATE: 88 BPM | RESPIRATION RATE: 18 BRPM

## 2023-05-16 DIAGNOSIS — N63.14 MASS OF LOWER INNER QUADRANT OF RIGHT BREAST: ICD-10-CM

## 2023-05-16 DIAGNOSIS — Z17.0 MALIGNANT NEOPLASM OF UPPER-OUTER QUADRANT OF RIGHT BREAST IN FEMALE, ESTROGEN RECEPTOR POSITIVE: Primary | ICD-10-CM

## 2023-05-16 DIAGNOSIS — C50.411 MALIGNANT NEOPLASM OF UPPER-OUTER QUADRANT OF RIGHT BREAST IN FEMALE, ESTROGEN RECEPTOR POSITIVE: Primary | ICD-10-CM

## 2023-05-16 PROCEDURE — 3080F DIAST BP >= 90 MM HG: CPT | Mod: CPTII,,, | Performed by: INTERNAL MEDICINE

## 2023-05-16 PROCEDURE — 3077F SYST BP >= 140 MM HG: CPT | Mod: CPTII,,, | Performed by: INTERNAL MEDICINE

## 2023-05-16 PROCEDURE — 1160F PR REVIEW ALL MEDS BY PRESCRIBER/CLIN PHARMACIST DOCUMENTED: ICD-10-PCS | Mod: CPTII,,, | Performed by: INTERNAL MEDICINE

## 2023-05-16 PROCEDURE — 1159F PR MEDICATION LIST DOCUMENTED IN MEDICAL RECORD: ICD-10-PCS | Mod: CPTII,,, | Performed by: INTERNAL MEDICINE

## 2023-05-16 PROCEDURE — 99999 PR PBB SHADOW E&M-EST. PATIENT-LVL V: ICD-10-PCS | Mod: PBBFAC,,, | Performed by: INTERNAL MEDICINE

## 2023-05-16 PROCEDURE — 1101F PR PT FALLS ASSESS DOC 0-1 FALLS W/OUT INJ PAST YR: ICD-10-PCS | Mod: CPTII,,, | Performed by: INTERNAL MEDICINE

## 2023-05-16 PROCEDURE — 1125F AMNT PAIN NOTED PAIN PRSNT: CPT | Mod: CPTII,,, | Performed by: INTERNAL MEDICINE

## 2023-05-16 PROCEDURE — 1125F PR PAIN SEVERITY QUANTIFIED, PAIN PRESENT: ICD-10-PCS | Mod: CPTII,,, | Performed by: INTERNAL MEDICINE

## 2023-05-16 PROCEDURE — 1160F RVW MEDS BY RX/DR IN RCRD: CPT | Mod: CPTII,,, | Performed by: INTERNAL MEDICINE

## 2023-05-16 PROCEDURE — 3288F FALL RISK ASSESSMENT DOCD: CPT | Mod: CPTII,,, | Performed by: INTERNAL MEDICINE

## 2023-05-16 PROCEDURE — 1159F MED LIST DOCD IN RCRD: CPT | Mod: CPTII,,, | Performed by: INTERNAL MEDICINE

## 2023-05-16 PROCEDURE — 99213 PR OFFICE/OUTPT VISIT, EST, LEVL III, 20-29 MIN: ICD-10-PCS | Mod: ,,, | Performed by: INTERNAL MEDICINE

## 2023-05-16 PROCEDURE — 1101F PT FALLS ASSESS-DOCD LE1/YR: CPT | Mod: CPTII,,, | Performed by: INTERNAL MEDICINE

## 2023-05-16 PROCEDURE — 3080F PR MOST RECENT DIASTOLIC BLOOD PRESSURE >= 90 MM HG: ICD-10-PCS | Mod: CPTII,,, | Performed by: INTERNAL MEDICINE

## 2023-05-16 PROCEDURE — 3077F PR MOST RECENT SYSTOLIC BLOOD PRESSURE >= 140 MM HG: ICD-10-PCS | Mod: CPTII,,, | Performed by: INTERNAL MEDICINE

## 2023-05-16 PROCEDURE — 3288F PR FALLS RISK ASSESSMENT DOCUMENTED: ICD-10-PCS | Mod: CPTII,,, | Performed by: INTERNAL MEDICINE

## 2023-05-16 PROCEDURE — 3008F PR BODY MASS INDEX (BMI) DOCUMENTED: ICD-10-PCS | Mod: CPTII,,, | Performed by: INTERNAL MEDICINE

## 2023-05-16 PROCEDURE — 99999 PR PBB SHADOW E&M-EST. PATIENT-LVL V: CPT | Mod: PBBFAC,,, | Performed by: INTERNAL MEDICINE

## 2023-05-16 PROCEDURE — 99213 OFFICE O/P EST LOW 20 MIN: CPT | Mod: ,,, | Performed by: INTERNAL MEDICINE

## 2023-05-16 PROCEDURE — 3008F BODY MASS INDEX DOCD: CPT | Mod: CPTII,,, | Performed by: INTERNAL MEDICINE

## 2023-05-16 NOTE — PROGRESS NOTES
Subjective:       Patient ID: Sherry Torres is a 67 y.o. female.    Chief Complaint: No chief complaint on file.    HPI Ms Torres is a 67-year-old female seen in follow-up  of right breast cancer.   She had stage II A, strongly ER and VT positive and HER-2 negative disease.   Last visit May 2021.    She noted a right breast lump last week.She has been very anxious.      Breast history:    She noted an abnormality on self examination at the end of July or early August 2016.  On August 8 she underwent diagnostic mammogram which showed an irregular mass was plated margins in the middle right breast and o'clock position.  By ultrasound this was a solid 1.7 x 1.0 x 1.5 cm mass.     On August 9 a core needle biopsy showed infiltrating ductal carcinoma which was well differentiated (histologic grade 2, nuclear grade 2, mitotic index 1).  The tumor was 100% ER positive, 70% VT positive and HER-2 1+.  On August 25 right breast lumpectomy and sentinel lymph node biopsy was performed.  That showed a 14 mm low-grade infiltrating ductal carcinoma.    The sentinel lymph node was positive for 1.5 mm micrometastasis.       Final pathological stage TI cN1 stage II    Mammaprint genomic assay  showed that she was low risk.  Score was +0.336 with a 5 year risk of distant recurrence at 5% and a 10 year risk at 10%.       She completed radiation in December 2016 and began Letrozole at that time.  She stopped after 5 years.    In May 2020 she had a left breast biopsy which was benign.  Review of Systems   Constitutional:  Negative for appetite change and unexpected weight change.   Eyes:  Negative for visual disturbance.   Respiratory:  Negative for cough and shortness of breath.    Cardiovascular:  Negative for chest pain.   Gastrointestinal:  Negative for abdominal pain and diarrhea.   Genitourinary:  Negative for frequency.   Musculoskeletal:  Negative for back pain.   Skin:  Negative for rash.   Neurological:  Negative for  headaches.   Hematological:  Negative for adenopathy.   Psychiatric/Behavioral:  Positive for sleep disturbance. The patient is not nervous/anxious.      Objective:      Physical Exam  Vitals reviewed.   Constitutional:       Appearance: She is well-developed.   Cardiovascular:      Rate and Rhythm: Normal rate and regular rhythm.   Pulmonary:      Effort: Pulmonary effort is normal.      Breath sounds: Normal breath sounds.   Chest:   Breasts:     Right: No mass, nipple discharge or skin change.      Left: Normal. No mass, nipple discharge or skin change.       Abdominal:      Palpations: Abdomen is soft. There is no mass.   Lymphadenopathy:      Cervical: No cervical adenopathy.      Upper Body:      Right upper body: No supraclavicular or axillary adenopathy.      Left upper body: No supraclavicular or axillary adenopathy.   Neurological:      Mental Status: She is alert and oriented to person, place, and time.   Psychiatric:         Behavior: Behavior normal.         Thought Content: Thought content normal.       Assessment:     Mammogram - no evidence for new findings  1. Malignant neoplasm of upper-outer quadrant of right breast in female, estrogen receptor positive        Plan:   US right breast    RTC 1 year with mammo - [facility]       Route Chart for Scheduling    Med Onc Chart Routing      Follow up with physician    Follow up with MADELINE 1 year.   Infusion scheduling note    Injection scheduling note    Labs None   Scheduling:  Preferred lab:  Lab interval:     Imaging Other   Breast Ultrasound next avail, mammo 1 year   Pharmacy appointment    Other referrals

## 2023-06-01 ENCOUNTER — HOSPITAL ENCOUNTER (OUTPATIENT)
Dept: RADIOLOGY | Facility: HOSPITAL | Age: 68
Discharge: HOME | End: 2023-06-01
Attending: INTERNAL MEDICINE
Payer: MEDICARE

## 2023-06-01 DIAGNOSIS — N63.14 MASS OF LOWER INNER QUADRANT OF RIGHT BREAST: ICD-10-CM

## 2023-06-01 PROCEDURE — 76642 US BREAST RIGHT LIMITED: ICD-10-PCS | Mod: 26,RT,, | Performed by: RADIOLOGY

## 2023-06-01 PROCEDURE — 77061 BREAST TOMOSYNTHESIS UNI: CPT | Mod: TC,RT

## 2023-06-01 PROCEDURE — 76642 ULTRASOUND BREAST LIMITED: CPT | Mod: TC,RT

## 2023-06-01 PROCEDURE — 77065 DX MAMMO INCL CAD UNI: CPT | Mod: 26,RT,, | Performed by: RADIOLOGY

## 2023-06-01 PROCEDURE — 77061 MAMMO DIGITAL DIAGNOSTIC RIGHT WITH TOMO: ICD-10-PCS | Mod: 26,RT,, | Performed by: RADIOLOGY

## 2023-06-01 PROCEDURE — 76642 ULTRASOUND BREAST LIMITED: CPT | Mod: 26,RT,, | Performed by: RADIOLOGY

## 2023-06-01 PROCEDURE — 77061 BREAST TOMOSYNTHESIS UNI: CPT | Mod: 26,RT,, | Performed by: RADIOLOGY

## 2023-06-01 PROCEDURE — 77065 MAMMO DIGITAL DIAGNOSTIC RIGHT WITH TOMO: ICD-10-PCS | Mod: 26,RT,, | Performed by: RADIOLOGY

## 2023-06-15 ENCOUNTER — OFFICE VISIT (OUTPATIENT)
Dept: FAMILY MEDICINE | Facility: CLINIC | Age: 68
End: 2023-06-15
Payer: MEDICARE

## 2023-06-15 ENCOUNTER — PATIENT MESSAGE (OUTPATIENT)
Dept: FAMILY MEDICINE | Facility: CLINIC | Age: 68
End: 2023-06-15

## 2023-06-15 VITALS
BODY MASS INDEX: 26.08 KG/M2 | OXYGEN SATURATION: 97 % | WEIGHT: 156.5 LBS | HEIGHT: 65 IN | SYSTOLIC BLOOD PRESSURE: 164 MMHG | DIASTOLIC BLOOD PRESSURE: 98 MMHG | HEART RATE: 86 BPM

## 2023-06-15 DIAGNOSIS — R79.9 ABNORMAL BLOOD CHEMISTRY: ICD-10-CM

## 2023-06-15 DIAGNOSIS — R20.2 TINGLING IN EXTREMITIES: ICD-10-CM

## 2023-06-15 DIAGNOSIS — I10 HYPERTENSION, UNSPECIFIED TYPE: Primary | ICD-10-CM

## 2023-06-15 DIAGNOSIS — R42 LIGHTHEADEDNESS: ICD-10-CM

## 2023-06-15 PROCEDURE — 3288F FALL RISK ASSESSMENT DOCD: CPT | Mod: CPTII,S$GLB,, | Performed by: FAMILY MEDICINE

## 2023-06-15 PROCEDURE — 99214 OFFICE O/P EST MOD 30 MIN: CPT | Mod: S$GLB,,, | Performed by: FAMILY MEDICINE

## 2023-06-15 PROCEDURE — 99214 PR OFFICE/OUTPT VISIT, EST, LEVL IV, 30-39 MIN: ICD-10-PCS | Mod: S$GLB,,, | Performed by: FAMILY MEDICINE

## 2023-06-15 PROCEDURE — 1101F PR PT FALLS ASSESS DOC 0-1 FALLS W/OUT INJ PAST YR: ICD-10-PCS | Mod: CPTII,S$GLB,, | Performed by: FAMILY MEDICINE

## 2023-06-15 PROCEDURE — 3288F PR FALLS RISK ASSESSMENT DOCUMENTED: ICD-10-PCS | Mod: CPTII,S$GLB,, | Performed by: FAMILY MEDICINE

## 2023-06-15 PROCEDURE — 3008F BODY MASS INDEX DOCD: CPT | Mod: CPTII,S$GLB,, | Performed by: FAMILY MEDICINE

## 2023-06-15 PROCEDURE — 1126F PR PAIN SEVERITY QUANTIFIED, NO PAIN PRESENT: ICD-10-PCS | Mod: CPTII,S$GLB,, | Performed by: FAMILY MEDICINE

## 2023-06-15 PROCEDURE — 3080F PR MOST RECENT DIASTOLIC BLOOD PRESSURE >= 90 MM HG: ICD-10-PCS | Mod: CPTII,S$GLB,, | Performed by: FAMILY MEDICINE

## 2023-06-15 PROCEDURE — 99999 PR PBB SHADOW E&M-EST. PATIENT-LVL IV: CPT | Mod: PBBFAC,,, | Performed by: FAMILY MEDICINE

## 2023-06-15 PROCEDURE — 1101F PT FALLS ASSESS-DOCD LE1/YR: CPT | Mod: CPTII,S$GLB,, | Performed by: FAMILY MEDICINE

## 2023-06-15 PROCEDURE — 3080F DIAST BP >= 90 MM HG: CPT | Mod: CPTII,S$GLB,, | Performed by: FAMILY MEDICINE

## 2023-06-15 PROCEDURE — 3077F PR MOST RECENT SYSTOLIC BLOOD PRESSURE >= 140 MM HG: ICD-10-PCS | Mod: CPTII,S$GLB,, | Performed by: FAMILY MEDICINE

## 2023-06-15 PROCEDURE — 1159F MED LIST DOCD IN RCRD: CPT | Mod: CPTII,S$GLB,, | Performed by: FAMILY MEDICINE

## 2023-06-15 PROCEDURE — 3077F SYST BP >= 140 MM HG: CPT | Mod: CPTII,S$GLB,, | Performed by: FAMILY MEDICINE

## 2023-06-15 PROCEDURE — 1126F AMNT PAIN NOTED NONE PRSNT: CPT | Mod: CPTII,S$GLB,, | Performed by: FAMILY MEDICINE

## 2023-06-15 PROCEDURE — 1159F PR MEDICATION LIST DOCUMENTED IN MEDICAL RECORD: ICD-10-PCS | Mod: CPTII,S$GLB,, | Performed by: FAMILY MEDICINE

## 2023-06-15 PROCEDURE — 99999 PR PBB SHADOW E&M-EST. PATIENT-LVL IV: ICD-10-PCS | Mod: PBBFAC,,, | Performed by: FAMILY MEDICINE

## 2023-06-15 PROCEDURE — 3008F PR BODY MASS INDEX (BMI) DOCUMENTED: ICD-10-PCS | Mod: CPTII,S$GLB,, | Performed by: FAMILY MEDICINE

## 2023-06-15 RX ORDER — AMLODIPINE BESYLATE 5 MG/1
5 TABLET ORAL DAILY
Qty: 30 TABLET | Refills: 3 | Status: SHIPPED | OUTPATIENT
Start: 2023-06-15 | End: 2023-09-15

## 2023-06-15 NOTE — PROGRESS NOTES
(Portions of this note were dictated using voice recognition software and may contain dictation related errors in spelling/grammar/syntax not found on text review)    CC:   Chief Complaint   Patient presents with    Hypertension    Dizziness       HPI: 67 y.o. female presented with concerns about hypertension and lightheadedness as a new patient to me, she has no primary care doctor.  She is medical history significant for lobular carcinoma in-situ status post treatment, mitral valve prolapse, history of thyroid cancer.  Patient stated that she found a lump on the right breast a month ago and became worried about recurrence of breast cancer, followed up with oncologist on 5/16, had mammogram and ultrasound done which is normal, she was very stressed about the recurrence of breast cancer, at that time her blood pressure started to increase.    Patient was advised to monitor blood pressure at home, she has been monitoring blood pressure and readings are always in the high range, she also feels lightheaded sometimes, has history of dizziness and lightheadedness since 2017 after having a syncopal episode, workup was done at that time which was within normal limits.  She also reports having tingling sensation all over the arm and legs on the left side which is transient and temporary, it is intermittent and short-lived.  She is concerned about her symptoms.  She denies having fever, chills, cough, shortness of breath, headache, chest pain, palpitations, nausea, vomiting, abdominal pain, changes in bowel habits, urine problems including burning and dysuria.    Past Medical History:   Diagnosis Date    Atypical ductal hyperplasia, breast 2008    left side    Breast cancer 08/2016    right side    Breast cyst approx. 40 years ago    Cancer     GERD (gastroesophageal reflux disease)     History of thyroid cancer 2006    Lobular carcinoma in situ not certain of 2016 diagnosis    Mitral valve prolapse     Psychiatric problem         Past Surgical History:   Procedure Laterality Date    BREAST BIOPSY Right 2016    BREAST BIOPSY Left     exc bx    BREAST LUMPECTOMY Right     w/radiation     SECTION      COLONOSCOPY N/A 10/8/2019    Procedure: COLONOSCOPY;  Surgeon: Eliceo Holloway MD;  [facility]  Service: Endoscopy;  Laterality: N/A;  Okay for [facility] or Holloway. However, she needs it done before end , so if they are not available before then, okay for any provided.    CYST REMOVED FROM RIGHT BREAST IN       HERNIA REPAIR      over C section incision     left breast wire localization excisional biopsy with bracketed wires using 3 wires and excision through 1 incision.       THYROIDECTOMY      TRANSFORAMINAL EPIDURAL INJECTION OF STEROID Right 2019    Procedure: Injection,steroid,epidural,transforaminal approach LUMBAR TRANSFORAMINAL RIGHT L5 AND S1 TF ARNOLDO;  Surgeon: Nadya Mcfarland MD;  Location: [facility] PAIN MGT;  Service: Pain Management;  Laterality: Right;  NEEDS CONSENT    TUBAL LIGATION         Family History   Problem Relation Age of Onset    Osteoporosis Mother     Dementia Father     Breast cancer Maternal Grandmother 88    Breast cancer Other 70    Colon cancer Neg Hx     Colon polyps Neg Hx     Rectal cancer Neg Hx     Stomach cancer Neg Hx     Esophageal cancer Neg Hx     Liver cancer Neg Hx     Liver disease Neg Hx     Cirrhosis Neg Hx     Inflammatory bowel disease Neg Hx     Celiac disease Neg Hx     Crohn's disease Neg Hx     Ulcerative colitis Neg Hx     Ovarian cancer Neg Hx     Cancer Neg Hx     Melanoma Neg Hx        Social History     Tobacco Use    Smoking status: Former     Types: Cigarettes     Quit date: 2/10/1985     Years since quittin.3    Smokeless tobacco: Never    Tobacco comments:     socally a few years in college. none since 30 years ago   Substance Use Topics    Alcohol use: Yes     Comment: few drinks on the weekend    Drug use: No       Lab Results    Component Value Date    WBC 5.07 04/18/2023    HGB 14.4 04/18/2023    HCT 42.7 04/18/2023    MCV 93 04/18/2023     04/18/2023    CHOL 282 (H) 12/05/2019    TRIG 112 12/05/2019    HDL 63 12/05/2019    ALT 20 04/18/2023    AST 17 04/18/2023    BILITOT 0.6 04/18/2023    ALKPHOS 40 (L) 04/18/2023     04/18/2023    K 3.7 04/18/2023     04/18/2023    CREATININE 0.8 04/18/2023    ESTGFRAFRICA >60.0 07/27/2022    EGFRNONAA >60.0 07/27/2022    CALCIUM 9.0 04/18/2023    ALBUMIN 3.8 04/18/2023    BUN 6 (L) 04/18/2023    CO2 21 (L) 04/18/2023    TSH 1.664 10/11/2022    INR 0.9 07/04/2017    HGBA1C 5.6 11/06/2019    LDLCALC 196.6 (H) 12/05/2019     04/18/2023    [id number] 26 (L) 07/27/2022             Vital signs reviewed  PE:   APPEARANCE: Well nourished, well developed, in no acute distress.    HEAD: Normocephalic, atraumatic.  EYES: EOMI.  Conjunctivae noninjected.  NOSE: Mucosa pink. Airway clear.  NECK: Supple with no cervical lymphadenopathy.    CHEST: Good inspiratory effort. Lungs clear to auscultation with no wheezes or crackles.  CARDIOVASCULAR: Normal S1, S2. No rubs, murmurs, or gallops.  ABDOMEN: Bowel sounds normal. Not distended. Soft. No tenderness or masses. No organomegaly.  EXTREMITIES: No edema, cyanosis, or clubbing.    Review of Systems   Constitutional:  Negative for chills, fatigue and fever.   HENT: Negative.     Respiratory:  Negative for cough, shortness of breath and wheezing.    Cardiovascular:  Negative for chest pain, palpitations and leg swelling.   Gastrointestinal: Negative.  Negative for abdominal distention.   Genitourinary: Negative.    Neurological:  Positive for light-headedness.   Psychiatric/Behavioral: Negative.     All other systems reviewed and are negative.    IMPRESSION  1. Hypertension, unspecified type    2. Tingling in extremities    3. Lightheadedness    4. Abnormal blood chemistry            PLAN      1. Tingling in extremities    - Hemoglobin A1C;  Future  - Vitamin B12; Future  - Iron and TIBC; Future      2. Lightheadedness    Advised to maintain enough hydration  Might be related to elevated BP      3. Abnormal blood chemistry    - Hemoglobin A1C; Future  - Vitamin B12; Future  - Iron and TIBC; Future      4. Hypertension, unspecified type    Bp elevated on 2 readings, has been high at home, started amlodipine    - amLODIPine (NORVASC) 5 MG tablet; Take 1 tablet (5 mg total) by mouth once daily.  Dispense: 30 tablet; Refill: 3     Low salt diet, regular exercise  Advised to monitor BP at home    RTC in 3 weeks for bp check        Age/demographic appropriate health maintenance:    Health Maintenance Due   Topic Date Due    COVID-19 Vaccine (1) Never done    TETANUS VACCINE  Never done    Shingles Vaccine (1 of 2) Never done    Pneumococcal Vaccines (Age 65+) (2 - PPSV23 if available, else PCV20) 11/19/2015    Hemoglobin A1c (Diabetic Prevention Screening)  11/06/2022    DEXA Scan  02/14/2023             Darci Smith   6/15/2023

## 2023-06-16 ENCOUNTER — LAB VISIT (OUTPATIENT)
Dept: LAB | Facility: HOSPITAL | Age: 68
End: 2023-06-16
Attending: FAMILY MEDICINE
Payer: MEDICARE

## 2023-06-16 DIAGNOSIS — R20.2 TINGLING IN EXTREMITIES: ICD-10-CM

## 2023-06-16 DIAGNOSIS — R79.9 ABNORMAL BLOOD CHEMISTRY: ICD-10-CM

## 2023-06-16 LAB
ESTIMATED AVG GLUCOSE: 103 MG/DL (ref 68–131)
HBA1C MFR BLD: 5.2 % (ref 4–5.6)
IRON SERPL-MCNC: 101 UG/DL (ref 30–160)
SATURATED IRON: 25 % (ref 20–50)
TOTAL IRON BINDING CAPACITY: 404 UG/DL (ref 250–450)
TRANSFERRIN SERPL-MCNC: 273 MG/DL (ref 200–375)
VIT B12 SERPL-MCNC: 282 PG/ML (ref 210–950)

## 2023-06-16 PROCEDURE — 82607 VITAMIN B-12: CPT | Performed by: FAMILY MEDICINE

## 2023-06-16 PROCEDURE — 83036 HEMOGLOBIN GLYCOSYLATED A1C: CPT | Performed by: FAMILY MEDICINE

## 2023-06-16 PROCEDURE — 36415 COLL VENOUS BLD VENIPUNCTURE: CPT | Performed by: FAMILY MEDICINE

## 2023-06-16 PROCEDURE — 84466 ASSAY OF TRANSFERRIN: CPT | Performed by: FAMILY MEDICINE

## 2023-06-20 PROBLEM — I10 PRIMARY HYPERTENSION: Status: ACTIVE | Noted: 2023-06-20

## 2023-06-21 ENCOUNTER — PATIENT MESSAGE (OUTPATIENT)
Dept: ADMINISTRATIVE | Facility: OTHER | Age: 68
End: 2023-06-21
Payer: MEDICARE

## 2023-06-29 ENCOUNTER — CLINICAL SUPPORT (OUTPATIENT)
Dept: FAMILY MEDICINE | Facility: CLINIC | Age: 68
End: 2023-06-29
Payer: MEDICARE

## 2023-06-29 VITALS — DIASTOLIC BLOOD PRESSURE: 82 MMHG | HEART RATE: 84 BPM | SYSTOLIC BLOOD PRESSURE: 118 MMHG | OXYGEN SATURATION: 98 %

## 2023-06-29 PROCEDURE — 99999 PR PBB SHADOW E&M-EST. PATIENT-LVL II: CPT | Mod: PBBFAC,,,

## 2023-06-29 PROCEDURE — 99999 PR PBB SHADOW E&M-EST. PATIENT-LVL II: ICD-10-PCS | Mod: PBBFAC,,,

## 2023-06-30 ENCOUNTER — PATIENT MESSAGE (OUTPATIENT)
Dept: ENDOCRINOLOGY | Facility: CLINIC | Age: 68
End: 2023-06-30
Payer: MEDICARE

## 2023-07-14 ENCOUNTER — PATIENT MESSAGE (OUTPATIENT)
Dept: ENDOCRINOLOGY | Facility: CLINIC | Age: 68
End: 2023-07-14
Payer: MEDICARE

## 2023-09-01 NOTE — PROGRESS NOTES
Subjective:      Patient ID: Sherry Torres is a 67 y.o. female.    Chief Complaint:  No chief complaint on file.    History of Present Illness  Sherry Torres is here for follow up of post surgical hypothyroidism.  Previously seen by me 4/2022.  This is a MyChart video visit.    The patient location is: [place]  The chief complaint leading to consultation is: Hypothyroid  Visit type: Virtual visit with synchronous audio and video  Total time spent with patient: see below  Each patient to whom he or she provides medical services by telemedicine is:  (1) informed of the relationship between the physician and patient and the respective role of any other health care provider with respect to management of the patient; and (2) notified that he or she may decline to receive medical services by telemedicine and may withdraw from such care at any time.    With regards to hypothyroidism:    She had thyroid cancer surgery in 2006 by Dr. Pardo. It was stage III: T2, N0, M0. 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 She has had 3 total body scans, 2007, 2008 and 2009, all negative. She was treated with 100 mCi I-131 postsurgery.    Lab Results   Component Value Date    TSH 1.664 10/11/2022    FREET4 1.07 07/27/2022       Thyroid US  10/2019  Status post thyroidectomy.  No sonographic evidence of malignancy in this patient with an undetectable serum thyroglobulin.    Current medication:  Levothyroxine 100mcg daily     Biotin Use: Denies     Takes thyroid medication properly without food first thing in the morning.    Current Symptoms:   Denies unexplained weight changes  Denies Fatigue   Denies Constipation   Denies Hair loss  Denies Brittle nails  Denies Mental fog   Denies cp, palpations or sob  Denies Heat intolerance  Denies Cold intolerance    BMD  2/14/2020  1. Osteopenia: FRAX Score does not support osteoporosis medications  RECOMMENDATIONS of [facility]:  1.  Recommend daily calcium intake 8779-1304 mg, dietary sources preferred; Vitamin D 6315-7703 IU daily.  2. Adequate exercise and fall precautions.  3. Repeat BMD in 2-4 years      Vitamin D: multivitamin     Review of Systems  as above      There were no vitals taken for this visit.    There is no height or weight on file to calculate BMI.    Lab Review:   Lab Results   Component Value Date    HGBA1C 5.2 06/16/2023     Lab Results   Component Value Date    CHOL 282 (H) 12/05/2019    HDL 63 12/05/2019    LDLCALC 196.6 (H) 12/05/2019    TRIG 112 12/05/2019    CHOLHDL 22.3 12/05/2019     Lab Results   Component Value Date     04/18/2023    K 3.7 04/18/2023     04/18/2023    CO2 21 (L) 04/18/2023     04/18/2023    BUN 6 (L) 04/18/2023    CREATININE 0.8 04/18/2023    CALCIUM 9.0 04/18/2023    PROT 7.5 04/18/2023    ALBUMIN 3.8 04/18/2023    BILITOT 0.6 04/18/2023    ALKPHOS 40 (L) 04/18/2023    AST 17 04/18/2023    ALT 20 04/18/2023    ANIONGAP 13 04/18/2023    ESTGFRAFRICA >60.0 07/27/2022    EGFRNONAA >60.0 07/27/2022    TSH 1.664 10/11/2022     Vit D, 25-Hydroxy   Date Value Ref Range Status   07/27/2022 26 (L) 30 - 96 ng/mL Final     Comment:     Vitamin D deficiency.........<10 ng/mL                              Vitamin D insufficiency......10-29 ng/mL       Vitamin D sufficiency........> or equal to 30 ng/mL  Vitamin D toxicity............>100 ng/mL       Assessment and Plan     1. Post-surgical hypothyroidism  TSH    Thyroglobulin      2. History of thyroid cancer  TSH    Thyroglobulin      3. Vitamin D deficiency  Vitamin D        Post-surgical hypothyroidism  -- Labs now.  -- Medication Changes:   Levothyroxine 100mcg daily  -- Clinically and biochemically euthyroid.  -- Goal is a low normal TSH.  -- Avoid exogenous hyperthyroidism as this can accelerate bone loss and increase risk of CV complications.  -- Advised to take LT4 on an empty stomach with water and to wait 30-45 minutes before  eating or taking other medications   -- Reviewed usual times of thyroid hormone changes  -- Reviewed that symptoms of hypothyroidism may not correlate with tsh, and a normal TSH is the goal of therapy. Symptoms are not a justification for over treatment.    History of thyroid cancer  -- Check Tg.  -- Repeat thyroid ultrasound if clinical change or Tg change.    Vitamin D deficiency  -- Check vitamin D.    Follow up in about 1 year (around 9/6/2024).      I spent 20 minutes face-to-face with the patient, over half of the visit was spent on counseling and/or coordinating the care of the patient.    Counseling includes:  Diagnostic results, impressions, recommendations   Prognosis   Risk and benefits of management/treatment options   Instructions for management treatment and or follow-up   Importance of compliance with management   Risk factor reduction   Patient education

## 2023-09-04 ENCOUNTER — OFFICE VISIT (OUTPATIENT)
Dept: URGENT CARE | Facility: CLINIC | Age: 68
End: 2023-09-04
Payer: MEDICARE

## 2023-09-04 VITALS
BODY MASS INDEX: 25.99 KG/M2 | TEMPERATURE: 99 F | RESPIRATION RATE: 16 BRPM | SYSTOLIC BLOOD PRESSURE: 128 MMHG | HEART RATE: 86 BPM | OXYGEN SATURATION: 97 % | WEIGHT: 156 LBS | DIASTOLIC BLOOD PRESSURE: 83 MMHG | HEIGHT: 65 IN

## 2023-09-04 DIAGNOSIS — D49.2: Primary | ICD-10-CM

## 2023-09-04 PROBLEM — C80.1 MALIGNANT (PRIMARY) NEOPLASM, UNSPECIFIED: Status: ACTIVE | Noted: 2020-03-24

## 2023-09-04 PROBLEM — N64.9 DISORDER OF BREAST, UNSPECIFIED: Status: ACTIVE | Noted: 2020-03-24

## 2023-09-04 PROBLEM — E07.9 DISORDER OF THYROID, UNSPECIFIED: Status: ACTIVE | Noted: 2020-03-24

## 2023-09-04 PROCEDURE — 99212 PR OFFICE/OUTPT VISIT, EST, LEVL II, 10-19 MIN: ICD-10-PCS | Mod: S$GLB,,, | Performed by: PHYSICIAN ASSISTANT

## 2023-09-04 PROCEDURE — 99212 OFFICE O/P EST SF 10 MIN: CPT | Mod: S$GLB,,, | Performed by: PHYSICIAN ASSISTANT

## 2023-09-04 NOTE — PATIENT INSTRUCTIONS
I have ordered an ultrasound.  Please call [phone] to schedule your appointment.  I have ordered a limited abdomen ultrasound.  Please specify to them that this is your back and showed them location.  I tried contacting the imaging center to specify the order however nobody answered and soft tissue for the back could not be found in the orders.  If your symptoms persist or worsen please go to the ER for evaluation

## 2023-09-04 NOTE — PROGRESS NOTES
"Subjective:      Patient ID: Sherry Torres is a 67 y.o. female.    Vitals:  height is 5' 5" (1.651 m) and weight is 70.8 kg (156 lb). Her oral temperature is 98.8 °F (37.1 °C). Her blood pressure is 128/83 and her pulse is 86. Her respiration is 16 and oxygen saturation is 97%.     Chief Complaint: Mass (Located on back)    Pt is coming in today for a chief complaint of mass on right side of back. She noticed yesterday not sure if its a insect bite. It looks to be the size of a small baseball and its raised.     Mass  This is a new problem. The current episode started yesterday. The problem occurs constantly. The problem has been gradually worsening. Pertinent negatives include no abdominal pain, chest pain, chills, coughing, fatigue, fever, myalgias or rash. Nothing aggravates the symptoms. She has tried rest and sleep (warm compress) for the symptoms. The treatment provided no relief.     Constitution: Negative for chills, fatigue and fever.   Neck: Negative for painful lymph nodes.   Cardiovascular:  Negative for chest pain.   Respiratory:  Negative for cough and shortness of breath.    Gastrointestinal:  Negative for abdominal pain.   Musculoskeletal:  Negative for pain, trauma, back pain, muscle cramps and muscle ache.   Skin:  Negative for color change, pale, rash and erythema.   Neurological:  Negative for altered mental status.   Hematologic/Lymphatic: Negative for swollen lymph nodes.   Psychiatric/Behavioral:  Negative for altered mental status.       Past Medical History:   Diagnosis Date    Atypical ductal hyperplasia, breast 2008    left side    Breast cancer 08/2016    right side    Breast cyst approx. 40 years ago    Cancer     GERD (gastroesophageal reflux disease)     History of thyroid cancer 2006    Lobular carcinoma in situ not certain of 2016 diagnosis    Mitral valve prolapse     Psychiatric problem        Past Surgical History:   Procedure Laterality Date    BREAST BIOPSY Right 08/2016    " BREAST BIOPSY Left     exc bx    BREAST LUMPECTOMY Right     w/radiation     SECTION      COLONOSCOPY N/A 10/8/2019    Procedure: COLONOSCOPY;  Surgeon: Eliceo Holloway MD;  Location: [facility] ENDO ([address] [place] [address]);  Service: Endoscopy;  Laterality: N/A;  Okay for [facility] or [facility]. However, she needs it done before end of October, so if they are not available before then, okay for any provided.    CYST REMOVED FROM RIGHT BREAST IN       HERNIA REPAIR      over C section incision     left breast wire localization excisional biopsy with bracketed wires using 3 wires and excision through 1 incision.       THYROIDECTOMY      TRANSFORAMINAL EPIDURAL INJECTION OF STEROID Right 2019    Procedure: Injection,steroid,epidural,transforaminal approach LUMBAR TRANSFORAMINAL RIGHT L5 AND S1 TF ARNOLDO;  Surgeon: Nadya Mcfarland MD;  Location: [facility] PAIN MGT;  Service: Pain Management;  Laterality: Right;  NEEDS CONSENT    TUBAL LIGATION         Family History   Problem Relation Age of Onset    Osteoporosis Mother     Dementia Father     Breast cancer Maternal Grandmother 88    Breast cancer Other 70    Colon cancer Neg Hx     Colon polyps Neg Hx     Rectal cancer Neg Hx     Stomach cancer Neg Hx     Esophageal cancer Neg Hx     Liver cancer Neg Hx     Liver disease Neg Hx     Cirrhosis Neg Hx     Inflammatory bowel disease Neg Hx     Celiac disease Neg Hx     Crohn's disease Neg Hx     Ulcerative colitis Neg Hx     Ovarian cancer Neg Hx     Cancer Neg Hx     Melanoma Neg Hx        Social History     Socioeconomic History    Marital status:     Number of children: 2   Occupational History     Comment:    Tobacco Use    Smoking status: Former     Current packs/day: 0.00     Types: Cigarettes     Quit date: 2/10/1985     Years since quittin.5    Smokeless tobacco: Never    Tobacco comments:     socally a few years in college. none since 30 years ago   Substance and Sexual Activity    Alcohol use: Yes     Comment:  few drinks on the weekend    Drug use: No    Sexual activity: Yes     Partners: Male     Birth control/protection: Post-menopausal   Other Topics Concern    Patient feels they ought to cut down on drinking/drug use No    Patient annoyed by others criticizing their drinking/drug use No    Patient has felt bad or guilty about drinking/drug use No    Patient has had a drink/used drugs as an eye opener in the AM No   Social History Narrative    ,  x 40 years, 2 children, 2 grandchildren, [employer]     Social Determinants of Health     Financial Resource Strain: Low Risk  (6/15/2023)    Overall Financial Resource Strain (CARDIA)     Difficulty of Paying Living Expenses: Not hard at all   Food Insecurity: No Food Insecurity (6/15/2023)    Hunger Vital Sign     Worried About Running Out of Food in the Last Year: Never true     Ran Out of Food in the Last Year: Never true   Transportation Needs: No Transportation Needs (6/15/2023)    PRAPARE - Transportation     Lack of Transportation (Medical): No     Lack of Transportation (Non-Medical): No   Physical Activity: Insufficiently Active (6/15/2023)    Exercise Vital Sign     Days of Exercise per Week: 3 days     Minutes of Exercise per Session: 20 min   Stress: Stress Concern Present (6/15/2023)    [employer] [place] [employer] - Occupational Stress Questionnaire     Feeling of Stress : Rather much   Social Connections: Unknown (6/15/2023)    Social Connection and Isolation Panel [NHANES]     Frequency of Communication with Friends and Family: More than three times a week     Frequency of Social Gatherings with Friends and Family: Three times a week     Active Member of Clubs or Organizations: No     Attends Club or Organization Meetings: Patient refused     Marital Status:    Housing Stability: Low Risk  (6/15/2023)    Housing Stability Vital Sign     Unable to Pay for Housing in the Last Year: No     Number of Places Lived in the Last  Year: 1     Unstable Housing in the Last Year: No       Current Outpatient Medications   Medication Sig Dispense Refill    amLODIPine (NORVASC) 5 MG tablet Take 1 tablet (5 mg total) by mouth once daily. 30 tablet 3    atorvastatin (LIPITOR) 80 MG tablet Take 1 tablet (80 mg total) by mouth once daily. 90 tablet 4    calcium-vitamin D3 (OS-NINA 500 + D3) 500 mg-5 mcg (200 unit) per tablet Take 4 tablets by mouth 2 (two) times daily with meals.       ibuprofen (ADVIL,MOTRIN) 200 MG tablet Take 200 mg by mouth every 6 (six) hours as needed for Pain.      levothyroxine (SYNTHROID) 100 MCG tablet Take 1 tablet (100 mcg total) by mouth before breakfast. NEEDS AN APPOINTMENT FOR REFILLS. 30 tablet 1    MAGNESIUM CARBONATE ORAL Take by mouth.      multivitamin capsule Take 2 capsules by mouth once daily.       ondansetron (ZOFRAN-ODT) 4 MG TbDL Take 1 tablet (4 mg total) by mouth every 6 (six) hours as needed. 20 tablet 0    pantoprazole (PROTONIX) 40 MG tablet Take 1 tablet (40 mg total) by mouth once daily. 30 tablet 11    potassium chloride SA (K-DUR,KLOR-CON) 10 MEQ tablet Take 20 mEq by mouth once daily.      tretinoin (RETIN-A) 0.025 % cream Compound tretinoin 0.025% / niacinamide 2% cream / hyaluronic acid 0.25%. Apply a pea-sized amount to entire face qhs. 30 g 11    zinc gluconate 50 mg tablet Take 50 mg by mouth once daily.      acetaminophen/diphenhydramine (TYLENOL PM ORAL) Take by mouth.       No current facility-administered medications for this visit.       Review of patient's allergies indicates:   Allergen Reactions    Codeine Nausea Only    Sulfa (sulfonamide antibiotics) Nausea Only       Objective:     Physical Exam   Constitutional: She is oriented to person, place, and time.  Non-toxic appearance. She does not appear ill. No distress.   Eyes: Conjunctivae are normal.   Cardiovascular: Normal rate, regular rhythm and normal heart sounds.   No murmur heard.Exam reveals no gallop and no friction rub.    Pulmonary/Chest: Effort normal and breath sounds normal. No stridor. No respiratory distress. She has no wheezes. She has no rhonchi. She has no rales. She exhibits no tenderness.   Abdominal: Normal appearance.   Musculoskeletal: Normal range of motion.         General: No swelling, tenderness or signs of injury. Normal range of motion.   Neurological: She is alert and oriented to person, place, and time. She displays no weakness. No sensory deficit.   Skin: Skin is warm, dry, not diaphoretic, not pale and no rash. No bruising and No erythema        Psychiatric: Her behavior is normal. Mood, judgment and thought content normal.   Nursing note and vitals reviewed.        Assessment:     1. Neoplasm of soft tissue of back        Plan:       Neoplasm of soft tissue of back  -     US Abdomen Limited    Discussed with patient this is most likely lipoma however needs to be confirmed with ultrasound.  Unable to find ultrasound soft tissue for the back only could find for the neck.  Tried to call the imaging center however they are closed.  Advised the patient that if she runs into any complications to let us know.  I also advised that if this is a lipoma we will place a referral to General surgery for removal if she would like.  I have reviewed the patient chart and pertinent past imaging/labs.  Patient Instructions   I have ordered an ultrasound.  Please call [phone] to schedule your appointment.  I have ordered a limited abdomen ultrasound.  Please specify to them that this is your back and showed them location.  I tried contacting the imaging center to specify the order however nobody answered and soft tissue for the back could not be found in the orders.  If your symptoms persist or worsen please go to the ER for evaluation

## 2023-09-05 ENCOUNTER — PATIENT MESSAGE (OUTPATIENT)
Dept: FAMILY MEDICINE | Facility: CLINIC | Age: 68
End: 2023-09-05
Payer: MEDICARE

## 2023-09-05 ENCOUNTER — HOSPITAL ENCOUNTER (OUTPATIENT)
Dept: RADIOLOGY | Facility: HOSPITAL | Age: 68
Discharge: HOME | End: 2023-09-05
Attending: PHYSICIAN ASSISTANT
Payer: MEDICARE

## 2023-09-05 DIAGNOSIS — M79.89 PARASPINAL SOFT TISSUE MASS: ICD-10-CM

## 2023-09-05 DIAGNOSIS — R22.9 LOCALIZED SWELLING, MASS AND LUMP, UNSPECIFIED: Primary | ICD-10-CM

## 2023-09-05 PROCEDURE — 76705 ECHO EXAM OF ABDOMEN: CPT | Mod: 26,,, | Performed by: RADIOLOGY

## 2023-09-05 PROCEDURE — 76705 US ABDOMEN LIMITED: ICD-10-PCS | Mod: 26,,, | Performed by: RADIOLOGY

## 2023-09-05 PROCEDURE — 76705 ECHO EXAM OF ABDOMEN: CPT | Mod: TC

## 2023-09-06 ENCOUNTER — PATIENT MESSAGE (OUTPATIENT)
Dept: FAMILY MEDICINE | Facility: CLINIC | Age: 68
End: 2023-09-06
Payer: MEDICARE

## 2023-09-06 ENCOUNTER — OFFICE VISIT (OUTPATIENT)
Dept: ENDOCRINOLOGY | Facility: CLINIC | Age: 68
End: 2023-09-06
Payer: MEDICARE

## 2023-09-06 DIAGNOSIS — E89.0 POST-SURGICAL HYPOTHYROIDISM: Primary | ICD-10-CM

## 2023-09-06 DIAGNOSIS — Z85.850 HISTORY OF THYROID CANCER: ICD-10-CM

## 2023-09-06 DIAGNOSIS — E55.9 VITAMIN D DEFICIENCY: ICD-10-CM

## 2023-09-06 PROCEDURE — 3044F PR MOST RECENT HEMOGLOBIN A1C LEVEL <7.0%: ICD-10-PCS | Mod: CPTII,95,, | Performed by: NURSE PRACTITIONER

## 2023-09-06 PROCEDURE — 3044F HG A1C LEVEL LT 7.0%: CPT | Mod: CPTII,95,, | Performed by: NURSE PRACTITIONER

## 2023-09-06 PROCEDURE — 1159F PR MEDICATION LIST DOCUMENTED IN MEDICAL RECORD: ICD-10-PCS | Mod: CPTII,95,, | Performed by: NURSE PRACTITIONER

## 2023-09-06 PROCEDURE — 1159F MED LIST DOCD IN RCRD: CPT | Mod: CPTII,95,, | Performed by: NURSE PRACTITIONER

## 2023-09-06 PROCEDURE — 99214 OFFICE O/P EST MOD 30 MIN: CPT | Mod: 95,,, | Performed by: NURSE PRACTITIONER

## 2023-09-06 PROCEDURE — 1160F PR REVIEW ALL MEDS BY PRESCRIBER/CLIN PHARMACIST DOCUMENTED: ICD-10-PCS | Mod: CPTII,95,, | Performed by: NURSE PRACTITIONER

## 2023-09-06 PROCEDURE — 1160F RVW MEDS BY RX/DR IN RCRD: CPT | Mod: CPTII,95,, | Performed by: NURSE PRACTITIONER

## 2023-09-06 PROCEDURE — 99214 PR OFFICE/OUTPT VISIT, EST, LEVL IV, 30-39 MIN: ICD-10-PCS | Mod: 95,,, | Performed by: NURSE PRACTITIONER

## 2023-09-06 NOTE — TELEPHONE ENCOUNTER
Please see message.  I reviewed the urgent care note, images, ultrasound report. Per notation looks like something just noticed recently. Will order MRI.   Orders Placed This Encounter   Procedures    MRI Lumbar Spine W WO Contrast    Creatinine, serum

## 2023-09-06 NOTE — ASSESSMENT & PLAN NOTE
-- Labs now.  -- Medication Changes:   Levothyroxine 100mcg daily  -- Clinically and biochemically euthyroid.  -- Goal is a low normal TSH.  -- Avoid exogenous hyperthyroidism as this can accelerate bone loss and increase risk of CV complications.  -- Advised to take LT4 on an empty stomach with water and to wait 30-45 minutes before eating or taking other medications   -- Reviewed usual times of thyroid hormone changes  -- Reviewed that symptoms of hypothyroidism may not correlate with tsh, and a normal TSH is the goal of therapy. Symptoms are not a justification for over treatment.

## 2023-09-06 NOTE — Clinical Note
Good Morning, I had a follow up with Ms Torres for Postsurgical Hypothyroidism. She asked that I reach out on her behalf regarding her recent US/ finding of a lump on her back. She would appreciate if someone could follow up with her on the findings and next steps. Thanks, Loraine

## 2023-09-07 ENCOUNTER — TELEPHONE (OUTPATIENT)
Dept: URGENT CARE | Facility: CLINIC | Age: 68
End: 2023-09-07
Payer: MEDICARE

## 2023-09-07 ENCOUNTER — LAB VISIT (OUTPATIENT)
Dept: LAB | Facility: HOSPITAL | Age: 68
End: 2023-09-07
Payer: MEDICARE

## 2023-09-07 DIAGNOSIS — E89.0 POST-SURGICAL HYPOTHYROIDISM: ICD-10-CM

## 2023-09-07 DIAGNOSIS — E55.9 VITAMIN D DEFICIENCY: ICD-10-CM

## 2023-09-07 DIAGNOSIS — Z85.850 HISTORY OF THYROID CANCER: ICD-10-CM

## 2023-09-07 LAB
25(OH)D3+25(OH)D2 SERPL-MCNC: 26 NG/ML (ref 30–96)
TSH SERPL DL<=0.005 MIU/L-ACNC: 0.85 UIU/ML (ref 0.4–4)

## 2023-09-07 PROCEDURE — 84443 ASSAY THYROID STIM HORMONE: CPT | Performed by: NURSE PRACTITIONER

## 2023-09-07 PROCEDURE — 82306 VITAMIN D 25 HYDROXY: CPT | Performed by: NURSE PRACTITIONER

## 2023-09-07 PROCEDURE — 86800 THYROGLOBULIN ANTIBODY: CPT | Performed by: NURSE PRACTITIONER

## 2023-09-08 ENCOUNTER — TELEPHONE (OUTPATIENT)
Dept: URGENT CARE | Facility: CLINIC | Age: 68
End: 2023-09-08
Payer: MEDICARE

## 2023-09-08 ENCOUNTER — PATIENT MESSAGE (OUTPATIENT)
Dept: ENDOCRINOLOGY | Facility: CLINIC | Age: 68
End: 2023-09-08
Payer: MEDICARE

## 2023-09-08 DIAGNOSIS — D17.1 LIPOMA OF BACK: Primary | ICD-10-CM

## 2023-09-08 NOTE — TELEPHONE ENCOUNTER
Calling to give patient results for ultrasound of lower right back; imaging suggests benign lipoma. Patient has called PCP to schedule MRI per ultrasound results. She is requesting referral for general surgery. Referral placed.  Patient can call our Referral Hotline at [phone] to make an appointment.

## 2023-09-08 NOTE — TELEPHONE ENCOUNTER
Component      Latest Ref Rng 9/7/2023   Thyroglobulin, Tumor Marker      ng/mL <0.1    Thyroglobulin Antibody Screen      <1.8 IU/mL <1.8    Thyroglobulin Interpretation SEE BELOW    Vit D, 25-Hydroxy      30 - 96 ng/mL 26 (L)    TSH      0.400 - 4.000 uIU/mL 0.854       Legend:  (L) Low

## 2023-09-13 DIAGNOSIS — E89.0 POST-SURGICAL HYPOTHYROIDISM: ICD-10-CM

## 2023-09-13 DIAGNOSIS — I10 HYPERTENSION, UNSPECIFIED TYPE: ICD-10-CM

## 2023-09-13 RX ORDER — LEVOTHYROXINE SODIUM 100 UG/1
100 TABLET ORAL
Qty: 90 TABLET | Refills: 3 | Status: SHIPPED | OUTPATIENT
Start: 2023-09-13

## 2023-09-15 RX ORDER — AMLODIPINE BESYLATE 5 MG/1
5 TABLET ORAL DAILY
Qty: 30 TABLET | Refills: 3 | Status: SHIPPED | OUTPATIENT
Start: 2023-09-15 | End: 2024-09-14

## 2023-09-16 ENCOUNTER — HOSPITAL ENCOUNTER (OUTPATIENT)
Dept: RADIOLOGY | Facility: HOSPITAL | Age: 68
Discharge: HOME | End: 2023-09-16
Attending: FAMILY MEDICINE
Payer: MEDICARE

## 2023-09-16 DIAGNOSIS — R22.9 LOCALIZED SWELLING, MASS AND LUMP, UNSPECIFIED: ICD-10-CM

## 2023-09-16 DIAGNOSIS — M79.89 PARASPINAL SOFT TISSUE MASS: ICD-10-CM

## 2023-09-16 PROCEDURE — 72158 MRI LUMBAR SPINE W/O & W/DYE: CPT | Mod: TC

## 2023-09-16 PROCEDURE — 72158 MRI LUMBAR SPINE W/O & W/DYE: CPT | Mod: 26,,, | Performed by: RADIOLOGY

## 2023-09-16 PROCEDURE — A9585 GADOBUTROL INJECTION: HCPCS | Performed by: FAMILY MEDICINE

## 2023-09-16 PROCEDURE — 25500020 PHARM REV CODE 255: Performed by: FAMILY MEDICINE

## 2023-09-16 PROCEDURE — 72158 MRI LUMBAR SPINE W WO CONTRAST: ICD-10-PCS | Mod: 26,,, | Performed by: RADIOLOGY

## 2023-09-16 RX ORDER — GADOBUTROL 604.72 MG/ML
8 INJECTION INTRAVENOUS
Status: COMPLETED | OUTPATIENT
Start: 2023-09-16 | End: 2023-09-16

## 2023-09-16 RX ADMIN — GADOBUTROL 8 ML: 604.72 INJECTION INTRAVENOUS at 04:09

## 2023-09-16 NOTE — PROGRESS NOTES
This study was ordered for a soft tissue mass near the lumbar region so additional slices were added. Two markers were placed over area of interest.

## 2023-09-18 ENCOUNTER — PATIENT MESSAGE (OUTPATIENT)
Dept: FAMILY MEDICINE | Facility: CLINIC | Age: 68
End: 2023-09-18
Payer: MEDICARE

## 2023-09-20 DIAGNOSIS — Z78.0 MENOPAUSE: ICD-10-CM

## 2023-09-25 ENCOUNTER — PATIENT MESSAGE (OUTPATIENT)
Dept: ADMINISTRATIVE | Facility: HOSPITAL | Age: 68
End: 2023-09-25
Payer: MEDICARE

## 2023-09-26 ENCOUNTER — OFFICE VISIT (OUTPATIENT)
Dept: SURGERY | Facility: CLINIC | Age: 68
End: 2023-09-26
Payer: MEDICARE

## 2023-09-26 VITALS
BODY MASS INDEX: 25.51 KG/M2 | DIASTOLIC BLOOD PRESSURE: 67 MMHG | WEIGHT: 153.13 LBS | OXYGEN SATURATION: 100 % | HEART RATE: 82 BPM | SYSTOLIC BLOOD PRESSURE: 120 MMHG | HEIGHT: 65 IN

## 2023-09-26 DIAGNOSIS — D17.1 LIPOMA OF TORSO: Primary | ICD-10-CM

## 2023-09-26 PROCEDURE — 3008F PR BODY MASS INDEX (BMI) DOCUMENTED: ICD-10-PCS | Mod: CPTII,S$GLB,, | Performed by: SURGERY

## 2023-09-26 PROCEDURE — 1159F MED LIST DOCD IN RCRD: CPT | Mod: CPTII,S$GLB,, | Performed by: SURGERY

## 2023-09-26 PROCEDURE — 3288F FALL RISK ASSESSMENT DOCD: CPT | Mod: CPTII,S$GLB,, | Performed by: SURGERY

## 2023-09-26 PROCEDURE — 99999 PR PBB SHADOW E&M-EST. PATIENT-LVL III: CPT | Mod: PBBFAC,,, | Performed by: SURGERY

## 2023-09-26 PROCEDURE — 3008F BODY MASS INDEX DOCD: CPT | Mod: CPTII,S$GLB,, | Performed by: SURGERY

## 2023-09-26 PROCEDURE — 1101F PR PT FALLS ASSESS DOC 0-1 FALLS W/OUT INJ PAST YR: ICD-10-PCS | Mod: CPTII,S$GLB,, | Performed by: SURGERY

## 2023-09-26 PROCEDURE — 1126F PR PAIN SEVERITY QUANTIFIED, NO PAIN PRESENT: ICD-10-PCS | Mod: CPTII,S$GLB,, | Performed by: SURGERY

## 2023-09-26 PROCEDURE — 3078F PR MOST RECENT DIASTOLIC BLOOD PRESSURE < 80 MM HG: ICD-10-PCS | Mod: CPTII,S$GLB,, | Performed by: SURGERY

## 2023-09-26 PROCEDURE — 99999 PR PBB SHADOW E&M-EST. PATIENT-LVL III: ICD-10-PCS | Mod: PBBFAC,,, | Performed by: SURGERY

## 2023-09-26 PROCEDURE — 3074F PR MOST RECENT SYSTOLIC BLOOD PRESSURE < 130 MM HG: ICD-10-PCS | Mod: CPTII,S$GLB,, | Performed by: SURGERY

## 2023-09-26 PROCEDURE — 3044F HG A1C LEVEL LT 7.0%: CPT | Mod: CPTII,S$GLB,, | Performed by: SURGERY

## 2023-09-26 PROCEDURE — 99203 PR OFFICE/OUTPT VISIT, NEW, LEVL III, 30-44 MIN: ICD-10-PCS | Mod: S$GLB,,, | Performed by: SURGERY

## 2023-09-26 PROCEDURE — 3078F DIAST BP <80 MM HG: CPT | Mod: CPTII,S$GLB,, | Performed by: SURGERY

## 2023-09-26 PROCEDURE — 3044F PR MOST RECENT HEMOGLOBIN A1C LEVEL <7.0%: ICD-10-PCS | Mod: CPTII,S$GLB,, | Performed by: SURGERY

## 2023-09-26 PROCEDURE — 3074F SYST BP LT 130 MM HG: CPT | Mod: CPTII,S$GLB,, | Performed by: SURGERY

## 2023-09-26 PROCEDURE — 1101F PT FALLS ASSESS-DOCD LE1/YR: CPT | Mod: CPTII,S$GLB,, | Performed by: SURGERY

## 2023-09-26 PROCEDURE — 1126F AMNT PAIN NOTED NONE PRSNT: CPT | Mod: CPTII,S$GLB,, | Performed by: SURGERY

## 2023-09-26 PROCEDURE — 3288F PR FALLS RISK ASSESSMENT DOCUMENTED: ICD-10-PCS | Mod: CPTII,S$GLB,, | Performed by: SURGERY

## 2023-09-26 PROCEDURE — 99203 OFFICE O/P NEW LOW 30 MIN: CPT | Mod: S$GLB,,, | Performed by: SURGERY

## 2023-09-26 PROCEDURE — 1159F PR MEDICATION LIST DOCUMENTED IN MEDICAL RECORD: ICD-10-PCS | Mod: CPTII,S$GLB,, | Performed by: SURGERY

## 2023-09-26 NOTE — PROGRESS NOTES
General Surgery Office Visit   History and Physical    Patient Name: Sherry Torres  YOB: 1955 (67 y.o.)  MRN: [id number]  Today's Date: 2023    Referring Md:   Justine Johnson, PaMarcelinoc  [address] [facility]  [place] [address]    SUBJECTIVE:     Chief Complaint: R flank lipoma    History of Present Illness:  Sherry Torres is a 67 y.o. female with past medical history of thyroid cancer s/p thyroidectomy presents with right flank mass.  Has noticed the area for some time but has noticed it has gotten larger.  Discomfort in lower back where her pants meet.  Denies weight loss, night sweats, or other constitutional symptoms.  No blood thinners.  No prior surgery in the area.    Review of patient's allergies indicates:   Allergen Reactions    Codeine Nausea Only    Sulfa (sulfonamide antibiotics) Nausea Only     Past Medical History:   Diagnosis Date    Atypical ductal hyperplasia, breast     left side    Breast cancer 2016    right side    Breast cyst approx. 40 years ago    Cancer     GERD (gastroesophageal reflux disease)     History of thyroid cancer     Lobular carcinoma in situ not certain of 2016 diagnosis    Mitral valve prolapse     Psychiatric problem      Past Surgical History:   Procedure Laterality Date    BREAST BIOPSY Right 2016    BREAST BIOPSY Left     exc bx    BREAST LUMPECTOMY Right     w/radiation     SECTION      COLONOSCOPY N/A 10/8/2019    Procedure: COLONOSCOPY;  Surgeon: Eliceo Holloway MD;  Location: [facility] ([address] [place] [address]);  Service: Endoscopy;  Laterality: N/A;  Okay for Freeman or Jewel. However, she needs it done before end of October, so if they are not available before then, okay for any provided.    CYST REMOVED FROM RIGHT BREAST IN       HERNIA REPAIR      over C section incision     left breast wire localization excisional biopsy with bracketed wires using 3 wires and excision through 1 incision.       THYROIDECTOMY      TRANSFORAMINAL  EPIDURAL INJECTION OF STEROID Right 2019    Procedure: Injection,steroid,epidural,transforaminal approach LUMBAR TRANSFORAMINAL RIGHT L5 AND S1 TF ARNOLDO;  Surgeon: Nadya Mcfarland MD;  Location: [facility];  Service: Pain Management;  Laterality: Right;  NEEDS CONSENT    TUBAL LIGATION       Family History   Problem Relation Age of Onset    Osteoporosis Mother     Dementia Father     Breast cancer Maternal Grandmother 88    Breast cancer Other 70    Colon cancer Neg Hx     Colon polyps Neg Hx     Rectal cancer Neg Hx     Stomach cancer Neg Hx     Esophageal cancer Neg Hx     Liver cancer Neg Hx     Liver disease Neg Hx     Cirrhosis Neg Hx     Inflammatory bowel disease Neg Hx     Celiac disease Neg Hx     Crohn's disease Neg Hx     Ulcerative colitis Neg Hx     Ovarian cancer Neg Hx     Cancer Neg Hx     Melanoma Neg Hx      Social History     Tobacco Use    Smoking status: Former     Current packs/day: 0.00     Types: Cigarettes     Quit date: 2/10/1985     Years since quittin.6    Smokeless tobacco: Never    Tobacco comments:     socally a few years in college. none since 30 years ago   Substance Use Topics    Alcohol use: Yes     Comment: few drinks on the weekend    Drug use: No        Review of Systems:  Review of Systems   Constitutional:  Negative for chills, fever and weight loss.   Respiratory:  Negative for cough and shortness of breath.    Cardiovascular:  Negative for chest pain and palpitations.   Gastrointestinal:  Negative for abdominal pain, nausea and vomiting.   Genitourinary:  Negative for dysuria and hematuria.   Musculoskeletal:  Positive for back pain (mild at mass). Negative for myalgias.   Skin:  Negative for itching and rash.   Neurological:  Negative for weakness and headaches.   Endo/Heme/Allergies:  Negative for environmental allergies. Does not bruise/bleed easily.       OBJECTIVE:     Vital Signs (Most Recent)  /67 (BP Location: Right arm, Patient Position: Sitting, BP  "Method: Medium (Automatic))   Pulse 82   Ht 5' 5" (1.651 m)   Wt 69.4 kg (153 lb 1.8 oz)   SpO2 100%   BMI 25.48 kg/m²     Physical Exam:  Physical Exam  Constitutional:       Appearance: Normal appearance.   HENT:      Head: Normocephalic and atraumatic.      Nose: Nose normal.      Mouth/Throat:      Mouth: Mucous membranes are moist.      Pharynx: Oropharynx is clear.   Eyes:      Extraocular Movements: Extraocular movements intact.      Conjunctiva/sclera: Conjunctivae normal.   Cardiovascular:      Rate and Rhythm: Normal rate and regular rhythm.   Pulmonary:      Effort: Pulmonary effort is normal.      Breath sounds: Normal breath sounds.   Abdominal:      General: Abdomen is flat.      Palpations: Abdomen is soft.      Comments: Midline incision well approximated without induration or erythema   Musculoskeletal:      Comments: Right lower back lipoma measuring approximately 5 x 6 cm   Skin:     General: Skin is warm and dry.      Capillary Refill: Capillary refill takes less than 2 seconds.   Neurological:      General: No focal deficit present.      Mental Status: She is alert.         Diagnostic Results reviewed independently:  MRI and ultrasound consistent with subcutaneous lipoma without muscular involvement    ASSESSMENT/PLAN:     Sherry Torres is a 67 y.o. female presenting with decent sized right lower back lipoma.  She desires excision.  We will plan for removal in the operating room given size.  Patient agrees to this plan.    Kenneth Hernández MD  Acute Care Surgery  [facility]      "

## 2023-09-26 NOTE — H&P (VIEW-ONLY)
General Surgery Office Visit   History and Physical    Patient Name: Sherry Torres  YOB: 1955 (67 y.o.)  MRN: [id number]  Today's Date: 2023    Referring Md:   Justine Johnson, PaMarcelinoc  [address] [facility]  [place] [address]    SUBJECTIVE:     Chief Complaint: R flank lipoma    History of Present Illness:  Sherry Torres is a 67 y.o. female with past medical history of thyroid cancer s/p thyroidectomy presents with right flank mass.  Has noticed the area for some time but has noticed it has gotten larger.  Discomfort in lower back where her pants meet.  Denies weight loss, night sweats, or other constitutional symptoms.  No blood thinners.  No prior surgery in the area.    Review of patient's allergies indicates:   Allergen Reactions    Codeine Nausea Only    Sulfa (sulfonamide antibiotics) Nausea Only     Past Medical History:   Diagnosis Date    Atypical ductal hyperplasia, breast     left side    Breast cancer 2016    right side    Breast cyst approx. 40 years ago    Cancer     GERD (gastroesophageal reflux disease)     History of thyroid cancer     Lobular carcinoma in situ not certain of 2016 diagnosis    Mitral valve prolapse     Psychiatric problem      Past Surgical History:   Procedure Laterality Date    BREAST BIOPSY Right 2016    BREAST BIOPSY Left     exc bx    BREAST LUMPECTOMY Right     w/radiation     SECTION      COLONOSCOPY N/A 10/8/2019    Procedure: COLONOSCOPY;  Surgeon: Eliceo Holloway MD;  Location: [facility] ([address] [place] [address]);  Service: Endoscopy;  Laterality: N/A;  Okay for Freeman or Jewel. However, she needs it done before end of October, so if they are not available before then, okay for any provided.    CYST REMOVED FROM RIGHT BREAST IN       HERNIA REPAIR      over C section incision     left breast wire localization excisional biopsy with bracketed wires using 3 wires and excision through 1 incision.       THYROIDECTOMY      TRANSFORAMINAL  EPIDURAL INJECTION OF STEROID Right 2019    Procedure: Injection,steroid,epidural,transforaminal approach LUMBAR TRANSFORAMINAL RIGHT L5 AND S1 TF ARNOLDO;  Surgeon: Nadya Mcfarland MD;  Location: [facility];  Service: Pain Management;  Laterality: Right;  NEEDS CONSENT    TUBAL LIGATION       Family History   Problem Relation Age of Onset    Osteoporosis Mother     Dementia Father     Breast cancer Maternal Grandmother 88    Breast cancer Other 70    Colon cancer Neg Hx     Colon polyps Neg Hx     Rectal cancer Neg Hx     Stomach cancer Neg Hx     Esophageal cancer Neg Hx     Liver cancer Neg Hx     Liver disease Neg Hx     Cirrhosis Neg Hx     Inflammatory bowel disease Neg Hx     Celiac disease Neg Hx     Crohn's disease Neg Hx     Ulcerative colitis Neg Hx     Ovarian cancer Neg Hx     Cancer Neg Hx     Melanoma Neg Hx      Social History     Tobacco Use    Smoking status: Former     Current packs/day: 0.00     Types: Cigarettes     Quit date: 2/10/1985     Years since quittin.6    Smokeless tobacco: Never    Tobacco comments:     socally a few years in college. none since 30 years ago   Substance Use Topics    Alcohol use: Yes     Comment: few drinks on the weekend    Drug use: No        Review of Systems:  Review of Systems   Constitutional:  Negative for chills, fever and weight loss.   Respiratory:  Negative for cough and shortness of breath.    Cardiovascular:  Negative for chest pain and palpitations.   Gastrointestinal:  Negative for abdominal pain, nausea and vomiting.   Genitourinary:  Negative for dysuria and hematuria.   Musculoskeletal:  Positive for back pain (mild at mass). Negative for myalgias.   Skin:  Negative for itching and rash.   Neurological:  Negative for weakness and headaches.   Endo/Heme/Allergies:  Negative for environmental allergies. Does not bruise/bleed easily.       OBJECTIVE:     Vital Signs (Most Recent)  /67 (BP Location: Right arm, Patient Position: Sitting, BP  "Method: Medium (Automatic))   Pulse 82   Ht 5' 5" (1.651 m)   Wt 69.4 kg (153 lb 1.8 oz)   SpO2 100%   BMI 25.48 kg/m²     Physical Exam:  Physical Exam  Constitutional:       Appearance: Normal appearance.   HENT:      Head: Normocephalic and atraumatic.      Nose: Nose normal.      Mouth/Throat:      Mouth: Mucous membranes are moist.      Pharynx: Oropharynx is clear.   Eyes:      Extraocular Movements: Extraocular movements intact.      Conjunctiva/sclera: Conjunctivae normal.   Cardiovascular:      Rate and Rhythm: Normal rate and regular rhythm.   Pulmonary:      Effort: Pulmonary effort is normal.      Breath sounds: Normal breath sounds.   Abdominal:      General: Abdomen is flat.      Palpations: Abdomen is soft.      Comments: Midline incision well approximated without induration or erythema   Musculoskeletal:      Comments: Right lower back lipoma measuring approximately 5 x 6 cm   Skin:     General: Skin is warm and dry.      Capillary Refill: Capillary refill takes less than 2 seconds.   Neurological:      General: No focal deficit present.      Mental Status: She is alert.         Diagnostic Results reviewed independently:  MRI and ultrasound consistent with subcutaneous lipoma without muscular involvement    ASSESSMENT/PLAN:     Sherry Torres is a 67 y.o. female presenting with decent sized right lower back lipoma.  She desires excision.  We will plan for removal in the operating room given size.  Patient agrees to this plan.    Kenneth Hernández MD  Acute Care Surgery  [facility]      "

## 2023-09-28 ENCOUNTER — APPOINTMENT (OUTPATIENT)
Dept: RADIOLOGY | Facility: CLINIC | Age: 68
End: 2023-09-28
Attending: FAMILY MEDICINE
Payer: MEDICARE

## 2023-09-28 DIAGNOSIS — Z78.0 MENOPAUSE: ICD-10-CM

## 2023-09-28 PROCEDURE — 77080 DXA BONE DENSITY AXIAL: CPT | Mod: 26,,, | Performed by: INTERNAL MEDICINE

## 2023-09-28 PROCEDURE — 77080 DXA BONE DENSITY AXIAL: CPT | Mod: TC,PO

## 2023-09-28 PROCEDURE — 77080 DXA BONE DENSITY AXIAL SKELETON 1 OR MORE SITES: ICD-10-PCS | Mod: 26,,, | Performed by: INTERNAL MEDICINE

## 2023-10-03 ENCOUNTER — ANESTHESIA EVENT (OUTPATIENT)
Dept: SURGERY | Facility: HOSPITAL | Age: 68
End: 2023-10-03
Payer: MEDICARE

## 2023-10-03 ENCOUNTER — TELEPHONE (OUTPATIENT)
Dept: SURGERY | Facility: CLINIC | Age: 68
End: 2023-10-03
Payer: MEDICARE

## 2023-10-03 NOTE — PRE-PROCEDURE INSTRUCTIONS
PreOp Instructions given:   - Verbal medication information (what to hold and what to take)   - NPO guidelines   - Arrival place directions given; time to be given the day before procedure by the   Surgeon's Office   - Bathing with antibacterial soap   - Don't wear any jewelry or bring any valuables AM of surgery   - No makeup or moisturizer to face   - No perfume/cologne, powder, lotions or aftershave   Pt. verbalized understanding.   Pt WITH H\/O SUDHAKAR

## 2023-10-03 NOTE — ANESTHESIA PREPROCEDURE EVALUATION
[facility]  Anesthesia Pre-Operative Evaluation         Patient Name: Sherry Torres  YOB: 1955  MRN: [id number]    SUBJECTIVE:     Pre-operative evaluation for Procedure(s) (LRB):  EXCISION, LIPOMA Back (N/A)     10/03/2023    Sherry Torres is a 67 y.o. female w/ a significant PMHx of hypothyroidism, GERD, HLD, HTN, hx of thyroid cancer, and hx of breast cancer who now presents for the above procedure(s).      TTE: 1/8/2018  CONCLUSIONS     1 - Normal left ventricular systolic function (EF 60-65%).     2 - No wall motion abnormalities.     3 - Normal left ventricular diastolic function.     4 - Normal right ventricular systolic function .     5 - Trivial to mild mitral regurgitation.     6 - Trivial tricuspid regurgitation.     7 - The estimated PA systolic pressure is 26 mmHg.     8 - Full Doppler if clinically indicated.     LDA:      Prev airway: None documented.    Drips: None documented.    Patient Active Problem List   Diagnosis    Post-surgical hypothyroidism    Gastro-esophageal reflux disease without esophagitis    Malignant neoplasm of upper-outer quadrant of right breast in female, estrogen receptor positive    History of thyroid cancer    Lumbar radiculopathy    Tinnitus of right ear    Decreased ROM of neck    Hand pain    Chronic pain    Right knee pain    Hyperlipidemia, unspecified    Acne    Primary hypertension    Disorder of breast, unspecified    Disorder of thyroid, unspecified    Malignant (primary) neoplasm, unspecified    Vitamin D deficiency       Review of patient's allergies indicates:   Allergen Reactions    Codeine Nausea Only    Sulfa (sulfonamide antibiotics) Nausea Only       Current Outpatient Medications:  No current facility-administered medications for this encounter.    Current Outpatient Medications:     acetaminophen/diphenhydramine (TYLENOL PM ORAL), Take by mouth., Disp: , Rfl:     amLODIPine (NORVASC) 5 MG tablet,  TAKE 1 TABLET (5 MG TOTAL) BY MOUTH ONCE DAILY., Disp: 30 tablet, Rfl: 3    atorvastatin (LIPITOR) 80 MG tablet, Take 1 tablet (80 mg total) by mouth once daily., Disp: 90 tablet, Rfl: 4    calcium-vitamin D3 (OS-NINA 500 + D3) 500 mg-5 mcg (200 unit) per tablet, Take 4 tablets by mouth 2 (two) times daily with meals. , Disp: , Rfl:     ibuprofen (ADVIL,MOTRIN) 200 MG tablet, Take 200 mg by mouth every 6 (six) hours as needed for Pain., Disp: , Rfl:     levothyroxine (SYNTHROID) 100 MCG tablet, Take 1 tablet (100 mcg total) by mouth before breakfast., Disp: 90 tablet, Rfl: 3    MAGNESIUM CARBONATE ORAL, Take by mouth., Disp: , Rfl:     multivitamin capsule, Take 2 capsules by mouth once daily. , Disp: , Rfl:     ondansetron (ZOFRAN-ODT) 4 MG TbDL, Take 1 tablet (4 mg total) by mouth every 6 (six) hours as needed., Disp: 20 tablet, Rfl: 0    pantoprazole (PROTONIX) 40 MG tablet, Take 1 tablet (40 mg total) by mouth once daily., Disp: 30 tablet, Rfl: 11    potassium chloride SA (K-DUR,KLOR-CON) 10 MEQ tablet, Take 20 mEq by mouth once daily., Disp: , Rfl:     tretinoin (RETIN-A) 0.025 % cream, Compound tretinoin 0.025% / niacinamide 2% cream / hyaluronic acid 0.25%. Apply a pea-sized amount to entire face qhs., Disp: 30 g, Rfl: 11    zinc gluconate 50 mg tablet, Take 50 mg by mouth once daily., Disp: , Rfl:     Past Surgical History:   Procedure Laterality Date    BREAST BIOPSY Right 2016    BREAST BIOPSY Left     exc bx    BREAST LUMPECTOMY Right     w/radiation     SECTION      COLONOSCOPY N/A 10/8/2019    Procedure: COLONOSCOPY;  Surgeon: Eliceo Holloway MD;  Location: [facility] ([address] [place] [address]);  Service: Endoscopy;  Laterality: N/A;  Okay for [facility] or Jewel. However, she needs it done before end , so if they are not available before then, okay for any provided.    CYST REMOVED FROM RIGHT BREAST IN       HERNIA REPAIR      over C section incision     left breast wire  localization excisional biopsy with bracketed wires using 3 wires and excision through 1 incision.       THYROIDECTOMY      TRANSFORAMINAL EPIDURAL INJECTION OF STEROID Right 2019    Procedure: Injection,steroid,epidural,transforaminal approach LUMBAR TRANSFORAMINAL RIGHT L5 AND S1 TF ARNOLDO;  Surgeon: Nadya Mcfarland MD;  Location: [facility];  Service: Pain Management;  Laterality: Right;  NEEDS CONSENT    TUBAL LIGATION         Social History     Socioeconomic History    Marital status:     Number of children: 2   Occupational History     Comment:    Tobacco Use    Smoking status: Former     Current packs/day: 0.00     Types: Cigarettes     Quit date: 2/10/1985     Years since quittin.6    Smokeless tobacco: Never    Tobacco comments:     socally a few years in college. none since 30 years ago   Substance and Sexual Activity    Alcohol use: Yes     Comment: few drinks on the weekend    Drug use: No    Sexual activity: Yes     Partners: Male     Birth control/protection: Post-menopausal   Other Topics Concern    Patient feels they ought to cut down on drinking/drug use No    Patient annoyed by others criticizing their drinking/drug use No    Patient has felt bad or guilty about drinking/drug use No    Patient has had a drink/used drugs as an eye opener in the AM No   Social History Narrative    ,  x 40 years, 2 children, 2 grandchildren, [employer]ism     Social Determinants of Health     Financial Resource Strain: Low Risk  (2023)    Overall Financial Resource Strain (CARDIA)     Difficulty of Paying Living Expenses: Not hard at all   Food Insecurity: No Food Insecurity (2023)    Hunger Vital Sign     Worried About Running Out of Food in the Last Year: Never true     Ran Out of Food in the Last Year: Never true   Transportation Needs: No Transportation Needs (2023)    PRAPARE - Transportation     Lack of Transportation (Medical): No     Lack of  Transportation (Non-Medical): No   Physical Activity: Insufficiently Active (9/25/2023)    Exercise Vital Sign     Days of Exercise per Week: 3 days     Minutes of Exercise per Session: 30 min   Stress: Stress Concern Present (9/25/2023)    [employer] [place] [employer] - Occupational Stress Questionnaire     Feeling of Stress : Rather much   Social Connections: Unknown (9/25/2023)    Social Connection and Isolation Panel [NHANES]     Frequency of Communication with Friends and Family: More than three times a week     Frequency of Social Gatherings with Friends and Family: Once a week     Active Member of Clubs or Organizations: No     Attends Club or Organization Meetings: Patient refused     Marital Status:    Housing Stability: Low Risk  (9/25/2023)    Housing Stability Vital Sign     Unable to Pay for Housing in the Last Year: No     Number of Places Lived in the Last Year: 1     Unstable Housing in the Last Year: No       OBJECTIVE:     Vital Signs Range (Last 24H):         Significant Labs:  Lab Results   Component Value Date    WBC 5.07 04/18/2023    HGB 14.4 04/18/2023    HCT 42.7 04/18/2023     04/18/2023    CHOL 282 (H) 12/05/2019    TRIG 112 12/05/2019    HDL 63 12/05/2019    ALT 20 04/18/2023    AST 17 04/18/2023     04/18/2023    K 3.7 04/18/2023     04/18/2023    CREATININE 0.8 04/18/2023    BUN 6 (L) 04/18/2023    CO2 21 (L) 04/18/2023    TSH 0.854 09/07/2023    INR 0.9 07/04/2017    HGBA1C 5.2 06/16/2023       Diagnostic Studies: No relevant studies.    EKG:   Results for orders placed or performed during the hospital encounter of 04/18/23   EKG 12-lead    Collection Time: 04/18/23  1:45 PM    Narrative    Test Reason : DIZZINESS    Vent. Rate : 076 BPM     Atrial Rate : 076 BPM     P-R Int : 156 ms          QRS Dur : 082 ms      QT Int : 404 ms       P-R-T Axes : 059 033 076 degrees     QTc Int : 454 ms    Normal sinus rhythm  Low anterior  forces  Abnormal ECG  When compared with ECG of 04-JUL-2017 10:10,  No significant change was found  Confirmed by Hao DALTON MD ([id number]) on 4/19/2023 8:27:24 AM    Referred By: PRICE   SELF           Confirmed By:Hao DALTON MD       2D ECHO:  TTE:  No results found for this or any previous visit.    MEAGAN:  No results found for this or any previous visit.    ASSESSMENT/PLAN:                                                                                                                  10/03/2023  Sherry Torres is a 67 y.o., female.      Pre-op Assessment    I have reviewed the Patient Summary Reports.     I have reviewed the Nursing Notes. I have reviewed the NPO Status.   I have reviewed the Medications.     Review of Systems  Anesthesia Hx:  No problems with previous Anesthesia  History of prior surgery of interest to airway management or planning: Denies Family Hx of Anesthesia complications.   Denies Personal Hx of Anesthesia complications.   Hematology/Oncology:         -- Cancer in past history:   Cardiovascular:   Hypertension Denies MI.  Denies CAD.    hyperlipidemia    Hepatic/GI:   GERD    Musculoskeletal:  Spine Disorders: lumbar    Neurological:   Neuromuscular Disease,    Endocrine:   Hypothyroidism        Physical Exam  General: Well nourished, Cooperative, Alert and Oriented    Airway:  Mallampati: II / II  Mouth Opening: Normal  TM Distance: Normal  Tongue: Normal  Neck ROM: Normal ROM    Dental:  Intact    Chest/Lungs:  Clear to auscultation, Normal Respiratory Rate    Heart:  Rate: Normal  Sounds: Normal        Anesthesia Plan  Type of Anesthesia, risks & benefits discussed:    Anesthesia Type: Gen Natural Airway  Intra-op Monitoring Plan: Standard ASA Monitors  Post Op Pain Control Plan: multimodal analgesia and IV/PO Opioids PRN  Induction:  IV  Airway Plan: Direct, Post-Induction  Informed Consent: Informed consent signed with the Patient and all parties understand the risks and agree with  anesthesia plan.  All questions answered.   ASA Score: 2  Day of Surgery Review of History & Physical: H&P Update referred to the surgeon/provider.    Ready For Surgery From Anesthesia Perspective.     .

## 2023-10-04 ENCOUNTER — ANESTHESIA (OUTPATIENT)
Dept: SURGERY | Facility: HOSPITAL | Age: 68
End: 2023-10-04
Payer: MEDICARE

## 2023-10-04 ENCOUNTER — HOSPITAL ENCOUNTER (OUTPATIENT)
Facility: HOSPITAL | Age: 68
Discharge: HOME | End: 2023-10-04
Attending: SURGERY | Admitting: SURGERY
Payer: MEDICARE

## 2023-10-04 VITALS
RESPIRATION RATE: 17 BRPM | SYSTOLIC BLOOD PRESSURE: 108 MMHG | DIASTOLIC BLOOD PRESSURE: 60 MMHG | BODY MASS INDEX: 25.83 KG/M2 | OXYGEN SATURATION: 100 % | WEIGHT: 155 LBS | TEMPERATURE: 98 F | HEIGHT: 65 IN | HEART RATE: 63 BPM

## 2023-10-04 DIAGNOSIS — D17.1 LIPOMA OF TORSO: Primary | ICD-10-CM

## 2023-10-04 PROCEDURE — 71000044 HC DOSC ROUTINE RECOVERY FIRST HOUR: Performed by: SURGERY

## 2023-10-04 PROCEDURE — 25000003 PHARM REV CODE 250

## 2023-10-04 PROCEDURE — 63600175 PHARM REV CODE 636 W HCPCS

## 2023-10-04 PROCEDURE — 71000015 HC POSTOP RECOV 1ST HR: Performed by: SURGERY

## 2023-10-04 PROCEDURE — D9220A PRA ANESTHESIA: Mod: ,,, | Performed by: SURGERY

## 2023-10-04 PROCEDURE — 37000008 HC ANESTHESIA 1ST 15 MINUTES: Performed by: SURGERY

## 2023-10-04 PROCEDURE — 88304 TISSUE EXAM BY PATHOLOGIST: CPT | Performed by: STUDENT IN AN ORGANIZED HEALTH CARE EDUCATION/TRAINING PROGRAM

## 2023-10-04 PROCEDURE — 88304 PR  SURG PATH,LEVEL III: ICD-10-PCS | Mod: 26,,, | Performed by: STUDENT IN AN ORGANIZED HEALTH CARE EDUCATION/TRAINING PROGRAM

## 2023-10-04 PROCEDURE — D9220A PRA ANESTHESIA: ICD-10-PCS | Mod: ,,, | Performed by: SURGERY

## 2023-10-04 PROCEDURE — 63600175 PHARM REV CODE 636 W HCPCS: Performed by: SURGERY

## 2023-10-04 PROCEDURE — 36000706: Performed by: SURGERY

## 2023-10-04 PROCEDURE — 21933 EXC BACK TUM DEEP 5 CM/>: CPT | Mod: ,,, | Performed by: SURGERY

## 2023-10-04 PROCEDURE — 21933 PR EXC TUMOR SOFT TISS BACK/FLANK SUBFASCIAL 5+CM: ICD-10-PCS | Mod: ,,, | Performed by: SURGERY

## 2023-10-04 PROCEDURE — 88304 TISSUE EXAM BY PATHOLOGIST: CPT | Mod: 26,,, | Performed by: STUDENT IN AN ORGANIZED HEALTH CARE EDUCATION/TRAINING PROGRAM

## 2023-10-04 PROCEDURE — 25000003 PHARM REV CODE 250: Performed by: SURGERY

## 2023-10-04 PROCEDURE — 25000003 PHARM REV CODE 250: Performed by: STUDENT IN AN ORGANIZED HEALTH CARE EDUCATION/TRAINING PROGRAM

## 2023-10-04 PROCEDURE — 37000009 HC ANESTHESIA EA ADD 15 MINS: Performed by: SURGERY

## 2023-10-04 PROCEDURE — 36000707: Performed by: SURGERY

## 2023-10-04 RX ORDER — MIDAZOLAM HYDROCHLORIDE 1 MG/ML
INJECTION, SOLUTION INTRAMUSCULAR; INTRAVENOUS
Status: DISCONTINUED | OUTPATIENT
Start: 2023-10-04 | End: 2023-10-04

## 2023-10-04 RX ORDER — SODIUM CHLORIDE 0.9 % (FLUSH) 0.9 %
10 SYRINGE (ML) INJECTION
Status: ACTIVE | OUTPATIENT
Start: 2023-10-04

## 2023-10-04 RX ORDER — SCOLOPAMINE TRANSDERMAL SYSTEM 1 MG/1
1 PATCH, EXTENDED RELEASE TRANSDERMAL
Status: DISCONTINUED | OUTPATIENT
Start: 2023-10-04 | End: 2023-10-04 | Stop reason: HOSPADM

## 2023-10-04 RX ORDER — PROPOFOL 10 MG/ML
VIAL (ML) INTRAVENOUS CONTINUOUS PRN
Status: DISCONTINUED | OUTPATIENT
Start: 2023-10-04 | End: 2023-10-04

## 2023-10-04 RX ORDER — PHENYLEPHRINE HCL IN 0.9% NACL 1 MG/10 ML
SYRINGE (ML) INTRAVENOUS
Status: DISCONTINUED | OUTPATIENT
Start: 2023-10-04 | End: 2023-10-04

## 2023-10-04 RX ORDER — HYDROMORPHONE HYDROCHLORIDE 1 MG/ML
0.2 INJECTION, SOLUTION INTRAMUSCULAR; INTRAVENOUS; SUBCUTANEOUS EVERY 5 MIN PRN
Status: ACTIVE | OUTPATIENT
Start: 2023-10-04

## 2023-10-04 RX ORDER — BUPIVACAINE HYDROCHLORIDE 2.5 MG/ML
INJECTION, SOLUTION EPIDURAL; INFILTRATION; INTRACAUDAL
Status: DISCONTINUED | OUTPATIENT
Start: 2023-10-04 | End: 2023-10-04 | Stop reason: HOSPADM

## 2023-10-04 RX ORDER — CEFAZOLIN SODIUM 1 G/3ML
INJECTION, POWDER, FOR SOLUTION INTRAMUSCULAR; INTRAVENOUS
Status: DISCONTINUED | OUTPATIENT
Start: 2023-10-04 | End: 2023-10-04

## 2023-10-04 RX ORDER — HALOPERIDOL 5 MG/ML
0.5 INJECTION INTRAMUSCULAR EVERY 10 MIN PRN
Status: ACTIVE | OUTPATIENT
Start: 2023-10-04

## 2023-10-04 RX ORDER — FENTANYL CITRATE 50 UG/ML
INJECTION, SOLUTION INTRAMUSCULAR; INTRAVENOUS
Status: DISCONTINUED | OUTPATIENT
Start: 2023-10-04 | End: 2023-10-04

## 2023-10-04 RX ORDER — ONDANSETRON 2 MG/ML
INJECTION INTRAMUSCULAR; INTRAVENOUS
Status: DISCONTINUED | OUTPATIENT
Start: 2023-10-04 | End: 2023-10-04

## 2023-10-04 RX ORDER — TRAMADOL HYDROCHLORIDE 50 MG/1
50 TABLET ORAL EVERY 6 HOURS PRN
Qty: 5 TABLET | Refills: 0 | Status: SHIPPED | OUTPATIENT
Start: 2023-10-04

## 2023-10-04 RX ORDER — SODIUM CHLORIDE 9 MG/ML
INJECTION, SOLUTION INTRAVENOUS CONTINUOUS
Status: ACTIVE | OUTPATIENT
Start: 2023-10-04

## 2023-10-04 RX ADMIN — SCOPALAMINE 1 PATCH: 1 PATCH, EXTENDED RELEASE TRANSDERMAL at 11:10

## 2023-10-04 RX ADMIN — SODIUM CHLORIDE: 9 INJECTION, SOLUTION INTRAVENOUS at 11:10

## 2023-10-04 RX ADMIN — FENTANYL CITRATE 50 MCG: 50 INJECTION INTRAMUSCULAR; INTRAVENOUS at 12:10

## 2023-10-04 RX ADMIN — PROPOFOL 20 MG: 10 INJECTION, EMULSION INTRAVENOUS at 12:10

## 2023-10-04 RX ADMIN — SODIUM CHLORIDE: 9 INJECTION, SOLUTION INTRAVENOUS at 12:10

## 2023-10-04 RX ADMIN — MIDAZOLAM 2 MG: 1 INJECTION INTRAMUSCULAR; INTRAVENOUS at 12:10

## 2023-10-04 RX ADMIN — CEFAZOLIN 2 G: 330 INJECTION, POWDER, FOR SOLUTION INTRAMUSCULAR; INTRAVENOUS at 12:10

## 2023-10-04 RX ADMIN — Medication 200 MCG: at 01:10

## 2023-10-04 RX ADMIN — ONDANSETRON 4 MG: 2 INJECTION INTRAMUSCULAR; INTRAVENOUS at 01:10

## 2023-10-04 RX ADMIN — PROPOFOL 75 MCG/KG/MIN: 10 INJECTION, EMULSION INTRAVENOUS at 12:10

## 2023-10-04 RX ADMIN — Medication 100 MCG: at 01:10

## 2023-10-04 NOTE — DISCHARGE SUMMARY
Eric Richardson - Surgery ([facility])  Brief Operative Note    Surgery Date: 10/4/2023     Surgeon(s) and Role:     * Morelia Hernández MD - Primary     * Javier Ba MD - Resident - Assisting     * Isabella Osorio MD - Resident - Assisting        Pre-op Diagnosis:  Lipoma of torso [D17.1]    Post-op Diagnosis:  Post-Op Diagnosis Codes:     * Lipoma of torso [D17.1]    Procedure(s) (LRB):  EXCISION, LIPOMA Back (N/A)    Anesthesia: Choice    Operative Findings:   - Torso lipoma     Estimated Blood Loss: * No values recorded between 10/4/2023 12:55 PM and 10/4/2023  1:27 PM *         Specimens:   Specimen (24h ago, onward)       Start     Ordered    10/04/23 1305  Specimen to Pathology, Surgery General Surgery  Once        Comments: Pre-op Diagnosis: Lipoma of torso [D17.1]Procedure(s):EXCISION, LIPOMA Back Number of specimens: 1Name of specimens: 1 lipoma ( perm)     References:    Click here for ordering Quick Tip   Question Answer Comment   Procedure Type: General Surgery    Which provider would you like to cc? MORELIA HERNÁNDEZ    Release to patient Immediate        10/04/23 1304                      Discharge Note    OUTCOME: Patient tolerated treatment/procedure well without complication and is now ready for discharge.    DISPOSITION: Home or Self Care    FINAL DIAGNOSIS:  Lipoma    FOLLOWUP: In clinic    DISCHARGE INSTRUCTIONS:    Discharge Procedure Orders   No driving until:   Scheduling Instructions: Off narcotics     Notify your health care provider if you experience any of the following:  increased confusion or weakness     Notify your health care provider if you experience any of the following:  persistent dizziness, light-headedness, or visual disturbances     Notify your health care provider if you experience any of the following:  worsening rash     Notify your health care provider if you experience any of the following:  severe persistent headache     Notify your health care provider if you  experience any of the following:  difficulty breathing or increased cough     Notify your health care provider if you experience any of the following:  redness, tenderness, or signs of infection (pain, swelling, redness, odor or green/yellow discharge around incision site)     Notify your health care provider if you experience any of the following:  severe uncontrolled pain     Notify your health care provider if you experience any of the following:  persistent nausea and vomiting or diarrhea     Notify your health care provider if you experience any of the following:  temperature >100.4

## 2023-10-04 NOTE — OP NOTE
DATE: 10/4/23.    PREOPERATIVE DIAGNOSIS:  Right lower back mass.    POSTOPERATIVE DIAGNOSIS:  Right lower back mass.    PROCEDURE:  Excision of subcutaneous right lower back mass (11 x 7 x 3 cm).    ATTENDING SURGEON: Kenneth Hernández MD.    RESIDENT: Isabella Osorio MD.    : Javier Dunlap MD.    ANESTHESIA:  Monitored anesthesia care plus local injection.    INDICATION:  Patient is a 67-year-old woman who presented to my clinic with a subcutaneous mass in the right lower back.  Exam was consistent with likely lipoma and she desired excision.    PROCEDURE IN DETAIL:  Patient was taken to the operating room and placed in the left lateral decubitus position.  After initiation of monitored anesthesia care, the right lower back was prepped and draped in the standard fashion.  Time-out was performed.  Local anesthetic was applied.  Transverse incision was made was carried down with cautery to the level of the lipoma capsule.  This was widely opened.  The lipoma was then bluntly dissected circumferentially and mobilized from its pocket.  Attachments to the muscular fascia were divided with cautery and the lipoma was passed off the field as specimen.  Lipoma measured 11 x 7 x 3 cm.  Hemostasis was confirmed.  Incision was closed in layers with 3-0 Vicryl and subcuticular 4-0 Monocryl.  Dermabond was applied.  Patient was awakened from anesthesia and transferred to the PACU in good condition.  All needle and sponge counts were correct at the conclusion of the case.  I was present for the procedure in its entirety.    EBL:  Minimal.    FINDINGS:  11 x 7 x 3 cm lipoma excised from the subcutaneous right lower back without apparent complication.

## 2023-10-04 NOTE — ANESTHESIA POSTPROCEDURE EVALUATION
Anesthesia Post Evaluation    Patient: Sherry Torres    Procedure(s) Performed: Procedure(s) (LRB):  EXCISION, LIPOMA Back (N/A)    Final Anesthesia Type: MAC      Patient location during evaluation: [facility]  Patient participation: Yes- Able to Participate  Level of consciousness: awake and alert  Post-procedure vital signs: reviewed and stable  Pain management: adequate  Airway patency: patent  NELIA mitigation strategies: Multimodal analgesia, Preoperative use of mandibular advancement devices or oral appliances and Intraoperative administration of CPAP, nasopharyngeal airway, or oral appliance during sedation  PONV status at discharge: No PONV  Anesthetic complications: no      Cardiovascular status: blood pressure returned to baseline, hemodynamically stable and stable  Respiratory status: unassisted and spontaneous ventilation  Hydration status: euvolemic  Follow-up not needed.          Vitals Value Taken Time   /59 10/04/23 1356   Temp 36.4 °C (97.6 °F) 10/04/23 1332   Pulse 61 10/04/23 1359   Resp 24 10/04/23 1359   SpO2 100 % 10/04/23 1359   Vitals shown include unvalidated device data.      No case tracking events are documented in the log.      Pain/Sheldon Score: Sheldon Score: 9 (10/4/2023  1:32 PM)

## 2023-10-04 NOTE — TRANSFER OF CARE
"Anesthesia Transfer of Care Note    Patient: Sherry Torres    Procedure(s) Performed: Procedure(s) (LRB):  EXCISION, LIPOMA Back (N/A)    Patient location: PACU    Anesthesia Type: general    Transport from OR: Transported from OR on 6-10 L/min O2 by face mask with adequate spontaneous ventilation    Post pain: adequate analgesia    Post assessment: no apparent anesthetic complications    Post vital signs: stable    Level of consciousness: awake and alert    Nausea/Vomiting: no nausea/vomiting    Complications: none    Transfer of care protocol was followed      Last vitals:   Visit Vitals  /76 (BP Location: Right arm)   Pulse 84   Temp 37.3 °C (99.1 °F) (Core Esophageal)   Resp 16   Ht 5' 5" (1.651 m)   Wt 70.3 kg (155 lb)   SpO2 98%   Breastfeeding No   BMI 25.79 kg/m²     "

## 2023-10-05 ENCOUNTER — PATIENT MESSAGE (OUTPATIENT)
Dept: ENDOCRINOLOGY | Facility: CLINIC | Age: 68
End: 2023-10-05
Payer: MEDICARE

## 2023-10-09 ENCOUNTER — PATIENT MESSAGE (OUTPATIENT)
Dept: SURGERY | Facility: CLINIC | Age: 68
End: 2023-10-09
Payer: MEDICARE

## 2023-10-09 LAB
FINAL PATHOLOGIC DIAGNOSIS: NORMAL
GROSS: NORMAL
Lab: NORMAL

## 2023-10-17 ENCOUNTER — OFFICE VISIT (OUTPATIENT)
Dept: SURGERY | Facility: CLINIC | Age: 68
End: 2023-10-17
Payer: MEDICARE

## 2023-10-17 VITALS
DIASTOLIC BLOOD PRESSURE: 76 MMHG | WEIGHT: 152.25 LBS | BODY MASS INDEX: 25.37 KG/M2 | HEART RATE: 82 BPM | HEIGHT: 65 IN | OXYGEN SATURATION: 98 % | SYSTOLIC BLOOD PRESSURE: 142 MMHG

## 2023-10-17 DIAGNOSIS — Z48.89 POSTOPERATIVE VISIT: Primary | ICD-10-CM

## 2023-10-17 DIAGNOSIS — M17.11 PRIMARY OSTEOARTHRITIS OF RIGHT KNEE: Primary | ICD-10-CM

## 2023-10-17 PROCEDURE — 99024 POSTOP FOLLOW-UP VISIT: CPT | Mod: S$GLB,,, | Performed by: SURGERY

## 2023-10-17 PROCEDURE — 99024 PR POST-OP FOLLOW-UP VISIT: ICD-10-PCS | Mod: S$GLB,,, | Performed by: SURGERY

## 2023-10-17 PROCEDURE — 3077F SYST BP >= 140 MM HG: CPT | Mod: CPTII,S$GLB,, | Performed by: SURGERY

## 2023-10-17 PROCEDURE — 3078F DIAST BP <80 MM HG: CPT | Mod: CPTII,S$GLB,, | Performed by: SURGERY

## 2023-10-17 PROCEDURE — 99999 PR PBB SHADOW E&M-EST. PATIENT-LVL III: ICD-10-PCS | Mod: PBBFAC,,, | Performed by: SURGERY

## 2023-10-17 PROCEDURE — 3044F HG A1C LEVEL LT 7.0%: CPT | Mod: CPTII,S$GLB,, | Performed by: SURGERY

## 2023-10-17 PROCEDURE — 3077F PR MOST RECENT SYSTOLIC BLOOD PRESSURE >= 140 MM HG: ICD-10-PCS | Mod: CPTII,S$GLB,, | Performed by: SURGERY

## 2023-10-17 PROCEDURE — 3078F PR MOST RECENT DIASTOLIC BLOOD PRESSURE < 80 MM HG: ICD-10-PCS | Mod: CPTII,S$GLB,, | Performed by: SURGERY

## 2023-10-17 PROCEDURE — 99999 PR PBB SHADOW E&M-EST. PATIENT-LVL III: CPT | Mod: PBBFAC,,, | Performed by: SURGERY

## 2023-10-17 PROCEDURE — 3288F PR FALLS RISK ASSESSMENT DOCUMENTED: ICD-10-PCS | Mod: CPTII,S$GLB,, | Performed by: SURGERY

## 2023-10-17 PROCEDURE — 1101F PT FALLS ASSESS-DOCD LE1/YR: CPT | Mod: CPTII,S$GLB,, | Performed by: SURGERY

## 2023-10-17 PROCEDURE — 1101F PR PT FALLS ASSESS DOC 0-1 FALLS W/OUT INJ PAST YR: ICD-10-PCS | Mod: CPTII,S$GLB,, | Performed by: SURGERY

## 2023-10-17 PROCEDURE — 3288F FALL RISK ASSESSMENT DOCD: CPT | Mod: CPTII,S$GLB,, | Performed by: SURGERY

## 2023-10-17 PROCEDURE — 3044F PR MOST RECENT HEMOGLOBIN A1C LEVEL <7.0%: ICD-10-PCS | Mod: CPTII,S$GLB,, | Performed by: SURGERY

## 2023-10-24 ENCOUNTER — OFFICE VISIT (OUTPATIENT)
Dept: ORTHOPEDICS | Facility: CLINIC | Age: 68
End: 2023-10-24
Payer: MEDICARE

## 2023-10-24 DIAGNOSIS — M17.11 PRIMARY OSTEOARTHRITIS OF RIGHT KNEE: Primary | ICD-10-CM

## 2023-10-24 PROCEDURE — 99499 NO LOS: ICD-10-PCS | Mod: S$GLB,,, | Performed by: PHYSICIAN ASSISTANT

## 2023-10-24 PROCEDURE — 99999 PR PBB SHADOW E&M-EST. PATIENT-LVL III: CPT | Mod: PBBFAC,,, | Performed by: PHYSICIAN ASSISTANT

## 2023-10-24 PROCEDURE — 99499 UNLISTED E&M SERVICE: CPT | Mod: S$GLB,,, | Performed by: PHYSICIAN ASSISTANT

## 2023-10-24 PROCEDURE — 20610 DRAIN/INJ JOINT/BURSA W/O US: CPT | Mod: RT,S$GLB,, | Performed by: PHYSICIAN ASSISTANT

## 2023-10-24 PROCEDURE — 20610 PR DRAIN/INJECT LARGE JOINT/BURSA: ICD-10-PCS | Mod: RT,S$GLB,, | Performed by: PHYSICIAN ASSISTANT

## 2023-10-24 PROCEDURE — 99999 PR PBB SHADOW E&M-EST. PATIENT-LVL III: ICD-10-PCS | Mod: PBBFAC,,, | Performed by: PHYSICIAN ASSISTANT

## 2023-10-24 NOTE — PROGRESS NOTES
Sherry Torres is a 67 y.o. year old her here today for her 1st Euflexxa injection for degenerative changes of her right knee . she was last seen and treated in the clinic on 3/17/2023. There has been no significant change in her medical status since her last visit. No Fever, chills, malaise, or unexplained weight change.      Allergies, Medications, past medical and surgical history were reviewed .    Examination of the knee demonstrates  No evidence of edema, erythema , echymosis strength and range of motion are unchanged from previous visit.    The risks, benefits, pros, cons, and potential side effects of the procedure were discussed with the patient in detail all questions were answered.  The patient is comfortable and willing to proceed with the procedure. Verbal consent was obtained and the proper joint was identified by the patient and provider.     The injection site was identified and the skin was prepared with a betadine solution. The  right knee  joint was injected with 2 ml of Euflexxa solution under sterile technique. Sherry Torres tolerated the procedure well, she was advised to rest the knee today, ice and elevation. I may take 3 -6 weeks following the last injection to get the full benefit of the medication.  I will see her back in 1 week. Sooner if he has any problems or concerns.           .     ICD-10-CM ICD-9-CM   1. Primary osteoarthritis of right knee  M17.11 715.16

## 2023-10-31 ENCOUNTER — OFFICE VISIT (OUTPATIENT)
Dept: ORTHOPEDICS | Facility: CLINIC | Age: 68
End: 2023-10-31
Payer: MEDICARE

## 2023-10-31 DIAGNOSIS — M17.11 PRIMARY OSTEOARTHRITIS OF RIGHT KNEE: Primary | ICD-10-CM

## 2023-10-31 PROCEDURE — 99499 NO LOS: ICD-10-PCS | Mod: S$GLB,,, | Performed by: PHYSICIAN ASSISTANT

## 2023-10-31 PROCEDURE — 20610 PR DRAIN/INJECT LARGE JOINT/BURSA: ICD-10-PCS | Mod: RT,S$GLB,, | Performed by: PHYSICIAN ASSISTANT

## 2023-10-31 PROCEDURE — 20610 DRAIN/INJ JOINT/BURSA W/O US: CPT | Mod: RT,S$GLB,, | Performed by: PHYSICIAN ASSISTANT

## 2023-10-31 PROCEDURE — 99499 UNLISTED E&M SERVICE: CPT | Mod: S$GLB,,, | Performed by: PHYSICIAN ASSISTANT

## 2023-10-31 PROCEDURE — 99999 PR PBB SHADOW E&M-EST. PATIENT-LVL III: ICD-10-PCS | Mod: PBBFAC,,, | Performed by: PHYSICIAN ASSISTANT

## 2023-10-31 PROCEDURE — 99999 PR PBB SHADOW E&M-EST. PATIENT-LVL III: CPT | Mod: PBBFAC,,, | Performed by: PHYSICIAN ASSISTANT

## 2023-10-31 NOTE — PROGRESS NOTES
Sherry Torres is a 67 y.o. year old her here today for her 2nd Euflexxa injection for degenerative changes of her right knee . she was last seen and treated in the clinic on 10/24/2023. There has been no significant change in her medical status since her last visit. No Fever, chills, malaise, or unexplained weight change.      Allergies, Medications, past medical and surgical history were reviewed .    Examination of the knee demonstrates  No evidence of edema, erythema , echymosis strength and range of motion are unchanged from previous visit.    The risks, benefits, pros, cons, and potential side effects of the procedure were discussed with the patient in detail all questions were answered.  The patient is comfortable and willing to proceed with the procedure. Verbal consent was obtained and the proper joint was identified by the patient and provider.     The injection site was identified and the skin was prepared with a betadine solution. The  right knee  joint was injected with 2 ml of Euflexxa solution under sterile technique. Sherry Torres tolerated the procedure well, she was advised to rest the knee today, ice and elevation. I may take 3 -6 weeks following the last injection to get the full benefit of the medication.  I will see her back in 1 week. Sooner if he has any problems or concerns.           .     ICD-10-CM ICD-9-CM   1. Primary osteoarthritis of right knee  M17.11 715.16

## 2023-11-02 NOTE — PROGRESS NOTES
"Subjective:       Sherry Torres presents to the clinic 2 weeks following back lipoma excision.  She is doing very well.  Path is benign.  She is having no pain.  Incision healing well.       Objective:      BP (!) 142/76 (BP Location: Left arm, Patient Position: Sitting)   Pulse 82   Ht 5' 5" (1.651 m)   Wt 69 kg (152 lb 3.6 oz)   SpO2 98%   BMI 25.33 kg/m²     General:  alert, appears stated age, and cooperative   Abdomen: soft, bowel sounds active, non-tender   Incision:   healing well, no drainage, no erythema, no hernia, no seroma, no swelling, no dehiscence, incision well approximated       Assessment:      Doing well postoperatively.      Plan:      1. Continue any current medications.  2. Wound care discussed.  3. Pt is to increase activities as tolerated.  4. Follow up: as needed.    Kenneth Hernández MD  Acute Care Surgery  [facility] Department of Surgery      "

## 2023-11-07 ENCOUNTER — OFFICE VISIT (OUTPATIENT)
Dept: ORTHOPEDICS | Facility: CLINIC | Age: 68
End: 2023-11-07
Payer: MEDICARE

## 2023-11-07 DIAGNOSIS — M17.11 PRIMARY OSTEOARTHRITIS OF RIGHT KNEE: Primary | ICD-10-CM

## 2023-11-07 PROCEDURE — 20610 PR DRAIN/INJECT LARGE JOINT/BURSA: ICD-10-PCS | Mod: RT,S$GLB,, | Performed by: PHYSICIAN ASSISTANT

## 2023-11-07 PROCEDURE — 99999 PR PBB SHADOW E&M-EST. PATIENT-LVL III: ICD-10-PCS | Mod: PBBFAC,,, | Performed by: PHYSICIAN ASSISTANT

## 2023-11-07 PROCEDURE — 20610 DRAIN/INJ JOINT/BURSA W/O US: CPT | Mod: RT,S$GLB,, | Performed by: PHYSICIAN ASSISTANT

## 2023-11-07 PROCEDURE — 99499 NO LOS: ICD-10-PCS | Mod: S$GLB,,, | Performed by: PHYSICIAN ASSISTANT

## 2023-11-07 PROCEDURE — 99999 PR PBB SHADOW E&M-EST. PATIENT-LVL III: CPT | Mod: PBBFAC,,, | Performed by: PHYSICIAN ASSISTANT

## 2023-11-07 PROCEDURE — 99499 UNLISTED E&M SERVICE: CPT | Mod: S$GLB,,, | Performed by: PHYSICIAN ASSISTANT

## 2023-11-07 NOTE — PROGRESS NOTES
Sherry Torres is a 67 y.o. year old her here today for her 3rd Euflexxa injection for degenerative changes of her right knee . she was last seen and treated in the clinic on 10/31/2023. There has been no significant change in her medical status since her last visit. No Fever, chills, malaise, or unexplained weight change.      Allergies, Medications, past medical and surgical history were reviewed .    Examination of the knee demonstrates  No evidence of edema, erythema , echymosis strength and range of motion are unchanged from previous visit.    The risks, benefits, pros, cons, and potential side effects of the procedure were discussed with the patient in detail all questions were answered.  The patient is comfortable and willing to proceed with the procedure. Verbal consent was obtained and the proper joint was identified by the patient and provider.     The injection site was identified and the skin was prepared with a betadine solution. The  right knee  joint was injected with 2 ml of Euflexxa solution under sterile technique. Sherry Torres tolerated the procedure well, she was advised to rest the knee today, ice and elevation. I may take 3 -6 weeks following the last injection to get the full benefit of the medication.  I will see her back in 6 months. Sooner if he has any problems or concerns.           .     ICD-10-CM ICD-9-CM   1. Primary osteoarthritis of right knee  M17.11 715.16

## 2023-12-22 ENCOUNTER — PATIENT MESSAGE (OUTPATIENT)
Dept: ADMINISTRATIVE | Facility: OTHER | Age: 68
End: 2023-12-22
Payer: MEDICARE

## 2023-12-22 NOTE — PROGRESS NOTES
"                                                    Physical Therapy Progress Note     Name: Sherry Torres  Clinic Number: [id number]  Diagnosis:R55 (ICD-10-CM) - Syncope and collapse     Encounter Diagnoses   Name Primary?    New onset of headaches     Decreased ROM of neck     Impaired motor control        Physician: Pito Guerrero*  Treatment Orders: PT Eval and Treat, perform Eply Manuever  Past Medical History:   Diagnosis Date    Atypical ductal hyperplasia, breast 2008    left side    Breast cancer 08/2016    right side    Breast cyst approx. 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    Mitral valve prolapse     Psychiatric problem        Precautions: standard  Visit #: 1  Date of Eval: 1/2/2018  Plan of Care Expiration: 2/13/2018    Functional Limitations Reports - G Codes  Category: other   Tool: FOTO  Score: 73% (27% impairment)    G-CODE  2/10   eval CJ-CI             Subjective   Pt reports: no new complaints pt reports good compliance with HEP  Pain Scale:  "discomfort" reported left upper quadrant with AROM cervical    Objective     Patient received individual therapy to increase strength, ROM, flexibility, posture and decrease HA with activities as follows:     Pt participated in therapeutic exercise x 25 minutes to address ROM, strength, and posture to decrease headaches:   MHP cervical region in supine x 9 min    cervical AROM sitting:   flex= 40 deg*  Ext= 40 deg  Rotation: R= 40 deg, L= 42 deg*  SB: R= 26 deg*, L=25 deg*  * "discomfort or pulling" in left upper trap     Standing: Pull downs with OTB 2 x 10  Supine: PROM upper trap    Cervical PROM rotation   Supine cervical AROM rotation  Sitting: Saccades- 70 BPM, all directions (sitting)   Scalene stretch    Pt participated in manual therapy treatment x 20 minutes to address cervical ROM and decrease " DISPLAY PLAN FREE TEXT headaches:  inhibitive distraction  Cervical upslides  AA rotation    Written Home Exercises: 1/5/18- scalene stretch  eval- chin tuck, levator scap stretch, upper trap stretch, AROM cervical rotation, scap retraction, pec doorway stretch (2 positions) saccades.  Pt demo good understanding of the education provided. Sherry demonstrated good return demonstration of activities.     Education provided re: POC, HEP    Pt has no cultural, educational or language barriers to learning provided.    Assessment   Sherry tolerated treatment well. Pt reports good compliance with HEP. Pt was able to tolerate 70 bpm for saccades (unable to tolerate 80 bpm). Scalene stretch added to HEP to address abnormal motor control for cervical rotation AROM (chin tuck noted). Pt can benefit from continued skilled PT to address impairments to decrease headaches, improve cervical mobility, and return pt to PLOF.    Pt prognosis is Excellent. Pt will continue to benefit from skilled outpatient physical therapy to address the deficits listed in the problem list, provide pt/family education and to maximize pt's level of independence in the home and community environment.     Anticipated barriers to physical therapy: none    Medical necessity is demonstrated by the following IMPAIRMENTS/PROBLEM LIST:   Impaired muscle tone  Limited computer use  Frequent HA  Decreased ROM  Decreased activity tolerance   Difficulty participating in daily activities   Difficulty participating in vocational pursuits   Requires skilled supervision to complete and progress HEP     Pt's spiritual, cultural and educational needs considered and pt agreeable to plan of care and GOALS as stated below:   Short term goals: 2 weeks, pt agrees to goals set.  1. Pt will perform HEP for cervical ROM and oculomotor function with supervision to improve carryover of progress and reduce HA.  2. Pt will report decrease intensity of HA to 4-5/10 to improve tolerance to daily  activities.  3. Pt will demonstrate improved cervical SB AROM to R to 20 deg to equal left and decrease pain/ HA.  4. Pt will demonstrate improved cervical AROM for flexion and extension to 50 degrees to demonstrate improved mobility and to decrease pain/ HA.     Long term goals: 4 weeks, pt agrees to goals set  5. Pt will report decreased HA occurrence to <daily to demonstrate an improvement of symptoms.  6. Pt will report decreased HA intensity to 3/10 to demonstrate improved tolerance to daily activities.   7. Pt will demonstrate improved cervical rotation AROM to 70 degrees to demonstrate improved mobility and ROM WFLs.   8. Pt will perform horizontal saccades at 120 bpm x 30 sec to demonstrate improved oculomotor function to decrease headaches.         Plan   Continue PT 1-2x weekly under established Plan of Care, with treatment to include: pt education, HEP, therapeutic exercises, gait training, neuromuscular re-education/balance exercises, therapeutic activities, manual therapy, and modalities PRN, to work towards established goals. Pt may be seen by PTA to carry out plan of care.     Susy Nicole, PT   01/05/2018

## 2024-01-19 DIAGNOSIS — I10 HYPERTENSION, UNSPECIFIED TYPE: ICD-10-CM

## 2024-01-20 RX ORDER — AMLODIPINE BESYLATE 5 MG/1
5 TABLET ORAL DAILY
Qty: 30 TABLET | Refills: 3 | OUTPATIENT
Start: 2024-01-20 | End: 2025-01-19

## 2024-02-21 ENCOUNTER — OFFICE VISIT (OUTPATIENT)
Dept: OPHTHALMOLOGY | Facility: CLINIC | Age: 69
End: 2024-02-21
Payer: MEDICARE

## 2024-02-21 DIAGNOSIS — H25.11 NUCLEAR SCLEROSIS OF RIGHT EYE: Primary | ICD-10-CM

## 2024-02-21 PROCEDURE — 92136 OPHTHALMIC BIOMETRY: CPT | Mod: RT,S$GLB,, | Performed by: OPHTHALMOLOGY

## 2024-02-21 PROCEDURE — 1160F RVW MEDS BY RX/DR IN RCRD: CPT | Mod: CPTII,S$GLB,, | Performed by: OPHTHALMOLOGY

## 2024-02-21 PROCEDURE — 1126F AMNT PAIN NOTED NONE PRSNT: CPT | Mod: CPTII,S$GLB,, | Performed by: OPHTHALMOLOGY

## 2024-02-21 PROCEDURE — 99999 PR PBB SHADOW E&M-EST. PATIENT-LVL III: CPT | Mod: PBBFAC,,, | Performed by: OPHTHALMOLOGY

## 2024-02-21 PROCEDURE — 1159F MED LIST DOCD IN RCRD: CPT | Mod: CPTII,S$GLB,, | Performed by: OPHTHALMOLOGY

## 2024-02-21 PROCEDURE — 99204 OFFICE O/P NEW MOD 45 MIN: CPT | Mod: S$GLB,,, | Performed by: OPHTHALMOLOGY

## 2024-02-21 RX ORDER — PREDNISOLONE ACETATE-GATIFLOXACIN-BROMFENAC .75; 5; 1 MG/ML; MG/ML; MG/ML
1 SUSPENSION/ DROPS OPHTHALMIC 3 TIMES DAILY
Qty: 5 ML | Refills: 3 | Status: SHIPPED | OUTPATIENT
Start: 2024-02-21 | End: 2024-06-13 | Stop reason: ALTCHOICE

## 2024-02-21 RX ORDER — SODIUM CHLORIDE 0.9 % (FLUSH) 0.9 %
10 SYRINGE (ML) INJECTION
Status: DISCONTINUED | OUTPATIENT
Start: 2024-02-21 | End: 2024-04-18

## 2024-02-21 RX ORDER — PHENYLEPHRINE HYDROCHLORIDE 100 MG/ML
1 SOLUTION/ DROPS OPHTHALMIC
Status: CANCELLED | OUTPATIENT
Start: 2024-02-21

## 2024-02-21 RX ORDER — CYCLOP/TROP/PROPA/PHEN/KET/WAT 1-1-0.1%
1 DROPS (EA) OPHTHALMIC (EYE)
Status: CANCELLED | OUTPATIENT
Start: 2024-02-21

## 2024-02-21 RX ORDER — MOXIFLOXACIN 5 MG/ML
1 SOLUTION/ DROPS OPHTHALMIC
Status: CANCELLED | OUTPATIENT
Start: 2024-02-21

## 2024-02-21 NOTE — PROGRESS NOTES
HPI    Patient present today for Cataract Evaluation OD  Pt state blurry VA OD. Glare off/on   No other complaints at this time     S/p PCIOL OS 12/23    Refresh OU   Last edited by Kamran Galvan on 2/21/2024  8:32 AM.            Assessment /Plan     For exam results, see Encounter Report.    Nuclear sclerosis of right eye      Visually Significant Cataract: Patient reports decreased vision consistent with the clinical amount of lenticular opacity, which reaches the level of visual significance and affects activities of daily living.     Specifically, this patient describes difficulty with:  - driving safely at night  - reading road signs  - reading small print  - deciphering medicine bottles  - reading the newspaper  - using the phone  - reading texts     Risks, benefits, and alternatives to cataract surgery were discussed and the consent reviewed. IOL options were discussed, including ATIOLs and the associated side effects and additional patient cost associated with them.   IOL Selections:   Right eye  IOL: CNWTT0 19.0 (VS 18.5)     Left eye  IOL: MONOFOCAL DISTANCE    Pt wishes to have RIGHT eye done first.    The patient expresses a desire to reduce spectacle dependence. I reviewed various IOL and LASER refractive surgical options and we will attempt to minimize spectacle dependence by managing astigmatism and optimizing IOL selection. Customized Cataract Surgery with Vision Correction (CCVC) was explained to the patient with educational videos and discussion.  The patient voices understanding and wishes to implement this technology during the cataract procedure.  I explained the increased precision of the LASER versus manual techniques, especially as it relates to astigmatism reduction with arcuate incisions.  I emphasized that although our goal is to reduce the need for refractive correction (glasses or contacts) after surgery, there may still be a need for spectacle correction to achieve optimal visual  acuity, and that a reasonable range of functional vision should be the expectation.  No guarantees are made about post operative refraction or visual acuity, as the eye may heal in unpredictable ways, and the standard risks, benefits, and alternatives to cataract surgery were explained.  The patient understands that the refractive portions of this cataract procedure are not covered by insurance, and that there is an out of pocket expense of $2550 per eye. I also explained that even though our pre-operative plan is to utilize advanced refractive technologies during surgery, that I may decide to eliminate part or all of this plan if surgical challenges or complications arise, or I feel that it is not in the patient's best interest. Consent forms and an ABN form were given to the patient to review.    IVAN JACKSON [facility]

## 2024-02-29 ENCOUNTER — TELEPHONE (OUTPATIENT)
Dept: FAMILY MEDICINE | Facility: CLINIC | Age: 69
End: 2024-02-29
Payer: MEDICARE

## 2024-03-15 ENCOUNTER — TELEPHONE (OUTPATIENT)
Dept: OPHTHALMOLOGY | Facility: CLINIC | Age: 69
End: 2024-03-15
Payer: MEDICARE

## 2024-03-15 DIAGNOSIS — H25.11 NUCLEAR SCLEROTIC CATARACT OF RIGHT EYE: Primary | ICD-10-CM

## 2024-03-22 DIAGNOSIS — I10 HYPERTENSION, UNSPECIFIED TYPE: ICD-10-CM

## 2024-03-23 NOTE — TELEPHONE ENCOUNTER
Refill Routing Note   Medication(s) are not appropriate for processing by [facility] for the following reason(s):        Non-participating provider    [facility] action(s):  Route               Appointments  past 12m or future 3m with PCP    Date Provider   Last Visit   Visit date not found Bib Vasquez MD   Next Visit   Visit date not found Bib Vasquez MD   ED visits in past 90 days: 0        Note composed:5:23 PM 03/23/2024

## 2024-03-28 RX ORDER — AMLODIPINE BESYLATE 5 MG/1
5 TABLET ORAL DAILY
Qty: 90 TABLET | Refills: 0 | OUTPATIENT
Start: 2024-03-28 | End: 2025-03-28

## 2024-04-15 ENCOUNTER — TELEPHONE (OUTPATIENT)
Dept: OPHTHALMOLOGY | Facility: CLINIC | Age: 69
End: 2024-04-15
Payer: MEDICARE

## 2024-04-15 NOTE — TELEPHONE ENCOUNTER
Patient given arrival time of  10:30 am on Thursday April 18. Nothing to eat or drink after 11:59 pm   Start drops into the operative eye today. [address] [facility]

## 2024-04-17 DIAGNOSIS — K44.9 HIATAL HERNIA WITH GERD: ICD-10-CM

## 2024-04-17 DIAGNOSIS — K21.9 HIATAL HERNIA WITH GERD: ICD-10-CM

## 2024-04-17 RX ORDER — PANTOPRAZOLE SODIUM 40 MG/1
40 TABLET, DELAYED RELEASE ORAL DAILY
Qty: 30 TABLET | Refills: 11 | Status: SHIPPED | OUTPATIENT
Start: 2024-04-17 | End: 2025-04-17

## 2024-04-17 NOTE — PRE-PROCEDURE INSTRUCTIONS
Unable to reach pt via phone.  Left voicemail with arrival time also informing pt of need for responsible  accompaniment and instructing pt to follow pre-procedure instructions provided via [facility] portal.  The following message was sent to pt's portal.       Dear Sherry     Below you will find basic pre-procedure instructions in preparation for your procedure on 4/18/24 with Dr. Khan     You should have received your arrival time already from Dr's office.     - Nothing to eat or drink after midnight the night before your procedure until after your procedure, except AM meds with small sips of water.     - HOLD all oral Diabetic medications night before and morning of procedure  - HOLD all Insulin morning of procedure  - HOLD all Fluid pills morning of procedure  - HOLD all non-insulin shots until after surgery (Ozempic, Mounjaro, Trulicity, Victoza, Byetta, Wegovy and Adlyxin) (7 days prior)  - HOLD all vitamins, minerals and herbal supplements morning of procedure   - TAKE all B/P meds, EXCEPT those that contain a fluid pill (ex. Lasix, Hydroclorothiazide/HCTZ, Spirnolactone)  - USE inhalers as needed and bring AM of surgery  - USE EYE DROPS as directed  -TAKE blood thinner meds AM of surgery unless otherwise instructed by your provider to not take     - Shower and wash face with dial soap for 3 mins PM prior and AM of surgery  - No powder, lotions, creams, oils, gels, ointments, makeup,  or jewelry    - Wear comfortable clothing (button up shirt)     (Patient is required to have a responsible ride to transport home, ride may not leave while patient is in surgery)     -- [facility], 2nd floor Surgery Center, located   @ [address] [place] [address]  2nd Floor Registration        If you have any questions or concerns please feel free to contact your surgeon's office.           Please reply to this message as receipt of delivery.     Cat[place], LPN [facility]  Pre-Admit - Anesthesia Dept

## 2024-04-18 ENCOUNTER — HOSPITAL ENCOUNTER (OUTPATIENT)
Facility: HOSPITAL | Age: 69
Discharge: HOME | End: 2024-04-18
Attending: OPHTHALMOLOGY | Admitting: OPHTHALMOLOGY
Payer: MEDICARE

## 2024-04-18 ENCOUNTER — OFFICE VISIT (OUTPATIENT)
Dept: OPHTHALMOLOGY | Facility: CLINIC | Age: 69
End: 2024-04-18
Payer: MEDICARE

## 2024-04-18 VITALS
SYSTOLIC BLOOD PRESSURE: 119 MMHG | HEART RATE: 79 BPM | RESPIRATION RATE: 16 BRPM | TEMPERATURE: 98 F | DIASTOLIC BLOOD PRESSURE: 68 MMHG | OXYGEN SATURATION: 100 %

## 2024-04-18 DIAGNOSIS — Z98.890 POST-OPERATIVE STATE: Primary | ICD-10-CM

## 2024-04-18 DIAGNOSIS — H25.11 NUCLEAR SCLEROTIC CATARACT OF RIGHT EYE: ICD-10-CM

## 2024-04-18 DIAGNOSIS — H25.11 NUCLEAR SCLEROSIS OF RIGHT EYE: ICD-10-CM

## 2024-04-18 DIAGNOSIS — H25.11 NUCLEAR SCLEROTIC CATARACT OF RIGHT EYE: Primary | ICD-10-CM

## 2024-04-18 PROCEDURE — 25000003 PHARM REV CODE 250: Performed by: OPHTHALMOLOGY

## 2024-04-18 PROCEDURE — 1159F MED LIST DOCD IN RCRD: CPT | Mod: CPTII,S$GLB,, | Performed by: OPHTHALMOLOGY

## 2024-04-18 PROCEDURE — 1101F PT FALLS ASSESS-DOCD LE1/YR: CPT | Mod: CPTII,S$GLB,, | Performed by: OPHTHALMOLOGY

## 2024-04-18 PROCEDURE — 36000707: Performed by: OPHTHALMOLOGY

## 2024-04-18 PROCEDURE — 1126F AMNT PAIN NOTED NONE PRSNT: CPT | Mod: CPTII,S$GLB,, | Performed by: OPHTHALMOLOGY

## 2024-04-18 PROCEDURE — 1160F RVW MEDS BY RX/DR IN RCRD: CPT | Mod: CPTII,S$GLB,, | Performed by: OPHTHALMOLOGY

## 2024-04-18 PROCEDURE — 99900035 HC TECH TIME PER 15 MIN (STAT)

## 2024-04-18 PROCEDURE — 99999 PR PBB SHADOW E&M-EST. PATIENT-LVL III: CPT | Mod: PBBFAC,,, | Performed by: OPHTHALMOLOGY

## 2024-04-18 PROCEDURE — 36000706: Performed by: OPHTHALMOLOGY

## 2024-04-18 PROCEDURE — 3288F FALL RISK ASSESSMENT DOCD: CPT | Mod: CPTII,S$GLB,, | Performed by: OPHTHALMOLOGY

## 2024-04-18 PROCEDURE — 99024 POSTOP FOLLOW-UP VISIT: CPT | Mod: S$GLB,,, | Performed by: OPHTHALMOLOGY

## 2024-04-18 PROCEDURE — V2788 PRESBYOPIA-CORRECT FUNCTION: HCPCS | Mod: WS | Performed by: OPHTHALMOLOGY

## 2024-04-18 PROCEDURE — 63600175 PHARM REV CODE 636 W HCPCS: Performed by: OPHTHALMOLOGY

## 2024-04-18 PROCEDURE — 94760 N-INVAS EAR/PLS OXIMETRY 1: CPT

## 2024-04-18 PROCEDURE — 66984 XCAPSL CTRC RMVL W/O ECP: CPT | Mod: RT,,, | Performed by: OPHTHALMOLOGY

## 2024-04-18 PROCEDURE — 66999 UNLISTED PX ANT SEGMENT EYE: CPT | Mod: CSM,RT,, | Performed by: OPHTHALMOLOGY

## 2024-04-18 PROCEDURE — 71000015 HC POSTOP RECOV 1ST HR: Performed by: OPHTHALMOLOGY

## 2024-04-18 RX ORDER — TROPICAMIDE 10 MG/ML
1 SOLUTION/ DROPS OPHTHALMIC
Status: COMPLETED | OUTPATIENT
Start: 2024-04-18 | End: 2024-04-18

## 2024-04-18 RX ORDER — MOXIFLOXACIN 5 MG/ML
1 SOLUTION/ DROPS OPHTHALMIC
Status: COMPLETED | OUTPATIENT
Start: 2024-04-18 | End: 2024-04-18

## 2024-04-18 RX ORDER — PROPARACAINE HYDROCHLORIDE 5 MG/ML
SOLUTION/ DROPS OPHTHALMIC
Status: DISCONTINUED | OUTPATIENT
Start: 2024-04-18 | End: 2024-04-18 | Stop reason: HOSPADM

## 2024-04-18 RX ORDER — CYCLOP/TROP/PROPA/PHEN/KET/WAT 1-1-0.1%
1 DROPS (EA) OPHTHALMIC (EYE)
Status: DISCONTINUED | OUTPATIENT
Start: 2024-04-18 | End: 2024-04-18

## 2024-04-18 RX ORDER — MOXIFLOXACIN 5 MG/ML
SOLUTION/ DROPS OPHTHALMIC
Status: DISCONTINUED | OUTPATIENT
Start: 2024-04-18 | End: 2024-04-18 | Stop reason: HOSPADM

## 2024-04-18 RX ORDER — ACETAMINOPHEN 325 MG/1
650 TABLET ORAL EVERY 4 HOURS PRN
Status: DISCONTINUED | OUTPATIENT
Start: 2024-04-18 | End: 2024-04-18 | Stop reason: HOSPADM

## 2024-04-18 RX ORDER — PHENYLEPHRINE HYDROCHLORIDE 25 MG/ML
1 SOLUTION/ DROPS OPHTHALMIC
Status: COMPLETED | OUTPATIENT
Start: 2024-04-18 | End: 2024-04-18

## 2024-04-18 RX ORDER — TETRACAINE HYDROCHLORIDE 5 MG/ML
1 SOLUTION OPHTHALMIC
Status: COMPLETED | OUTPATIENT
Start: 2024-04-18 | End: 2024-04-18

## 2024-04-18 RX ORDER — PHENYLEPHRINE HYDROCHLORIDE 100 MG/ML
1 SOLUTION/ DROPS OPHTHALMIC
Status: DISCONTINUED | OUTPATIENT
Start: 2024-04-18 | End: 2024-04-18 | Stop reason: HOSPADM

## 2024-04-18 RX ORDER — PROPARACAINE HYDROCHLORIDE 5 MG/ML
1 SOLUTION/ DROPS OPHTHALMIC
Status: DISCONTINUED | OUTPATIENT
Start: 2024-04-18 | End: 2024-04-18 | Stop reason: HOSPADM

## 2024-04-18 RX ORDER — MIDAZOLAM HYDROCHLORIDE 1 MG/ML
1 INJECTION, SOLUTION INTRAMUSCULAR; INTRAVENOUS
Status: DISCONTINUED | OUTPATIENT
Start: 2024-04-18 | End: 2024-04-18 | Stop reason: HOSPADM

## 2024-04-18 RX ORDER — LIDOCAINE HYDROCHLORIDE 40 MG/ML
INJECTION, SOLUTION RETROBULBAR
Status: DISCONTINUED | OUTPATIENT
Start: 2024-04-18 | End: 2024-04-18 | Stop reason: HOSPADM

## 2024-04-18 RX ADMIN — TROPICAMIDE 1 DROP: 10 SOLUTION/ DROPS OPHTHALMIC at 12:04

## 2024-04-18 RX ADMIN — MOXIFLOXACIN 1 DROP: 5 SOLUTION/ DROPS OPHTHALMIC at 01:04

## 2024-04-18 RX ADMIN — PHENYLEPHRINE HYDROCHLORIDE 1 DROP: 25 SOLUTION/ DROPS OPHTHALMIC at 12:04

## 2024-04-18 RX ADMIN — TETRACAINE HYDROCHLORIDE 1 DROP: 5 SOLUTION/ DROPS OPHTHALMIC at 12:04

## 2024-04-18 RX ADMIN — MOXIFLOXACIN OPHTHALMIC 1 DROP: 5 SOLUTION/ DROPS OPHTHALMIC at 11:04

## 2024-04-18 RX ADMIN — PHENYLEPHRINE HYDROCHLORIDE 1 DROP: 25 SOLUTION/ DROPS OPHTHALMIC at 11:04

## 2024-04-18 RX ADMIN — MOXIFLOXACIN OPHTHALMIC 1 DROP: 5 SOLUTION/ DROPS OPHTHALMIC at 12:04

## 2024-04-18 RX ADMIN — TROPICAMIDE 1 DROP: 10 SOLUTION/ DROPS OPHTHALMIC at 11:04

## 2024-04-18 RX ADMIN — TETRACAINE HYDROCHLORIDE 1 DROP: 5 SOLUTION/ DROPS OPHTHALMIC at 11:04

## 2024-04-18 RX ADMIN — MIDAZOLAM HYDROCHLORIDE 2 MG: 1 INJECTION, SOLUTION INTRAMUSCULAR; INTRAVENOUS at 12:04

## 2024-04-18 NOTE — DISCHARGE SUMMARY
Outcome: Successful outpatient ophthalmic surgical procedure  Preprinted Instructions given to patient.  Regular diet.  Activity: No restrictions  Meds: see Med Rec  Condition: stable  Follow up: 1 day with Dr Khan  Disposition: Home  Diagnosis: s/p eye surgery  Date of discharge: 04/18/2024

## 2024-04-18 NOTE — DISCHARGE INSTRUCTIONS
CATARACT SURGERY    POST-OPERATIVE INSTRUCTIONS    · Apply drops THREE times a day into operative eye for 30 days.    · DO NOT rub your eye    · Wear protective sunglasses during the day    · Resume moderate activity    · Bathe/shower/wash face normally    · DO NOT apply makeup around the operative eye for 1 week.         You should expect    - Blurry vision and halos for 24-48 hours    - Dilated pupil for 24-48 hours    - Scratchy feeling in the eye for 1-2 days    - Curved shadow in your peripheral vision for 2-3 weeks    - Occasional flickering of lights for up to 1 week    -If you experience severe pain or nausea, please call Dr Khan or the on-call doctor at [phone]    - Plan to see Dr Khan tomorrow .      [facility]    [address] [facility] [id number]    ** Most patients can drive the next day, but if you do not feel comfortable driving, please arrange for transportation.

## 2024-04-18 NOTE — OP NOTE
SURGEON:  Zhane Khan M.D.    PREOPERATIVE DIAGNOSIS:    Nuclear Sclerotic Cataract Right Eye    POSTOPERATIVE DIAGNOSIS:    Nuclear Sclerotic Cataract Right Eye    PROCEDURES:    Phacoemulsification with  intraocular lens, Right eye (32298)  With ORA  LASER assist    DATE OF SURGERY: 04/18/2024    IMPLANT: DRNOOV 18.5    ANESTHESIA:  Moderate sedation with topical Lidocaine. Patient ID confirmed and re-evaluated the patient and anesthesia plan confirming it is suitable for the patient's condition and procedure.    COMPLICATIONS:  None    ESTIMATED BLOOD LOSS: None    SPECIMENS: None    INDICATIONS:    The patient has a history of painless progressive visual loss and difficulty with activities of daily living, which specifically include difficult driving at night due to glare and difficulty reading small print, secondary to cataract formation.  After a thorough discussion of the risks, benefits, and alternatives to cataract surgery, including, but not limited to, the rare risks of infection, retinal detachment, hemorrhage, need for additional surgery, loss of vision, and even loss of the eye, the patient voices understanding and desires to proceed.    DESCRIPTION OF PROCEDURE:      The patients IOL calculations were reviewed, and the lens selection confirmed.   After verification and marking of the proper eye in the preop holding area, the patient was brought to the operating room in supine position where the eye was prepped and draped in standard sterile fashion with 5% Betadine and a lid speculum placed in the eye.   Topical 4% Lidocaine was used in addition to the preoperative anesthesia and the procedure was begun by the creation of a paracentesis incision through which viscoelastic was used to fill the anterior chamber.  Next, a keratome blade was used to create a triplanar temporal clear corneal incision and a cystotome and Utrata forceps used to fashion a continuous curvilinear capsulorrhexis.   Hydrodissection was carried out using the [employer] hydrodissection cannula and the nucleus was found to be mobile.  Phacoemulsification of the nucleus was carried out using a quick chop technique, and all remaining epinuclear and cortical material was removed.  The eye was then reformed with Viscoelastic and ORA was used to verify the proper IOL power. The intraocular lens was then implanted into the capsular bag.  All remaining viscoelastics were removed from the eye and at the end of the case the pupil was round, the lens was well-centered within the capsular bag and all wounds were found to be water tight.  Drops of Vigamox and Pred Forte were instilled and a shield was placed over the eye. The patient will follow up with Dr. Khan in the morning.

## 2024-04-18 NOTE — PLAN OF CARE
Sherry Torres has met all discharge criteria from Phase II. Vital Signs are stable, ambulating  without difficulty. Discharge instructions given, patient verbalized understanding. Discharged from facility via wheelchair in stable condition.

## 2024-04-19 ENCOUNTER — OFFICE VISIT (OUTPATIENT)
Dept: URGENT CARE | Facility: CLINIC | Age: 69
End: 2024-04-19
Payer: MEDICARE

## 2024-04-19 VITALS
SYSTOLIC BLOOD PRESSURE: 116 MMHG | WEIGHT: 140 LBS | RESPIRATION RATE: 19 BRPM | HEIGHT: 65 IN | BODY MASS INDEX: 23.32 KG/M2 | OXYGEN SATURATION: 97 % | HEART RATE: 76 BPM | DIASTOLIC BLOOD PRESSURE: 79 MMHG | TEMPERATURE: 98 F

## 2024-04-19 DIAGNOSIS — N30.00 ACUTE CYSTITIS WITHOUT HEMATURIA: Primary | ICD-10-CM

## 2024-04-19 DIAGNOSIS — R30.0 DYSURIA: ICD-10-CM

## 2024-04-19 LAB
BILIRUB UR QL STRIP: NEGATIVE
GLUCOSE UR QL STRIP: NEGATIVE
KETONES UR QL STRIP: NEGATIVE
LEUKOCYTE ESTERASE UR QL STRIP: POSITIVE
PH, POC UA: 5 (ref 5–8)
POC BLOOD, URINE: POSITIVE
POC NITRATES, URINE: NEGATIVE
PROT UR QL STRIP: POSITIVE
SP GR UR STRIP: 1.02 (ref 1–1.03)
UROBILINOGEN UR STRIP-ACNC: NORMAL (ref 0.1–1.1)

## 2024-04-19 PROCEDURE — 87086 URINE CULTURE/COLONY COUNT: CPT

## 2024-04-19 PROCEDURE — 87186 SC STD MICRODIL/AGAR DIL: CPT

## 2024-04-19 PROCEDURE — 99213 OFFICE O/P EST LOW 20 MIN: CPT | Mod: S$GLB,,,

## 2024-04-19 PROCEDURE — 87088 URINE BACTERIA CULTURE: CPT

## 2024-04-19 PROCEDURE — 87077 CULTURE AEROBIC IDENTIFY: CPT

## 2024-04-19 PROCEDURE — 81003 URINALYSIS AUTO W/O SCOPE: CPT | Mod: QW,S$GLB,,

## 2024-04-19 RX ORDER — PHENAZOPYRIDINE HYDROCHLORIDE 200 MG/1
200 TABLET, FILM COATED ORAL 3 TIMES DAILY PRN
Qty: 15 TABLET | Refills: 0 | Status: SHIPPED | OUTPATIENT
Start: 2024-04-19 | End: 2024-04-24

## 2024-04-19 RX ORDER — NITROFURANTOIN 25; 75 MG/1; MG/1
100 CAPSULE ORAL 2 TIMES DAILY
Qty: 10 CAPSULE | Refills: 0 | Status: SHIPPED | OUTPATIENT
Start: 2024-04-19 | End: 2024-04-24

## 2024-04-19 NOTE — PROGRESS NOTES
"Subjective:      Patient ID: Sherry Torres is a 68 y.o. female.    Vitals:  height is 5' 5" (1.651 m) and weight is 63.5 kg (140 lb). Her oral temperature is 97.7 °F (36.5 °C). Her blood pressure is 116/79 and her pulse is 76. Her respiration is 19 and oxygen saturation is 97%.     Chief Complaint: Dysuria    Pt is complaining of burning of urination that started yesterday. She said she also has frequent urination and cloudy color. She said she has not taking anything yet.     Provider note starts below:  Patient presents to clinic for evaluation of dysuria, urinary frequency, urinary urgency, and cloudy dark colored urine.  Symptoms onset 2 days ago.  She has not taken any medications.  Patient states she had cataract surgery yesterday and was unable to come in for evaluation.  She is currently on antibiotic eyedrops, but denies any oral antibiotics at this time.  States she has had urinary tract infections in the past, most recent was several years ago.  Patient denies any fever, chills, nausea, vomiting, abdominal pain, flank pain, hematuria, confusion, altered mental status.    Urinary Tract Infection   This is a new problem. The current episode started yesterday. The pain is at a severity of 0/10. The patient is experiencing no pain. There has been no fever. She is Sexually active. There is No history of pyelonephritis. Associated symptoms include frequency, urgency and weight loss. Pertinent negatives include no behavior changes, chills, discharge, flank pain, hematuria, hesitancy, nausea, possible pregnancy, sweats, vomiting, bubble bath use, rash or withholding.       Constitution: Negative for appetite change, chills and fever.   Cardiovascular:  Negative for chest pain.   Respiratory:  Negative for shortness of breath.    Gastrointestinal:  Negative for abdominal pain, nausea and vomiting.   Genitourinary:  Positive for dysuria, frequency and urgency. Negative for urine decreased, flank pain, bladder " incontinence, bed wetting and hematuria.        + dark colored urine  + cloudy urine   Musculoskeletal:  Negative for muscle cramps and muscle ache.   Skin:  Negative for pale and rash.   Neurological:  Negative for dizziness, light-headedness, headaches, disorientation and altered mental status.   Psychiatric/Behavioral:  Negative for altered mental status, disorientation and confusion.       Objective:     Physical Exam   Constitutional: She is oriented to person, place, and time.  Non-toxic appearance. She does not appear ill. No distress.   HENT:   Head: Normocephalic and atraumatic.   Neck: Neck supple.   Cardiovascular: Normal rate, regular rhythm, normal heart sounds and normal pulses.   Pulmonary/Chest: Effort normal and breath sounds normal.   Abdominal: Normal appearance. She exhibits no distension. There is no abdominal tenderness. There is no rebound, no guarding, no left CVA tenderness and no right CVA tenderness.   Musculoskeletal: Normal range of motion.         General: Normal range of motion.   Neurological: She is alert, oriented to person, place, and time and at baseline.   Skin: Skin is warm and dry.   Psychiatric: Her behavior is normal. Mood normal.   Nursing note and vitals reviewed.      Assessment:     Results for orders placed or performed in visit on 04/19/24   POCT Urinalysis, Dipstick, Automated, W/O Scope   Result Value Ref Range    POC Blood, Urine Positive (A) Negative    POC Bilirubin, Urine Negative Negative    POC Urobilinogen, Urine normal 0.1 - 1.1    POC Ketones, Urine Negative Negative    POC Protein, Urine Positive (A) Negative    POC Nitrates, Urine Negative Negative    POC Glucose, Urine Negative Negative    pH, UA 5.0 5 - 8    POC Specific Gravity, Urine 1.020 1.003 - 1.029    POC Leukocytes, Urine Positive (A) Negative       1. Acute cystitis without hematuria    2. Dysuria        Plan:     Acute cystitis without hematuria  -     CULTURE, URINE  -     nitrofurantoin,  macrocrystal-monohydrate, (MACROBID) 100 MG capsule; Take 1 capsule (100 mg total) by mouth 2 (two) times daily. for 5 days  Dispense: 10 capsule; Refill: 0  -     phenazopyridine (PYRIDIUM) 200 MG tablet; Take 1 tablet (200 mg total) by mouth 3 (three) times daily as needed for Pain.  Dispense: 15 tablet; Refill: 0    Dysuria  -     POCT Urinalysis, Dipstick, Automated, W/O Scope          Patient Instructions   Macrobid (antibiotics) x5 days to treat infection. Please take to completion.  Urine culture was ordered for further testing of your urine. You will get a phone call within 3-5 days regarding urine culture results.  Please drink plenty of fluids.  Please get plenty of rest.  You were prescribed Pyridium (phenazopyridine), please be aware that if you wear contact lens that this medication may stain your contacts. While taking this medication it is recommended that you do not wear your contacts until 24 hours after your last dose. If it not covered, you can purchase Azo (phenazopyridine 99 mg) over the counter at drug stores.  If you smoke, please stop smoking.  If not allergic, take Tylenol (Acetaminophen) 650 mg to  1 g every 6 hours as needed for fever and/or Motrin (Ibuprofen) 600 to 800 mg every 6 hours as needed for fever as directed for control of pain and/or fever      Please remember that you have received care at an urgent care today. Urgent cares are not emergency rooms and are not equipped to handle life threatening emergencies and cannot rule in or out certain medical conditions and you may be released before all of your medical problems are known or treated. Please arrange follow up with your primary care physician or speciality clinic  within 2-5 days if your signs and symptoms have not resolved or worsen. Patient can call our Referral Hotline at [phone] to make an appointment.    Please return here or go to the Emergency Department for any concerns or worsening of condition.Patient was  educated on signs/symptoms that would warrant emergent medical attention.   Signs of infection. These include a fever of 100.4°F (38°C) or higher, chills, back pain, nausea, throwing up, or bloody urine.  Signs come back after treatment ends  You notice more blood in your urine.  Your signs get worse or do not improve within 24 hours of starting treatment.  You are not able to urinate for more than 8 hours.  Your signs come back after treatment has stopped.

## 2024-04-19 NOTE — PATIENT INSTRUCTIONS
Macrobid (antibiotics) x5 days to treat infection. Please take to completion.  Urine culture was ordered for further testing of your urine. You will get a phone call within 3-5 days regarding urine culture results.  Please drink plenty of fluids.  Please get plenty of rest.  You were prescribed Pyridium (phenazopyridine), please be aware that if you wear contact lens that this medication may stain your contacts. While taking this medication it is recommended that you do not wear your contacts until 24 hours after your last dose. If it not covered, you can purchase Azo (phenazopyridine 99 mg) over the counter at drug stores.  If you smoke, please stop smoking.  If not allergic, take Tylenol (Acetaminophen) 650 mg to  1 g every 6 hours as needed for fever and/or Motrin (Ibuprofen) 600 to 800 mg every 6 hours as needed for fever as directed for control of pain and/or fever      Please remember that you have received care at an urgent care today. Urgent cares are not emergency rooms and are not equipped to handle life threatening emergencies and cannot rule in or out certain medical conditions and you may be released before all of your medical problems are known or treated. Please arrange follow up with your primary care physician or speciality clinic  within 2-5 days if your signs and symptoms have not resolved or worsen. Patient can call our Referral Hotline at [phone] to make an appointment.    Please return here or go to the Emergency Department for any concerns or worsening of condition.Patient was educated on signs/symptoms that would warrant emergent medical attention.   Signs of infection. These include a fever of 100.4°F (38°C) or higher, chills, back pain, nausea, throwing up, or bloody urine.  Signs come back after treatment ends  You notice more blood in your urine.  Your signs get worse or do not improve within 24 hours of starting treatment.  You are not able to urinate for more than 8 hours.  Your  signs come back after treatment has stopped.

## 2024-04-22 LAB — BACTERIA UR CULT: ABNORMAL

## 2024-04-26 ENCOUNTER — OFFICE VISIT (OUTPATIENT)
Dept: OPHTHALMOLOGY | Facility: CLINIC | Age: 69
End: 2024-04-26
Payer: MEDICARE

## 2024-04-26 DIAGNOSIS — Z98.890 POST-OPERATIVE STATE: Primary | ICD-10-CM

## 2024-04-26 DIAGNOSIS — H25.11 NUCLEAR SCLEROSIS OF RIGHT EYE: ICD-10-CM

## 2024-04-26 PROCEDURE — 1160F RVW MEDS BY RX/DR IN RCRD: CPT | Mod: CPTII,S$GLB,, | Performed by: OPHTHALMOLOGY

## 2024-04-26 PROCEDURE — 1126F AMNT PAIN NOTED NONE PRSNT: CPT | Mod: CPTII,S$GLB,, | Performed by: OPHTHALMOLOGY

## 2024-04-26 PROCEDURE — 99999 PR PBB SHADOW E&M-EST. PATIENT-LVL III: CPT | Mod: PBBFAC,,, | Performed by: OPHTHALMOLOGY

## 2024-04-26 PROCEDURE — 1159F MED LIST DOCD IN RCRD: CPT | Mod: CPTII,S$GLB,, | Performed by: OPHTHALMOLOGY

## 2024-04-26 PROCEDURE — 99024 POSTOP FOLLOW-UP VISIT: CPT | Mod: S$GLB,,, | Performed by: OPHTHALMOLOGY

## 2024-04-26 PROCEDURE — 1101F PT FALLS ASSESS-DOCD LE1/YR: CPT | Mod: CPTII,S$GLB,, | Performed by: OPHTHALMOLOGY

## 2024-04-26 PROCEDURE — 3288F FALL RISK ASSESSMENT DOCD: CPT | Mod: CPTII,S$GLB,, | Performed by: OPHTHALMOLOGY

## 2024-04-26 NOTE — PROGRESS NOTES
HPI    04/18/2024 IMPLANT: DRNOOV 18.5 OD    Patient is here today for 1 week phaco OD. Denies eye pain and discomfort.       PGB TID OD     Last edited by Eva Nance on 4/26/2024  2:26 PM.            Assessment /Plan     For exam results, see Encounter Report.    Post-operative state    Nuclear sclerosis of right eye      Slit lamp exam:  L/L: nl  K: clear, wound sealed  AC: trace cell  Iris/Lens: IOL centered and stable    POW1 s/p phaco: Surgery healing well with no signs of infection or abnormal inflammation.   Excellent Odyssey monocular result with monofocal OS

## 2024-05-02 ENCOUNTER — OFFICE VISIT (OUTPATIENT)
Dept: INTERNAL MEDICINE | Facility: CLINIC | Age: 69
End: 2024-05-02
Payer: MEDICARE

## 2024-05-02 VITALS
RESPIRATION RATE: 10 BRPM | BODY MASS INDEX: 21.79 KG/M2 | DIASTOLIC BLOOD PRESSURE: 74 MMHG | TEMPERATURE: 98 F | HEART RATE: 71 BPM | HEIGHT: 65 IN | WEIGHT: 130.81 LBS | OXYGEN SATURATION: 98 % | SYSTOLIC BLOOD PRESSURE: 116 MMHG

## 2024-05-02 DIAGNOSIS — C80.1 MALIGNANT (PRIMARY) NEOPLASM, UNSPECIFIED: ICD-10-CM

## 2024-05-02 DIAGNOSIS — Z29.11 NEED FOR RSV IMMUNIZATION: ICD-10-CM

## 2024-05-02 DIAGNOSIS — C50.411 MALIGNANT NEOPLASM OF UPPER-OUTER QUADRANT OF RIGHT BREAST IN FEMALE, ESTROGEN RECEPTOR POSITIVE: ICD-10-CM

## 2024-05-02 DIAGNOSIS — Z00.00 ENCOUNTER FOR PREVENTIVE HEALTH EXAMINATION: Primary | ICD-10-CM

## 2024-05-02 DIAGNOSIS — Z17.0 MALIGNANT NEOPLASM OF UPPER-OUTER QUADRANT OF RIGHT BREAST IN FEMALE, ESTROGEN RECEPTOR POSITIVE: ICD-10-CM

## 2024-05-02 DIAGNOSIS — Z23 NEED FOR SHINGLES VACCINE: ICD-10-CM

## 2024-05-02 DIAGNOSIS — E89.0 POST-SURGICAL HYPOTHYROIDISM: ICD-10-CM

## 2024-05-02 DIAGNOSIS — Z23 NEED FOR PNEUMOCOCCAL VACCINATION: ICD-10-CM

## 2024-05-02 DIAGNOSIS — I10 PRIMARY HYPERTENSION: ICD-10-CM

## 2024-05-02 DIAGNOSIS — E78.2 MIXED HYPERLIPIDEMIA: ICD-10-CM

## 2024-05-02 PROCEDURE — 1159F MED LIST DOCD IN RCRD: CPT | Mod: CPTII,S$GLB,, | Performed by: NURSE PRACTITIONER

## 2024-05-02 PROCEDURE — 1101F PT FALLS ASSESS-DOCD LE1/YR: CPT | Mod: CPTII,S$GLB,, | Performed by: NURSE PRACTITIONER

## 2024-05-02 PROCEDURE — 1158F ADVNC CARE PLAN TLK DOCD: CPT | Mod: CPTII,S$GLB,, | Performed by: NURSE PRACTITIONER

## 2024-05-02 PROCEDURE — 3078F DIAST BP <80 MM HG: CPT | Mod: CPTII,S$GLB,, | Performed by: NURSE PRACTITIONER

## 2024-05-02 PROCEDURE — 3288F FALL RISK ASSESSMENT DOCD: CPT | Mod: CPTII,S$GLB,, | Performed by: NURSE PRACTITIONER

## 2024-05-02 PROCEDURE — 1170F FXNL STATUS ASSESSED: CPT | Mod: CPTII,S$GLB,, | Performed by: NURSE PRACTITIONER

## 2024-05-02 PROCEDURE — G0439 PPPS, SUBSEQ VISIT: HCPCS | Mod: S$GLB,,, | Performed by: NURSE PRACTITIONER

## 2024-05-02 PROCEDURE — 3074F SYST BP LT 130 MM HG: CPT | Mod: CPTII,S$GLB,, | Performed by: NURSE PRACTITIONER

## 2024-05-02 PROCEDURE — 1160F RVW MEDS BY RX/DR IN RCRD: CPT | Mod: CPTII,S$GLB,, | Performed by: NURSE PRACTITIONER

## 2024-05-02 PROCEDURE — 99999 PR PBB SHADOW E&M-EST. PATIENT-LVL IV: CPT | Mod: PBBFAC,,, | Performed by: NURSE PRACTITIONER

## 2024-05-02 NOTE — PROGRESS NOTES
"  Sherry Torres presented for a  Medicare AWV and comprehensive Health Risk Assessment today. The following components were reviewed and updated:    Medical history  Family History  Social history  Allergies and Current Medications  Health Risk Assessment  Health Maintenance  Care Team         ** See Completed Assessments for Annual Wellness Visit within the encounter summary.**         The following assessments were completed:  Living Situation  CAGE  Depression Screening  Timed Get Up and Go  Whisper Test  Cognitive Function Screening    Nutrition Screening  ADL Screening  PAQ Screening      Opioid documentation:      Patient does not have a current opioid prescription.        Vitals:    05/02/24 1415   BP: 116/74   Pulse: 71   Resp: 10   Temp: 98.4 °F (36.9 °C)   TempSrc: Oral   SpO2: 98%   Weight: 59.4 kg (130 lb 13.5 oz)   Height: 5' 5" (1.651 m)     Body mass index is 21.77 kg/m².  Physical Exam  Vitals and nursing note reviewed.   Constitutional:       Appearance: Normal appearance. She is normal weight.   HENT:      Head: Normocephalic.      Nose: Nose normal.      Mouth/Throat:      Mouth: Mucous membranes are moist.   Eyes:      Conjunctiva/sclera: Conjunctivae normal.   Cardiovascular:      Rate and Rhythm: Normal rate and regular rhythm.      Heart sounds: Normal heart sounds.   Pulmonary:      Effort: Pulmonary effort is normal.      Breath sounds: Normal breath sounds.   Musculoskeletal:         General: Normal range of motion.      Cervical back: Normal range of motion.   Neurological:      General: No focal deficit present.      Mental Status: She is alert and oriented to person, place, and time.   Psychiatric:         Mood and Affect: Mood normal.         Behavior: Behavior normal.         Thought Content: Thought content normal.         Judgment: Judgment normal.               Diagnoses and health risks identified today and associated recommendations/orders:    1. Encounter for preventive health " examination  Exam done    Health Maintenance updated    Records reviewed    2. Malignant (primary) neoplasm, unspecified  Chronic, followed by PCP    3. Malignant neoplasm of upper-outer quadrant of right breast in female, estrogen receptor positive  Chronic, followed by PCP    4. Post-surgical hypothyroidism  Chronic, followed by PCP    5. Mixed hyperlipidemia  Chronic, followed by PCP    6. Primary hypertension  Chronic, followed by PCP    7. Need for shingles vaccine  Ordered today    8. Need for RSV immunization  Postponed til next appt in June    9. Need for pneumococcal vaccination  Postponed til next appt in [place]    10. BMI 21.0-21.9, adult  BMI reviewed      Provided Sherry with a 5-10 year written screening schedule and personal prevention plan. Recommendations were developed using the USPSTF age appropriate recommendations. Education, counseling, and referrals were provided as needed. After Visit Summary printed and given to patient which includes a list of additional screenings\tests needed.    Follow up in about 6 weeks (around 6/13/2024) for with Dr. Javier as scheduled.    Toma Gordon, DNP      I offered to discuss advanced care planning, including how to pick a person who would make decisions for you if you were unable to make them for yourself, called a health care power of , and what kind of decisions you might make such as use of life sustaining treatments such as ventilators and tube feeding when faced with a life limiting illness recorded on a living will that they will need to know. (How you want to be cared for as you near the end of your natural life)     X Patient is interested in learning more about how to make advanced directives.  I provided them paperwork and offered to discuss this with them.

## 2024-05-02 NOTE — PATIENT INSTRUCTIONS
Counseling and Referral of Other Preventative  (Italic type indicates deductible and co-insurance are waived)    Patient Name: Sherry Torres  Today's Date: 5/2/2024    Health Maintenance         Date Due Completion Date    Shingles Vaccine (1 of 2) Ordered today Ordered today    Mammogram 06/01/2024 6/1/2023    RSV Vaccine (Age 60+ and Pregnant patients) (1 - 1-dose 60+ series) 06/13/2024 (Originally 12/5/2013) ---    Pneumococcal Vaccines (Age 65+) (2 of 2 - PPSV23 or PCV20) 06/13/2024 (Originally 11/19/2015) 9/24/2015    TETANUS VACCINE 05/01/2025 (Originally 12/5/1973) ---    COVID-19 Vaccine (3 - Moderna risk series) 05/01/2025 (Originally 4/8/2021) 3/11/2021    Influenza Vaccine (Season Ended) 09/01/2024 9/23/2021    Lipid Panel 12/05/2024 12/5/2019    DEXA Scan 09/28/2025 9/28/2023    Colorectal Cancer Screening 10/08/2029 10/8/2019          No orders of the defined types were placed in this encounter.    The following information is provided to all patients.  This information is to help you find resources for any of the problems found today that may be affecting your health:                  Living healthy guide: www.[facility].gov      Understanding Diabetes: www.diabetes.org      Eating healthy: www.cdc.gov/healthyweight      CDC home safety checklist: www.cdc.gov/steadi/patient.html      Agency on Aging: www.goea.[place].gov      Alcoholics anonymous (AA): www.aa.org      Physical Activity: www.mac.nih.gov/pe0sdde      Tobacco use: www.quitwithusla.org

## 2024-05-20 ENCOUNTER — PATIENT MESSAGE (OUTPATIENT)
Dept: HEMATOLOGY/ONCOLOGY | Facility: CLINIC | Age: 69
End: 2024-05-20
Payer: MEDICARE

## 2024-05-20 DIAGNOSIS — Z12.31 ENCOUNTER FOR SCREENING MAMMOGRAM FOR HIGH-RISK PATIENT: Primary | ICD-10-CM

## 2024-05-22 ENCOUNTER — PROCEDURE VISIT (OUTPATIENT)
Dept: DERMATOLOGY | Facility: CLINIC | Age: 69
End: 2024-05-22
Payer: MEDICARE

## 2024-05-22 DIAGNOSIS — L98.8 RHYTIDES: ICD-10-CM

## 2024-05-22 DIAGNOSIS — Z41.1 ENCOUNTER FOR COSMETIC PROCEDURE: Primary | ICD-10-CM

## 2024-05-22 PROCEDURE — 99499 UNLISTED E&M SERVICE: CPT | Mod: CSM,S$GLB,, | Performed by: DERMATOLOGY

## 2024-05-22 RX ORDER — TRETINOIN 0.25 MG/G
CREAM TOPICAL
Qty: 30 G | Refills: 11 | Status: CANCELLED | OUTPATIENT
Start: 2024-05-22

## 2024-05-22 RX ORDER — TRETINOIN 0.25 MG/G
CREAM TOPICAL
Qty: 30 G | Refills: 11 | Status: SHIPPED | OUTPATIENT
Start: 2024-05-22

## 2024-05-22 NOTE — PROGRESS NOTES
Patient is here today for cosmetic Botox treatment.   She denies any history of adverse reaction to Botox or history of neuromuscular disease.     Discussed benefits and risks of Botox injections, including headache, weakness/paralysis of muscles, asymmetry, eyebrow/lid drooping, pain, bruising, swelling, infection, and rare risk of systemic botulism. Verbal and written consent was obtained.    Patient agreed to proceed with treatment. 50 units total were injected today:  8 in frontalis  22 in glabella (3-5-4-2,5-3)  12 in bilateral periorbital region  8 in bilateral nasalis    Patient tolerated well with no complications. She was instructed not to rub or massage the treated areas and that she should avoid lying down, bending upside down, and strenuous exercise for the rest of the day.  Instructed to wait two weeks to assess response before presenting for a touch up injection, if needed.      Botox dilution: 10u / 0.2mL (10u / 0.4mL for frontalis)  Lot #: [id number]  Exp date: 03/2026        Rhytides  -     tretinoin (RETIN-A) 0.025 % cream; Compound tretinoin 0.025% / niacinamide 2% cream / hyaluronic acid 0.25%. Apply a pea-sized amount to entire face qhs.  Dispense: 30 g; Refill: 11

## 2024-06-10 NOTE — PROGRESS NOTES
[facility] Progress Note  6/13/2024          Reason for Visit:      had concerns including Establish Care.       History of Present Illness:     Pt is here today to establish care.  She has no new complaints today    Breast cancer  Initially disocvered in 2016 on self-exam  Stage 2, ER+, IA+, Her-2 negative  [place] node positive  Treated with lumpectomy, radiation, then 5 years of letrozole.  Still follows with oncology    Hypothyroidism, s/p thyroid cancer  Levothyroxine 100 mcg daily  2006, thyroidectomy for papillary thyroid cancer  Treated with radioactive Iodine after surgery  Thyroglobulin has been undetectable.  Follows with Endocrine    Dyslipidemia  Has not been treated with any meds thus for for this  Agreeable to repeat labs    HTN  On digital medicine  Has lost 30 pounds recently, and was able to get off of the amlodipine.  Bp today looks good    GERD  Pantoprazole 40  Controlled well on this dose, particularly after the weight losses.    Weight management  Semaglutide 0.5 weekly  Getting it through [facility]   Has lost about 30 pounds  Nearly at goal weight, so plans to reduce to 0.25 mg soon    HM  Encouraged shingrix #2 at pharmacy  Encouraged RSV at pharmacy  Prevnar-20 today  MMG is scheduled    Problem List:     Patient Active Problem List   Diagnosis    Post-surgical hypothyroidism    Gastro-esophageal reflux disease without esophagitis    Malignant neoplasm of upper-outer quadrant of right breast in female, estrogen receptor positive    History of thyroid cancer    Lumbar radiculopathy    Tinnitus of right ear    Decreased ROM of neck    Hand pain    Chronic pain    Right knee pain    Hyperlipidemia, unspecified    Acne    Primary hypertension    Disorder of breast, unspecified    Disorder of thyroid, unspecified    Malignant (primary) neoplasm, unspecified    Vitamin D deficiency    Nuclear sclerotic cataract of right eye         Surgical History:     She has a past  surgical history that includes  section; Tubal ligation; CYST REMOVED FROM RIGHT BREAST IN ; left breast wire localization excisional biopsy with bracketed wires using 3 wires and excision through 1 incision. ; Hernia repair; Breast lumpectomy (Right, 2016); Breast biopsy (Right, 2016); Breast biopsy (Left, ); Transforaminal epidural injection of steroid (Right, 2019); Colonoscopy (N/A, 10/08/2019); Thyroidectomy; Lipoma resection (N/A, 10/04/2023); Extraction of cataract (Left, 2023); phacoemulsification, cataract, with iol insertion (Left, 2023); Cataract extraction w/  intraocular lens implant (Right, 2024); and Breast surgery ( AND  OR ).      Family History:      Her family history includes Arthritis in her mother; Breast cancer (age of onset: 70) in an other family member; Breast cancer (age of onset: 88) in her maternal grandmother; Cancer in her maternal grandmother; Dementia in her father; Osteoporosis in her mother.      Social History:     She reports that she quit smoking about 36 years ago. Her smoking use included cigarettes. She started smoking about 39 years ago. She has a 1.4 pack-year smoking history. She has never used smokeless tobacco. She reports current alcohol use of about 6.0 standard drinks of alcohol per week. She reports that she does not use drugs.      Medications:       Current Outpatient Medications:     calcium-vitamin D3 (OS-NINA 500 + D3) 500 mg-5 mcg (200 unit) per tablet, Take 4 tablets by mouth 2 (two) times daily with meals. , Disp: , Rfl:     ibuprofen (ADVIL,MOTRIN) 200 MG tablet, Take 200 mg by mouth every 6 (six) hours as needed for Pain., Disp: , Rfl:     levothyroxine (SYNTHROID) 100 MCG tablet, Take 1 tablet (100 mcg total) by mouth before breakfast., Disp: 90 tablet, Rfl: 3    MAGNESIUM CARBONATE ORAL, Take by mouth., Disp: , Rfl:     multivitamin capsule, Take 2 capsules by mouth once daily. , Disp: , Rfl:      pantoprazole (PROTONIX) 40 MG tablet, Take 1 tablet (40 mg total) by mouth once daily., Disp: 30 tablet, Rfl: 11    potassium chloride SA (K-DUR,KLOR-CON) 10 MEQ tablet, Take 20 mEq by mouth once daily., Disp: , Rfl:     semaglutide (OZEMPIC) 0.25 mg or 0.5 mg (2 mg/3 mL) pen injector, Inject 0.5 mg into the skin every 7 days. On tuesday, Disp: , Rfl:     tretinoin (RETIN-A) 0.025 % cream, Compound tretinoin 0.025% / niacinamide 2% cream / hyaluronic acid 0.25%. Apply a pea-sized amount to entire face qhs., Disp: 30 g, Rfl: 11  No current facility-administered medications for this visit.    Facility-Administered Medications Ordered in Other Visits:     0.9%  NaCl infusion, , Intravenous, Continuous, Hakan Garces PA-C, Last Rate: 70 mL/hr at 10/04/23 1113, New Bag at 10/04/23 1113    ceFAZolin 2 g in dextrose 5 % in water (D5W) 50 mL IVPB (MB+), 2 g, Intravenous, On Call Procedure, Hakan Garces PA-C    haloperidol lactate injection 0.5 mg, 0.5 mg, Intravenous, Q10 Min PRN, Crystal Holloway MD    HYDROmorphone injection 0.2 mg, 0.2 mg, Intravenous, Q5 Min PRN, Crystal Holloway MD    LIDOcaine (PF) 10 mg/ml (1%) injection 10 mg, 1 mL, Intradermal, Once, Eric Cardona MD    sodium chloride 0.9% flush 10 mL, 10 mL, Intravenous, PRN, Crystal Holloway MD        Allergies:   She is allergic to codeine and sulfa (sulfonamide antibiotics).      Review of Systems:     Review of Systems   Constitutional:  Negative for activity change and unexpected weight change.   HENT:  Negative for hearing loss, rhinorrhea and trouble swallowing.    Eyes:  Positive for visual disturbance. Negative for discharge.   Respiratory:  Negative for chest tightness and wheezing.    Cardiovascular:  Negative for chest pain and palpitations.   Gastrointestinal:  Negative for blood in stool, constipation, diarrhea and vomiting.   Endocrine: Negative for polydipsia and polyuria.   Genitourinary:  Negative for difficulty urinating,  "dysuria, hematuria and menstrual problem.   Musculoskeletal:  Positive for arthralgias. Negative for joint swelling and neck pain.   Neurological:  Negative for weakness and headaches.   Psychiatric/Behavioral:  Negative for confusion and dysphoric mood.            Physical Exam:     Temp:             Blood Pressure:  116/68        Pulse:   80     Respirations:  14  Weight:   58.9 kg (129 lb 13.6 oz)  Height:   5' 5" (1.651 m)  BMI:     Body mass index is 21.61 kg/m².    Physical Exam  Constitutional:       General: She is not in acute distress.  HENT:      Right Ear: Tympanic membrane normal.      Left Ear: Tympanic membrane normal.      Nose: No congestion or rhinorrhea.      Mouth/Throat:      Pharynx: No oropharyngeal exudate or posterior oropharyngeal erythema.   Cardiovascular:      Rate and Rhythm: Normal rate and regular rhythm.   Pulmonary:      Effort: Pulmonary effort is normal. No respiratory distress.      Breath sounds: No wheezing.   Abdominal:      Palpations: Abdomen is soft.      Tenderness: There is no abdominal tenderness.   Skin:     General: Skin is warm.   Neurological:      General: No focal deficit present.      Mental Status: She is alert and oriented to person, place, and time.       Labs/Imaging/Testing:     Most recent CBC:  Lab Results   Component Value Date    WBC 5.07 04/18/2023    HGB 14.4 04/18/2023     04/18/2023    MCV 93 04/18/2023       Most recent Lipids:  Lab Results   Component Value Date    CHOL 282 (H) 12/05/2019    HDL 63 12/05/2019    LDLCALC 196.6 (H) 12/05/2019    TRIG 112 12/05/2019       Most recent Chemistry:  Lab Results   Component Value Date     04/18/2023    K 3.7 04/18/2023     04/18/2023    CO2 21 (L) 04/18/2023    BUN 6 (L) 04/18/2023    CREATININE 0.8 04/18/2023     04/18/2023    CALCIUM 9.0 04/18/2023    ALT 20 04/18/2023    AST 17 04/18/2023    ALKPHOS 40 (L) 04/18/2023    PROT 7.5 04/18/2023    ALBUMIN 3.8 04/18/2023       Other " tests:  Lab Results   Component Value Date    TSH 0.854 09/07/2023    FREET4 1.07 07/27/2022    INR 0.9 07/04/2017    HGBA1C 5.2 06/16/2023    IRON 101 06/16/2023    NFSSJSQU64 282 06/16/2023    DPWPWNXO17DA 26 (L) 09/07/2023    MG 2.1 02/24/2015    CRP 0.9 01/25/2021    LIPASE 21 04/18/2023             Assessment and Plan:     1. Well adult exam  - CBC Auto Differential; Future  - Comprehensive Metabolic Panel; Future  - Lipid Panel; Future  - Hemoglobin A1C; Future  - TSH; Future  - T4, Free; Future    2. Hypothyroidism, unspecified type  - TSH; Future  - T4, Free; Future    3. IFG (impaired fasting glucose)  - Hemoglobin A1C; Future    4. Dyslipidemia  - CBC Auto Differential; Future  - Comprehensive Metabolic Panel; Future  - Lipid Panel; Future  - Hemoglobin A1C; Future  - TSH; Future  - T4, Free; Future       RTC 12 mos or sooner lexx Javier MD  6/13/2024    This note was prepared using voice-recognition software.  Although efforts are made to proofread the note, some errors may persist in the final document.

## 2024-06-13 ENCOUNTER — PATIENT MESSAGE (OUTPATIENT)
Dept: PRIMARY CARE CLINIC | Facility: CLINIC | Age: 69
End: 2024-06-13

## 2024-06-13 ENCOUNTER — OFFICE VISIT (OUTPATIENT)
Dept: PRIMARY CARE CLINIC | Facility: CLINIC | Age: 69
End: 2024-06-13
Payer: MEDICARE

## 2024-06-13 VITALS
WEIGHT: 129.88 LBS | DIASTOLIC BLOOD PRESSURE: 68 MMHG | OXYGEN SATURATION: 98 % | HEART RATE: 80 BPM | SYSTOLIC BLOOD PRESSURE: 116 MMHG | BODY MASS INDEX: 21.64 KG/M2 | HEIGHT: 65 IN | RESPIRATION RATE: 14 BRPM

## 2024-06-13 DIAGNOSIS — E78.5 DYSLIPIDEMIA: ICD-10-CM

## 2024-06-13 DIAGNOSIS — Z00.00 WELL ADULT EXAM: Primary | ICD-10-CM

## 2024-06-13 DIAGNOSIS — R73.01 IFG (IMPAIRED FASTING GLUCOSE): ICD-10-CM

## 2024-06-13 DIAGNOSIS — E03.9 HYPOTHYROIDISM, UNSPECIFIED TYPE: ICD-10-CM

## 2024-06-13 PROCEDURE — 3008F BODY MASS INDEX DOCD: CPT | Mod: CPTII,S$GLB,, | Performed by: INTERNAL MEDICINE

## 2024-06-13 PROCEDURE — 3074F SYST BP LT 130 MM HG: CPT | Mod: CPTII,S$GLB,, | Performed by: INTERNAL MEDICINE

## 2024-06-13 PROCEDURE — 99999 PR PBB SHADOW E&M-EST. PATIENT-LVL IV: CPT | Mod: PBBFAC,,, | Performed by: INTERNAL MEDICINE

## 2024-06-13 PROCEDURE — 3288F FALL RISK ASSESSMENT DOCD: CPT | Mod: CPTII,S$GLB,, | Performed by: INTERNAL MEDICINE

## 2024-06-13 PROCEDURE — 1159F MED LIST DOCD IN RCRD: CPT | Mod: CPTII,S$GLB,, | Performed by: INTERNAL MEDICINE

## 2024-06-13 PROCEDURE — 1101F PT FALLS ASSESS-DOCD LE1/YR: CPT | Mod: CPTII,S$GLB,, | Performed by: INTERNAL MEDICINE

## 2024-06-13 PROCEDURE — 99214 OFFICE O/P EST MOD 30 MIN: CPT | Mod: S$GLB,,, | Performed by: INTERNAL MEDICINE

## 2024-06-13 PROCEDURE — 90677 PCV20 VACCINE IM: CPT | Mod: S$GLB,,, | Performed by: INTERNAL MEDICINE

## 2024-06-13 PROCEDURE — 3078F DIAST BP <80 MM HG: CPT | Mod: CPTII,S$GLB,, | Performed by: INTERNAL MEDICINE

## 2024-06-13 PROCEDURE — G0009 ADMIN PNEUMOCOCCAL VACCINE: HCPCS | Mod: S$GLB,,, | Performed by: INTERNAL MEDICINE

## 2024-06-13 PROCEDURE — 1126F AMNT PAIN NOTED NONE PRSNT: CPT | Mod: CPTII,S$GLB,, | Performed by: INTERNAL MEDICINE

## 2024-06-14 ENCOUNTER — LAB VISIT (OUTPATIENT)
Dept: LAB | Facility: HOSPITAL | Age: 69
End: 2024-06-14
Attending: INTERNAL MEDICINE
Payer: MEDICARE

## 2024-06-14 DIAGNOSIS — Z00.00 WELL ADULT EXAM: ICD-10-CM

## 2024-06-14 DIAGNOSIS — E03.9 HYPOTHYROIDISM, UNSPECIFIED TYPE: ICD-10-CM

## 2024-06-14 DIAGNOSIS — R73.01 IFG (IMPAIRED FASTING GLUCOSE): ICD-10-CM

## 2024-06-14 DIAGNOSIS — E78.5 DYSLIPIDEMIA: ICD-10-CM

## 2024-06-14 LAB
ALBUMIN SERPL BCP-MCNC: 3.6 G/DL (ref 3.5–5.2)
ALP SERPL-CCNC: 38 U/L (ref 55–135)
ALT SERPL W/O P-5'-P-CCNC: 9 U/L (ref 10–44)
ANION GAP SERPL CALC-SCNC: 8 MMOL/L (ref 8–16)
AST SERPL-CCNC: 15 U/L (ref 10–40)
BASOPHILS # BLD AUTO: 0.05 K/UL (ref 0–0.2)
BASOPHILS NFR BLD: 0.7 % (ref 0–1.9)
BILIRUB SERPL-MCNC: 0.6 MG/DL (ref 0.1–1)
BUN SERPL-MCNC: 9 MG/DL (ref 8–23)
CALCIUM SERPL-MCNC: 9.5 MG/DL (ref 8.7–10.5)
CHLORIDE SERPL-SCNC: 108 MMOL/L (ref 95–110)
CHOLEST SERPL-MCNC: 214 MG/DL (ref 120–199)
CHOLEST/HDLC SERPL: 4.3 {RATIO} (ref 2–5)
CO2 SERPL-SCNC: 24 MMOL/L (ref 23–29)
CREAT SERPL-MCNC: 0.8 MG/DL (ref 0.5–1.4)
DIFFERENTIAL METHOD BLD: ABNORMAL
EOSINOPHIL # BLD AUTO: 0.2 K/UL (ref 0–0.5)
EOSINOPHIL NFR BLD: 2.1 % (ref 0–8)
ERYTHROCYTE [DISTWIDTH] IN BLOOD BY AUTOMATED COUNT: 13.3 % (ref 11.5–14.5)
EST. GFR  (NO RACE VARIABLE): >60 ML/MIN/1.73 M^2
ESTIMATED AVG GLUCOSE: 103 MG/DL (ref 68–131)
GLUCOSE SERPL-MCNC: 79 MG/DL (ref 70–110)
HBA1C MFR BLD: 5.2 % (ref 4–5.6)
HCT VFR BLD AUTO: 39 % (ref 37–48.5)
HDLC SERPL-MCNC: 50 MG/DL (ref 40–75)
HDLC SERPL: 23.4 % (ref 20–50)
HGB BLD-MCNC: 12.8 G/DL (ref 12–16)
IMM GRANULOCYTES # BLD AUTO: 0.02 K/UL (ref 0–0.04)
IMM GRANULOCYTES NFR BLD AUTO: 0.3 % (ref 0–0.5)
LDLC SERPL CALC-MCNC: 144.6 MG/DL (ref 63–159)
LYMPHOCYTES # BLD AUTO: 1.5 K/UL (ref 1–4.8)
LYMPHOCYTES NFR BLD: 20.9 % (ref 18–48)
MCH RBC QN AUTO: 30.5 PG (ref 27–31)
MCHC RBC AUTO-ENTMCNC: 32.8 G/DL (ref 32–36)
MCV RBC AUTO: 93 FL (ref 82–98)
MONOCYTES # BLD AUTO: 0.7 K/UL (ref 0.3–1)
MONOCYTES NFR BLD: 9.3 % (ref 4–15)
NEUTROPHILS # BLD AUTO: 4.8 K/UL (ref 1.8–7.7)
NEUTROPHILS NFR BLD: 66.7 % (ref 38–73)
NONHDLC SERPL-MCNC: 164 MG/DL
NRBC BLD-RTO: 0 /100 WBC
PLATELET # BLD AUTO: 455 K/UL (ref 150–450)
PMV BLD AUTO: 10.2 FL (ref 9.2–12.9)
POTASSIUM SERPL-SCNC: 4.6 MMOL/L (ref 3.5–5.1)
PROT SERPL-MCNC: 6.6 G/DL (ref 6–8.4)
RBC # BLD AUTO: 4.19 M/UL (ref 4–5.4)
SODIUM SERPL-SCNC: 140 MMOL/L (ref 136–145)
T4 FREE SERPL-MCNC: 1.8 NG/DL (ref 0.71–1.51)
TRIGL SERPL-MCNC: 97 MG/DL (ref 30–150)
TSH SERPL DL<=0.005 MIU/L-ACNC: 0.04 UIU/ML (ref 0.4–4)
WBC # BLD AUTO: 7.13 K/UL (ref 3.9–12.7)

## 2024-06-14 PROCEDURE — 80053 COMPREHEN METABOLIC PANEL: CPT | Performed by: INTERNAL MEDICINE

## 2024-06-14 PROCEDURE — 80061 LIPID PANEL: CPT | Performed by: INTERNAL MEDICINE

## 2024-06-14 PROCEDURE — 36415 COLL VENOUS BLD VENIPUNCTURE: CPT | Mod: PN | Performed by: INTERNAL MEDICINE

## 2024-06-14 PROCEDURE — 84439 ASSAY OF FREE THYROXINE: CPT | Performed by: INTERNAL MEDICINE

## 2024-06-14 PROCEDURE — 85025 COMPLETE CBC W/AUTO DIFF WBC: CPT | Performed by: INTERNAL MEDICINE

## 2024-06-14 PROCEDURE — 83036 HEMOGLOBIN GLYCOSYLATED A1C: CPT | Performed by: INTERNAL MEDICINE

## 2024-06-14 PROCEDURE — 84443 ASSAY THYROID STIM HORMONE: CPT | Performed by: INTERNAL MEDICINE

## 2024-06-18 ENCOUNTER — PATIENT MESSAGE (OUTPATIENT)
Dept: ENDOCRINOLOGY | Facility: CLINIC | Age: 69
End: 2024-06-18
Payer: MEDICARE

## 2024-06-18 DIAGNOSIS — E89.0 POST-SURGICAL HYPOTHYROIDISM: ICD-10-CM

## 2024-06-18 DIAGNOSIS — Z85.850 HISTORY OF THYROID CANCER: Primary | ICD-10-CM

## 2024-06-18 DIAGNOSIS — M17.11 PRIMARY OSTEOARTHRITIS OF RIGHT KNEE: Primary | ICD-10-CM

## 2024-06-18 DIAGNOSIS — E55.9 VITAMIN D DEFICIENCY: ICD-10-CM

## 2024-06-19 ENCOUNTER — TELEPHONE (OUTPATIENT)
Dept: PRIMARY CARE CLINIC | Facility: CLINIC | Age: 69
End: 2024-06-19
Payer: MEDICARE

## 2024-06-19 NOTE — TELEPHONE ENCOUNTER
----- Message from Adrián Javier MD sent at 6/15/2024  8:39 PM CDT -----  Your labs looekd ok except that your thyroid dose looks like it is a bit high.  Are you still seeing an endocrinologist?  If so, you would probalby want to follow up with them to discuss the labs and consider adjusting dose.

## 2024-06-19 NOTE — TELEPHONE ENCOUNTER
Spoke with pt re: lab results. Pt aware an expresses understanding.    Pt states she's already followed up with Endocrine, they've sent in a new dose and scheduled a follow up appt.    Pt unsure of new dose, asked pt to update us when she picks it up later today.

## 2024-06-21 ENCOUNTER — HOSPITAL ENCOUNTER (OUTPATIENT)
Dept: RADIOLOGY | Facility: HOSPITAL | Age: 69
Discharge: HOME | End: 2024-06-21
Attending: INTERNAL MEDICINE
Payer: MEDICARE

## 2024-06-21 DIAGNOSIS — Z12.31 ENCOUNTER FOR SCREENING MAMMOGRAM FOR HIGH-RISK PATIENT: ICD-10-CM

## 2024-06-21 DIAGNOSIS — E89.0 POST-SURGICAL HYPOTHYROIDISM: Primary | ICD-10-CM

## 2024-06-21 PROCEDURE — 77067 SCR MAMMO BI INCL CAD: CPT | Mod: TC

## 2024-06-21 RX ORDER — LEVOTHYROXINE SODIUM 88 UG/1
88 TABLET ORAL
Qty: 30 TABLET | Refills: 11 | Status: SHIPPED | OUTPATIENT
Start: 2024-06-21 | End: 2025-06-21

## 2024-06-21 NOTE — PROGRESS NOTES
Latest Reference Range & Units 06/14/24 08:01   TSH 0.400 - 4.000 uIU/mL 0.039 (L)   Free T4 0.71 - 1.51 ng/dL 1.80 (H)   (L): Data is abnormally low  (H): Data is abnormally high    On LT4 100 mcg daily  Decrease LT4 to 88 mcg daily  Check labs in 8 weeks

## 2024-06-24 ENCOUNTER — OFFICE VISIT (OUTPATIENT)
Dept: GASTROENTEROLOGY | Facility: CLINIC | Age: 69
End: 2024-06-24
Payer: MEDICARE

## 2024-06-24 VITALS — HEIGHT: 65 IN | WEIGHT: 131.38 LBS | BODY MASS INDEX: 21.89 KG/M2

## 2024-06-24 DIAGNOSIS — K44.9 HIATAL HERNIA WITH GERD: Primary | ICD-10-CM

## 2024-06-24 DIAGNOSIS — K21.9 HIATAL HERNIA WITH GERD: Primary | ICD-10-CM

## 2024-06-24 PROCEDURE — 99999 PR PBB SHADOW E&M-EST. PATIENT-LVL III: CPT | Mod: PBBFAC,,, | Performed by: INTERNAL MEDICINE

## 2024-06-24 PROCEDURE — 3008F BODY MASS INDEX DOCD: CPT | Mod: CPTII,S$GLB,, | Performed by: INTERNAL MEDICINE

## 2024-06-24 PROCEDURE — 1101F PT FALLS ASSESS-DOCD LE1/YR: CPT | Mod: CPTII,S$GLB,, | Performed by: INTERNAL MEDICINE

## 2024-06-24 PROCEDURE — 1159F MED LIST DOCD IN RCRD: CPT | Mod: CPTII,S$GLB,, | Performed by: INTERNAL MEDICINE

## 2024-06-24 PROCEDURE — 3288F FALL RISK ASSESSMENT DOCD: CPT | Mod: CPTII,S$GLB,, | Performed by: INTERNAL MEDICINE

## 2024-06-24 PROCEDURE — 1126F AMNT PAIN NOTED NONE PRSNT: CPT | Mod: CPTII,S$GLB,, | Performed by: INTERNAL MEDICINE

## 2024-06-24 PROCEDURE — 99214 OFFICE O/P EST MOD 30 MIN: CPT | Mod: S$GLB,,, | Performed by: INTERNAL MEDICINE

## 2024-06-24 PROCEDURE — 3044F HG A1C LEVEL LT 7.0%: CPT | Mod: CPTII,S$GLB,, | Performed by: INTERNAL MEDICINE

## 2024-06-24 RX ORDER — PANTOPRAZOLE SODIUM 40 MG/1
40 TABLET, DELAYED RELEASE ORAL DAILY
Qty: 90 TABLET | Refills: 3 | Status: SHIPPED | OUTPATIENT
Start: 2024-06-24 | End: 2025-06-24

## 2024-06-24 NOTE — PROGRESS NOTES
GASTROENTEROLOGY CLINIC NOTE    Reason for visit: The encounter diagnosis was Hiatal hernia with GERD.  Referring provider/PCP: Adrián Javier MD    HPI:  Sherry Torres is a 68 y.o. female here today for gerd  , follow up  Previously followed with [facility].    Interval 6/24/24  Started on compounded semaglutide, and is doing well, lost 30 lbs about in about 8 months.  Initially had tons of GI side effects, including nausea, reflux, now these side effects have subsided.  Taking protonix every day , if misses a dose, will be in troublesome symptoms.   Gerd is better controlled with less sylmptoms and she believes the wt loss has signficiantly helped. No vomiting.  Was able to come off BP meds.       ========================  Initial visit with me 4/2023  Ongoing symptoms of worsening reflux, she will wake in the middle of the night with acid in the back of her throat.  When she ashvin over she will also feel acid coming up.  She had a prior food impaction she believes about 5 or 6 years ago from dysphagia, she had a endoscopic evaluation at that time.  She also had a balloon dilation of a Schatzki's ring in the past.  Her last endoscopy was EGD back in 2017, I reviewed this image with her, showing a medium size hiatal hernia.  She also knows her mother had a hiatal hernia, she is currently in her 90s her mom is, and she wishes she had her hernia fixed before that time.  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  She is interested in meeting a surgeon in the near future but wants to handle her job issues 1st.  She is taking OTC prilosec daily  She is avoiding eating late at night, she is avoiding dietary triggers such as heavy red sauces etc., she is also sleeping with her head of her bed elevated.    Works as  - working on tay claims currently    Prior Endoscopy:  EGD:  2017   Medium HH  Schatzki dilated to 18  Path - negative for barrets    Colon:  2019 smith  -  normal ; 10 years    (Portions of this note were dictated using voice recognition software and may contain dictation related errors in spelling/grammar/syntax not found on text review)    Review of Systems   Constitutional:  Positive for weight loss (intentional). Negative for fever and malaise/fatigue.   Respiratory:  Negative for cough and shortness of breath.    Cardiovascular:  Negative for chest pain and palpitations.   Gastrointestinal:  Positive for heartburn. Negative for abdominal pain, blood in stool, nausea and vomiting.   Neurological:  Negative for dizziness and headaches.       Past Medical History: has a past medical history of Atypical ductal hyperplasia, breast, Breast cancer, Breast cyst, Cancer, GERD (gastroesophageal reflux disease), History of thyroid cancer, Lobular carcinoma in situ, Mitral valve prolapse, PONV (postoperative nausea and vomiting), and Psychiatric problem.    Past Surgical History: has a past surgical history that includes  section; Tubal ligation; CYST REMOVED FROM RIGHT BREAST IN ; left breast wire localization excisional biopsy with bracketed wires using 3 wires and excision through 1 incision. ; Hernia repair; Breast lumpectomy (Right, 2016); Breast biopsy (Right, 2016); Breast biopsy (Left, ); Transforaminal epidural injection of steroid (Right, 2019); Colonoscopy (N/A, 10/08/2019); Thyroidectomy; Lipoma resection (N/A, 10/04/2023); Extraction of cataract (Left, 2023); phacoemulsification, cataract, with iol insertion (Left, 2023); Cataract extraction w/  intraocular lens implant (Right, 2024); and Breast surgery ( AND  OR ).    Family History:family history includes Arthritis in her mother; Breast cancer (age of onset: 70) in an other family member; Breast cancer (age of onset: 88) in her maternal grandmother; Cancer in her maternal grandmother; Dementia in her father; Osteoporosis in her mother.    Allergies:    Review of patient's allergies indicates:   Allergen Reactions    Codeine Nausea Only    Sulfa (sulfonamide antibiotics) Nausea Only       Social History: reports that she quit smoking about 36 years ago. Her smoking use included cigarettes. She started smoking about 39 years ago. She has a 1.4 pack-year smoking history. She has never used smokeless tobacco. She reports current alcohol use of about 6.0 standard drinks of alcohol per week. She reports that she does not use drugs.    Home medications:   Current Outpatient Medications on File Prior to Visit   Medication Sig Dispense Refill    calcium-vitamin D3 (OS-NINA 500 + D3) 500 mg-5 mcg (200 unit) per tablet Take 4 tablets by mouth 2 (two) times daily with meals.       ibuprofen (ADVIL,MOTRIN) 200 MG tablet Take 200 mg by mouth every 6 (six) hours as needed for Pain.      levothyroxine (SYNTHROID) 88 MCG tablet Take 1 tablet (88 mcg total) by mouth before breakfast. 30 tablet 11    MAGNESIUM CARBONATE ORAL Take by mouth.      multivitamin capsule Take 2 capsules by mouth once daily.       potassium chloride SA (K-DUR,KLOR-CON) 10 MEQ tablet Take 20 mEq by mouth once daily.      semaglutide (OZEMPIC) 0.25 mg or 0.5 mg (2 mg/3 mL) pen injector Inject 0.5 mg into the skin every 7 days. On tuesday      tretinoin (RETIN-A) 0.025 % cream Compound tretinoin 0.025% / niacinamide 2% cream / hyaluronic acid 0.25%. Apply a pea-sized amount to entire face qhs. 30 g 11    [DISCONTINUED] pantoprazole (PROTONIX) 40 MG tablet Take 1 tablet (40 mg total) by mouth once daily. 30 tablet 11     Current Facility-Administered Medications on File Prior to Visit   Medication Dose Route Frequency Provider Last Rate Last Admin    0.9%  NaCl infusion   Intravenous Continuous Hakan Garces PA-C 70 mL/hr at 10/04/23 1113 New Bag at 10/04/23 1113    ceFAZolin 2 g in dextrose 5 % in water (D5W) 50 mL IVPB (MB+)  2 g Intravenous On Call Procedure Hakan Garces PA-C         "haloperidol lactate injection 0.5 mg  0.5 mg Intravenous Q10 Min PRN Crystal Holloway MD        HYDROmorphone injection 0.2 mg  0.2 mg Intravenous Q5 Min PRN Crystal Holloway MD        LIDOcaine (PF) 10 mg/ml (1%) injection 10 mg  1 mL Intradermal Once Eric Cardona MD        sodium chloride 0.9% flush 10 mL  10 mL Intravenous PRN Crystal Holloway MD        sodium hyaluronate (EUFLEXXA) 10 mg/mL(mw 2.4 -3.6 million) injection 20 mg  20 mg Intra-articular Weekly Mika Encinas PA-C           Vital signs:  Ht 5' 5" (1.651 m)   Wt 59.6 kg (131 lb 6.3 oz)   BMI 21.87 kg/m²     Physical Exam  Vitals reviewed.   Constitutional:       General: She is not in acute distress.  HENT:      Head: Normocephalic and atraumatic.   Eyes:      General: No scleral icterus.     Conjunctiva/sclera: Conjunctivae normal.   Skin:     General: Skin is warm.      Coloration: Skin is not pale.      Findings: No rash.   Neurological:      Mental Status: She is oriented to person, place, and time.      Gait: Gait normal.   Psychiatric:         Mood and Affect: Mood normal.         Behavior: Behavior normal.         I have reviewed prior labs, imaging, notes from last month    Assessment:  1. Hiatal hernia with GERD      Discussed medium HH And gerd.  Semaglutide provided significant monitored wt loss, intentional, and now with improved upper GI symptoms   Still taking protonix which helps greatly.      Plan:  Orders Placed This Encounter    pantoprazole (PROTONIX) 40 MG tablet       Full dose protonix, continue   Can consider tapering off off semaglutide in future    Continue current lifestyle remedies.    RTC prn    Stephen Serna MD  [facility] [place]              "

## 2024-07-01 ENCOUNTER — PATIENT MESSAGE (OUTPATIENT)
Dept: HEMATOLOGY/ONCOLOGY | Facility: CLINIC | Age: 69
End: 2024-07-01
Payer: MEDICARE

## 2024-07-01 DIAGNOSIS — R92.8 ABNORMAL MAMMOGRAM OF BOTH BREASTS: Primary | ICD-10-CM

## 2024-07-02 ENCOUNTER — OFFICE VISIT (OUTPATIENT)
Dept: HEMATOLOGY/ONCOLOGY | Facility: CLINIC | Age: 69
End: 2024-07-02
Payer: MEDICARE

## 2024-07-02 ENCOUNTER — TELEPHONE (OUTPATIENT)
Dept: RADIOLOGY | Facility: HOSPITAL | Age: 69
End: 2024-07-02

## 2024-07-02 ENCOUNTER — HOSPITAL ENCOUNTER (OUTPATIENT)
Dept: RADIOLOGY | Facility: HOSPITAL | Age: 69
Discharge: HOME | End: 2024-07-02
Attending: INTERNAL MEDICINE
Payer: MEDICARE

## 2024-07-02 VITALS
SYSTOLIC BLOOD PRESSURE: 137 MMHG | WEIGHT: 134.94 LBS | HEART RATE: 78 BPM | OXYGEN SATURATION: 100 % | BODY MASS INDEX: 22.48 KG/M2 | DIASTOLIC BLOOD PRESSURE: 65 MMHG | TEMPERATURE: 98 F | HEIGHT: 65 IN

## 2024-07-02 DIAGNOSIS — Z85.3 HISTORY OF BREAST CANCER IN FEMALE: Primary | ICD-10-CM

## 2024-07-02 DIAGNOSIS — Z12.31 ENCOUNTER FOR SCREENING MAMMOGRAM FOR HIGH-RISK PATIENT: ICD-10-CM

## 2024-07-02 DIAGNOSIS — R92.8 ABNORMAL FINDING ON BREAST IMAGING: ICD-10-CM

## 2024-07-02 PROCEDURE — 77066 DX MAMMO INCL CAD BI: CPT | Mod: TC

## 2024-07-02 PROCEDURE — 76642 ULTRASOUND BREAST LIMITED: CPT | Mod: 26,RT,, | Performed by: RADIOLOGY

## 2024-07-02 PROCEDURE — 3078F DIAST BP <80 MM HG: CPT | Mod: CPTII,S$GLB,, | Performed by: INTERNAL MEDICINE

## 2024-07-02 PROCEDURE — 76642 ULTRASOUND BREAST LIMITED: CPT | Mod: TC,RT

## 2024-07-02 PROCEDURE — 3008F BODY MASS INDEX DOCD: CPT | Mod: CPTII,S$GLB,, | Performed by: INTERNAL MEDICINE

## 2024-07-02 PROCEDURE — 77066 DX MAMMO INCL CAD BI: CPT | Mod: 26,,, | Performed by: RADIOLOGY

## 2024-07-02 PROCEDURE — 99213 OFFICE O/P EST LOW 20 MIN: CPT | Mod: S$GLB,,, | Performed by: INTERNAL MEDICINE

## 2024-07-02 PROCEDURE — 1101F PT FALLS ASSESS-DOCD LE1/YR: CPT | Mod: CPTII,S$GLB,, | Performed by: INTERNAL MEDICINE

## 2024-07-02 PROCEDURE — 77062 BREAST TOMOSYNTHESIS BI: CPT | Mod: TC

## 2024-07-02 PROCEDURE — 1126F AMNT PAIN NOTED NONE PRSNT: CPT | Mod: CPTII,S$GLB,, | Performed by: INTERNAL MEDICINE

## 2024-07-02 PROCEDURE — 99999 PR PBB SHADOW E&M-EST. PATIENT-LVL IV: CPT | Mod: PBBFAC,,, | Performed by: INTERNAL MEDICINE

## 2024-07-02 PROCEDURE — 3075F SYST BP GE 130 - 139MM HG: CPT | Mod: CPTII,S$GLB,, | Performed by: INTERNAL MEDICINE

## 2024-07-02 PROCEDURE — 3288F FALL RISK ASSESSMENT DOCD: CPT | Mod: CPTII,S$GLB,, | Performed by: INTERNAL MEDICINE

## 2024-07-02 PROCEDURE — 1159F MED LIST DOCD IN RCRD: CPT | Mod: CPTII,S$GLB,, | Performed by: INTERNAL MEDICINE

## 2024-07-02 PROCEDURE — 77062 BREAST TOMOSYNTHESIS BI: CPT | Mod: 26,,, | Performed by: RADIOLOGY

## 2024-07-02 PROCEDURE — 3044F HG A1C LEVEL LT 7.0%: CPT | Mod: CPTII,S$GLB,, | Performed by: INTERNAL MEDICINE

## 2024-07-02 NOTE — TELEPHONE ENCOUNTER
Spoke with patient. Reviewed breast biopsy procedure and reviewed instructions for breast biopsy. Patient expressed understanding and all questions were answered. Provided patient with my phone number to call for any further concerns or questions.   Patient scheduled breast biopsy at [facility] 7/9/2024.

## 2024-07-02 NOTE — PROGRESS NOTES
Subjective:       Patient ID: Sherry Torres is a 68 y.o. female.    Chief Complaint: No chief complaint on file.    HPI Ms Torres is a 68-year-old female seen in follow-up  of right breast cancer.   She had stage II A, strongly ER and ND positive and HER-2 negative disease.     She has been feeling well with no new issues.      Breast history:    She noted an abnormality on self examination at the end of July or early August 2016.  On August 8 she underwent diagnostic mammogram which showed an irregular mass was plated margins in the middle right breast and o'clock position.  By ultrasound this was a solid 1.7 x 1.0 x 1.5 cm mass.     On August 9 a core needle biopsy showed infiltrating ductal carcinoma which was well differentiated (histologic grade 2, nuclear grade 2, mitotic index 1).  The tumor was 100% ER positive, 70% ND positive and HER-2 1+.  On August 25 right breast lumpectomy and sentinel lymph node biopsy was performed.  That showed a 14 mm low-grade infiltrating ductal carcinoma.    The sentinel lymph node was positive for 1.5 mm micrometastasis.       Final pathological stage TI cN1 stage II    Mammaprint genomic assay  showed that she was low risk.  Score was +0.336 with a 5 year risk of distant recurrence at 5% and a 10 year risk at 10%.       She completed radiation in December 2016 and began Letrozole at that time.  She stopped after 5 years.    In May 2020 she had a left breast biopsy which was benign.  Review of Systems   Constitutional:  Negative for appetite change and unexpected weight change.   Eyes:  Negative for visual disturbance.   Respiratory:  Negative for cough and shortness of breath.    Cardiovascular:  Negative for chest pain.   Gastrointestinal:  Negative for abdominal pain and diarrhea.   Genitourinary:  Negative for frequency.   Musculoskeletal:  Negative for back pain.   Skin:  Negative for rash.   Neurological:  Negative for headaches.   Hematological:  Negative for  adenopathy.   Psychiatric/Behavioral:  Positive for sleep disturbance. The patient is not nervous/anxious.        Objective:      Physical Exam  Vitals reviewed.   Constitutional:       Appearance: She is well-developed.   Cardiovascular:      Rate and Rhythm: Normal rate and regular rhythm.   Pulmonary:      Effort: Pulmonary effort is normal.      Breath sounds: Normal breath sounds.   Chest:   Breasts:     Right: No mass, nipple discharge or skin change.      Left: Normal. No mass, nipple discharge or skin change.       Abdominal:      Palpations: Abdomen is soft. There is no mass.   Lymphadenopathy:      Cervical: No cervical adenopathy.      Upper Body:      Right upper body: No supraclavicular or axillary adenopathy.      Left upper body: No supraclavicular or axillary adenopathy.   Neurological:      Mental Status: She is alert and oriented to person, place, and time.   Psychiatric:         Behavior: Behavior normal.         Thought Content: Thought content normal.       Assessment:     Screening Mammogram -  There is an asymmetry seen in the inner region of the left breast in the middle depth on the CC view.   There is an asymmetry seen in the upper region of the right breast in the posterior depth on the MLO view.    There is postsurgical change of right lumpectomy and right excisional biopsy      Diagnostic mammograms/US - left negative.   US shows a 3-4 mm UIQ mass on the right - needs biopsy.    1. History of breast cancer in female        Plan:   Right breast biopsy    F/U to be determined               Route Chart for Scheduling    Med Onc Chart Routing      Follow up with physician 1 year.   Follow up with MADELINE    Infusion scheduling note    Injection scheduling note    Labs None   Scheduling:  Preferred lab:  Lab interval:     Imaging Mammogram      Pharmacy appointment No pharmacy appointment needed      Other referrals no referral to Oncology Primary Care needed -  no Massage appointment  needed    No additional referrals needed

## 2024-07-09 ENCOUNTER — HOSPITAL ENCOUNTER (OUTPATIENT)
Dept: RADIOLOGY | Facility: HOSPITAL | Age: 69
Discharge: HOME | End: 2024-07-09
Attending: INTERNAL MEDICINE
Payer: MEDICARE

## 2024-07-09 DIAGNOSIS — R92.8 ABNORMAL FINDING ON BREAST IMAGING: ICD-10-CM

## 2024-07-09 PROCEDURE — 27202044 US BREAST BIOPSY WITH IMAGING 1ST SITE RIGHT

## 2024-07-09 PROCEDURE — 88360 TUMOR IMMUNOHISTOCHEM/MANUAL: CPT | Performed by: PATHOLOGY

## 2024-07-09 PROCEDURE — 88342 IMHCHEM/IMCYTCHM 1ST ANTB: CPT | Performed by: PATHOLOGY

## 2024-07-09 PROCEDURE — A4648 IMPLANTABLE TISSUE MARKER: HCPCS

## 2024-07-09 PROCEDURE — 88341 IMHCHEM/IMCYTCHM EA ADD ANTB: CPT | Performed by: PATHOLOGY

## 2024-07-09 PROCEDURE — 19083 BX BREAST 1ST LESION US IMAG: CPT | Mod: RT,,, | Performed by: RADIOLOGY

## 2024-07-09 PROCEDURE — 77065 DX MAMMO INCL CAD UNI: CPT | Mod: 26,RT,, | Performed by: RADIOLOGY

## 2024-07-09 PROCEDURE — 77065 DX MAMMO INCL CAD UNI: CPT | Mod: TC,RT

## 2024-07-09 PROCEDURE — 88305 TISSUE EXAM BY PATHOLOGIST: CPT | Performed by: PATHOLOGY

## 2024-07-09 PROCEDURE — 27200940 US BREAST BIOPSY WITH IMAGING 1ST SITE RIGHT

## 2024-07-10 ENCOUNTER — OFFICE VISIT (OUTPATIENT)
Dept: ORTHOPEDICS | Facility: CLINIC | Age: 69
End: 2024-07-10
Payer: MEDICARE

## 2024-07-10 DIAGNOSIS — M17.11 PRIMARY OSTEOARTHRITIS OF RIGHT KNEE: Primary | ICD-10-CM

## 2024-07-10 PROCEDURE — 99999 PR PBB SHADOW E&M-EST. PATIENT-LVL III: CPT | Mod: PBBFAC,,, | Performed by: PHYSICIAN ASSISTANT

## 2024-07-10 NOTE — PROGRESS NOTES
Sherry Torres is a 68 y.o. year old her here today for her 1st Euflexxa injection for degenerative changes of her right knee . she was last seen and treated in the clinic on 11/7/2023. There has been no significant change in her medical status since her last visit. No Fever, chills, malaise, or unexplained weight change.      Allergies, Medications, past medical and surgical history were reviewed .    Examination of the knee demonstrates  No evidence of edema, erythema , echymosis strength and range of motion are unchanged from previous visit.    The risks, benefits, pros, cons, and potential side effects of the procedure were discussed with the patient in detail all questions were answered.  The patient is comfortable and willing to proceed with the procedure. Verbal consent was obtained and the proper joint was identified by the patient and provider.     The injection site was identified and the skin was prepared with a betadine solution. The  right knee  joint was injected with 2 ml of Euflexxa solution under sterile technique. Sherry Torres tolerated the procedure well, she was advised to rest the knee today, ice and elevation. I may take 3 -6 weeks following the last injection to get the full benefit of the medication.  I will see her back in 1 week. Sooner if he has any problems or concerns.           .     ICD-10-CM ICD-9-CM   1. Primary osteoarthritis of right knee  M17.11 715.16

## 2024-07-11 ENCOUNTER — DOCUMENTATION ONLY (OUTPATIENT)
Dept: SURGERY | Facility: CLINIC | Age: 69
End: 2024-07-11
Payer: MEDICARE

## 2024-07-11 LAB
FINAL PATHOLOGIC DIAGNOSIS: NORMAL
GROSS: NORMAL
Lab: NORMAL
MICROSCOPIC EXAM: NORMAL

## 2024-07-11 NOTE — PROGRESS NOTES
Received notification from [facility] of patient's positive breast biopsy.  Called patient and answered any questions she had at the time.  Scheduled patient for a path review appointment with MICHAEL Smith on 7/12.  Also scheduled for 7/17 Dr. Carroll appointment.  Patient verbalized understanding of appointment date, time and location.      Oncology Navigation   Intake  Date of Diagnosis: 07/09/24  Cancer Type: Breast  Type of Referral: Internal  Date of Referral: 07/11/24  Initial Nurse Navigator Contact: 07/11/24  Referral to Initial Contact Timeline (days): 0  First Appointment Available: 07/17/24  Appointment Date: 07/17/24  First Available Date vs. Scheduled Date (days): 0     Treatment  Current Status: Staging work-up    Surgical Oncologist: Dr. Carroll  Consult Date: 07/17/24    Medical Oncologist: Dr. Mckee       Procedures: Biopsy; Screening Mammogram; Ultrasound; Diagnostic Mammogram  Biopsy Schedule Date: 07/09/24  Diagnostic Mammo Schedule Date: 07/02/24  Ultrasound Schedule Date: 07/02/24       ER: Positive  WA: Positive  Her2: Negative           Acuity      Follow Up  Follow up in about 6 days (around 7/17/2024) for Elder Appointment.

## 2024-07-12 ENCOUNTER — OFFICE VISIT (OUTPATIENT)
Dept: HEMATOLOGY/ONCOLOGY | Facility: CLINIC | Age: 69
End: 2024-07-12
Payer: MEDICARE

## 2024-07-12 DIAGNOSIS — Z92.21 HISTORY OF AROMATASE INHIBITOR THERAPY: ICD-10-CM

## 2024-07-12 DIAGNOSIS — Z98.890 HISTORY OF LUMPECTOMY OF RIGHT BREAST: ICD-10-CM

## 2024-07-12 DIAGNOSIS — C50.911 RECURRENT BREAST CANCER, RIGHT: Primary | ICD-10-CM

## 2024-07-12 PROCEDURE — 3044F HG A1C LEVEL LT 7.0%: CPT | Mod: CPTII,95,, | Performed by: NURSE PRACTITIONER

## 2024-07-12 PROCEDURE — 99214 OFFICE O/P EST MOD 30 MIN: CPT | Mod: 95,,, | Performed by: NURSE PRACTITIONER

## 2024-07-12 NOTE — Clinical Note
She is meeting with you on the 17th. 3mm mass on pathology.  This is a recurrence for her.  Do you want an MRI?  Thanks.  Kalee

## 2024-07-12 NOTE — Clinical Note
Addie,  I saw this patient for a path review.  Meeting with Dr. Carroll on the 17th.  This is a recurrence for her so she is very interested in a full mastectomy.  She wanted to know how far out the surgery schedule is now?  Thanks.  Kalee

## 2024-07-12 NOTE — Clinical Note
I saw her virtually for a path review on Friday.  I know you had already called her.  Given that this is a recurrence, do you want any other imaging on her?  Bone scan/CT or PET?  Thanks.  Kalee

## 2024-07-13 NOTE — PROGRESS NOTES
Subjective:       Patient ID: Sherry Torres is a 68 y.o. female.    Chief Complaint: No chief complaint on file.  The patient location is: home/la  The chief complaint leading to consultation is: positive biopsy, results review    Visit type: audiovisual    Face to Face time with patient: 15 minutes  30 minutes of total time spent on the encounter, which includes face to face time and non-face to face time preparing to see the patient (eg, review of tests), Obtaining and/or reviewing separately obtained history, Documenting clinical information in the electronic or other health record, Independently interpreting results (not separately reported) and communicating results to the patient/family/caregiver, or Care coordination (not separately reported).     Each patient to whom he or she provides medical services by telemedicine is:  (1) informed of the relationship between the physician and patient and the respective role of any other health care provider with respect to management of the patient; and (2) notified that he or she may decline to receive medical services by telemedicine and may withdraw from such care at any time.    Notes:     HPI Ms Torres is a 68-year-old female seen today for path review.  She has a history of right breast cancer.   She had stage II A, strongly ER and RI positive and HER-2 negative disease. Hx of right lumpectomy, radiation, and AI x 5 years.  She recently had a screening mammogram on 6/21/24 which showed a right breast asymmetry at the upper posterior position. Follow up imaging was done and a subsequent right sided biopsy was done on  7/9/24    Pathology results from biopsy on 7/9/24:  Right breast, mass at 1 o'clock 12 cm FN, biopsy:   Invasive ductal carcinoma, Grade 1 (tubular differentiation = 3, nuclear pleomorphism = 1, mitotic rate = 1)   Invasive carcinoma measures 3 mm (0.3 cm) in greatest linear dimension within the core biopsies   Calcifications associated with  non-nonneoplastic breast     Breast molecular markers:   ER:  Positive (%, strong)   NY:  Positive (71-80%, intermediate)   HER2 IHC:  Negative (score 0)   Ki-67:  6%     She is generally feeling well today with no new complaints    Per Dr. Mckee's note:   Breast history:    She noted an abnormality on self examination at the end of July or early August 2016.  On August 8 she underwent diagnostic mammogram which showed an irregular mass was plated margins in the middle right breast and o'clock position.  By ultrasound this was a solid 1.7 x 1.0 x 1.5 cm mass.     On August 9 a core needle biopsy showed infiltrating ductal carcinoma which was well differentiated (histologic grade 2, nuclear grade 2, mitotic index 1).  The tumor was 100% ER positive, 70% NY positive and HER-2 1+.  On August 25 right breast lumpectomy and sentinel lymph node biopsy was performed.  That showed a 14 mm low-grade infiltrating ductal carcinoma.    The sentinel lymph node was positive for 1.5 mm micrometastasis.       Final pathological stage TI cN1 stage II    Mammaprint genomic assay  showed that she was low risk.  Score was +0.336 with a 5 year risk of distant recurrence at 5% and a 10 year risk at 10%.       She completed radiation in December 2016 and began Letrozole at that time.  She stopped after 5 years.    In May 2020 she had a left breast biopsy which was benign.    Review of Systems   Constitutional:  Negative for appetite change and unexpected weight change.   HENT:  Negative for mouth sores.    Eyes:  Negative for visual disturbance.   Respiratory:  Negative for cough and shortness of breath.    Cardiovascular:  Negative for chest pain.   Gastrointestinal:  Negative for abdominal pain and diarrhea.   Genitourinary:  Negative for frequency.   Musculoskeletal:  Negative for back pain.   Skin:  Negative for rash.   Neurological:  Negative for headaches.   Hematological:  Negative for adenopathy.   Psychiatric/Behavioral:   Positive for sleep disturbance. The patient is not nervous/anxious.        Objective:      Limited PE d/t virtual visit    Physical Exam  Constitutional:       General: She is not in acute distress.     Appearance: Normal appearance. She is not ill-appearing.   HENT:      Head: Normocephalic and atraumatic.   Pulmonary:      Effort: Pulmonary effort is normal. No respiratory distress.   Musculoskeletal:      Cervical back: Normal range of motion.   Neurological:      Mental Status: She is alert and oriented to person, place, and time.       Assessment:     Screening Mammogram -  There is an asymmetry seen in the inner region of the left breast in the middle depth on the CC view.   There is an asymmetry seen in the upper region of the right breast in the posterior depth on the MLO view.    There is postsurgical change of right lumpectomy and right excisional biopsy    Diagnostic mammograms/US - left negative.   US shows a 3-4 mm UIQ mass on the right - needs biopsy.    1. Recurrent breast cancer, right    2. History of lumpectomy of right breast    3. History of aromatase inhibitor therapy          Plan:   Diagnoses and all orders for this visit:    Recurrent breast cancer, right  History of lumpectomy of right breast  History of aromatase inhibitor therapy  Pathology reviewed with patient today  She is scheduled with Dr. Carroll 7/17  She has already spoken with Dr. Mckee about results  Will reach out to Dr. Mckee and Dr. Carroll to see if any further imaging needed  Pt would like to know timeline for surgery    Kalee Smith NP             Route Chart for Scheduling    Med Onc Chart Routing  Urgent    Follow up with physician . Scheduled with Dr. Carroll, please schedule with Dr. Mckee   Follow up with MADELINE    Infusion scheduling note    Injection scheduling note    Labs    Imaging    Pharmacy appointment    Other referrals                 Total time of this visit, including time spent face to face with patient and/or via  video/audio, and also in preparing for today's visit for MDM and documentation. (Medical Decision Making, including consideration of possible diagnoses, management options, complex medical record review, review of diagnostic tests and information, consideration and discussion of significant complications based on comorbidities, and discussion with providers involved with the care of the patient) is 30 minutes. Greater than 50% was spent face to face with the patient counseling and coordinating care.

## 2024-07-16 ENCOUNTER — TELEPHONE (OUTPATIENT)
Dept: HEMATOLOGY/ONCOLOGY | Facility: CLINIC | Age: 69
End: 2024-07-16
Payer: MEDICARE

## 2024-07-16 ENCOUNTER — DOCUMENTATION ONLY (OUTPATIENT)
Dept: SURGERY | Facility: CLINIC | Age: 69
End: 2024-07-16
Payer: MEDICARE

## 2024-07-16 DIAGNOSIS — C50.911 RECURRENT BREAST CANCER, RIGHT: Primary | ICD-10-CM

## 2024-07-16 DIAGNOSIS — Z98.890 HISTORY OF LUMPECTOMY OF RIGHT BREAST: ICD-10-CM

## 2024-07-16 NOTE — PROGRESS NOTES
Received notification from MICHAEL Smith NP that patient needed to be scheduled for a CT and Bone Scan.  With the help of Nuclear Medicine, both scans scheduled for Friday 7/19.  Patient will receive a printout of her appointments tomorrow at her visit with Dr. Carroll.  Authorization is pending on Bone Scan and will be monitored to ensure it is approved by Friday.        Oncology Navigation   Intake  Date of Diagnosis: 07/09/24  Cancer Type: Breast  Type of Referral: Internal  Date of Referral: 07/11/24  Initial Nurse Navigator Contact: 07/11/24  Referral to Initial Contact Timeline (days): 0  First Appointment Available: 07/17/24  Appointment Date: 07/17/24  First Available Date vs. Scheduled Date (days): 0     Treatment  Current Status: Staging work-up    Surgical Oncologist: Dr. Carroll  Consult Date: 07/17/24    Medical Oncologist: Dr. Mckee       Procedures: CT; Bone scan  Biopsy Schedule Date: 07/09/24  Bone Scan Schedule Date: 07/19/24  CT Schedule Date: 07/19/24  Diagnostic Mammo Schedule Date: 07/02/24  Ultrasound Schedule Date: 07/02/24       ER: Positive  MT: Positive  Her2: Negative       Support Systems: Family members     Acuity      Follow Up  Follow up in about 2 days (around 7/18/2024) for Bone Scan Auth?.

## 2024-07-16 NOTE — PROGRESS NOTES
Breast Surgery  [facility]  Department of Surgery      REFERRING PROVIDER: Phillip Mckee MD  [address] [place] [address]    Chief Complaint: Breast Cancer (New Patient Ductal Carcinoma Right Breast .)      Subjective:      Patient ID: Sherry Torres is a 68 y.o. female who presents with right breast Invasive Ductal Carcinoma.     She presented for screening mammogram on 2024 for yearly scheduled screening.. This identified breast asymmetries the bilateral breasts. Follow-up mammogram and ultrasound on 2024 showed the left breast asymmetry dissipate on spot compression views , the right breast asymmetry corresponded to a mass at 01:00 o'clock position, 3 cm from the nipple measuring 4mm. A ultrasound guided biopsy was performed on 2024 with pathology revealing invasive ductal carcinoma of the breast.     Findings at that time were the following:   Lesion 1:    Location:  Right, 01:00 o'clock   Clip:  Infinity, in expected position  Tumor size: 0.4 cm   Tumor ndgndrndanddndend:nd nd2nd Estrogen Receptor: +   Progesterone Receptor: +   Her-2 jareth: -   Lymph node status:  Clinically negative     Patient has not noted a change on breast exam.  Patient denies nipple discharge. Patient admits to previous breast biopsy. Patient admits to a personal history of breast cancer. Family history includes maternal grandmother with breast cancer, maternal great aunt with breast cancer.     She has a history of right breast cancer. She had stage II A, strongly ER and GA positive and HER-2 negative disease. Hx of right lumpectomy, radiation, and AI x 5 years.  Underwent genetic testing in 2019 with no pathogenic mutation.    GYN History:  Age of menarche was 9. Age of menopause was 41.  Patient denies hormonal therapy. Patient is . Age of first live birth was 32. Patient did breast feed.    Past Medical History:   Diagnosis Date    Atypical ductal hyperplasia, breast     left side    Breast cancer  2016    right side    Breast cyst approx. 40 years ago    Cancer     GERD (gastroesophageal reflux disease)     History of thyroid cancer     Lobular carcinoma in situ not certain of 2016 diagnosis    Malignant neoplasm of upper-outer quadrant of right breast in female, estrogen receptor positive 09/15/2016    Mitral valve prolapse     PONV (postoperative nausea and vomiting)     Psychiatric problem      Past Surgical History:   Procedure Laterality Date    BREAST BIOPSY Right 2016    BREAST BIOPSY Left     exc bx    BREAST LUMPECTOMY Right 2016    w/radiation    BREAST SURGERY   AND  OR     CATARACT EXTRACTION W/  INTRAOCULAR LENS IMPLANT Right 2024    Procedure: EXTRACTION, CATARACT, WITH IOL INSERTION;  Surgeon: Zhane Khan MD;  Location: [facility] OR;  Service: Ophthalmology;  Laterality: Right;     SECTION      COLONOSCOPY N/A 10/08/2019    Procedure: COLONOSCOPY;  Surgeon: Eliceo Holloway MD;  Location: [facility] (4TH FLR);  Service: Endoscopy;  Laterality: N/A;  Okay for Freeman or Holloway. However, she needs it done before end , so if they are not available before then, okay for any provided.    CYST REMOVED FROM RIGHT BREAST IN       EXTRACTION OF CATARACT Left 2023    Procedure: EXTRACTION, CATARACT;  Surgeon: Eric Cardona MD;  Location: [facility] OR;  Service: Ophthalmology;  Laterality: Left;  DiB00 +21.0D    HERNIA REPAIR      over C section incision     left breast wire localization excisional biopsy with bracketed wires using 3 wires and excision through 1 incision.       LIPOMA RESECTION N/A 10/04/2023    Procedure: EXCISION, LIPOMA Back;  Surgeon: Kenneth Hernández MD;  Location: [facility] OR 2ND FLR;  Service: General;  Laterality: N/A;    PHACOEMULSIFICATION, CATARACT, WITH IOL INSERTION Left 2023    Procedure: PHACOEMULSIFICATION, CATARACT, WITH IOL INSERTION;  Surgeon: Eric Cardona MD;  Location: [facility] OR;  Service: Ophthalmology;   Laterality: Left;    THYROIDECTOMY      TRANSFORAMINAL EPIDURAL INJECTION OF STEROID Right 07/01/2019    Procedure: Injection,steroid,epidural,transforaminal approach LUMBAR TRANSFORAMINAL RIGHT L5 AND S1 TF ARNOLDO;  Surgeon: Nadya Mcfarland MD;  Location: [facility];  Service: Pain Management;  Laterality: Right;  NEEDS CONSENT    TUBAL LIGATION       Current Outpatient Medications on File Prior to Visit   Medication Sig Dispense Refill    calcium-vitamin D3 (OS-NINA 500 + D3) 500 mg-5 mcg (200 unit) per tablet Take 4 tablets by mouth 2 (two) times daily with meals.       ibuprofen (ADVIL,MOTRIN) 200 MG tablet Take 200 mg by mouth every 6 (six) hours as needed for Pain.      levothyroxine (SYNTHROID) 88 MCG tablet Take 1 tablet (88 mcg total) by mouth before breakfast. 30 tablet 11    MAGNESIUM CARBONATE ORAL Take by mouth.      multivitamin capsule Take 2 capsules by mouth once daily.       pantoprazole (PROTONIX) 40 MG tablet Take 1 tablet (40 mg total) by mouth once daily. 90 tablet 3    potassium chloride SA (K-DUR,KLOR-CON) 10 MEQ tablet Take 20 mEq by mouth once daily.      semaglutide (OZEMPIC) 0.25 mg or 0.5 mg (2 mg/3 mL) pen injector Inject 0.5 mg into the skin every 7 days. On tuesday      tretinoin (RETIN-A) 0.025 % cream Compound tretinoin 0.025% / niacinamide 2% cream / hyaluronic acid 0.25%. Apply a pea-sized amount to entire face qhs. 30 g 11     Current Facility-Administered Medications on File Prior to Visit   Medication Dose Route Frequency Provider Last Rate Last Admin    0.9%  NaCl infusion   Intravenous Continuous Hakan Garces PA-C 70 mL/hr at 10/04/23 1113 New Bag at 10/04/23 1113    ceFAZolin 2 g in dextrose 5 % in water (D5W) 50 mL IVPB (MB+)  2 g Intravenous On Call Procedure Hakan Garces PA-C        haloperidol lactate injection 0.5 mg  0.5 mg Intravenous Q10 Min PRN Crystal Holloway MD        HYDROmorphone injection 0.2 mg  0.2 mg Intravenous Q5 Min PRN Crystal Holloway MD         LIDOcaine (PF) 10 mg/ml (1%) injection 10 mg  1 mL Intradermal Once Eric Cardona MD        sodium chloride 0.9% flush 10 mL  10 mL Intravenous PRN Crystal Holloway MD         Social History     Socioeconomic History    Marital status:     Number of children: 2   Occupational History     Comment:    Tobacco Use    Smoking status: Former     Current packs/day: 0.00     Average packs/day: 0.5 packs/day for 2.9 years (1.4 ttl pk-yrs)     Types: Cigarettes     Start date: 2/10/1985     Quit date: 1988     Years since quittin.5    Smokeless tobacco: Never    Tobacco comments:     social smoker for several years; 1-2 cigarettes/week.  Quit ,   Substance and Sexual Activity    Alcohol use: Yes     Alcohol/week: 6.0 standard drinks of alcohol     Types: 2 Glasses of wine, 4 Drinks containing 0.5 oz of alcohol per week     Comment: Don't drink much as it aggravates GERD    Drug use: Never    Sexual activity: Not Currently     Partners: Male     Birth control/protection: Post-menopausal   Other Topics Concern    Patient feels they ought to cut down on drinking/drug use No    Patient annoyed by others criticizing their drinking/drug use No    Patient has felt bad or guilty about drinking/drug use No    Patient has had a drink/used drugs as an eye opener in the AM No   Social History Narrative    ,  x 40 years, 2 children, 2 grandchildren, Yarsani     Social Determinants of Health     Financial Resource Strain: Low Risk  (2024)    Overall Financial Resource Strain (CARDIA)     Difficulty of Paying Living Expenses: Not hard at all   Food Insecurity: No Food Insecurity (2024)    Hunger Vital Sign     Worried About Running Out of Food in the Last Year: Never true     Ran Out of Food in the Last Year: Never true   Transportation Needs: No Transportation Needs (2024)    PRAPARE - Transportation     Lack of Transportation (Medical): No     Lack of Transportation  "(Non-Medical): No   Physical Activity: Insufficiently Active (5/2/2024)    Exercise Vital Sign     Days of Exercise per Week: 3 days     Minutes of Exercise per Session: 30 min   Stress: Stress Concern Present (5/2/2024)    [employer] - Occupational Stress Questionnaire     Feeling of Stress : To some extent   Housing Stability: Low Risk  (2/19/2024)    Housing Stability Vital Sign     Unable to Pay for Housing in the Last Year: No     Number of Places Lived in the Last Year: 1     Unstable Housing in the Last Year: No     Family History   Problem Relation Name Age of Onset    Osteoporosis Mother Miranda Mesa     Arthritis Mother Miranda Mesa     Dementia Father      Breast cancer Maternal Grandmother Gabriella Montes 88    Cancer Maternal Grandmother Gabriella Montes     Breast cancer Other mat great aunt 70    Colon cancer Neg Hx      Colon polyps Neg Hx      Rectal cancer Neg Hx      Stomach cancer Neg Hx      Esophageal cancer Neg Hx      Liver cancer Neg Hx      Liver disease Neg Hx      Cirrhosis Neg Hx      Inflammatory bowel disease Neg Hx      Celiac disease Neg Hx      Crohn's disease Neg Hx      Ulcerative colitis Neg Hx      Ovarian cancer Neg Hx      Melanoma Neg Hx          Review of Systems   All other systems reviewed and are negative.    Objective:   BP (!) 143/71 (BP Location: Right arm, Patient Position: Sitting, BP Method: Medium (Automatic))   Pulse 74   Ht 5' 5" (1.651 m)   Wt 61.2 kg (135 lb)   SpO2 99%   BMI 22.47 kg/m²     Physical Exam   Vitals reviewed.  Constitutional: She is oriented to person, place, and time.   Cardiovascular:  Normal rate.            Pulmonary/Chest: Effort normal. No respiratory distress. Right breast exhibits no inverted nipple, no mass, no nipple discharge, no skin change and no tenderness. Left breast exhibits no inverted nipple, no mass, no nipple discharge, no skin change and no tenderness.   Bilateral well healed scars.  Minimal skin " change from radiation       Abdominal: Normal appearance.   Musculoskeletal: Lymphadenopathy:      Cervical: No cervical adenopathy.      Upper Body:      Right upper body: No supraclavicular or axillary adenopathy.      Left upper body: No supraclavicular or axillary adenopathy.     Neurological: She is alert and oriented to person, place, and time.   Skin: Skin is warm and dry.     Psychiatric: Her behavior is normal. Mood normal.       Radiology review: Images personally reviewed by me in the clinic and shown to the patient during the consultation.     Assessment:       1. Carcinoma of upper-outer quadrant of right breast in female, estrogen receptor positive        Plan:   Multidisciplinary nature of breast cancer care was discussed in detail at today's visit.  She was then established patient of Dr. Mckee.     According to National Comprehensive Cancer Network guidelines, systemic staging is recommended in the setting of recurrence. This generally consists of either a PET/CT scan or a CT of the chest, abdomen, and pelvis, as well as a bone scan.  Ordered.    We discussed surgical options in the setting of recurrent breast cancer with prior lumpectomy and radiation. While there is no survival benefit to undergoing a mastectomy compared to lumpectomy, there is a high local recurrence risk of preforming a lumpectomy without radiation. Radiation is not typically given twice to the same breast. She completed radiation therapy after her first breast cancer diagnosis.  We discussed that with her small breast cancer size we could look into the option for a lumpectomy partial breast radiation but I would have to discuss with Radiation Oncology.  She desires mastectomy.  We discussed mastectomy for local control of the new breast cancer diagnosis.  Surgical technique and rationale was discussed with the patient. Risks and benefits of the procedure were discussed in detail.  Risks include but are not limited to infection,  bleeding, poor cosmesis, positive margins requiring reexcision, and local and distant recurrence.     We discussed the option for contralateral prophylactic mastectomy.  Undergoing a contralateral prophylactic mastectomy does not approve the cure rate for the known cancer or reduce the risk of the that cancer returning.  Overall most women diagnosed with breast cancer have a 2-6% risk of developing a breast cancer in the opposite breast in the next 10 years.  Contralateral prophylactic mastectomy is not 100% protected against development of a new breast cancer.  Undergoing surgery on the contralateral breast has the risk of surgical site complications which could delay cancer treatments.  Most women who proceed with contralateral prophylactic mastectomy do so due to symmetry concerns or for peace of mind.     Reconstruction after mastectomy was discussed.  Reconstructive generally include implant or autologous tissue reconstruction.  Prior radiation can increase risks associated reconstruction.  She has a significant abdominal surgical history may not be a candidate for abdominal flap.  I will facilitate consultation with Plastic surgery to further discuss her options for reconstruction.     Based on her medical history she is a low risk of complications. Materials utilized in the surgery include surgical metal clips, suture material, and skin adhesives. These materials can lead to allergic reactions, skin irration, and other complications. Medications utilized in surgery include but are not limited to antibiotics, isosulfan blue dye, and technetium sulfa colloid.  Minimal cross-reactivity to dyes utilized for sentinel lymph node biopsy in the setting of sulfa allergy.  Due to high-risk of inability to map recommend proceeding with both eyes despite allergy. We will consider pretreatment in OR for allergy.    We also discussed axillary staging using sentinel node biopsy.  [place] lymph node biopsies performed  utilizing the injection of blue and radioactive dye.  This dye travels to the 1st few lymph nodes that drain the breast.  Lymph nodes that uptake the blue or radioactive dye or are palpable are surgically removed and sent to pathology.  Typically 1-5 lymph nodes are removed during this procedure although exact numbers vary depending on the patient.  This procedure allows sampling of the lymph nodes most at risk for metastasis.  The risks and benefits of the procedure discussed the patient.  Risks include but are not limited to lymphedema, bleeding, infection, poor cosmesis, numbness of the incision site or arm, seroma, failure of dye to map, and need for additional surgery.  If metastatic disease is identified on pathology of the lymph nodes been axillary dissection will most likely be recommended.  We discussed the risk of lymphedema in particular as it is elevated due to her prior sentinel lymph node biopsy.  We also discussed the risk of failure of the dye to map in the setting of prior sentinel lymph node biopsy.  Due to the low risk features of her breast cancer and age of 68, if the dye does not map then I would defer immediate axilla lymph node dissection and discuss at tumor board.    We also discussed the role of systemic therapy in the treatment of early stage breast cancer. We discussed that this is based on tumor biology and tre status and will be determined based on final pathology.  She has an early stage hormone receptor positive tumor. I do not anticipate she will need chemotherapy although final recommendations are pending surgical pathology. She will likely be recommended for repeat anti-estrogen therapy. We discussed that if the cancer is hormone positive, endocrine therapy would be recommended in most cases and its use can reduce the risk of recurrence as well as improve survival.     She completed genetic testing in 2019 which was negative.    Following her discussion today, she will be  scheduled for a right mastectomy, she is considering contralateral mastectomy, right axillary sentinel lymph node biopsy.  If dye does not map we will defer axilla lymph node dissection.  May benefit for pretreatment for sulfa allergy.  Staging scans pending.    Surgery will be scheduled once reconstructive plans are finalized. Follow-up in clinic roughly 14 days after surgery.      Patient was given the patient information packet.  All her questions were answered.    Total time spent with the patient: 60 minutes.  45 minutes of face to face consultation and 15 minutes of chart review and coordination of care.

## 2024-07-17 ENCOUNTER — DOCUMENTATION ONLY (OUTPATIENT)
Dept: SURGERY | Facility: CLINIC | Age: 69
End: 2024-07-17

## 2024-07-17 ENCOUNTER — OFFICE VISIT (OUTPATIENT)
Dept: SURGERY | Facility: CLINIC | Age: 69
End: 2024-07-17
Payer: MEDICARE

## 2024-07-17 ENCOUNTER — TELEPHONE (OUTPATIENT)
Dept: SURGERY | Facility: CLINIC | Age: 69
End: 2024-07-17

## 2024-07-17 ENCOUNTER — OFFICE VISIT (OUTPATIENT)
Dept: ORTHOPEDICS | Facility: CLINIC | Age: 69
End: 2024-07-17
Payer: MEDICARE

## 2024-07-17 VITALS
HEIGHT: 65 IN | BODY MASS INDEX: 22.49 KG/M2 | HEART RATE: 74 BPM | WEIGHT: 135 LBS | DIASTOLIC BLOOD PRESSURE: 71 MMHG | SYSTOLIC BLOOD PRESSURE: 143 MMHG | OXYGEN SATURATION: 99 %

## 2024-07-17 DIAGNOSIS — M17.11 PRIMARY OSTEOARTHRITIS OF RIGHT KNEE: Primary | ICD-10-CM

## 2024-07-17 DIAGNOSIS — Z17.0 CARCINOMA OF UPPER-OUTER QUADRANT OF RIGHT BREAST IN FEMALE, ESTROGEN RECEPTOR POSITIVE: Primary | ICD-10-CM

## 2024-07-17 DIAGNOSIS — C50.411 CARCINOMA OF UPPER-OUTER QUADRANT OF RIGHT BREAST IN FEMALE, ESTROGEN RECEPTOR POSITIVE: Primary | ICD-10-CM

## 2024-07-17 PROCEDURE — 3078F DIAST BP <80 MM HG: CPT | Mod: CPTII,S$GLB,, | Performed by: SURGERY

## 2024-07-17 PROCEDURE — 3008F BODY MASS INDEX DOCD: CPT | Mod: CPTII,S$GLB,, | Performed by: SURGERY

## 2024-07-17 PROCEDURE — 99205 OFFICE O/P NEW HI 60 MIN: CPT | Mod: S$GLB,,, | Performed by: SURGERY

## 2024-07-17 PROCEDURE — 99999 PR PBB SHADOW E&M-EST. PATIENT-LVL III: CPT | Mod: PBBFAC,,, | Performed by: SURGERY

## 2024-07-17 PROCEDURE — 3044F HG A1C LEVEL LT 7.0%: CPT | Mod: CPTII,S$GLB,, | Performed by: SURGERY

## 2024-07-17 PROCEDURE — 1101F PT FALLS ASSESS-DOCD LE1/YR: CPT | Mod: CPTII,S$GLB,, | Performed by: SURGERY

## 2024-07-17 PROCEDURE — 3288F FALL RISK ASSESSMENT DOCD: CPT | Mod: CPTII,S$GLB,, | Performed by: SURGERY

## 2024-07-17 PROCEDURE — 1160F RVW MEDS BY RX/DR IN RCRD: CPT | Mod: CPTII,S$GLB,, | Performed by: SURGERY

## 2024-07-17 PROCEDURE — 99999 PR PBB SHADOW E&M-EST. PATIENT-LVL III: CPT | Mod: PBBFAC,,, | Performed by: PHYSICIAN ASSISTANT

## 2024-07-17 PROCEDURE — 3077F SYST BP >= 140 MM HG: CPT | Mod: CPTII,S$GLB,, | Performed by: SURGERY

## 2024-07-17 PROCEDURE — 1159F MED LIST DOCD IN RCRD: CPT | Mod: CPTII,S$GLB,, | Performed by: SURGERY

## 2024-07-17 PROCEDURE — 1125F AMNT PAIN NOTED PAIN PRSNT: CPT | Mod: CPTII,S$GLB,, | Performed by: SURGERY

## 2024-07-17 NOTE — TELEPHONE ENCOUNTER
Spoke to pt and scheduled appt for a consult to see Dr Holloway  at [facility], 2nd floor. [address].    Pt agreeable with sx date 9/9 with Dr Holloway and Glen   Provided patient with appointment time and date, address of the location and call back # to RN navigator. All questions and concerns addressed. Pt verbalized understanding.

## 2024-07-17 NOTE — PROGRESS NOTES
Sherry Torres is a 68 y.o. year old her here today for her 2nd Euflexxa injection for degenerative changes of her right knee . she was last seen and treated in the clinic on 7/10/2024. There has been no significant change in her medical status since her last visit. No Fever, chills, malaise, or unexplained weight change.      Allergies, Medications, past medical and surgical history were reviewed .    Examination of the knee demonstrates  No evidence of edema, erythema , echymosis strength and range of motion are unchanged from previous visit.    The risks, benefits, pros, cons, and potential side effects of the procedure were discussed with the patient in detail all questions were answered.  The patient is comfortable and willing to proceed with the procedure. Verbal consent was obtained and the proper joint was identified by the patient and provider.     The injection site was identified and the skin was prepared with a betadine solution. The  right knee  joint was injected with 2 ml of Euflexxa solution under sterile technique. Sherry Torres tolerated the procedure well, she was advised to rest the knee today, ice and elevation. I may take 3 -6 weeks following the last injection to get the full benefit of the medication.  I will see her back in 1 week. Sooner if he has any problems or concerns.           .     ICD-10-CM ICD-9-CM   1. Primary osteoarthritis of right knee  M17.11 715.16

## 2024-07-17 NOTE — NURSING
Nurse Navigator Note:     Met with patient during her consult with Dr. Carroll for Liss Ann, Nurse Navigator. Patient and I reviewed the information she discussed with Dr. Carroll, including treatment options, diagnosis, and future plans for workup. Patient and I went through the new patient booklet, explained some of the information and why it is provided.     Also offered patient consults with our other specialty clinics: Integrative Oncology, Survivorship and/or Women's Gynecologic needs, our breast physical therapy department for pre-op and post-operative assessments, Oncologic Psychology for psychological support, and Oncologic Nutrition for nutritional counseling. Explained to patient that all of these support services are completely optional. Discussed that physical therapy may call patient to offer pre-op appt, and what that appt would entail.     Patient was given a copy of her appointments, Dr. Carroll's card, and my card. Encouraged her to call me if she has any questions or concerns or would like to schedule any additional appointments. Verbalized understanding of all information.    Genetics done in 2018. PT and Integrative oncology referrals placed. Email added to support group.  Oncology Navigation   Intake  Date of Diagnosis: 07/09/24  Cancer Type: Breast  Type of Referral: Internal  Date of Referral: 07/11/24  Initial Nurse Navigator Contact: 07/11/24  Referral to Initial Contact Timeline (days): 0  First Appointment Available: 07/17/24  Appointment Date: 07/17/24  First Available Date vs. Scheduled Date (days): 0     Treatment  Current Status: Active    Surgery: Planned  Surgical Oncologist: Dr. Carroll  Plastic Surgeon: Jewel  Type of Surgery: Uni vs bilateral mastectomy with SLNB and TE  Consult Date: 07/17/24    Medical Oncologist: Dr. Mckee       Procedures: CT; Bone scan  Biopsy Schedule Date: 07/09/24  Bone Scan Schedule Date: 07/19/24  CT Schedule Date: 07/19/24  Diagnostic Mammo Schedule Date:  07/02/24  Ultrasound Schedule Date: 07/02/24    Physical Therapy Referral Date: 07/17/24    ER: Positive  OH: Positive  Her2: Negative       Support Systems: Spouse/significant other     Acuity      Follow Up  No follow-ups on file.

## 2024-07-18 ENCOUNTER — TELEPHONE (OUTPATIENT)
Dept: HEMATOLOGY/ONCOLOGY | Facility: CLINIC | Age: 69
End: 2024-07-18
Payer: MEDICARE

## 2024-07-18 NOTE — TELEPHONE ENCOUNTER
Spoke to pt. in regards to referral to Integrative Oncology placed by Sandrine Carroll MD . Pt. approved and confirmed scheduling for 7/24 @ 9:30AM with Joanna Dubinsky, PA-C.  MA advised pt. That this is a consultation visit whereby the provider will review pt's overall health and all services offered by Integrative Oncology. MA informed pt that clinic is located on the 3rd floor of the [facility]. Pt acknowledged.     [place], [place] ext [phone]

## 2024-07-19 ENCOUNTER — HOSPITAL ENCOUNTER (OUTPATIENT)
Dept: RADIOLOGY | Facility: HOSPITAL | Age: 69
Discharge: HOME | End: 2024-07-19
Attending: NURSE PRACTITIONER
Payer: MEDICARE

## 2024-07-19 DIAGNOSIS — C50.411 CARCINOMA OF UPPER-OUTER QUADRANT OF RIGHT BREAST IN FEMALE, ESTROGEN RECEPTOR POSITIVE: ICD-10-CM

## 2024-07-19 DIAGNOSIS — Z17.0 CARCINOMA OF UPPER-OUTER QUADRANT OF RIGHT BREAST IN FEMALE, ESTROGEN RECEPTOR POSITIVE: ICD-10-CM

## 2024-07-19 DIAGNOSIS — Z98.890 HISTORY OF LUMPECTOMY OF RIGHT BREAST: ICD-10-CM

## 2024-07-19 DIAGNOSIS — C50.911 RECURRENT BREAST CANCER, RIGHT: ICD-10-CM

## 2024-07-19 PROCEDURE — 71260 CT THORAX DX C+: CPT | Mod: TC

## 2024-07-19 PROCEDURE — A9503 TC99M MEDRONATE: HCPCS | Performed by: NURSE PRACTITIONER

## 2024-07-19 PROCEDURE — 71260 CT THORAX DX C+: CPT | Mod: 26,,, | Performed by: RADIOLOGY

## 2024-07-19 PROCEDURE — 78306 BONE IMAGING WHOLE BODY: CPT | Mod: 26,,, | Performed by: NUCLEAR MEDICINE

## 2024-07-19 PROCEDURE — 78306 BONE IMAGING WHOLE BODY: CPT | Mod: TC

## 2024-07-19 PROCEDURE — 74174 CTA ABD&PLVS W/CONTRAST: CPT | Mod: TC

## 2024-07-19 PROCEDURE — 25500020 PHARM REV CODE 255: Performed by: NURSE PRACTITIONER

## 2024-07-19 PROCEDURE — 74174 CTA ABD&PLVS W/CONTRAST: CPT | Mod: 26,,, | Performed by: RADIOLOGY

## 2024-07-19 RX ORDER — TC 99M MEDRONATE 20 MG/10ML
23 INJECTION, POWDER, LYOPHILIZED, FOR SOLUTION INTRAVENOUS
Status: COMPLETED | OUTPATIENT
Start: 2024-07-19 | End: 2024-07-19

## 2024-07-19 RX ADMIN — TECHNETIUM TC 99M MEDRONATE 23 MILLICURIE: 25 INJECTION, POWDER, FOR SOLUTION INTRAVENOUS at 09:07

## 2024-07-19 RX ADMIN — IOHEXOL 150 ML: 350 INJECTION, SOLUTION INTRAVENOUS at 10:07

## 2024-07-22 ENCOUNTER — PATIENT MESSAGE (OUTPATIENT)
Dept: SURGERY | Facility: CLINIC | Age: 69
End: 2024-07-22
Payer: MEDICARE

## 2024-07-24 ENCOUNTER — CLINICAL SUPPORT (OUTPATIENT)
Dept: REHABILITATION | Facility: HOSPITAL | Age: 69
End: 2024-07-24
Attending: SURGERY
Payer: MEDICARE

## 2024-07-24 ENCOUNTER — OFFICE VISIT (OUTPATIENT)
Dept: ORTHOPEDICS | Facility: CLINIC | Age: 69
End: 2024-07-24
Payer: MEDICARE

## 2024-07-24 ENCOUNTER — OFFICE VISIT (OUTPATIENT)
Dept: HEMATOLOGY/ONCOLOGY | Facility: CLINIC | Age: 69
End: 2024-07-24
Attending: SURGERY
Payer: MEDICARE

## 2024-07-24 VITALS
BODY MASS INDEX: 22.48 KG/M2 | DIASTOLIC BLOOD PRESSURE: 74 MMHG | HEIGHT: 65 IN | OXYGEN SATURATION: 95 % | SYSTOLIC BLOOD PRESSURE: 119 MMHG | HEART RATE: 87 BPM | WEIGHT: 134.94 LBS

## 2024-07-24 DIAGNOSIS — M17.11 PRIMARY OSTEOARTHRITIS OF RIGHT KNEE: Primary | ICD-10-CM

## 2024-07-24 DIAGNOSIS — Z91.89 AT RISK FOR LYMPHEDEMA: Primary | ICD-10-CM

## 2024-07-24 DIAGNOSIS — C50.411 CARCINOMA OF UPPER-OUTER QUADRANT OF RIGHT BREAST IN FEMALE, ESTROGEN RECEPTOR POSITIVE: ICD-10-CM

## 2024-07-24 DIAGNOSIS — Z17.0 CARCINOMA OF UPPER-OUTER QUADRANT OF RIGHT BREAST IN FEMALE, ESTROGEN RECEPTOR POSITIVE: ICD-10-CM

## 2024-07-24 PROCEDURE — 3288F FALL RISK ASSESSMENT DOCD: CPT | Mod: CPTII,S$GLB,, | Performed by: PHYSICIAN ASSISTANT

## 2024-07-24 PROCEDURE — 99999 PR PBB SHADOW E&M-EST. PATIENT-LVL III: CPT | Mod: PBBFAC,,, | Performed by: PHYSICIAN ASSISTANT

## 2024-07-24 PROCEDURE — 1126F AMNT PAIN NOTED NONE PRSNT: CPT | Mod: CPTII,S$GLB,, | Performed by: PHYSICIAN ASSISTANT

## 2024-07-24 PROCEDURE — 1101F PT FALLS ASSESS-DOCD LE1/YR: CPT | Mod: CPTII,S$GLB,, | Performed by: PHYSICIAN ASSISTANT

## 2024-07-24 PROCEDURE — 3044F HG A1C LEVEL LT 7.0%: CPT | Mod: CPTII,S$GLB,, | Performed by: PHYSICIAN ASSISTANT

## 2024-07-24 PROCEDURE — 3078F DIAST BP <80 MM HG: CPT | Mod: CPTII,S$GLB,, | Performed by: PHYSICIAN ASSISTANT

## 2024-07-24 PROCEDURE — 97535 SELF CARE MNGMENT TRAINING: CPT

## 2024-07-24 PROCEDURE — 3074F SYST BP LT 130 MM HG: CPT | Mod: CPTII,S$GLB,, | Performed by: PHYSICIAN ASSISTANT

## 2024-07-24 PROCEDURE — 99215 OFFICE O/P EST HI 40 MIN: CPT | Mod: S$GLB,,, | Performed by: PHYSICIAN ASSISTANT

## 2024-07-24 PROCEDURE — 3008F BODY MASS INDEX DOCD: CPT | Mod: CPTII,S$GLB,, | Performed by: PHYSICIAN ASSISTANT

## 2024-07-24 PROCEDURE — 97161 PT EVAL LOW COMPLEX 20 MIN: CPT

## 2024-07-24 PROCEDURE — 99999 PR PBB SHADOW E&M-EST. PATIENT-LVL IV: CPT | Mod: PBBFAC,,, | Performed by: PHYSICIAN ASSISTANT

## 2024-07-24 RX ORDER — LORATADINE 10 MG/1
10 TABLET ORAL DAILY
COMMUNITY

## 2024-07-24 NOTE — PLAN OF CARE
OUTPATIENT PHYSICAL THERAPY   PRE-OP EVALUATION    Name: Sherry Torres  Clinic Number: [id number]    Therapy Diagnosis:   Encounter Diagnoses   Name Primary?    Carcinoma of upper-outer quadrant of right breast in female, estrogen receptor positive     At risk for lymphedema Yes        Physician: NADIRA Carroll MD    Physician Orders: PT Eval and Treat   Medical Diagnosis from Referral: C50.411,Z17.0 (ICD-10-CM) - Carcinoma of upper-outer quadrant of right breast in female, estrogen receptor positive   Evaluation Date: 7/24/2024  Authorization Period Expiration: 7/17/2025  Plan of Care Expiration: 7/24/2024  Progress Note Due: N/A  Visit # / Visits authorized: 1/ 1   FOTO: 1/3    Precautions: Standard and cancer     Time In: 1:45 pm  Time Out: 2:30 pm  Total Appointment Time (timed & untimed codes): 45 minutes    History   History of Present Illness: Sherry is a 68 y.o. female that presents to  [facility] at the [facility] secondary to dx of right breast cancer.    Dx: right breast Invasive Ductal Carcinoma   Surgery date: possibly 9/9/24      Pt presents today for baseline measurements to aid in the early detection of lymphedema, UE muscle testing, postural and ROM assessment along with education of risk of lymphedema and surgical precautions post surgery. Circumferential measurements will be taken today of BL UEs for early detection of lymphedema post surgery. Pt will also be instructed in exercises to perform pre and post-surgery to insure best outcomes.     Past Medical History:   Past Medical History:   Diagnosis Date    Atypical ductal hyperplasia, breast 2008    left side    Breast cancer 08/2016    right side    Breast cyst approx. 40 years ago    Cancer     GERD (gastroesophageal reflux disease)     History of thyroid cancer 2006    Lobular carcinoma in situ not certain of 2016 diagnosis    Malignant neoplasm of upper-outer quadrant of right breast in  female, estrogen receptor positive 09/15/2016    Mitral valve prolapse     PONV (postoperative nausea and vomiting)     Psychiatric problem        Past Surgical History:   Sherry Torres  has a past surgical history that includes  section; Tubal ligation; CYST REMOVED FROM RIGHT BREAST IN ; left breast wire localization excisional biopsy with bracketed wires using 3 wires and excision through 1 incision. ; Hernia repair; Breast lumpectomy (Right, 2016); Breast biopsy (Right, 2016); Breast biopsy (Left, ); Transforaminal epidural injection of steroid (Right, 2019); Colonoscopy (N/A, 10/08/2019); Thyroidectomy; Lipoma resection (N/A, 10/04/2023); Extraction of cataract (Left, 2023); phacoemulsification, cataract, with iol insertion (Left, 2023); Cataract extraction w/  intraocular lens implant (Right, 2024); and Breast surgery ( AND  OR ).    Medications:  Sherry has a current medication list which includes the following prescription(s): calcium-vitamin d3, ibuprofen, levothyroxine, loratadine, magnesium carbonate, multivitamin, pantoprazole, potassium chloride sa, semaglutide, and tretinoin, and the following Facility-Administered Medications: 0.9% nacl, ceFAZolin 2 g in dextrose 5 % in water (D5W) 50 mL IVPB (MB+), haloperidol lactate, hydromorphone, lidocaine (pf) 10 mg/ml (1%), and sodium chloride 0.9%.    Allergies:  Review of patient's allergies indicates:   Allergen Reactions    Codeine Nausea Only    Sulfa (sulfonamide antibiotics) Nausea Only          Hand dominance: right  Prior Therapy: PT previously for knee, wrist, vestibular    Social History: lives with spouse  Place of Residence (Steps/Adaptations): two story home, bedroom on first floor, 2 steps to enter the home  DME owned: none  Current functional status:  independent  Exercise routine prior to onset : walking 3-4 days a week  Work:  semi-retired                       "    Subjective   Pt states: no pain today  Pain: 0/10 on VAS.     Objective   Mental status: alert & oriented x 3    Posture/Alignment   Postural examination/scapular alignment: rounded shoulders    Nutrition:  Normal    Skin integrity: intact  Edema: none noted    Sensation: Light Touch: Intact           Proprioception: Intact  Appearance: well groomed     ROM:   UPPER EXTREMITY--AROM/PROM  (R) UE: WNLs  (L) UE: WNLs     Shoulder Range of Motion:   ACTIVE ROM RIGHT LEFT   Flexion 155 165   Abduction 175 175   Extension 60 65   IR/90deg T8 T7   ER/90deg C7 C7     Strength: manual muscle test grades below   Upper Extremity Strength   RIGHT UE LEFT UE   Shoulder flexion: 4+/5 4+/5   Shoulder Abduction: 4+/5 4+/5   Shoulder IR 5/5 5/5   Shoulder ER 4+/5 4+/5   Elbow flexion: 5/5 5/5   Elbow extension: 5/5 5/5   Wrist flexion: 5/5 5/5   Wrist extension: 5/5 5/5    50.4 lbs 56.2 lbs       Baseline measurements of bilateral upper extremities for early detection of lymphedema:     LANDMARK RIGHT UPPER EXTREMITY LEFT UPPER EXTREMITY DIFFERENCE   E + 8" 31 cm 32 cm 1.0 cm   E + 6" 28.5 cm 30 cm 1.5 cm   E + 4" 26 cm 27 cm 1.0 cm   E + 2" 25 cm 25.5 cm 0.5 cm   Elbow 26 cm 25.5 cm 0.5 cm   W+ 8" 25 cm 23.5 cm 1.5 cm   W +  6" 23 cm 23 cm 0 cm   W + 4" 21 cm 20.5 cm 0.5 cm   Wrist  16.5 cm 15.5 cm 1.0 cm   DPC 18 cm 18 cm 0 cm   IP Thumb 6.5 cm 6.0 cm 0.5 cm       Coordination:   - fine motor: within functional limits  - UE coordination: intact     - LE coordination:  Not tested     Functional Mobility (Bed mobility, transfers)  Bed mobility: I =  independent   Roll to left: I  Roll to right: I  Supine to prone: I  Scooting to edge of bed: I  Supine to sit: I  Sit to supine: I  Transfers to bed: I  Transfers to toilet: I  Sit to stand:  I  Stand pivot:  I  Car transfers: I      ADL's:  Feeding: I = independent   Grooming: I  Hygiene: I  UB Dressing: I  LB Dressing: I  Toileting: I  Bathing: I    Gait Assessment:   - " Assistive device used: none  - Assistance: independent  - Distance: community distances       Functional Limitations Reporting        Intake Outcome Measure for FOTO Shoulder Survey    Therapist reviewed FOTO scores for Sherry ACEVEDO Brian on 7/24/2024.   FOTO documents entered into [employer] - see Media section.    Intake Score: 70%           Pt has no cultural, educational or language barriers to learning provided.    Treatment and Patient Education     Total Time Separate from Evaluation: 15 minutes    Patient participated in self care/home management for 10 minutes including the following:     - Role of PT in multi - disciplinary team, goals for PT  - Pt was educated in lymphedema etiology and management plans.    - Pt was provided with written risk reductions and precautions for managing lymphedema.   - Reviewed TUAN drain care instructions.     ROM/lifting Precautions post surgery discussed -  until drains have been removed at your post op visit with your surgeon:  - do not lift affected arm above 90 degrees of shoulder flexion bilaterally  - do not lift over 5 lbs  - do not pull or push heavy objects  - do not sleep on your stomach or surgery side     Pt was instructed in and performed therapeutic exercise for 5 minutes for postural correction and alignment, stretching and soft tissue mobility.   Exercises included:   - exaggerated deep breathing and relaxation  - scapular retractions  - fist making  - elbow flexion/extension    Pt was able to demonstrate and report understanding and performance    Written Home Exercises Provided: yes.  Exercises were reviewed and Sherry was able to demonstrate them prior to the end of the session.  Sherry demonstrated good  understanding of the education provided.     See EMR under Patient Instructions for exercises provided 7/24/2024.      Assessment   This is a 68 y.o. female referred to outpatient physical therapy and presents with a medical diagnosis of right breast cancer and  was seen today pre-operatively to assess strength and ROM of BL UEs, to take baseline circumferential measurements of BL UEs to aid in the early detection of lymphedema and provide pt education on exercises/precations post breast surgery. Pt does not exhibit any ROM impairments  Pt educated in lymphedema risks/precautions as well as ROM/lifting precautions post surgery - pt demonstrated/verbalized understanding. No goals established this visit as goals for PT will be established post surgery at follow up.      Anticipated barriers to physical therapy: none anticipated     Pt's spiritual, cultural and educational needs considered and pt agreeable to plan of care and goals as stated below:     Medical Necessity is demonstrated by the following  History  Co-morbidities and personal factors that may impact the plan of care [] LOW: no personal factors / co-morbidities  [x] MODERATE: 1-2 personal factors / co-morbidities  [] HIGH: 3+ personal factors / co-morbidities    Moderate / High Support Documentation:   Co-morbidities affecting plan of care: history of cancer    Personal Factors:   no deficits     Examination  Body Structures and Functions, activity limitations and participation restrictions that may impact the plan of care [x] LOW: addressing 1-2 elements  [] MODERATE: 3+ elements  [] HIGH: 4+ elements (please support below)    Moderate / High Support Documentation: N/A     Clinical Presentation [] LOW: stable  [x] MODERATE: Evolving  [] HIGH: Unstable     Decision Making/ Complexity Score: low           Plan   Schedule patient for follow up with Physical therapy post surgery. Goals for therapy post surgery will be established at that time.     Therapist: Gina De Jesus, PT  7/24/2024

## 2024-07-24 NOTE — PROGRESS NOTES
Sherry Torres is a 68 y.o. year old her here today for her 3rd Euflexxa injection for degenerative changes of her right knee . she was last seen and treated in the clinic on 7/17/2024. There has been no significant change in her medical status since her last visit. No Fever, chills, malaise, or unexplained weight change.      Allergies, Medications, past medical and surgical history were reviewed .    Examination of the knee demonstrates  No evidence of edema, erythema , echymosis strength and range of motion are unchanged from previous visit.    The risks, benefits, pros, cons, and potential side effects of the procedure were discussed with the patient in detail all questions were answered.  The patient is comfortable and willing to proceed with the procedure. Verbal consent was obtained and the proper joint was identified by the patient and provider.     The injection site was identified and the skin was prepared with a betadine solution. The  right knee  joint was injected with 2 ml of Euflexxa solution under sterile technique. Sherry Torres tolerated the procedure well, she was advised to rest the knee today, ice and elevation. I may take 3 -6 weeks following the last injection to get the full benefit of the medication.  I will see her back in 6 months. Sooner if he has any problems or concerns.           .     ICD-10-CM ICD-9-CM   1. Primary osteoarthritis of right knee  M17.11 715.16

## 2024-07-24 NOTE — PROGRESS NOTES
"Integrative [facility] and Medicine Initial Visit      Chief Complaint:  I want to promote my overall well-being through cancer.    HPI: Shrery Torres is a 69 y/o FEMALE with recent diagnosis of recurrence of breast cancer.  Her oncology history is below.  She is referred to Integrative Oncology by Dr. Carroll.    She states she has been "blown away" by the recurrence and she has anxiety about surgery and treatment.  Patient mostly sleeps well, but does often wake up between 2-4 and sometimes has trouble getting back to sleep at that time.  She lays in bed not being able to sleep.  She walks for exercise.  She has an ellipse machine but prefers walking.  She acknowledges she needs to do more weight-baring exercise.  She has osteopenia and takes calcium and vitamin D.  She states she cooks every day and does some cleaning including laundry but she has someone who does heavy cleaning.  She continues to work part time as a .  She is anxious about winding down her practice as she has worked since she was 12, she states.  Her  was a  as well, but is recently retired.  She wants to travel with her .  She states they try to eat healthily and avoid red meat and salt.  She eats more fruits and vegetables.  She grills food.  She does not track her protein intake.  She is interested in Nutrition referral.    Cancer/Stage/TNM:   Cancer Staging   No matching staging information was found for the patient.       Oncology History   Malignant neoplasm of upper-outer quadrant of right breast in female, estrogen receptor positive (Resolved)   9/2/2016 Cancer Staged    Staging form: Onc Breast AJCC V7  - Pathologic stage from 9/2/2016: Stage IIA (T1c, N1, cM0)     9/15/2016 Initial Diagnosis    Malignant neoplasm of upper-outer quadrant of right breast in female, estrogen receptor positive           Past Medical History:   Diagnosis Date    Atypical ductal hyperplasia, breast 2008    left side    Breast cancer " 2016    right side    Breast cyst approx. 40 years ago    Cancer     GERD (gastroesophageal reflux disease)     History of thyroid cancer 2006    Lobular carcinoma in situ not certain of 2016 diagnosis    Malignant neoplasm of upper-outer quadrant of right breast in female, estrogen receptor positive 09/15/2016    Mitral valve prolapse     PONV (postoperative nausea and vomiting)     Psychiatric problem         Current Outpatient Medications   Medication Instructions    calcium-vitamin D3 (OS-NINA 500 + D3) 500 mg-5 mcg (200 unit) per tablet 4 tablets, Oral, 2 times daily with meals    ibuprofen (ADVIL,MOTRIN) 200 mg, Oral, Every 6 hours PRN    levothyroxine (SYNTHROID) 88 mcg, Oral, Before breakfast    loratadine (CLARITIN) 10 mg, Oral, Daily, AS NEEDED FOR ALLERGIES    MAGNESIUM CARBONATE ORAL Oral    multivitamin capsule 2 capsules, Oral, Daily    pantoprazole (PROTONIX) 40 mg, Oral, Daily    potassium chloride SA (K-DUR,KLOR-CON) 10 MEQ tablet 20 mEq, Oral, Daily    semaglutide (OZEMPIC) 0.5 mg, Subcutaneous, Every 7 days, On tuesday    tretinoin (RETIN-A) 0.025 % cream Compound tretinoin 0.025% / niacinamide 2% cream / hyaluronic acid 0.25%. Apply a pea-sized amount to entire face qhs.        Past Surgical History:   Procedure Laterality Date    BREAST BIOPSY Right 2016    BREAST BIOPSY Left     exc bx    BREAST LUMPECTOMY Right 2016    w/radiation    BREAST SURGERY   AND  OR     CATARACT EXTRACTION W/  INTRAOCULAR LENS IMPLANT Right 2024    Procedure: EXTRACTION, CATARACT, WITH IOL INSERTION;  Surgeon: Zhane Khan MD;  Location: [facility] OR;  Service: Ophthalmology;  Laterality: Right;     SECTION      COLONOSCOPY N/A 10/08/2019    Procedure: COLONOSCOPY;  Surgeon: Eliceo Holloway MD;  Location: [facility] ENDO ([address] [place] [address]);  Service: Endoscopy;  Laterality: N/A;  Okay for Rice or [facility]. However, she needs it done before end , so if they are not available before then, okay  for any provided.    CYST REMOVED FROM RIGHT BREAST IN 1969      EXTRACTION OF CATARACT Left 12/27/2023    Procedure: EXTRACTION, CATARACT;  Surgeon: Eric Cardona MD;  Location: [facility];  Service: Ophthalmology;  Laterality: Left;  DiB00 +21.0D    HERNIA REPAIR      over C section incision     left breast wire localization excisional biopsy with bracketed wires using 3 wires and excision through 1 incision.       LIPOMA RESECTION N/A 10/04/2023    Procedure: EXCISION, LIPOMA Back;  Surgeon: Kenneth Hernández MD;  [facility];  Service: General;  Laterality: N/A;    PHACOEMULSIFICATION, CATARACT, WITH IOL INSERTION Left 12/27/2023    Procedure: PHACOEMULSIFICATION, CATARACT, WITH IOL INSERTION;  Surgeon: Eric Cardona MD;  Location: [facility] OR;  Service: Ophthalmology;  Laterality: Left;    THYROIDECTOMY      TRANSFORAMINAL EPIDURAL INJECTION OF STEROID Right 07/01/2019    Procedure: Injection,steroid,epidural,transforaminal approach LUMBAR TRANSFORAMINAL RIGHT L5 AND S1 TF ARNOLDO;  Surgeon: Nadya Mcfarland MD;  Location: [facility] PAIN MGT;  Service: Pain Management;  Laterality: Right;  NEEDS CONSENT    TUBAL LIGATION            Distress Score:   Distress Score: 8        7 Pillars Assessment      Sleep  How many hours of sleep per night? 8 hours  Do you have trouble falling asleep, staying asleep or waking up earlier than you need to? yes  Do you have daytime fatigue? no  Do you need medication for sleep? no  Do you use any supplements or other interventions for sleep? no    Resilience  Rate your current level of stress- high  How do you manage stress?  Staying busy--work also grandchildren     Purposes  Do you feel you have a vision or a life purpose? Yes    Environment  Any exposures:no known exposures    Spirituality-  Mandaen     Nutrition   Food allergies or sensitivities: no  Do you adhere to a particular type of diet? no  What type of diet do you follow? Healthy   Do you have any concerns with your  "eating habits? yes  Are you concerned with your level of alcohol intake? no    Exercise  How would you describe your physical activity level? mod  Do you work at a sedentary job? yes  What do you do for physical activity? Walking mostly       Physical Exam   /74 (BP Location: Right arm, Patient Position: Sitting, BP Method: Medium (Automatic))   Pulse 87   Ht 5' 5" (1.651 m)   Wt 61.2 kg (134 lb 14.7 oz)   SpO2 95%   BMI 22.45 kg/m²    Wt Readings from Last 3 Encounters:   07/24/24 61.2 kg (134 lb 14.7 oz)   07/17/24 61.2 kg (135 lb)   07/02/24 61.2 kg (134 lb 14.7 oz)     Temp Readings from Last 3 Encounters:   07/02/24 98.1 °F (36.7 °C) (Oral)   05/02/24 98.4 °F (36.9 °C) (Oral)   04/19/24 97.7 °F (36.5 °C) (Oral)     BP Readings from Last 3 Encounters:   07/24/24 119/74   07/17/24 (!) 143/71   07/02/24 137/65     Pulse Readings from Last 3 Encounters:   07/24/24 87   07/17/24 74   07/02/24 78       Body mass index is Body mass index is 22.45 kg/m².    Vitals reviewed.   Constitutional:       General: Patient is not in acute distress.     Appearance: Normal appearance.   HENT:      Head: Normocephalic and atraumatic.  Pulmonary:      Effort: Pulmonary effort is normal.       Review of Systems:   Cardiac:           No SOB, chest pain with exertion,edema, orthopnea  Distress:          No excessive sadness, no hopelessness, no anhedonia, +anxiety   Cognitive:        some memory issues   Fatigue:           Energy level adequate, performing ADL's, no morning fatigue                           Fatigue  1  / 10  ( Scale 0 - 10)   Hormonal:       No hot flashes, no night sweats  Pain:                Has no pain,  location:                          Pain 0   / 10 (Scale 0 - 10)    Neuropathy:    No numbness, no tingling, no paresthesia   Weight:           wants to maintain healthy weight        Labs:   Lab Results   Component Value Date    WBC 7.13 06/14/2024    HGB 12.8 06/14/2024    HCT 39.0 06/14/2024    MCV 93 " 06/14/2024     (H) 06/14/2024           Hemoglobin A1C   Date Value Ref Range Status   06/14/2024 5.2 4.0 - 5.6 % Final     Comment:     ADA Screening Guidelines:  5.7-6.4%  Consistent with prediabetes  >or=6.5%  Consistent with diabetes    High levels of fetal hemoglobin interfere with the HbA1C  assay. Heterozygous hemoglobin variants (HbS, HgC, etc)do  not significantly interfere with this assay.   However, presence of multiple variants may affect accuracy.     06/16/2023 5.2 4.0 - 5.6 % Final     Comment:     ADA Screening Guidelines:  5.7-6.4%  Consistent with prediabetes  >or=6.5%  Consistent with diabetes    High levels of fetal hemoglobin interfere with the HbA1C  assay. Heterozygous hemoglobin variants (HbS, HgC, etc)do  not significantly interfere with this assay.   However, presence of multiple variants may affect accuracy.     11/06/2019 5.6 4.0 - 5.6 % Final     Comment:     ADA Screening Guidelines:  5.7-6.4%  Consistent with prediabetes  >or=6.5%  Consistent with diabetes  High levels of fetal hemoglobin interfere with the HbA1C  assay. Heterozygous hemoglobin variants (HbS, HgC, etc)do  not significantly interfere with this assay.   However, presence of multiple variants may affect accuracy.              Assessment:   Patient is a 68 y.o.female who arrived to Integrative Oncology for consult during breast cancer treatment after recurrence        Plan   #Nutrition: Encourage plant-forward anti-inflammatory diet with plenty of protein to maintain muscle mass and protect bones.  Encourage focus on protein consumption leading up to and after surgery   # Sleep: Recommend 6-8 hours of restful sleep nightly; recommend strong sleep hygiene routine one hour prior to bed (no screens)  Discussed the outline of CBT-I.  Encouraged patient to only use bed for sleep and sex.  If wakes up and thinks she will not sleep for more than 30 minutes, get up and read book until sleepy to get back to sleep. Magnesium  glycinate 400 mg daily  # Exercise: Recommend 60 minutes of gentle movement/light activity per day (cleaning, walking, cooking, gardening, stationary bike) at baseline and, if can tolerate, build up to at least 150 minutes (2 ½ hours) of moderate-intensity exercise weekly   # OT/yoga-focus on stress reduction, ROM, strength   # Psychology for anxiety, depression, situational adjustment disorder  # Discussed all other resources available through Integrative Oncology including acupuncture and classes as well as referrals to [facility] as needed  # Discussed benefits of meditation, strongly encouraged meditation class, patient will consider for future   Patient will reach out if interested   #Follow-Up: PRN     Face to Face Time With Patient: 45 min   I spent a total of 60 minutes on the day of the visit.This includes face to face time and non-face to face time preparing to see the patient (eg, review of tests), obtaining and/or reviewing separately obtained history, documenting clinical information in the electronic or other health record, independently interpreting results and communicating results to the patient/family/caregiver, or care coordinator.

## 2024-07-25 ENCOUNTER — TELEPHONE (OUTPATIENT)
Dept: PSYCHIATRY | Facility: CLINIC | Age: 69
End: 2024-07-25
Payer: MEDICARE

## 2024-07-29 DIAGNOSIS — Z01.818 PRE-OP EVALUATION: Primary | ICD-10-CM

## 2024-07-30 ENCOUNTER — OFFICE VISIT (OUTPATIENT)
Dept: PLASTIC SURGERY | Facility: CLINIC | Age: 69
End: 2024-07-30
Payer: MEDICARE

## 2024-07-30 VITALS
DIASTOLIC BLOOD PRESSURE: 74 MMHG | RESPIRATION RATE: 20 BRPM | BODY MASS INDEX: 22.6 KG/M2 | OXYGEN SATURATION: 100 % | SYSTOLIC BLOOD PRESSURE: 119 MMHG | WEIGHT: 135.81 LBS | HEART RATE: 87 BPM

## 2024-07-30 DIAGNOSIS — C50.211 MALIGNANT NEOPLASM OF UPPER-INNER QUADRANT OF RIGHT BREAST IN FEMALE, ESTROGEN RECEPTOR POSITIVE: Primary | ICD-10-CM

## 2024-07-30 DIAGNOSIS — Z17.0 MALIGNANT NEOPLASM OF UPPER-INNER QUADRANT OF RIGHT BREAST IN FEMALE, ESTROGEN RECEPTOR POSITIVE: Primary | ICD-10-CM

## 2024-07-30 PROCEDURE — 99204 OFFICE O/P NEW MOD 45 MIN: CPT | Mod: S$GLB,,, | Performed by: SURGERY

## 2024-07-30 PROCEDURE — 3288F FALL RISK ASSESSMENT DOCD: CPT | Mod: CPTII,S$GLB,, | Performed by: SURGERY

## 2024-07-30 PROCEDURE — 99999 PR PBB SHADOW E&M-EST. PATIENT-LVL III: CPT | Mod: PBBFAC,,, | Performed by: SURGERY

## 2024-07-30 PROCEDURE — 1126F AMNT PAIN NOTED NONE PRSNT: CPT | Mod: CPTII,S$GLB,, | Performed by: SURGERY

## 2024-07-30 PROCEDURE — 3078F DIAST BP <80 MM HG: CPT | Mod: CPTII,S$GLB,, | Performed by: SURGERY

## 2024-07-30 PROCEDURE — 3008F BODY MASS INDEX DOCD: CPT | Mod: CPTII,S$GLB,, | Performed by: SURGERY

## 2024-07-30 PROCEDURE — 1101F PT FALLS ASSESS-DOCD LE1/YR: CPT | Mod: CPTII,S$GLB,, | Performed by: SURGERY

## 2024-07-30 PROCEDURE — 3074F SYST BP LT 130 MM HG: CPT | Mod: CPTII,S$GLB,, | Performed by: SURGERY

## 2024-07-30 PROCEDURE — 3044F HG A1C LEVEL LT 7.0%: CPT | Mod: CPTII,S$GLB,, | Performed by: SURGERY

## 2024-07-31 ENCOUNTER — OFFICE VISIT (OUTPATIENT)
Dept: HEMATOLOGY/ONCOLOGY | Facility: CLINIC | Age: 69
End: 2024-07-31
Payer: MEDICARE

## 2024-07-31 ENCOUNTER — TELEPHONE (OUTPATIENT)
Dept: PLASTIC SURGERY | Facility: CLINIC | Age: 69
End: 2024-07-31
Payer: MEDICARE

## 2024-07-31 VITALS
WEIGHT: 136.13 LBS | SYSTOLIC BLOOD PRESSURE: 119 MMHG | HEIGHT: 65 IN | RESPIRATION RATE: 20 BRPM | BODY MASS INDEX: 22.68 KG/M2 | HEART RATE: 77 BPM | DIASTOLIC BLOOD PRESSURE: 75 MMHG | OXYGEN SATURATION: 99 %

## 2024-07-31 DIAGNOSIS — Z85.3 HISTORY OF BREAST CANCER IN FEMALE: Primary | ICD-10-CM

## 2024-07-31 DIAGNOSIS — C50.211 MALIGNANT NEOPLASM OF UPPER-INNER QUADRANT OF RIGHT BREAST IN FEMALE, ESTROGEN RECEPTOR POSITIVE: ICD-10-CM

## 2024-07-31 DIAGNOSIS — Z17.0 MALIGNANT NEOPLASM OF UPPER-INNER QUADRANT OF RIGHT BREAST IN FEMALE, ESTROGEN RECEPTOR POSITIVE: ICD-10-CM

## 2024-07-31 PROCEDURE — 99999 PR PBB SHADOW E&M-EST. PATIENT-LVL IV: CPT | Mod: PBBFAC,,, | Performed by: INTERNAL MEDICINE

## 2024-07-31 NOTE — PROGRESS NOTES
Subjective:       Patient ID: Sherry Torres is a 68 y.o. female.    Chief Complaint: No chief complaint on file.    HPI Ms Torres is a 68-year-old female seen in follow-up  of right breast cancer.   She had stage II A, strongly ER and FL positive and HER-2 negative disease.     Screening mammogram on 6/21/24 showed an asymmetry in the upper right breast.    Diagnostic imaging on 7/2/24 showed  -an asymmetry seen in the upper region of the right breast in the posterior depth. The asymmetry correlates with the screening mammogram finding.    US Breast Right Limited  There is an irregularly shaped, hypoechoic mass with angular margins seen in the right breast at 1 o'clock    Biopsy of the right breast on 7/9/24 revealed:  Invasive ductal carcinoma, Grade 1 (tubular differentiation = 3, nuclear pleomorphism = 1, mitotic rate = 1)   Invasive carcinoma measures 3 mm (0.3 cm) in greatest linear dimension within the core biopsies   Calcifications associated with non-nonneoplastic breast     Breast molecular markers:   ER:  Positive (%, strong)   FL:  Positive (71-80%, intermediate)   HER2 IHC:  Negative (score 0)   Ki-67:  6%     CT chest on 7/19/24 showed several small nodules up to 5 mm - non-specific, Bone scan was negative    She is scheduled for mastectomy 9/9/24.  Overall, she is doing well.    Breast history:    She noted an abnormality on self examination at the end of July or early August 2016.  On August 8 she underwent diagnostic mammogram which showed an irregular mass was plated margins in the middle right breast and o'clock position.  By ultrasound this was a solid 1.7 x 1.0 x 1.5 cm mass.     On August 9 a core needle biopsy showed infiltrating ductal carcinoma which was well differentiated (histologic grade 2, nuclear grade 2, mitotic index 1).  The tumor was 100% ER positive, 70% FL positive and HER-2 1+.  On August 25 right breast lumpectomy and sentinel lymph node biopsy was performed.  That  showed a 14 mm low-grade infiltrating ductal carcinoma.    The sentinel lymph node was positive for 1.5 mm micrometastasis.       Final pathological stage TI cN1 stage II    Mammaprint genomic assay  showed that she was low risk.  Score was +0.336 with a 5 year risk of distant recurrence at 5% and a 10 year risk at 10%.       She completed radiation in December 2016 and began Letrozole at that time.  She stopped after 5 years.    In May 2020 she had a left breast biopsy which was benign.  Review of Systems   Constitutional:  Negative for appetite change and unexpected weight change.   HENT:  Negative for mouth sores.    Eyes:  Negative for visual disturbance.   Respiratory:  Negative for cough and shortness of breath.    Cardiovascular:  Negative for chest pain.   Gastrointestinal:  Negative for abdominal pain and diarrhea.   Genitourinary:  Negative for frequency.   Musculoskeletal:  Negative for back pain.   Skin:  Negative for rash.   Neurological:  Negative for headaches.   Hematological:  Negative for adenopathy.   Psychiatric/Behavioral:  The patient is not nervous/anxious.        Objective:      Physical Exam  Vitals reviewed.   Constitutional:       General: She is not in acute distress.     Appearance: Normal appearance. She is well-developed.   Neurological:      Mental Status: She is alert and oriented to person, place, and time.   Psychiatric:         Mood and Affect: Mood normal.         Behavior: Behavior normal.         Thought Content: Thought content normal.         Judgment: Judgment normal.         Assessment:       1. History of breast cancer in female    2. Malignant neoplasm of upper-inner quadrant of right breast in female, estrogen receptor positive        Plan:   Low grade strongly ER/MS positive, HER 2 negative right breast cancer.  She will proceed with surgery and likely will only need adjuvant endocrine therapy. Will decide on Genomic testing based on path findings.    I discussed the CT  findings -small nodules - likely benign. Will probably repeat CT in 6M.           Route Chart for Scheduling  Med Onc Route Chart for Scheduling

## 2024-07-31 NOTE — TELEPHONE ENCOUNTER
Spoke to pt and confirmed  a surgery date 9/9/24 at [facility] location / 1st floor -[facility] day  Surgery center  - Pt agreeable. Provided patient with details of pre /post op appts, basic prep for sx, arrival time the day before on Fri by Dr Carroll's office, Pre admit w anesthesia dept, and address of the locations.  call back # to RN navigator given should any questions or concerns arise  All questions and concerns addressed. Pt voiced understanding.

## 2024-08-05 ENCOUNTER — PATIENT MESSAGE (OUTPATIENT)
Dept: PSYCHIATRY | Facility: CLINIC | Age: 69
End: 2024-08-05
Payer: MEDICARE

## 2024-08-05 ENCOUNTER — OFFICE VISIT (OUTPATIENT)
Dept: PSYCHIATRY | Facility: CLINIC | Age: 69
End: 2024-08-05
Payer: MEDICARE

## 2024-08-05 DIAGNOSIS — C50.411 CARCINOMA OF UPPER-OUTER QUADRANT OF RIGHT BREAST IN FEMALE, ESTROGEN RECEPTOR POSITIVE: ICD-10-CM

## 2024-08-05 DIAGNOSIS — Z17.0 CARCINOMA OF UPPER-OUTER QUADRANT OF RIGHT BREAST IN FEMALE, ESTROGEN RECEPTOR POSITIVE: ICD-10-CM

## 2024-08-05 DIAGNOSIS — C50.211 MALIGNANT NEOPLASM OF UPPER-INNER QUADRANT OF RIGHT BREAST IN FEMALE, ESTROGEN RECEPTOR POSITIVE: ICD-10-CM

## 2024-08-05 DIAGNOSIS — F51.04 PSYCHOPHYSIOLOGICAL INSOMNIA: Primary | ICD-10-CM

## 2024-08-05 DIAGNOSIS — Z17.0 MALIGNANT NEOPLASM OF UPPER-INNER QUADRANT OF RIGHT BREAST IN FEMALE, ESTROGEN RECEPTOR POSITIVE: ICD-10-CM

## 2024-08-05 PROCEDURE — 90791 PSYCH DIAGNOSTIC EVALUATION: CPT | Mod: S$GLB,,, | Performed by: PSYCHOLOGIST

## 2024-08-05 PROCEDURE — 99999 PR PBB SHADOW E&M-EST. PATIENT-LVL I: CPT | Mod: PBBFAC,,, | Performed by: PSYCHOLOGIST

## 2024-08-05 PROCEDURE — 3044F HG A1C LEVEL LT 7.0%: CPT | Mod: CPTII,S$GLB,, | Performed by: PSYCHOLOGIST

## 2024-08-12 NOTE — PROGRESS NOTES
The patient location is: [place]  The chief complaint leading to consultation is: protein intake     Visit type: audiovisual    Face to Face time with patient: 41 minutes   52 minutes of total time spent on the encounter, which includes face to face time and non-face to face time preparing to see the patient (eg, review of tests), Obtaining and/or reviewing separately obtained history, Documenting clinical information in the electronic or other health record, Independently interpreting results (not separately reported) and communicating results to the patient/family/caregiver, or Care coordination (not separately reported).     Each patient to whom he or she provides medical services by telemedicine is:  (1) informed of the relationship between the physician and patient and the respective role of any other health care provider with respect to management of the patient; and (2) notified that he or she may decline to receive medical services by telemedicine and may withdraw from such care at any time.    Oncology Nutrition Assessment for Medical Nutrition Therapy  Initial Visit    Sherry ACEVEDO Brian   1955    Referring Provider:  Dubinsky, Joanna L., PA*      Reason for Visit: Nutrition counseling and education     PMHx:   Past Medical History:   Diagnosis Date    Atypical ductal hyperplasia, breast 2008    left side    Breast cancer 08/2016    right side    Breast cyst approx. 40 years ago    Cancer     GERD (gastroesophageal reflux disease)     History of thyroid cancer 2006    Lobular carcinoma in situ not certain of 2016 diagnosis    Malignant neoplasm of upper-outer quadrant of right breast in female, estrogen receptor positive 09/15/2016    Mitral valve prolapse     PONV (postoperative nausea and vomiting)     Psychiatric problem        Nutrition Assessment    Patient is a 68 y.o.female with recently diagnosed breast cancer, planned for bilateral mastectomies. Will likely require adjuvant endocrine  "therapy. Referred to nutrition for risk reduction and surgery recovery.   She reports a prior history of breast cancer s/p lumpectomy, radiation and 5 years of letrozole. She noticed weight gain with letrozole, however both she and her  have much better eating habits now. She also had thyroid cancer and underwent surgery for this as well. Has been on synthroid since.   She reports she has been on wegovy since last November. She reports a little nausea the day after her injections. Used to have gastric issues, but these have gotten much better with weight loss. Her appetite is definitely limited. Rich/heavy foods are not digested well. Does not cook with salt. Concerned about putting weight back on during the healing process, period of limited activity. Also wants to have plenty of protein to help heal and because she is not meeting current recommended intake.   Diet recall:   1 cup coffee, 1 dry blueberry waffle  Salad with chicken, light dressing   Dinner- grilled fish/pork tenderloin/chicken with salad, broccoli, green beans   Sometimes pasta   1-2 glasses of wine with dinner, usually on the weekends   Ensure high protein before bed   Drinks water and sparkling water     Weight:61.7 kg (136 lb)  Height:5' 5" (1.651 m)  BMI:Body mass index is 22.63 kg/m².   IBW: Ideal body weight: 57 kg (125 lb 10.6 oz)  Adjusted ideal body weight: 58.9 kg (129 lb 12.8 oz)    Allergies: Codeine and Sulfa (sulfonamide antibiotics)    Current Medications:    Current Outpatient Medications:     calcium-vitamin D3 (OS-NINA 500 + D3) 500 mg-5 mcg (200 unit) per tablet, Take 4 tablets by mouth 2 (two) times daily with meals. , Disp: , Rfl:     ibuprofen (ADVIL,MOTRIN) 200 MG tablet, Take 200 mg by mouth every 6 (six) hours as needed for Pain., Disp: , Rfl:     levothyroxine (SYNTHROID) 88 MCG tablet, Take 1 tablet (88 mcg total) by mouth before breakfast., Disp: 30 tablet, Rfl: 11    loratadine (CLARITIN) 10 mg tablet, Take 10 mg " by mouth once daily. AS NEEDED FOR ALLERGIES, Disp: , Rfl:     MAGNESIUM CARBONATE ORAL, Take by mouth., Disp: , Rfl:     multivitamin capsule, Take 2 capsules by mouth once daily. , Disp: , Rfl:     pantoprazole (PROTONIX) 40 MG tablet, Take 1 tablet (40 mg total) by mouth once daily., Disp: 90 tablet, Rfl: 3    potassium chloride SA (K-DUR,KLOR-CON) 10 MEQ tablet, Take 20 mEq by mouth once daily., Disp: , Rfl:     semaglutide (OZEMPIC) 0.25 mg or 0.5 mg (2 mg/3 mL) pen injector, Inject 0.5 mg into the skin every 7 days. On tuesday, Disp: , Rfl:     tretinoin (RETIN-A) 0.025 % cream, Compound tretinoin 0.025% / niacinamide 2% cream / hyaluronic acid 0.25%. Apply a pea-sized amount to entire face qhs., Disp: 30 g, Rfl: 11  No current facility-administered medications for this visit.    Facility-Administered Medications Ordered in Other Visits:     0.9%  NaCl infusion, , Intravenous, Continuous, Hakan Garces PA-C, Last Rate: 70 mL/hr at 10/04/23 1113, New Bag at 10/04/23 1113    ceFAZolin 2 g in dextrose 5 % in water (D5W) 50 mL IVPB (MB+), 2 g, Intravenous, On Call Procedure, Hakan aGrces PA-C    haloperidol lactate injection 0.5 mg, 0.5 mg, Intravenous, Q10 Min PRN, Crystal Holloway MD    HYDROmorphone injection 0.2 mg, 0.2 mg, Intravenous, Q5 Min PRN, Crystal Holloway MD    LIDOcaine (PF) 10 mg/ml (1%) injection 10 mg, 1 mL, Intradermal, Once, Eric Cardona MD    sodium chloride 0.9% flush 10 mL, 10 mL, Intravenous, PRN, Crystal Holloway MD    Vitamins/Supplements: multivitamin, calcium, magnesium, vitamin K, D3    Labs: Reviewed from 6/14/24    Nutrition Diagnosis    Problem: Inadequate protein intake  Etiology (related to):  increased protein needs  Signs/Symptoms (as evidenced by):  diet recall, medication/surgical demands     Nutrition Intervention    Nutrition Prescription   6108-2294 Kcals (20-25kcal/kg)  75-93 g protein (1.2-1.5g/kg)   3684-2048 mL fluid  (25-30mL/kg)    Recommendations:  75-90g protein per day   Higher protein food options:   -kodiak brand waffles   -high protein cereals with milk/soy milk  -Greek or Sammarinese yogurt with low sugar granola, berries  -boiled eggs  -[employer] pasta  -refrigerated protein drinks (Rebbl or similar)  For recovery:   Orgain (or similar) high protein drink nightly    Demetris BID for at least two weeks post op    Materials Provided/Reviewed   Caution with supplements/additives in protein shakes for the first couple weeks following surgery    Nutrition Monitoring and Evaluation    Monitor: energy intake, diet tolerance , weight, and diet education needs       Goals: meet protein goals, add variety to diet     Follow up Patient will follow up via portal as needed with any questions/concerns     Communication to referring provider/care team: note available in chart     Counseling time: 30 Minutes    Nita Lyons, MPH, RD, , LDN, FAND  Board Certified Specialist in Oncology Nutrition   [phone]

## 2024-08-15 ENCOUNTER — CLINICAL SUPPORT (OUTPATIENT)
Dept: HEMATOLOGY/ONCOLOGY | Facility: CLINIC | Age: 69
End: 2024-08-15
Attending: SURGERY
Payer: MEDICARE

## 2024-08-15 VITALS — WEIGHT: 136 LBS | HEIGHT: 65 IN | BODY MASS INDEX: 22.66 KG/M2

## 2024-08-15 DIAGNOSIS — E63.8 INADEQUATE DIETARY INTAKE OF PROTEIN: ICD-10-CM

## 2024-08-15 DIAGNOSIS — Z17.0 CARCINOMA OF UPPER-OUTER QUADRANT OF RIGHT BREAST IN FEMALE, ESTROGEN RECEPTOR POSITIVE: ICD-10-CM

## 2024-08-15 DIAGNOSIS — C50.411 CARCINOMA OF UPPER-OUTER QUADRANT OF RIGHT BREAST IN FEMALE, ESTROGEN RECEPTOR POSITIVE: ICD-10-CM

## 2024-08-15 DIAGNOSIS — Z71.3 NUTRITIONAL COUNSELING: Primary | ICD-10-CM

## 2024-08-15 NOTE — ASSESSMENT & PLAN NOTE
Began with discussion of implant based reconstruction. This included both direct-to-implant, tissue expander based reconstruction, the possible use of ADM, drains and expected post-operative course.  Risks of implant based recon including: hematoma, seroma, infection, extrusion, capsular contracture, risks of rupture, need for replacement later in life and BII/LILIYA-ALCL. Discussed basics of revision procedures including: tailoring of the skin envelope, repositioning of the implant, liposuction and fat grafting. Any questions answered.    Next we discussed autologous reconstruction with the most common donor sites (abdomen, thigh, back). Explained that this procedure will reconstruction the breast with the patients own living tissue, without the need for an implant, but in exchange for a longer initial procedure and on average three day hospital stay afterwards.  Discussed flap monitoring, risks of take back/flap failure/ expected hospital course, use of drains, and recovery.  Risks including flap loss, fat necrosis, infection, wound breakdown, seroma, hematoma, scarring, need for reoperation, blood clots were all covered.  Typical revision procedures including lifting the breast, tailoring the skin, liposuction and fat grafting for contour were also discussed.     Discussed flap options and will plan for immediate bilateral reconstruction  Not a candidate for nipple sparing based on nipple position  Ordered CTA and Medical Photography.   Will coordinate scheduling of surgery with Dr Carroll

## 2024-08-19 ENCOUNTER — PATIENT MESSAGE (OUTPATIENT)
Dept: HEMATOLOGY/ONCOLOGY | Facility: CLINIC | Age: 69
End: 2024-08-19
Payer: MEDICARE

## 2024-08-20 ENCOUNTER — LAB VISIT (OUTPATIENT)
Dept: LAB | Facility: HOSPITAL | Age: 69
End: 2024-08-20
Attending: NURSE PRACTITIONER
Payer: MEDICARE

## 2024-08-20 DIAGNOSIS — E89.0 POST-SURGICAL HYPOTHYROIDISM: ICD-10-CM

## 2024-08-20 PROCEDURE — 84443 ASSAY THYROID STIM HORMONE: CPT | Performed by: NURSE PRACTITIONER

## 2024-08-20 PROCEDURE — 36415 COLL VENOUS BLD VENIPUNCTURE: CPT | Mod: PN | Performed by: NURSE PRACTITIONER

## 2024-08-21 ENCOUNTER — PATIENT MESSAGE (OUTPATIENT)
Dept: ENDOCRINOLOGY | Facility: CLINIC | Age: 69
End: 2024-08-21
Payer: MEDICARE

## 2024-08-21 DIAGNOSIS — E55.9 VITAMIN D DEFICIENCY: ICD-10-CM

## 2024-08-21 DIAGNOSIS — Z85.850 HISTORY OF THYROID CANCER: ICD-10-CM

## 2024-08-21 DIAGNOSIS — E89.0 POST-SURGICAL HYPOTHYROIDISM: Primary | ICD-10-CM

## 2024-08-21 LAB — TSH SERPL DL<=0.005 MIU/L-ACNC: 0.94 UIU/ML (ref 0.4–4)

## 2024-09-04 ENCOUNTER — ANESTHESIA EVENT (OUTPATIENT)
Dept: SURGERY | Facility: OTHER | Age: 69
DRG: 580 | End: 2024-09-04
Payer: MEDICARE

## 2024-09-04 ENCOUNTER — HOSPITAL ENCOUNTER (OUTPATIENT)
Dept: PREADMISSION TESTING | Facility: OTHER | Age: 69
Discharge: HOME | End: 2024-09-04
Attending: SURGERY
Payer: MEDICARE

## 2024-09-04 VITALS
TEMPERATURE: 99 F | DIASTOLIC BLOOD PRESSURE: 70 MMHG | OXYGEN SATURATION: 100 % | HEIGHT: 65 IN | HEART RATE: 70 BPM | BODY MASS INDEX: 21.81 KG/M2 | SYSTOLIC BLOOD PRESSURE: 130 MMHG | RESPIRATION RATE: 17 BRPM | WEIGHT: 130.88 LBS

## 2024-09-04 DIAGNOSIS — Z01.818 PREOP TESTING: Primary | ICD-10-CM

## 2024-09-04 LAB
ABO + RH BLD: NORMAL
BLD GP AB SCN CELLS X3 SERPL QL: NORMAL
SPECIMEN OUTDATE: NORMAL

## 2024-09-04 PROCEDURE — 86850 RBC ANTIBODY SCREEN: CPT | Performed by: ANESTHESIOLOGY

## 2024-09-04 PROCEDURE — 86900 BLOOD TYPING SEROLOGIC ABO: CPT | Performed by: ANESTHESIOLOGY

## 2024-09-04 RX ORDER — FAMOTIDINE 20 MG/1
20 TABLET, FILM COATED ORAL 2 TIMES DAILY
COMMUNITY

## 2024-09-04 RX ORDER — ONDANSETRON 4 MG/1
4 TABLET, ORALLY DISINTEGRATING ORAL EVERY 6 HOURS PRN
COMMUNITY
Start: 2024-07-08

## 2024-09-04 RX ORDER — SCOLOPAMINE TRANSDERMAL SYSTEM 1 MG/1
1 PATCH, EXTENDED RELEASE TRANSDERMAL ONCE
OUTPATIENT
Start: 2024-09-04 | End: 2024-09-04

## 2024-09-04 RX ORDER — LIDOCAINE HYDROCHLORIDE 10 MG/ML
1 INJECTION, SOLUTION EPIDURAL; INFILTRATION; INTRACAUDAL; PERINEURAL ONCE
OUTPATIENT
Start: 2024-09-04 | End: 2024-09-04

## 2024-09-04 NOTE — ANESTHESIA PREPROCEDURE EVALUATION
09/04/2024  Sherry Torres is a 68 y.o., female.      Pre-op Assessment    I have reviewed the Patient Summary Reports.     I have reviewed the Nursing Notes. I have reviewed the NPO Status.   I have reviewed the Medications.     Review of Systems  Anesthesia Hx:    PONV           Denies Family Hx of Anesthesia complications.   Personal Hx of Anesthesia complications, Post-Operative Nausea/Vomiting                    Social:  Non-Smoker       Hematology/Oncology:  Hematology Normal                     Current/Recent Cancer.  --  Cancer in past history:       Breast    right   surgery   Oncology Comments: Previous thyroid ca  Now breast ca     EENT/Dental:  EENT/Dental Normal           Cardiovascular:      Denies Hypertension. Valvular problems/Murmurs, MVP          hyperlipidemia                             Pulmonary:  Pulmonary Normal                       Renal/:  Renal/ Normal                 Hepatic/GI:     GERD, well controlled             Musculoskeletal:  Musculoskeletal Normal                Neurological:  Neurology Normal                                      Endocrine:   Hypothyroidism  Was on Semaglutide        Dermatological:  Skin Normal    Psych:  Psychiatric History                Physical Exam  General: Cooperative, Alert and Oriented    Airway:  Mallampati: II   Mouth Opening: Normal  Neck ROM: Normal ROM    Dental:  Intact, Caps / Implants    Anesthesia Plan  Type of Anesthesia, risks & benefits discussed:    Anesthesia Type: Gen ETT  Intra-op Monitoring Plan: Standard ASA Monitors  Post Op Pain Control Plan: multimodal analgesia  Induction:  IV  Informed Consent: Informed consent signed with the Patient and all parties understand the risks and agree with anesthesia plan.  All questions answered.   ASA Score: 3    Ready For Surgery From Anesthesia Perspective.     .

## 2024-09-04 NOTE — DISCHARGE INSTRUCTIONS
Information to Prepare you for your Surgery    PRE-ADMIT TESTING -  [phone]    [address]  [facility]          Your surgery has been scheduled at [facility]. We are pleased to have the opportunity to serve you. For Further Information please call [phone].    On the day of surgery please report to the Information Desk on the 1st floor.    CONTACT YOUR PHYSICIAN'S OFFICE THE DAY PRIOR TO YOUR SURGERY TO OBTAIN YOUR ARRIVAL TIME.     The evening before surgery do not eat anything after 9 p.m. ( this includes hard candy, chewing gum and mints).  You may only have GATORADE, POWERADE AND WATER  from 9 p.m. until you leave your home.   DO NOT DRINK ANY LIQUIDS ON THE WAY TO THE HOSPITAL.      Why does your anesthesiologist allow you to drink Gatorade/Powerade before surgery?  Gatorade/Powerade helps to increase your comfort before surgery and to decrease your nausea after surgery. The carbohydrates in Gatorade/Powerade help reduce your body's stress response to surgery.  If you are a diabetic-drink only water prior to surgery.    Outpatient Surgery- May allow 2 adult (18 and older) Support Persons (1 being the designated ) for all surgical/procedural patients. A breastfeeding mother will be allowed her infant and 2 adult Support Persons. No one under the age of 18 will be allowed in the building. No swapping out of visitors in the [facility].      SPECIAL MEDICATION INSTRUCTIONS: TAKE medications checked off by the Anesthesiologist on your Medication List.    Angiogram Patients: Take medications as instructed by your physician, including aspirin.     Surgery Patients:    If you take ASPIRIN - Your PHYSICIAN/SURGEON will need to inform you IF/OR when you need to stop taking aspirin prior to your surgery.     The week prior to surgery do not ot take any medications containing IBUPROFEN or NSAIDS ( Advil, Motrin, Goodys, BC, Aleve, Naproxen etc)  If you are not sure if you should take a medicine please call your surgeon's office.  Ok to take Tylenol    Do Not Wear any make-up (especially eye make-up) to surgery. Please remove any false eyelashes or eyelash extensions. If you arrive the day of surgery with makeup/eyelashes on you will be required to remove prior to surgery. (There is a risk of corneal abrasions if eye makeup/eyelash extensions are not removed)      Leave all valuables at home.   Do Not wear any jewelry or watches, including any metal in body piercings. Jewelry must be removed prior to coming to the hospital.  There is a possibility that rings that are unable to be removed may be cut off if they are on the surgical extremity.    Please remove all hair extensions, wigs, clips and any other metal accessories/ ornaments from your hair.  These items may pose a flammable/fire risk in Surgery and must be removed.    Do not shave your surgical area at least 5 days prior to your surgery. The surgical prep will be performed at the hospital according to Infection Control regulations.    Contact Lens must be removed before surgery. Either do not wear the contact lens or bring a case and solution for storage.  Please bring a container for eyeglasses or dentures as required.  Bring any paperwork your physician has provided, such as consent forms,  history and physicals, doctor's orders, etc.   Bring comfortable clothes that are loose fitting to wear upon discharge. Take into consideration the type of surgery being performed.  Maintain your diet as advised per your physician the day prior to surgery.      Adequate rest the night before surgery is advised.   Park in the Parking lot behind the hospital or in the [facility] across the street from the parking lot. Parking is complimentary.  If you will be discharged the same day as your procedure, please arrange for a responsible adult to drive you home or to accompany you if traveling by taxi.    YOU WILL NOT BE PERMITTED TO DRIVE OR TO LEAVE THE HOSPITAL ALONE AFTER SURGERY.   If you are being discharged the same day, it is strongly recommended that you arrange for someone to remain with you for the first 24 hrs following your surgery.    The Surgeon will speak to your family/visitor after your surgery regarding the outcome of your surgery and post op care.  The Surgeon may speak to you after your surgery, but there is a possibility you may not remember the details.  Please check with your family members regarding the conversation with the Surgeon.    We strongly recommend whoever is bringing you home be present for discharge instructions.  This will ensure a thorough understanding for your post op home care.    If the patient has fever, cough, or signs/symptoms of Flu or Covid please do not come in for your surgery. Contact your surgeon and your primary care physician for further instructions.           Thank you for your cooperation.  The Staff of [facility].            Bathing Instructions with Hibiclens    Shower the evening before and morning of your procedure with Chlorhexidine (Hibiclens)  do not use Chlorhexidine on your face or genitals. Do not get in your eyes.  Wash your face with water and your regular face wash/soap  Use your regular shampoo  Apply Chlorhexidine (Hibiclens) directly on your skin or on a wet washcloth and wash gently. When showering: Move away from the shower stream when applying Chlorhexidine (Hibiclens) to avoid rinsing off too soon.  Rinse thoroughly with warm water  Do not dilute Chlorhexidine (Hibiclens)   Dry off as usual, do not use any deodorant, powder, body lotions, perfume, after shave or cologne.

## 2024-09-05 ENCOUNTER — OFFICE VISIT (OUTPATIENT)
Dept: PLASTIC SURGERY | Facility: CLINIC | Age: 69
End: 2024-09-05
Payer: MEDICARE

## 2024-09-05 VITALS
OXYGEN SATURATION: 99 % | HEART RATE: 86 BPM | SYSTOLIC BLOOD PRESSURE: 130 MMHG | RESPIRATION RATE: 19 BRPM | DIASTOLIC BLOOD PRESSURE: 70 MMHG

## 2024-09-05 DIAGNOSIS — Z17.0 MALIGNANT NEOPLASM OF UPPER-INNER QUADRANT OF RIGHT BREAST IN FEMALE, ESTROGEN RECEPTOR POSITIVE: Primary | ICD-10-CM

## 2024-09-05 DIAGNOSIS — C50.211 MALIGNANT NEOPLASM OF UPPER-INNER QUADRANT OF RIGHT BREAST IN FEMALE, ESTROGEN RECEPTOR POSITIVE: Primary | ICD-10-CM

## 2024-09-05 PROCEDURE — 3288F FALL RISK ASSESSMENT DOCD: CPT | Mod: CPTII,S$GLB,, | Performed by: SURGERY

## 2024-09-05 PROCEDURE — 99999 PR PBB SHADOW E&M-EST. PATIENT-LVL III: CPT | Mod: PBBFAC,,, | Performed by: SURGERY

## 2024-09-05 PROCEDURE — 1126F AMNT PAIN NOTED NONE PRSNT: CPT | Mod: CPTII,S$GLB,, | Performed by: SURGERY

## 2024-09-05 PROCEDURE — 99214 OFFICE O/P EST MOD 30 MIN: CPT | Mod: S$GLB,,, | Performed by: SURGERY

## 2024-09-05 PROCEDURE — 3078F DIAST BP <80 MM HG: CPT | Mod: CPTII,S$GLB,, | Performed by: SURGERY

## 2024-09-05 PROCEDURE — 1101F PT FALLS ASSESS-DOCD LE1/YR: CPT | Mod: CPTII,S$GLB,, | Performed by: SURGERY

## 2024-09-05 PROCEDURE — 3075F SYST BP GE 130 - 139MM HG: CPT | Mod: CPTII,S$GLB,, | Performed by: SURGERY

## 2024-09-05 PROCEDURE — 3044F HG A1C LEVEL LT 7.0%: CPT | Mod: CPTII,S$GLB,, | Performed by: SURGERY

## 2024-09-05 PROCEDURE — 1159F MED LIST DOCD IN RCRD: CPT | Mod: CPTII,S$GLB,, | Performed by: SURGERY

## 2024-09-08 NOTE — H&P (VIEW-ONLY)
Plastic Surgery History & Physical    SUBJECTIVE:   Chief complaint: Preoperative visit for RODRIGUEZ flap reconstruction    History of Present Illness:  68 y.o. female presenting to plastic surgery clinic for a preoperative visit. The patient was last seen on 2024 at which time we discussed immediate bilateral RODRIGUEZ flap reconstruction.  Patient understands the details of RODRIGUEZ flap reconstruction and has elected to undergo surgery on 2024. Since the last clinic visit there have been no significant changes in the patient's history.  She was thought about the different incisions in the skin paddles, and prefers a wise pattern closure with no areolar skin paddle    Past Medical History:   Diagnosis Date    Anxiety disorder, unspecified     r/t dx    Atypical ductal hyperplasia, breast     left side    Breast cancer 2016    right side    Breast cyst approx. 40 years ago    Cancer     Depression     r/t dx    GERD (gastroesophageal reflux disease)     History of thyroid cancer     Lobular carcinoma in situ not certain of  diagnosis    Malignant neoplasm of upper-outer quadrant of right breast in female, estrogen receptor positive 09/15/2016    Mitral valve prolapse     PONV (postoperative nausea and vomiting)        Past Surgical History:   Procedure Laterality Date    BREAST BIOPSY Right 2016    BREAST BIOPSY Left     exc bx    BREAST LUMPECTOMY Right 2016    w/radiation    BREAST SURGERY   AND  OR     CATARACT EXTRACTION W/  INTRAOCULAR LENS IMPLANT Right 2024    Procedure: EXTRACTION, CATARACT, WITH IOL INSERTION;  Surgeon: Zhane Khan MD;  Location: [facility];  Service: Ophthalmology;  Laterality: Right;     SECTION      x 2  and     COLONOSCOPY N/A 10/08/2019    Procedure: COLONOSCOPY;  Surgeon: Eliceo Holloway MD;  Location: [address]);  Service: Endoscopy;  Laterality: N/A;  Okay for Freeman or Jewel. However, she needs it done before end of  October, so if they are not available before then, okay for any provided.    CYST REMOVED FROM RIGHT BREAST IN 1969      EXTRACTION OF CATARACT Left 12/27/2023    Procedure: EXTRACTION, CATARACT;  Surgeon: Eric Cardona MD;  Location: [facility] OR;  Service: Ophthalmology;  Laterality: Left;  DiB00 +21.0D    HERNIA REPAIR      over C section incision     left breast wire localization excisional biopsy with bracketed wires using 3 wires and excision through 1 incision.       LIPOMA RESECTION N/A 10/04/2023    Procedure: EXCISION, LIPOMA Back;  Surgeon: Kenneth Hernández MD;  Location: [facility] OR [address] [place] [address];  Service: General;  Laterality: N/A;    PHACOEMULSIFICATION, CATARACT, WITH IOL INSERTION Left 12/27/2023    Procedure: PHACOEMULSIFICATION, CATARACT, WITH IOL INSERTION;  Surgeon: Eric Cardona MD;  Location: [facility] OR;  Service: Ophthalmology;  Laterality: Left;    THYROIDECTOMY      TRANSFORAMINAL EPIDURAL INJECTION OF STEROID Right 07/01/2019    Procedure: Injection,steroid,epidural,transforaminal approach LUMBAR TRANSFORAMINAL RIGHT L5 AND S1 TF ARNOLDO;  Surgeon: Nadya Mcfarland MD;  Location: [facility] PAIN MGT;  Service: Pain Management;  Laterality: Right;  NEEDS CONSENT    TUBAL LIGATION         Family History   Problem Relation Name Age of Onset    Osteoporosis Mother Miranda Mesa     Arthritis Mother Mirandaondina Mesa     Dementia Father      Breast cancer Maternal Grandmother Gabriella Simon 88    Cancer Maternal Grandmother Gabriella Simon     Breast cancer Other mat great aunt 70    Colon cancer Neg Hx      Colon polyps Neg Hx      Rectal cancer Neg Hx      Stomach cancer Neg Hx      Esophageal cancer Neg Hx      Liver cancer Neg Hx      Liver disease Neg Hx      Cirrhosis Neg Hx      Inflammatory bowel disease Neg Hx      Celiac disease Neg Hx      Crohn's disease Neg Hx      Ulcerative colitis Neg Hx      Ovarian cancer Neg Hx      Melanoma Neg Hx         Social History     Socioeconomic History    Marital status:      Number of children: 2   Occupational History     Comment:    Tobacco Use    Smoking status: Former     Current packs/day: 0.00     Average packs/day: 0.5 packs/day for 2.9 years (1.4 ttl pk-yrs)     Types: Cigarettes     Start date: 2/10/1985     Quit date: 1988     Years since quittin.7    Smokeless tobacco: Never    Tobacco comments:     social smoker for several years; 1-2 cigarettes/week.  Quit ,   Substance and Sexual Activity    Alcohol use: Yes     Alcohol/week: 6.0 standard drinks of alcohol     Types: 2 Glasses of wine, 4 Drinks containing 0.5 oz of alcohol per week     Comment: Don't drink much as it aggravates GERD    Drug use: Never    Sexual activity: Not Currently     Partners: Male     Birth control/protection: Post-menopausal   Other Topics Concern    Patient feels they ought to cut down on drinking/drug use No    Patient annoyed by others criticizing their drinking/drug use No    Patient has felt bad or guilty about drinking/drug use No    Patient has had a drink/used drugs as an eye opener in the AM No   Social History Narrative    ,  x 40 years, 2 children, 2 grandchildren, Orthodoxy     Social Determinants of Health     Financial Resource Strain: Low Risk  (2024)    Overall Financial Resource Strain (CARDIA)     Difficulty of Paying Living Expenses: Not hard at all   Food Insecurity: No Food Insecurity (2024)    Hunger Vital Sign     Worried About Running Out of Food in the Last Year: Never true     Ran Out of Food in the Last Year: Never true   Transportation Needs: No Transportation Needs (2024)    PRAPARE - Transportation     Lack of Transportation (Medical): No     Lack of Transportation (Non-Medical): No   Physical Activity: Insufficiently Active (2024)    Exercise Vital Sign     Days of Exercise per Week: 3 days     Minutes of Exercise per Session: 30 min   Stress: Stress Concern Present (2024)    [employer] [place] [employer] - Occupational Stress Questionnaire     Feeling of Stress : To some extent   Housing Stability: Low Risk  (2/19/2024)    Housing Stability Vital Sign     Unable to Pay for Housing in the Last Year: No     Number of Places Lived in the Last Year: 1     Unstable Housing in the Last Year: No       Current Outpatient Medications   Medication Sig Dispense Refill    famotidine (PEPCID) 20 MG tablet Take 20 mg by mouth 2 (two) times daily.      levothyroxine (SYNTHROID) 88 MCG tablet Take 1 tablet (88 mcg total) by mouth before breakfast. 30 tablet 11    loratadine (CLARITIN) 10 mg tablet Take 10 mg by mouth once daily. AS NEEDED FOR ALLERGIES      ondansetron (ZOFRAN-ODT) 4 MG TbDL Take 4 mg by mouth every 6 (six) hours as needed.      pantoprazole (PROTONIX) 40 MG tablet Take 1 tablet (40 mg total) by mouth once daily. 90 tablet 3    potassium chloride SA (K-DUR,KLOR-CON) 10 MEQ tablet Take 20 mEq by mouth once daily.      semaglutide (OZEMPIC) 0.25 mg or 0.5 mg (2 mg/3 mL) pen injector Inject 0.5 mg into the skin every 7 days. On tuesday      tretinoin (RETIN-A) 0.025 % cream Compound tretinoin 0.025% / niacinamide 2% cream / hyaluronic acid 0.25%. Apply a pea-sized amount to entire face qhs. 30 g 11     No current facility-administered medications for this visit.     Facility-Administered Medications Ordered in Other Visits   Medication Dose Route Frequency Provider Last Rate Last Admin    0.9%  NaCl infusion   Intravenous Continuous Hakan Garces PA-C 70 mL/hr at 10/04/23 1113 New Bag at 10/04/23 1113    ceFAZolin 2 g in dextrose 5 % in water (D5W) 50 mL IVPB (MB+)  2 g Intravenous On Call Procedure Hakan Garces PA-C        haloperidol lactate injection 0.5 mg  0.5 mg Intravenous Q10 Min PRN Crystal Holloway MD        HYDROmorphone injection 0.2 mg  0.2 mg Intravenous Q5 Min PRN Crystal Holloway MD        LIDOcaine (PF) 10 mg/ml (1%) injection 10 mg  1 mL Intradermal Once Eric Cardona MD         sodium chloride 0.9% flush 10 mL  10 mL Intravenous PRN Crystal Holloway MD           Review of patient's allergies indicates:   Allergen Reactions    Codeine Nausea Only    Sulfa (sulfonamide antibiotics) Nausea Only           OBJECTIVE:     /70   Pulse 86   Resp 19   SpO2 99%       Physical Exam:  Gen: NAD, appears stated age  Neuro: normal without focal findings, mental status and speech normal  HEENT: NCAT, neck supple, PEERL  CV: RRR  Pulm: Breathing non-labored, chest wall movement equal bilaterally   Breast: Exam deferred, reviewed medical photography  Abdomen: soft, nontender, no guarding, adequate tissue for RODRIGUEZ flap  Extremity:normal strength, no cyanosis or edema  Skin: Skin color, texture, turgor normal. No rashes or lesions  Psych: oriented to time, place and person, mood and affect are within normal limits          ASSESSMENT/PLAN:     Sherry Torres was seen preoperatively today for RODRIGUEZ flap reconstruction  The plan is for bilateral skin sparing mastectomy with immediate RODRIGUEZ flap reconstruction   We will plan for a wise pattern skin closure.  She prefers to not have an areolar skin paddle.  I discussed we may use a small skin paddle at the 6 o'clock position to monitor the flap and offload tension at the T point.  That can be excised later at her revision    Explained the importance of postoperative hospitalization for monitoring of the new vessel anastomosis, which is most important in the 48 hours after surgery.  Discussed flap monitoring, risks of take back/flap failure/ expected hospital course, use of drains, and recovery.  Risks including flap loss, fat necrosis, infection, wound breakdown, seroma, hematoma, scarring, need for reoperation, blood clots were all covered.  Typical revision procedures including lifting the breast, tailoring the skin, liposuction and fat grafting for contour were also discussed.  Consents signed    Reviewed multimodal pain control regimen used in the  post operative period. Prescriptions will be sent to the outpatient pharmacy the day of surgery.  The patient was given a packet including general instructions for post-operative care, instructions for the day of surgery, drain care and process to complete FMLA/Disability paperwork.  All questions were answered. The patient was given a business card with contact info and advised to contact the clinic with any questions or concerns before or after surgery.      Pito Holloway  Plastic and Reconstructive Surgery    I spent a total of 30 minutes on the day of the visit.This includes face to face time and non-face to face time preparing to see the patient (eg, review of tests), obtaining and/or reviewing separately obtained history, documenting clinical information in the electronic or other health record, independently interpreting results and communicating results to the patient/family/caregiver, or care coordinator.

## 2024-09-08 NOTE — PROGRESS NOTES
Plastic Surgery History & Physical    SUBJECTIVE:   Chief complaint: Preoperative visit for RODRIGUEZ flap reconstruction    History of Present Illness:  68 y.o. female presenting to plastic surgery clinic for a preoperative visit. The patient was last seen on 2024 at which time we discussed immediate bilateral RODRIGUEZ flap reconstruction.  Patient understands the details of RODRIGUEZ flap reconstruction and has elected to undergo surgery on 2024. Since the last clinic visit there have been no significant changes in the patient's history.  She was thought about the different incisions in the skin paddles, and prefers a wise pattern closure with no areolar skin paddle    Past Medical History:   Diagnosis Date    Anxiety disorder, unspecified     r/t dx    Atypical ductal hyperplasia, breast     left side    Breast cancer 2016    right side    Breast cyst approx. 40 years ago    Cancer     Depression     r/t dx    GERD (gastroesophageal reflux disease)     History of thyroid cancer     Lobular carcinoma in situ not certain of  diagnosis    Malignant neoplasm of upper-outer quadrant of right breast in female, estrogen receptor positive 09/15/2016    Mitral valve prolapse     PONV (postoperative nausea and vomiting)        Past Surgical History:   Procedure Laterality Date    BREAST BIOPSY Right 2016    BREAST BIOPSY Left     exc bx    BREAST LUMPECTOMY Right 2016    w/radiation    BREAST SURGERY   AND  OR     CATARACT EXTRACTION W/  INTRAOCULAR LENS IMPLANT Right 2024    Procedure: EXTRACTION, CATARACT, WITH IOL INSERTION;  Surgeon: Zhane Khan MD;  Location: [facility];  Service: Ophthalmology;  Laterality: Right;     SECTION      x 2  and     COLONOSCOPY N/A 10/08/2019    Procedure: COLONOSCOPY;  Surgeon: Eliceo Holloway MD;  Location: [address]);  Service: Endoscopy;  Laterality: N/A;  Okay for [facility] or [facility]. However, she needs it done before end of  October, so if they are not available before then, okay for any provided.    CYST REMOVED FROM RIGHT BREAST IN 1969      EXTRACTION OF CATARACT Left 12/27/2023    Procedure: EXTRACTION, CATARACT;  Surgeon: Eric Cardona MD;  Location: [facility] OR;  Service: Ophthalmology;  Laterality: Left;  DiB00 +21.0D    HERNIA REPAIR      over C section incision     left breast wire localization excisional biopsy with bracketed wires using 3 wires and excision through 1 incision.       LIPOMA RESECTION N/A 10/04/2023    Procedure: EXCISION, LIPOMA Back;  Surgeon: Kenneth Hernández MD;  Location: [facility] OR [address] [place] [address];  Service: General;  Laterality: N/A;    PHACOEMULSIFICATION, CATARACT, WITH IOL INSERTION Left 12/27/2023    Procedure: PHACOEMULSIFICATION, CATARACT, WITH IOL INSERTION;  Surgeon: Eric Cardona MD;  Location: [facility] OR;  Service: Ophthalmology;  Laterality: Left;    THYROIDECTOMY      TRANSFORAMINAL EPIDURAL INJECTION OF STEROID Right 07/01/2019    Procedure: Injection,steroid,epidural,transforaminal approach LUMBAR TRANSFORAMINAL RIGHT L5 AND S1 TF ARNOLDO;  Surgeon: Nadya Mcfarland MD;  Location: [facility] PAIN MGT;  Service: Pain Management;  Laterality: Right;  NEEDS CONSENT    TUBAL LIGATION         Family History   Problem Relation Name Age of Onset    Osteoporosis Mother Miranda Mesa     Arthritis Mother Mirandaondina Mesa     Dementia Father      Breast cancer Maternal Grandmother Gabriella Simon 88    Cancer Maternal Grandmother Gabriella Simon     Breast cancer Other mat great aunt 70    Colon cancer Neg Hx      Colon polyps Neg Hx      Rectal cancer Neg Hx      Stomach cancer Neg Hx      Esophageal cancer Neg Hx      Liver cancer Neg Hx      Liver disease Neg Hx      Cirrhosis Neg Hx      Inflammatory bowel disease Neg Hx      Celiac disease Neg Hx      Crohn's disease Neg Hx      Ulcerative colitis Neg Hx      Ovarian cancer Neg Hx      Melanoma Neg Hx         Social History     Socioeconomic History    Marital status:      Number of children: 2   Occupational History     Comment:    Tobacco Use    Smoking status: Former     Current packs/day: 0.00     Average packs/day: 0.5 packs/day for 2.9 years (1.4 ttl pk-yrs)     Types: Cigarettes     Start date: 2/10/1985     Quit date: 1988     Years since quittin.7    Smokeless tobacco: Never    Tobacco comments:     social smoker for several years; 1-2 cigarettes/week.  Quit ,   Substance and Sexual Activity    Alcohol use: Yes     Alcohol/week: 6.0 standard drinks of alcohol     Types: 2 Glasses of wine, 4 Drinks containing 0.5 oz of alcohol per week     Comment: Don't drink much as it aggravates GERD    Drug use: Never    Sexual activity: Not Currently     Partners: Male     Birth control/protection: Post-menopausal   Other Topics Concern    Patient feels they ought to cut down on drinking/drug use No    Patient annoyed by others criticizing their drinking/drug use No    Patient has felt bad or guilty about drinking/drug use No    Patient has had a drink/used drugs as an eye opener in the AM No   Social History Narrative    ,  x 40 years, 2 children, 2 grandchildren, Oriental orthodox     Social Determinants of Health     Financial Resource Strain: Low Risk  (2024)    Overall Financial Resource Strain (CARDIA)     Difficulty of Paying Living Expenses: Not hard at all   Food Insecurity: No Food Insecurity (2024)    Hunger Vital Sign     Worried About Running Out of Food in the Last Year: Never true     Ran Out of Food in the Last Year: Never true   Transportation Needs: No Transportation Needs (2024)    PRAPARE - Transportation     Lack of Transportation (Medical): No     Lack of Transportation (Non-Medical): No   Physical Activity: Insufficiently Active (2024)    Exercise Vital Sign     Days of Exercise per Week: 3 days     Minutes of Exercise per Session: 30 min   Stress: Stress Concern Present (2024)    [employer] [place] [employer] - Occupational Stress Questionnaire     Feeling of Stress : To some extent   Housing Stability: Low Risk  (2/19/2024)    Housing Stability Vital Sign     Unable to Pay for Housing in the Last Year: No     Number of Places Lived in the Last Year: 1     Unstable Housing in the Last Year: No       Current Outpatient Medications   Medication Sig Dispense Refill    famotidine (PEPCID) 20 MG tablet Take 20 mg by mouth 2 (two) times daily.      levothyroxine (SYNTHROID) 88 MCG tablet Take 1 tablet (88 mcg total) by mouth before breakfast. 30 tablet 11    loratadine (CLARITIN) 10 mg tablet Take 10 mg by mouth once daily. AS NEEDED FOR ALLERGIES      ondansetron (ZOFRAN-ODT) 4 MG TbDL Take 4 mg by mouth every 6 (six) hours as needed.      pantoprazole (PROTONIX) 40 MG tablet Take 1 tablet (40 mg total) by mouth once daily. 90 tablet 3    potassium chloride SA (K-DUR,KLOR-CON) 10 MEQ tablet Take 20 mEq by mouth once daily.      semaglutide (OZEMPIC) 0.25 mg or 0.5 mg (2 mg/3 mL) pen injector Inject 0.5 mg into the skin every 7 days. On tuesday      tretinoin (RETIN-A) 0.025 % cream Compound tretinoin 0.025% / niacinamide 2% cream / hyaluronic acid 0.25%. Apply a pea-sized amount to entire face qhs. 30 g 11     No current facility-administered medications for this visit.     Facility-Administered Medications Ordered in Other Visits   Medication Dose Route Frequency Provider Last Rate Last Admin    0.9%  NaCl infusion   Intravenous Continuous Hakan Garces PA-C 70 mL/hr at 10/04/23 1113 New Bag at 10/04/23 1113    ceFAZolin 2 g in dextrose 5 % in water (D5W) 50 mL IVPB (MB+)  2 g Intravenous On Call Procedure Hakan Garces PA-C        haloperidol lactate injection 0.5 mg  0.5 mg Intravenous Q10 Min PRN Crystal Holloway MD        HYDROmorphone injection 0.2 mg  0.2 mg Intravenous Q5 Min PRN Crystal Holloway MD        LIDOcaine (PF) 10 mg/ml (1%) injection 10 mg  1 mL Intradermal Once Eric Cardona MD         sodium chloride 0.9% flush 10 mL  10 mL Intravenous PRN Crystal Holloway MD           Review of patient's allergies indicates:   Allergen Reactions    Codeine Nausea Only    Sulfa (sulfonamide antibiotics) Nausea Only           OBJECTIVE:     /70   Pulse 86   Resp 19   SpO2 99%       Physical Exam:  Gen: NAD, appears stated age  Neuro: normal without focal findings, mental status and speech normal  HEENT: NCAT, neck supple, PEERL  CV: RRR  Pulm: Breathing non-labored, chest wall movement equal bilaterally   Breast: Exam deferred, reviewed medical photography  Abdomen: soft, nontender, no guarding, adequate tissue for RODRIGUEZ flap  Extremity:normal strength, no cyanosis or edema  Skin: Skin color, texture, turgor normal. No rashes or lesions  Psych: oriented to time, place and person, mood and affect are within normal limits          ASSESSMENT/PLAN:     Sherry Torres was seen preoperatively today for RODRIGUEZ flap reconstruction  The plan is for bilateral skin sparing mastectomy with immediate RODRIGUEZ flap reconstruction   We will plan for a wise pattern skin closure.  She prefers to not have an areolar skin paddle.  I discussed we may use a small skin paddle at the 6 o'clock position to monitor the flap and offload tension at the T point.  That can be excised later at her revision    Explained the importance of postoperative hospitalization for monitoring of the new vessel anastomosis, which is most important in the 48 hours after surgery.  Discussed flap monitoring, risks of take back/flap failure/ expected hospital course, use of drains, and recovery.  Risks including flap loss, fat necrosis, infection, wound breakdown, seroma, hematoma, scarring, need for reoperation, blood clots were all covered.  Typical revision procedures including lifting the breast, tailoring the skin, liposuction and fat grafting for contour were also discussed.  Consents signed    Reviewed multimodal pain control regimen used in the  post operative period. Prescriptions will be sent to the outpatient pharmacy the day of surgery.  The patient was given a packet including general instructions for post-operative care, instructions for the day of surgery, drain care and process to complete FMLA/Disability paperwork.  All questions were answered. The patient was given a business card with contact info and advised to contact the clinic with any questions or concerns before or after surgery.      Pito Holloway  Plastic and Reconstructive Surgery    I spent a total of 30 minutes on the day of the visit.This includes face to face time and non-face to face time preparing to see the patient (eg, review of tests), obtaining and/or reviewing separately obtained history, documenting clinical information in the electronic or other health record, independently interpreting results and communicating results to the patient/family/caregiver, or care coordinator.

## 2024-09-09 ENCOUNTER — ANESTHESIA (OUTPATIENT)
Dept: SURGERY | Facility: OTHER | Age: 69
DRG: 580 | End: 2024-09-09
Payer: MEDICARE

## 2024-09-09 ENCOUNTER — ANESTHESIA EVENT (OUTPATIENT)
Dept: SURGERY | Facility: OTHER | Age: 69
DRG: 580 | End: 2024-09-09
Payer: MEDICARE

## 2024-09-09 ENCOUNTER — HOSPITAL ENCOUNTER (INPATIENT)
Facility: OTHER | Age: 69
LOS: 3 days | Discharge: HOME HEALTH | DRG: 580 | End: 2024-09-12
Attending: SURGERY | Admitting: SURGERY
Payer: MEDICARE

## 2024-09-09 DIAGNOSIS — C50.211 MALIGNANT NEOPLASM OF UPPER-INNER QUADRANT OF RIGHT BREAST IN FEMALE, ESTROGEN RECEPTOR POSITIVE: Primary | ICD-10-CM

## 2024-09-09 DIAGNOSIS — Z17.0 MALIGNANT NEOPLASM OF UPPER-INNER QUADRANT OF RIGHT BREAST IN FEMALE, ESTROGEN RECEPTOR POSITIVE: Primary | ICD-10-CM

## 2024-09-09 DIAGNOSIS — Z17.0 CARCINOMA OF UPPER-OUTER QUADRANT OF RIGHT BREAST IN FEMALE, ESTROGEN RECEPTOR POSITIVE: ICD-10-CM

## 2024-09-09 DIAGNOSIS — C50.411 CARCINOMA OF UPPER-OUTER QUADRANT OF RIGHT BREAST IN FEMALE, ESTROGEN RECEPTOR POSITIVE: ICD-10-CM

## 2024-09-09 DIAGNOSIS — Z85.3 PERSONAL HISTORY OF BREAST CANCER: ICD-10-CM

## 2024-09-09 LAB
ABO + RH BLD: NORMAL
BLD GP AB SCN CELLS X3 SERPL QL: NORMAL
ERYTHROCYTE [DISTWIDTH] IN BLOOD BY AUTOMATED COUNT: 13.2 % (ref 11.5–14.5)
HCT VFR BLD AUTO: 24.9 % (ref 37–48.5)
HGB BLD-MCNC: 8.5 G/DL (ref 12–16)
MCH RBC QN AUTO: 31.3 PG (ref 27–31)
MCHC RBC AUTO-ENTMCNC: 34.1 G/DL (ref 32–36)
MCV RBC AUTO: 92 FL (ref 82–98)
PLATELET # BLD AUTO: 315 K/UL (ref 150–450)
PMV BLD AUTO: 9.2 FL (ref 9.2–12.9)
RBC # BLD AUTO: 2.72 M/UL (ref 4–5.4)
WBC # BLD AUTO: 11.73 K/UL (ref 3.9–12.7)

## 2024-09-09 PROCEDURE — 0HRV077 REPLACEMENT OF BILATERAL BREAST USING DEEP INFERIOR EPIGASTRIC ARTERY PERFORATOR FLAP, OPEN APPROACH: ICD-10-PCS | Performed by: SURGERY

## 2024-09-09 PROCEDURE — 25000003 PHARM REV CODE 250: Performed by: SURGERY

## 2024-09-09 PROCEDURE — 88341 IMHCHEM/IMCYTCHM EA ADD ANTB: CPT | Performed by: PATHOLOGY

## 2024-09-09 PROCEDURE — 25000003 PHARM REV CODE 250: Performed by: NURSE ANESTHETIST, CERTIFIED REGISTERED

## 2024-09-09 PROCEDURE — 11000001 HC ACUTE MED/SURG PRIVATE ROOM

## 2024-09-09 PROCEDURE — 37000008 HC ANESTHESIA 1ST 15 MINUTES: Performed by: SURGERY

## 2024-09-09 PROCEDURE — 36000706: Performed by: SURGERY

## 2024-09-09 PROCEDURE — 88341 IMHCHEM/IMCYTCHM EA ADD ANTB: CPT | Mod: 26,,, | Performed by: PATHOLOGY

## 2024-09-09 PROCEDURE — 63600175 PHARM REV CODE 636 W HCPCS: Performed by: NURSE ANESTHETIST, CERTIFIED REGISTERED

## 2024-09-09 PROCEDURE — P9045 ALBUMIN (HUMAN), 5%, 250 ML: HCPCS | Mod: JZ,JG | Performed by: NURSE ANESTHETIST, CERTIFIED REGISTERED

## 2024-09-09 PROCEDURE — 88307 TISSUE EXAM BY PATHOLOGIST: CPT | Performed by: PATHOLOGY

## 2024-09-09 PROCEDURE — 71000039 HC RECOVERY, EACH ADD'L HOUR: Performed by: SURGERY

## 2024-09-09 PROCEDURE — 0HCU0ZZ EXTIRPATION OF MATTER FROM LEFT BREAST, OPEN APPROACH: ICD-10-PCS | Performed by: SURGERY

## 2024-09-09 PROCEDURE — 88342 IMHCHEM/IMCYTCHM 1ST ANTB: CPT | Mod: 59 | Performed by: PATHOLOGY

## 2024-09-09 PROCEDURE — 15860 IV NJX TST VASC FLO FLAP/GRF: CPT | Mod: ,,, | Performed by: SURGERY

## 2024-09-09 PROCEDURE — C9290 INJ, BUPIVACAINE LIPOSOME: HCPCS | Performed by: SURGERY

## 2024-09-09 PROCEDURE — 25000003 PHARM REV CODE 250: Performed by: ANESTHESIOLOGY

## 2024-09-09 PROCEDURE — 36000709 HC OR TIME LEV III EA ADD 15 MIN: Performed by: SURGERY

## 2024-09-09 PROCEDURE — 36000707: Performed by: SURGERY

## 2024-09-09 PROCEDURE — 36000708 HC OR TIME LEV III 1ST 15 MIN: Performed by: SURGERY

## 2024-09-09 PROCEDURE — 4A1GXSH MONITORING OF SKIN AND BREAST VASCULAR PERFUSION USING INDOCYANINE GREEN DYE, EXTERNAL APPROACH: ICD-10-PCS | Performed by: SURGERY

## 2024-09-09 PROCEDURE — P9016 RBC LEUKOCYTES REDUCED: HCPCS | Performed by: SURGERY

## 2024-09-09 PROCEDURE — 37000009 HC ANESTHESIA EA ADD 15 MINS: Performed by: SURGERY

## 2024-09-09 PROCEDURE — 88342 IMHCHEM/IMCYTCHM 1ST ANTB: CPT | Mod: 26,,, | Performed by: PATHOLOGY

## 2024-09-09 PROCEDURE — 25000003 PHARM REV CODE 250: Performed by: PHYSICIAN ASSISTANT

## 2024-09-09 PROCEDURE — C1729 CATH, DRAINAGE: HCPCS | Performed by: SURGERY

## 2024-09-09 PROCEDURE — 07B50ZX EXCISION OF RIGHT AXILLARY LYMPHATIC, OPEN APPROACH, DIAGNOSTIC: ICD-10-PCS | Performed by: SURGERY

## 2024-09-09 PROCEDURE — 36415 COLL VENOUS BLD VENIPUNCTURE: CPT | Performed by: SURGERY

## 2024-09-09 PROCEDURE — 27201423 OPTIME MED/SURG SUP & DEVICES STERILE SUPPLY: Performed by: SURGERY

## 2024-09-09 PROCEDURE — 63600175 PHARM REV CODE 636 W HCPCS: Performed by: SURGERY

## 2024-09-09 PROCEDURE — 63600175 PHARM REV CODE 636 W HCPCS: Performed by: PHYSICIAN ASSISTANT

## 2024-09-09 PROCEDURE — 85027 COMPLETE CBC AUTOMATED: CPT | Performed by: SURGERY

## 2024-09-09 PROCEDURE — 94799 UNLISTED PULMONARY SVC/PX: CPT

## 2024-09-09 PROCEDURE — 19364 BRST RCNSTJ FREE FLAP: CPT | Mod: 50,,, | Performed by: SURGERY

## 2024-09-09 PROCEDURE — 88307 TISSUE EXAM BY PATHOLOGIST: CPT | Mod: 26,,, | Performed by: PATHOLOGY

## 2024-09-09 PROCEDURE — 38525 BIOPSY/REMOVAL LYMPH NODES: CPT | Mod: 51,RT,, | Performed by: SURGERY

## 2024-09-09 PROCEDURE — 86850 RBC ANTIBODY SCREEN: CPT | Performed by: SURGERY

## 2024-09-09 PROCEDURE — 71000033 HC RECOVERY, INTIAL HOUR: Performed by: SURGERY

## 2024-09-09 PROCEDURE — 86920 COMPATIBILITY TEST SPIN: CPT | Performed by: SURGERY

## 2024-09-09 PROCEDURE — 38900 IO MAP OF SENT LYMPH NODE: CPT | Mod: RT,,, | Performed by: SURGERY

## 2024-09-09 PROCEDURE — 21501 I&D DP ABSC/HMTMA SFT TS NCK: CPT | Mod: 59,,, | Performed by: SURGERY

## 2024-09-09 PROCEDURE — 19303 MAST SIMPLE COMPLETE: CPT | Mod: 50,,, | Performed by: SURGERY

## 2024-09-09 PROCEDURE — 0HTV0ZZ RESECTION OF BILATERAL BREAST, OPEN APPROACH: ICD-10-PCS | Performed by: SURGERY

## 2024-09-09 DEVICE — COUPLER MICROVAS ANSTMS 3.0MM: Type: IMPLANTABLE DEVICE | Site: BREAST | Status: FUNCTIONAL

## 2024-09-09 DEVICE — COUPLER MICROVAS ANSTMS 2.5MM: Type: IMPLANTABLE DEVICE | Site: BREAST | Status: FUNCTIONAL

## 2024-09-09 RX ORDER — CEFAZOLIN SODIUM 1 G/3ML
2 INJECTION, POWDER, FOR SOLUTION INTRAMUSCULAR; INTRAVENOUS
Status: COMPLETED | OUTPATIENT
Start: 2024-09-09 | End: 2024-09-09

## 2024-09-09 RX ORDER — MUPIROCIN 20 MG/G
OINTMENT TOPICAL
Status: DISCONTINUED | OUTPATIENT
Start: 2024-09-09 | End: 2024-09-09 | Stop reason: HOSPADM

## 2024-09-09 RX ORDER — ONDANSETRON HYDROCHLORIDE 2 MG/ML
INJECTION, SOLUTION INTRAVENOUS
Status: DISCONTINUED | OUTPATIENT
Start: 2024-09-09 | End: 2024-09-09

## 2024-09-09 RX ORDER — PROPOFOL 10 MG/ML
VIAL (ML) INTRAVENOUS
Status: DISCONTINUED | OUTPATIENT
Start: 2024-09-09 | End: 2024-09-10

## 2024-09-09 RX ORDER — PROPOFOL 10 MG/ML
VIAL (ML) INTRAVENOUS CONTINUOUS PRN
Status: DISCONTINUED | OUTPATIENT
Start: 2024-09-09 | End: 2024-09-09

## 2024-09-09 RX ORDER — PHENYLEPHRINE HCL IN 0.9% NACL 1 MG/10 ML
SYRINGE (ML) INTRAVENOUS
Status: DISCONTINUED | OUTPATIENT
Start: 2024-09-09 | End: 2024-09-09

## 2024-09-09 RX ORDER — PHENYLEPHRINE HYDROCHLORIDE 10 MG/ML
INJECTION INTRAVENOUS
Status: DISCONTINUED | OUTPATIENT
Start: 2024-09-09 | End: 2024-09-10

## 2024-09-09 RX ORDER — DIPHENHYDRAMINE HYDROCHLORIDE 50 MG/ML
25 INJECTION INTRAMUSCULAR; INTRAVENOUS EVERY 6 HOURS PRN
Status: DISCONTINUED | OUTPATIENT
Start: 2024-09-09 | End: 2024-09-09 | Stop reason: HOSPADM

## 2024-09-09 RX ORDER — ACETAMINOPHEN 500 MG
1000 TABLET ORAL EVERY 8 HOURS
Status: DISCONTINUED | OUTPATIENT
Start: 2024-09-09 | End: 2024-09-12 | Stop reason: HOSPADM

## 2024-09-09 RX ORDER — LIDOCAINE HYDROCHLORIDE AND EPINEPHRINE 10; 10 UG/ML; MG/ML
INJECTION, SOLUTION INFILTRATION; PERINEURAL
Status: DISCONTINUED | OUTPATIENT
Start: 2024-09-09 | End: 2024-09-09 | Stop reason: HOSPADM

## 2024-09-09 RX ORDER — MEPERIDINE HYDROCHLORIDE 25 MG/ML
12.5 INJECTION INTRAMUSCULAR; INTRAVENOUS; SUBCUTANEOUS ONCE AS NEEDED
Status: ACTIVE | OUTPATIENT
Start: 2024-09-09 | End: 2024-09-10

## 2024-09-09 RX ORDER — OXYCODONE HYDROCHLORIDE 5 MG/1
5 TABLET ORAL EVERY 4 HOURS PRN
Status: DISCONTINUED | OUTPATIENT
Start: 2024-09-09 | End: 2024-09-12 | Stop reason: HOSPADM

## 2024-09-09 RX ORDER — HEPARIN SODIUM 1000 [USP'U]/ML
INJECTION, SOLUTION INTRAVENOUS; SUBCUTANEOUS
Status: DISCONTINUED | OUTPATIENT
Start: 2024-09-09 | End: 2024-09-09

## 2024-09-09 RX ORDER — INDOCYANINE GREEN AND WATER 25 MG
KIT INJECTION
Status: DISCONTINUED | OUTPATIENT
Start: 2024-09-09 | End: 2024-09-09

## 2024-09-09 RX ORDER — HEPARIN SODIUM 10000 [USP'U]/ML
INJECTION, SOLUTION INTRAVENOUS; SUBCUTANEOUS
Status: DISCONTINUED | OUTPATIENT
Start: 2024-09-09 | End: 2024-09-09 | Stop reason: HOSPADM

## 2024-09-09 RX ORDER — PROPOFOL 10 MG/ML
VIAL (ML) INTRAVENOUS
Status: DISCONTINUED | OUTPATIENT
Start: 2024-09-09 | End: 2024-09-09

## 2024-09-09 RX ORDER — SUCCINYLCHOLINE CHLORIDE 20 MG/ML
INJECTION INTRAMUSCULAR; INTRAVENOUS
Status: DISCONTINUED | OUTPATIENT
Start: 2024-09-09 | End: 2024-09-10

## 2024-09-09 RX ORDER — ONDANSETRON 2 MG/ML
INJECTION INTRAMUSCULAR; INTRAVENOUS
Status: DISCONTINUED | OUTPATIENT
Start: 2024-09-09 | End: 2024-09-10

## 2024-09-09 RX ORDER — ENOXAPARIN SODIUM 100 MG/ML
40 INJECTION SUBCUTANEOUS EVERY 24 HOURS
Status: DISCONTINUED | OUTPATIENT
Start: 2024-09-10 | End: 2024-09-12 | Stop reason: HOSPADM

## 2024-09-09 RX ORDER — LEVOTHYROXINE SODIUM 88 UG/1
88 TABLET ORAL
Status: DISCONTINUED | OUTPATIENT
Start: 2024-09-10 | End: 2024-09-12 | Stop reason: HOSPADM

## 2024-09-09 RX ORDER — SODIUM CHLORIDE 0.9 % (FLUSH) 0.9 %
3 SYRINGE (ML) INJECTION
Status: DISCONTINUED | OUTPATIENT
Start: 2024-09-09 | End: 2024-09-09 | Stop reason: HOSPADM

## 2024-09-09 RX ORDER — METHOCARBAMOL 500 MG/1
500 TABLET, FILM COATED ORAL 3 TIMES DAILY
Status: DISCONTINUED | OUTPATIENT
Start: 2024-09-09 | End: 2024-09-12 | Stop reason: HOSPADM

## 2024-09-09 RX ORDER — KETAMINE HYDROCHLORIDE 50 MG/ML
INJECTION, SOLUTION INTRAMUSCULAR; INTRAVENOUS
Status: DISCONTINUED | OUTPATIENT
Start: 2024-09-09 | End: 2024-09-09

## 2024-09-09 RX ORDER — MEPERIDINE HYDROCHLORIDE 25 MG/ML
12.5 INJECTION INTRAMUSCULAR; INTRAVENOUS; SUBCUTANEOUS ONCE AS NEEDED
Status: DISCONTINUED | OUTPATIENT
Start: 2024-09-09 | End: 2024-09-09 | Stop reason: HOSPADM

## 2024-09-09 RX ORDER — MIDAZOLAM HYDROCHLORIDE 1 MG/ML
INJECTION INTRAMUSCULAR; INTRAVENOUS
Status: DISCONTINUED | OUTPATIENT
Start: 2024-09-09 | End: 2024-09-09

## 2024-09-09 RX ORDER — HYDROMORPHONE HYDROCHLORIDE 2 MG/ML
0.4 INJECTION, SOLUTION INTRAMUSCULAR; INTRAVENOUS; SUBCUTANEOUS EVERY 5 MIN PRN
Status: DISCONTINUED | OUTPATIENT
Start: 2024-09-09 | End: 2024-09-09 | Stop reason: HOSPADM

## 2024-09-09 RX ORDER — GLUCAGON 1 MG
1 KIT INJECTION
Status: DISCONTINUED | OUTPATIENT
Start: 2024-09-09 | End: 2024-09-09 | Stop reason: HOSPADM

## 2024-09-09 RX ORDER — GLUCAGON 1 MG
1 KIT INJECTION
Status: DISCONTINUED | OUTPATIENT
Start: 2024-09-09 | End: 2024-09-12 | Stop reason: HOSPADM

## 2024-09-09 RX ORDER — PROCHLORPERAZINE EDISYLATE 5 MG/ML
5 INJECTION INTRAMUSCULAR; INTRAVENOUS EVERY 30 MIN PRN
Status: DISCONTINUED | OUTPATIENT
Start: 2024-09-09 | End: 2024-09-09 | Stop reason: HOSPADM

## 2024-09-09 RX ORDER — HALOPERIDOL 5 MG/ML
INJECTION INTRAMUSCULAR
Status: DISCONTINUED | OUTPATIENT
Start: 2024-09-09 | End: 2024-09-10

## 2024-09-09 RX ORDER — ENOXAPARIN SODIUM 100 MG/ML
40 INJECTION SUBCUTANEOUS
Status: COMPLETED | OUTPATIENT
Start: 2024-09-09 | End: 2024-09-09

## 2024-09-09 RX ORDER — BUPIVACAINE 13.3 MG/ML
INJECTION, SUSPENSION, LIPOSOMAL INFILTRATION
Status: DISCONTINUED | OUTPATIENT
Start: 2024-09-09 | End: 2024-09-09 | Stop reason: HOSPADM

## 2024-09-09 RX ORDER — ONDANSETRON HYDROCHLORIDE 2 MG/ML
4 INJECTION, SOLUTION INTRAVENOUS EVERY 6 HOURS PRN
Status: DISCONTINUED | OUTPATIENT
Start: 2024-09-09 | End: 2024-09-12 | Stop reason: HOSPADM

## 2024-09-09 RX ORDER — GABAPENTIN 300 MG/1
300 CAPSULE ORAL 3 TIMES DAILY
Status: DISCONTINUED | OUTPATIENT
Start: 2024-09-09 | End: 2024-09-12 | Stop reason: HOSPADM

## 2024-09-09 RX ORDER — PROCHLORPERAZINE EDISYLATE 5 MG/ML
INJECTION INTRAMUSCULAR; INTRAVENOUS
Status: DISCONTINUED | OUTPATIENT
Start: 2024-09-09 | End: 2024-09-09

## 2024-09-09 RX ORDER — ACETAMINOPHEN 10 MG/ML
INJECTION, SOLUTION INTRAVENOUS
Status: DISCONTINUED | OUTPATIENT
Start: 2024-09-09 | End: 2024-09-09

## 2024-09-09 RX ORDER — SODIUM CHLORIDE 0.9 % (FLUSH) 0.9 %
3 SYRINGE (ML) INJECTION
Status: DISCONTINUED | OUTPATIENT
Start: 2024-09-09 | End: 2024-09-12 | Stop reason: HOSPADM

## 2024-09-09 RX ORDER — HYDROMORPHONE HYDROCHLORIDE 2 MG/ML
0.4 INJECTION, SOLUTION INTRAMUSCULAR; INTRAVENOUS; SUBCUTANEOUS EVERY 5 MIN PRN
Status: DISCONTINUED | OUTPATIENT
Start: 2024-09-09 | End: 2024-09-12 | Stop reason: HOSPADM

## 2024-09-09 RX ORDER — HYDROCODONE BITARTRATE AND ACETAMINOPHEN 500; 5 MG/1; MG/1
TABLET ORAL
Status: DISCONTINUED | OUTPATIENT
Start: 2024-09-09 | End: 2024-09-12 | Stop reason: HOSPADM

## 2024-09-09 RX ORDER — FENTANYL CITRATE 50 UG/ML
INJECTION, SOLUTION INTRAMUSCULAR; INTRAVENOUS
Status: DISCONTINUED | OUTPATIENT
Start: 2024-09-09 | End: 2024-09-09

## 2024-09-09 RX ORDER — LIDOCAINE HYDROCHLORIDE 20 MG/ML
INJECTION INTRAVENOUS
Status: DISCONTINUED | OUTPATIENT
Start: 2024-09-09 | End: 2024-09-10

## 2024-09-09 RX ORDER — LIDOCAINE HYDROCHLORIDE 10 MG/ML
1 INJECTION, SOLUTION EPIDURAL; INFILTRATION; INTRACAUDAL; PERINEURAL ONCE
Status: DISCONTINUED | OUTPATIENT
Start: 2024-09-09 | End: 2024-09-09 | Stop reason: HOSPADM

## 2024-09-09 RX ORDER — OXYCODONE HYDROCHLORIDE 5 MG/1
5 TABLET ORAL
Status: DISCONTINUED | OUTPATIENT
Start: 2024-09-09 | End: 2024-09-12 | Stop reason: HOSPADM

## 2024-09-09 RX ORDER — DEXTROSE, SODIUM CHLORIDE, SODIUM LACTATE, POTASSIUM CHLORIDE, AND CALCIUM CHLORIDE 5; .6; .31; .03; .02 G/100ML; G/100ML; G/100ML; G/100ML; G/100ML
INJECTION, SOLUTION INTRAVENOUS CONTINUOUS
Status: DISCONTINUED | OUTPATIENT
Start: 2024-09-09 | End: 2024-09-10

## 2024-09-09 RX ORDER — ALBUMIN HUMAN 50 G/1000ML
SOLUTION INTRAVENOUS
Status: DISCONTINUED | OUTPATIENT
Start: 2024-09-09 | End: 2024-09-10

## 2024-09-09 RX ORDER — SODIUM CHLORIDE 0.9 % (FLUSH) 0.9 %
10 SYRINGE (ML) INJECTION
Status: DISCONTINUED | OUTPATIENT
Start: 2024-09-09 | End: 2024-09-09 | Stop reason: HOSPADM

## 2024-09-09 RX ORDER — HYDROMORPHONE HYDROCHLORIDE 2 MG/ML
0.5 INJECTION, SOLUTION INTRAMUSCULAR; INTRAVENOUS; SUBCUTANEOUS EVERY 4 HOURS PRN
Status: DISCONTINUED | OUTPATIENT
Start: 2024-09-09 | End: 2024-09-12 | Stop reason: HOSPADM

## 2024-09-09 RX ORDER — HYDROMORPHONE HYDROCHLORIDE 2 MG/ML
INJECTION, SOLUTION INTRAMUSCULAR; INTRAVENOUS; SUBCUTANEOUS
Status: DISCONTINUED | OUTPATIENT
Start: 2024-09-09 | End: 2024-09-09

## 2024-09-09 RX ORDER — IBUPROFEN 400 MG/1
400 TABLET ORAL EVERY 6 HOURS
Status: DISCONTINUED | OUTPATIENT
Start: 2024-09-09 | End: 2024-09-12 | Stop reason: HOSPADM

## 2024-09-09 RX ORDER — DOCUSATE SODIUM 100 MG/1
100 CAPSULE, LIQUID FILLED ORAL DAILY
Status: DISCONTINUED | OUTPATIENT
Start: 2024-09-09 | End: 2024-09-12 | Stop reason: HOSPADM

## 2024-09-09 RX ORDER — OXYCODONE HYDROCHLORIDE 5 MG/1
5 TABLET ORAL
Status: DISCONTINUED | OUTPATIENT
Start: 2024-09-09 | End: 2024-09-09 | Stop reason: HOSPADM

## 2024-09-09 RX ORDER — MUPIROCIN 20 MG/G
OINTMENT TOPICAL 2 TIMES DAILY
Status: COMPLETED | OUTPATIENT
Start: 2024-09-09 | End: 2024-09-11

## 2024-09-09 RX ORDER — ALBUMIN HUMAN 50 G/1000ML
SOLUTION INTRAVENOUS
Status: DISCONTINUED | OUTPATIENT
Start: 2024-09-09 | End: 2024-09-09

## 2024-09-09 RX ORDER — TRANEXAMIC ACID 100 MG/ML
INJECTION, SOLUTION INTRAVENOUS
Status: DISCONTINUED | OUTPATIENT
Start: 2024-09-09 | End: 2024-09-09 | Stop reason: HOSPADM

## 2024-09-09 RX ORDER — FENTANYL CITRATE 50 UG/ML
INJECTION, SOLUTION INTRAMUSCULAR; INTRAVENOUS
Status: DISCONTINUED | OUTPATIENT
Start: 2024-09-09 | End: 2024-09-10

## 2024-09-09 RX ORDER — SCOLOPAMINE TRANSDERMAL SYSTEM 1 MG/1
1 PATCH, EXTENDED RELEASE TRANSDERMAL ONCE
Status: COMPLETED | OUTPATIENT
Start: 2024-09-09 | End: 2024-09-09

## 2024-09-09 RX ORDER — LIDOCAINE HYDROCHLORIDE 40 MG/ML
INJECTION, SOLUTION RETROBULBAR
Status: DISCONTINUED | OUTPATIENT
Start: 2024-09-09 | End: 2024-09-09 | Stop reason: HOSPADM

## 2024-09-09 RX ORDER — DEXAMETHASONE SODIUM PHOSPHATE 4 MG/ML
INJECTION, SOLUTION INTRA-ARTICULAR; INTRALESIONAL; INTRAMUSCULAR; INTRAVENOUS; SOFT TISSUE
Status: DISCONTINUED | OUTPATIENT
Start: 2024-09-09 | End: 2024-09-09

## 2024-09-09 RX ORDER — LIDOCAINE HYDROCHLORIDE 20 MG/ML
INJECTION INTRAVENOUS
Status: DISCONTINUED | OUTPATIENT
Start: 2024-09-09 | End: 2024-09-09

## 2024-09-09 RX ORDER — PROCHLORPERAZINE EDISYLATE 5 MG/ML
5 INJECTION INTRAMUSCULAR; INTRAVENOUS EVERY 30 MIN PRN
Status: DISCONTINUED | OUTPATIENT
Start: 2024-09-09 | End: 2024-09-12 | Stop reason: HOSPADM

## 2024-09-09 RX ORDER — ROCURONIUM BROMIDE 10 MG/ML
INJECTION, SOLUTION INTRAVENOUS
Status: DISCONTINUED | OUTPATIENT
Start: 2024-09-09 | End: 2024-09-09

## 2024-09-09 RX ADMIN — GABAPENTIN 300 MG: 300 CAPSULE ORAL at 09:09

## 2024-09-09 RX ADMIN — CEFAZOLIN 2 G: 2 INJECTION, POWDER, FOR SOLUTION INTRAMUSCULAR; INTRAVENOUS at 07:09

## 2024-09-09 RX ADMIN — PHENYLEPHRINE HYDROCHLORIDE 200 MCG: 10 INJECTION INTRAVENOUS at 10:09

## 2024-09-09 RX ADMIN — PROCHLORPERAZINE EDISYLATE 2.5 MG: 5 INJECTION INTRAMUSCULAR; INTRAVENOUS at 11:09

## 2024-09-09 RX ADMIN — ROCURONIUM BROMIDE 10 MG: 10 SOLUTION INTRAVENOUS at 12:09

## 2024-09-09 RX ADMIN — SODIUM CHLORIDE, SODIUM LACTATE, POTASSIUM CHLORIDE, CALCIUM CHLORIDE AND DEXTROSE MONOHYDRATE: 5; 600; 310; 30; 20 INJECTION, SOLUTION INTRAVENOUS at 06:09

## 2024-09-09 RX ADMIN — HEPARIN SODIUM 3000 UNITS: 1000 INJECTION, SOLUTION INTRAVENOUS; SUBCUTANEOUS at 01:09

## 2024-09-09 RX ADMIN — PROPOFOL 40 MG: 10 INJECTION, EMULSION INTRAVENOUS at 07:09

## 2024-09-09 RX ADMIN — SODIUM CHLORIDE, SODIUM LACTATE, POTASSIUM CHLORIDE, AND CALCIUM CHLORIDE: .6; .31; .03; .02 INJECTION, SOLUTION INTRAVENOUS at 10:09

## 2024-09-09 RX ADMIN — PROPOFOL 50 MG: 10 INJECTION, EMULSION INTRAVENOUS at 10:09

## 2024-09-09 RX ADMIN — ONDANSETRON 4 MG: 2 INJECTION INTRAMUSCULAR; INTRAVENOUS at 09:09

## 2024-09-09 RX ADMIN — ALBUMIN (HUMAN) 500 ML: 12.5 SOLUTION INTRAVENOUS at 11:09

## 2024-09-09 RX ADMIN — Medication 100 MCG: at 11:09

## 2024-09-09 RX ADMIN — FENTANYL CITRATE 100 MCG: 0.05 INJECTION, SOLUTION INTRAMUSCULAR; INTRAVENOUS at 07:09

## 2024-09-09 RX ADMIN — Medication 100 MCG: at 08:09

## 2024-09-09 RX ADMIN — PROPOFOL 160 MG: 10 INJECTION, EMULSION INTRAVENOUS at 07:09

## 2024-09-09 RX ADMIN — CEFAZOLIN 2 G: 330 INJECTION, POWDER, FOR SOLUTION INTRAMUSCULAR; INTRAVENOUS at 07:09

## 2024-09-09 RX ADMIN — Medication 10 MG: at 10:09

## 2024-09-09 RX ADMIN — KETAMINE HYDROCHLORIDE 10 MG: 50 INJECTION, SOLUTION INTRAMUSCULAR; INTRAVENOUS at 08:09

## 2024-09-09 RX ADMIN — ROCURONIUM BROMIDE 30 MG: 10 SOLUTION INTRAVENOUS at 09:09

## 2024-09-09 RX ADMIN — SODIUM CHLORIDE, SODIUM LACTATE, POTASSIUM CHLORIDE, AND CALCIUM CHLORIDE: .6; .31; .03; .02 INJECTION, SOLUTION INTRAVENOUS at 06:09

## 2024-09-09 RX ADMIN — MUPIROCIN: 20 OINTMENT TOPICAL at 09:09

## 2024-09-09 RX ADMIN — KETAMINE HYDROCHLORIDE 10 MG: 50 INJECTION, SOLUTION INTRAMUSCULAR; INTRAVENOUS at 09:09

## 2024-09-09 RX ADMIN — LIDOCAINE HYDROCHLORIDE 50 MG: 20 INJECTION, SOLUTION INTRAVENOUS at 10:09

## 2024-09-09 RX ADMIN — METHOCARBAMOL 500 MG: 500 TABLET ORAL at 09:09

## 2024-09-09 RX ADMIN — ENOXAPARIN SODIUM 40 MG: 100 INJECTION SUBCUTANEOUS at 05:09

## 2024-09-09 RX ADMIN — Medication 100 MCG: at 07:09

## 2024-09-09 RX ADMIN — PHENYLEPHRINE HYDROCHLORIDE 200 MCG: 10 INJECTION INTRAVENOUS at 11:09

## 2024-09-09 RX ADMIN — PHENYLEPHRINE HYDROCHLORIDE 0.25 MCG/KG/MIN: 10 INJECTION INTRAVENOUS at 08:09

## 2024-09-09 RX ADMIN — SCOPOLAMINE 1 PATCH: 1.5 PATCH, EXTENDED RELEASE TRANSDERMAL at 05:09

## 2024-09-09 RX ADMIN — ACETAMINOPHEN 1000 MG: 10 INJECTION INTRAVENOUS at 01:09

## 2024-09-09 RX ADMIN — PROPOFOL 100 MG: 10 INJECTION, EMULSION INTRAVENOUS at 10:09

## 2024-09-09 RX ADMIN — ALBUMIN (HUMAN) 500 ML: 12.5 SOLUTION INTRAVENOUS at 08:09

## 2024-09-09 RX ADMIN — SUGAMMADEX 200 MG: 100 INJECTION, SOLUTION INTRAVENOUS at 01:09

## 2024-09-09 RX ADMIN — OXYCODONE HYDROCHLORIDE 5 MG: 5 TABLET ORAL at 02:09

## 2024-09-09 RX ADMIN — IBUPROFEN 400 MG: 400 TABLET ORAL at 05:09

## 2024-09-09 RX ADMIN — CEFAZOLIN 2 G: 330 INJECTION, POWDER, FOR SOLUTION INTRAMUSCULAR; INTRAVENOUS at 11:09

## 2024-09-09 RX ADMIN — DOCUSATE SODIUM 100 MG: 100 CAPSULE, LIQUID FILLED ORAL at 04:09

## 2024-09-09 RX ADMIN — MIDAZOLAM HYDROCHLORIDE 1 MG: 1 INJECTION, SOLUTION INTRAMUSCULAR; INTRAVENOUS at 06:09

## 2024-09-09 RX ADMIN — KETAMINE HYDROCHLORIDE 30 MG: 50 INJECTION, SOLUTION INTRAMUSCULAR; INTRAVENOUS at 07:09

## 2024-09-09 RX ADMIN — GLYCOPYRROLATE 0.1 MG: 0.2 INJECTION INTRAMUSCULAR; INTRAVENOUS at 07:09

## 2024-09-09 RX ADMIN — CALCIUM CHLORIDE 1 G: 100 INJECTION, SOLUTION INTRAVENOUS at 11:09

## 2024-09-09 RX ADMIN — PROPOFOL 50 MG: 10 INJECTION, EMULSION INTRAVENOUS at 08:09

## 2024-09-09 RX ADMIN — ROCURONIUM BROMIDE 20 MG: 10 SOLUTION INTRAVENOUS at 10:09

## 2024-09-09 RX ADMIN — METHOCARBAMOL 500 MG: 500 TABLET ORAL at 03:09

## 2024-09-09 RX ADMIN — MUPIROCIN: 20 OINTMENT TOPICAL at 05:09

## 2024-09-09 RX ADMIN — LIDOCAINE HYDROCHLORIDE 60 MG: 20 INJECTION, SOLUTION INTRAVENOUS at 07:09

## 2024-09-09 RX ADMIN — ROCURONIUM BROMIDE 15 MG: 10 SOLUTION INTRAVENOUS at 10:09

## 2024-09-09 RX ADMIN — SODIUM CHLORIDE: 0.9 INJECTION, SOLUTION INTRAVENOUS at 11:09

## 2024-09-09 RX ADMIN — ACETAMINOPHEN 1000 MG: 500 TABLET ORAL at 03:09

## 2024-09-09 RX ADMIN — DEXAMETHASONE SODIUM PHOSPHATE 6 MG: 4 INJECTION, SOLUTION INTRA-ARTICULAR; INTRALESIONAL; INTRAMUSCULAR; INTRAVENOUS; SOFT TISSUE at 07:09

## 2024-09-09 RX ADMIN — GABAPENTIN 300 MG: 300 CAPSULE ORAL at 03:09

## 2024-09-09 RX ADMIN — ACETAMINOPHEN 1000 MG: 500 TABLET ORAL at 09:09

## 2024-09-09 RX ADMIN — ONDANSETRON 4 MG: 2 INJECTION INTRAMUSCULAR; INTRAVENOUS at 01:09

## 2024-09-09 RX ADMIN — HALOPERIDOL LACTATE 1 MG: 5 INJECTION, SOLUTION INTRAMUSCULAR at 11:09

## 2024-09-09 RX ADMIN — SUCCINYLCHOLINE CHLORIDE 120 MG: 20 INJECTION, SOLUTION INTRAMUSCULAR; INTRAVENOUS at 10:09

## 2024-09-09 RX ADMIN — FENTANYL CITRATE 100 MCG: 0.05 INJECTION, SOLUTION INTRAMUSCULAR; INTRAVENOUS at 10:09

## 2024-09-09 RX ADMIN — PROPOFOL 50 MCG/KG/MIN: 10 INJECTION, EMULSION INTRAVENOUS at 12:09

## 2024-09-09 RX ADMIN — ROCURONIUM BROMIDE 40 MG: 10 SOLUTION INTRAVENOUS at 07:09

## 2024-09-09 RX ADMIN — HYDROMORPHONE HYDROCHLORIDE 0.4 MG: 2 INJECTION INTRAMUSCULAR; INTRAVENOUS; SUBCUTANEOUS at 08:09

## 2024-09-09 RX ADMIN — ROCURONIUM BROMIDE 25 MG: 10 SOLUTION INTRAVENOUS at 11:09

## 2024-09-09 NOTE — TRANSFER OF CARE
"Anesthesia Transfer of Care Note    Patient: Sherry Torres    Procedure(s) Performed: Procedure(s) (LRB):  RECONSTRUCTION, BREAST, USING RODRIGUEZ FREE FLAP / BILATERAL RODRIGUEZ FLAP (Bilateral)  INJECTION, AGENT, INTRAVENOUS, FOR ASSESSMENT OF BLOOD FLOW IN FLAP OR GRAFT (N/A)  MASTECTOMY, BILATERAL (Bilateral)  BIOPSY, LYMPH NODE, SENTINEL (Right)  INJECTION, FOR SENTINEL NODE IDENTIFICATION (Right)    Patient location: PACU    Anesthesia Type: general    Transport from OR: Transported from OR on 6-10 L/min O2 by face mask with adequate spontaneous ventilation    Post pain: adequate analgesia    Post assessment: no apparent anesthetic complications and tolerated procedure well    Post vital signs: stable    Level of consciousness: awake and alert    Nausea/Vomiting: no nausea/vomiting    Complications: none    Transfer of care protocol was followed      Last vitals: Visit Vitals  /72 (BP Location: Right arm, Patient Position: Lying)   Pulse 66   Temp 36.9 °C (98.4 °F) (Oral)   Resp 18   Ht 5' 5" (1.651 m)   Wt 59.4 kg (130 lb 14.4 oz)   SpO2 99%   Breastfeeding No   BMI 21.78 kg/m²     "

## 2024-09-09 NOTE — OP NOTE
Operative Note     9/9/2024    PRE-OP DIAGNOSIS: Malignant neoplasm of upper-outer quadrant of right female breast [C50.411]      POST-OP DIAGNOSIS: Post-Op Diagnosis Codes:     * Malignant neoplasm of upper-outer quadrant of right female breast [C50.411]     * Malignant neoplasm of upper-inner quadrant of right female breast [C50.211]     * Estrogen receptor positive [Z17.0]    Procedure(s):  MASTECTOMY, BILATERAL  BIOPSY, LYMPH NODE, SENTINEL  INJECTION, FOR SENTINEL NODE IDENTIFICATION     SURGEON: Surgeons and Role:     * NADIRA Carroll MD - Primary    ANESTHESIA: General     OPERATIVE FINDINGS:  Ring clip confirmed removed intraoperative imaging, 1 sentinel lymph node    INDICATION FOR PROCEDURE: This patient presents with a history of recurrent cancer of the right breast    PROCEDURE IN DETAIL:  Sherry Torres is a 68 y.o. female brought to the operating room for definitive surgery of recurrent cancer of the right breast.  The patient has elected to undergo bilateral simple mastectomy with right sentinel lymph node biopsy for tre assessment. The patient was informed of the possible risks and complications of the procedure, including but not limited to anesthetic risks, bleeding, infection, and need for additional surgery.  The patient concurred with the proposed plan, and has given informed consent.  The site of surgery was properly noted/marked in the preoperative holding area.    The patient was then brought to the operating room and placed in the supine position with both upper extremities extended.  general anesthesia was administered. Perioperative antibiotics were administered and a time out was performed confirming the patient, site, and procedure.   The patient's right breast was injected with technetium sulfur colloid to facilitate sentinel lymph node identification.The bilateral chest and axilla was then prepped and draped in the usual sterile fashion.    We then turned our attention to the  left breast where a vertical elliptical incision was fashioned to incorporate the nipple areolar complex.  The incision was made with a 15-blade and extended through the subcutaneous tissues with Bovie electrocautery.  Skin flaps were raised to the clavicle superiorly, lateral border of the sternum medially, to the inframammary fold inferiorly, and to the anterior border of the latissimus dorsi muscle laterally. The breast tissue was sharply excised off the chest wall taking care to incorporate the pectoralis fascia while leaving the serratus fascia behind.  The resulting mastectomy specimen was marked using a short stitch superiorly and long stitch laterally.  The breast was sent to pathology for permanent evaluation.      We then turned our attention to the right breast where a vertical elliptical incision was fashioned to incorporate the nipple areolar complex.  The incision was made with a 15-blade and extended through the subcutaneous tissues with Bovie electrocautery.  Skin flaps were raised to the clavicle superiorly, lateral border of the sternum medially, to the inframammary fold inferiorly, and to the anterior border of the latissimus dorsi muscle laterally. The breast tissue was sharply excised off the chest wall taking care to incorporate the pectoralis fascia while leaving the serratus fascia behind.  The resulting mastectomy specimen was marked using a short stitch superiorly and long stitch laterally.  Intraoperative imaging of the breast specimen showed the clip in the specimen at the anticipated location.   The breast was sent to pathology for permanent evaluation.        We then  turned our attention to the right axilla. The gamma probe was used to identify an area of increased radioactivity within the lower axilla. The clavipectoral sheath was sharply incised to reveal the level I axillary lymph nodes. The probe was used to identify a single node with increased radioactivity.  This node was  brought into the operative field and carefully dissected free of the surrounding lymphovascular structures.  The node was then sent to pathology  labeled as sentinel node #1.  A total of 1 axillary sentinel nodes and 0 axillary non-sentinel nodes were identified, excised and submitted to pathology.  Bed counts were obtained to confirm that the 10% rule had not been violated.   The wound was irrigated with normal saline, and all bleeding points were secured with Bovie electrocautery.     The case was then transitioned to the plastic surgery service for reconstruction and closure.    ESTIMATED BLOOD LOSS: less than 50 mL    COMPLICATIONS:  None    DISPOSITION:  Intraoperative transferred to Plastic surgery Service    ATTESTATION:   I was present and scrubbed for the entire procedure.    Jayme Attestation:  [place] Node Biopsy for Breast Cancer  Operation performed with curative intent Yes   Tracer(s) used to identify sentinel nodes in the upfront surgery (non-neoadjuvant) setting Radioactive tracer   Tracer(s) used to identify sentinel nodes in the neoadjuvant setting N/A   All nodes (colored or non-colored) present at the end of a dye-filled lymphatic channel were removed N/A   All significantly radioactive nodes were removed Yes   All palpably suspicious nodes were removed Yes   Biopsy-proven positive nodes marked with clips prior to chemotherapy were identified and removed N/A

## 2024-09-09 NOTE — H&P
H&P completed on 7/17/24 has been reviewed, the patient has been examined and:  I concur with the findings and no changes have occurred since H&P was written.    There are no hospital problems to display for this patient.     Physical Exam  Vitals reviewed.   Constitutional:       Appearance: Normal appearance.   Cardiovascular:      Rate and Rhythm: Normal rate.   Pulmonary:      Effort: Pulmonary effort is normal. No respiratory distress.      Comments: Bilateral well healed scars.  Minimal skin change from radiation  Chest:   Breasts:     Right: No inverted nipple, mass, nipple discharge, skin change or tenderness.      Left: No inverted nipple, mass, nipple discharge, skin change or tenderness.       Lymphadenopathy:      Cervical: No cervical adenopathy.      Upper Body:      Right upper body: No supraclavicular or axillary adenopathy.      Left upper body: No supraclavicular or axillary adenopathy.   Skin:     General: Skin is warm and dry.   Neurological:      General: No focal deficit present.      Mental Status: She is alert and oriented to person, place, and time.   Psychiatric:         Mood and Affect: Mood normal.         Behavior: Behavior normal.            Breast Surgery  [facility] [place]  Department of Surgery      REFERRING PROVIDER: Pito Holloway DO  [address] [place] [address]    Chief Complaint: No chief complaint on file.      Subjective:      Patient ID: Sherry Torres is a 68 y.o. female who presents with right breast Invasive Ductal Carcinoma.     She presented for screening mammogram on 6/21/2024 for yearly scheduled screening.. This identified breast asymmetries the bilateral breasts. Follow-up mammogram and ultrasound on 07/02/2024 showed the left breast asymmetry dissipate on spot compression views , the right breast asymmetry corresponded to a mass at 01:00 o'clock position, 3 cm from the nipple measuring 4mm. A ultrasound guided biopsy was performed on  2024 with pathology revealing invasive ductal carcinoma of the breast.     Findings at that time were the following:   Lesion 1:    Location:  Right, 01:00 o'clock   Clip:  Infinity, in expected position  Tumor size: 0.4 cm   Tumor ndgndrndanddndend:nd nd2nd Estrogen Receptor: +   Progesterone Receptor: +   Her-2 jareth: -   Lymph node status:  Clinically negative     Patient has not noted a change on breast exam.  Patient denies nipple discharge. Patient admits to previous breast biopsy. Patient admits to a personal history of breast cancer. Family history includes maternal grandmother with breast cancer, maternal great aunt with breast cancer.     She has a history of right breast cancer. She had stage II A, strongly ER and SC positive and HER-2 negative disease. Hx of right lumpectomy, radiation, and AI x 5 years.  Underwent genetic testing in 2019 with no pathogenic mutation.    GYN History:  Age of menarche was 9. Age of menopause was 41.  Patient denies hormonal therapy. Patient is . Age of first live birth was 32. Patient did breast feed.    Past Medical History:   Diagnosis Date    Anxiety disorder, unspecified     r/t dx    Atypical ductal hyperplasia, breast     left side    Breast cancer 2016    right side    Breast cyst approx. 40 years ago    Cancer     Depression     r/t dx    GERD (gastroesophageal reflux disease)     History of thyroid cancer     Lobular carcinoma in situ not certain of 2016 diagnosis    Malignant neoplasm of upper-outer quadrant of right breast in female, estrogen receptor positive 09/15/2016    Mitral valve prolapse     PONV (postoperative nausea and vomiting)      Past Surgical History:   Procedure Laterality Date    BREAST BIOPSY Right 2016    BREAST BIOPSY Left     exc bx    BREAST LUMPECTOMY Right 2016    w/radiation    BREAST SURGERY   AND  OR     CATARACT EXTRACTION W/  INTRAOCULAR LENS IMPLANT Right 2024    Procedure: EXTRACTION, CATARACT, WITH  IOL INSERTION;  Surgeon: Zhane Khan MD;  Location: [facility] OR;  Service: Ophthalmology;  Laterality: Right;     SECTION      x 2  and     COLONOSCOPY N/A 10/08/2019    Procedure: COLONOSCOPY;  Surgeon: Eliceo Holloway MD;  Location: [facility] ENDO (4TH FLR);  Service: Endoscopy;  Laterality: N/A;  Okay for [place] or Holloway. However, she needs it done before end , so if they are not available before then, okay for any provided.    CYST REMOVED FROM RIGHT BREAST IN       EXTRACTION OF CATARACT Left 2023    Procedure: EXTRACTION, CATARACT;  Surgeon: Eric Cardona MD;  Location: [facility] OR;  Service: Ophthalmology;  Laterality: Left;  DiB00 +21.0D    HERNIA REPAIR      over C section incision     left breast wire localization excisional biopsy with bracketed wires using 3 wires and excision through 1 incision.       LIPOMA RESECTION N/A 10/04/2023    Procedure: EXCISION, LIPOMA Back;  Surgeon: Kenneth Hernández MD;  Location: [facility] OR 2ND FLR;  Service: General;  Laterality: N/A;    PHACOEMULSIFICATION, CATARACT, WITH IOL INSERTION Left 2023    Procedure: PHACOEMULSIFICATION, CATARACT, WITH IOL INSERTION;  Surgeon: Eric Cardona MD;  Location: [facility] OR;  Service: Ophthalmology;  Laterality: Left;    THYROIDECTOMY      TRANSFORAMINAL EPIDURAL INJECTION OF STEROID Right 2019    Procedure: Injection,steroid,epidural,transforaminal approach LUMBAR TRANSFORAMINAL RIGHT L5 AND S1 TF ARNOLDO;  Surgeon: Nadya Mcfarland MD;  Location: [facility] PAIN MGT;  Service: Pain Management;  Laterality: Right;  NEEDS CONSENT    TUBAL LIGATION       Current Facility-Administered Medications on File Prior to Encounter   Medication Dose Route Frequency Provider Last Rate Last Admin    0.9%  NaCl infusion   Intravenous Continuous Hakan Garces PA-C 70 mL/hr at 10/04/23 1113 New Bag at 10/04/23 1113    haloperidol lactate injection 0.5 mg  0.5 mg Intravenous Q10 Min PRN Crystal Holloway MD         HYDROmorphone injection 0.2 mg  0.2 mg Intravenous Q5 Min PRN Crystal Holloway MD        LIDOcaine (PF) 10 mg/ml (1%) injection 10 mg  1 mL Intradermal Once Eric Cardona MD        sodium chloride 0.9% flush 10 mL  10 mL Intravenous PRN Crystal Holloway MD        [DISCONTINUED] ceFAZolin 2 g in dextrose 5 % in water (D5W) 50 mL IVPB (MB+)  2 g Intravenous On Call Procedure Hakan Garces PA-C         Current Outpatient Medications on File Prior to Encounter   Medication Sig Dispense Refill    levothyroxine (SYNTHROID) 88 MCG tablet Take 1 tablet (88 mcg total) by mouth before breakfast. 30 tablet 11    loratadine (CLARITIN) 10 mg tablet Take 10 mg by mouth once daily. AS NEEDED FOR ALLERGIES      pantoprazole (PROTONIX) 40 MG tablet Take 1 tablet (40 mg total) by mouth once daily. 90 tablet 3    potassium chloride SA (K-DUR,KLOR-CON) 10 MEQ tablet Take 20 mEq by mouth once daily.      tretinoin (RETIN-A) 0.025 % cream Compound tretinoin 0.025% / niacinamide 2% cream / hyaluronic acid 0.25%. Apply a pea-sized amount to entire face qhs. 30 g 11    semaglutide (OZEMPIC) 0.25 mg or 0.5 mg (2 mg/3 mL) pen injector Inject 0.5 mg into the skin every 7 days. On tuesday       Social History     Socioeconomic History    Marital status:     Number of children: 2   Occupational History     Comment:    Tobacco Use    Smoking status: Former     Current packs/day: 0.00     Average packs/day: 0.5 packs/day for 2.9 years (1.4 ttl pk-yrs)     Types: Cigarettes     Start date: 2/10/1985     Quit date: 1988     Years since quittin.7    Smokeless tobacco: Never    Tobacco comments:     social smoker for several years; 1-2 cigarettes/week.  Quit ,   Substance and Sexual Activity    Alcohol use: Yes     Alcohol/week: 6.0 standard drinks of alcohol     Types: 2 Glasses of wine, 4 Drinks containing 0.5 oz of alcohol per week     Comment: Don't drink much as it aggravates GERD    Drug use: Never    Sexual  activity: Not Currently     Partners: Male     Birth control/protection: Post-menopausal   Other Topics Concern    Patient feels they ought to cut down on drinking/drug use No    Patient annoyed by others criticizing their drinking/drug use No    Patient has felt bad or guilty about drinking/drug use No    Patient has had a drink/used drugs as an eye opener in the AM No   Social History Narrative    ,  x 40 years, 2 children, 2 grandchildren, [employer]     Social Determinants of Health     Financial Resource Strain: Low Risk  (5/2/2024)    Overall Financial Resource Strain (CARDIA)     Difficulty of Paying Living Expenses: Not hard at all   Food Insecurity: No Food Insecurity (5/2/2024)    Hunger Vital Sign     Worried About Running Out of Food in the Last Year: Never true     Ran Out of Food in the Last Year: Never true   Transportation Needs: No Transportation Needs (5/2/2024)    PRAPARE - Transportation     Lack of Transportation (Medical): No     Lack of Transportation (Non-Medical): No   Physical Activity: Insufficiently Active (5/2/2024)    Exercise Vital Sign     Days of Exercise per Week: 3 days     Minutes of Exercise per Session: 30 min   Stress: Stress Concern Present (5/2/2024)    [employer] - Occupational Stress Questionnaire     Feeling of Stress : To some extent   Housing Stability: Low Risk  (2/19/2024)    Housing Stability Vital Sign     Unable to Pay for Housing in the Last Year: No     Number of Places Lived in the Last Year: 1     Unstable Housing in the Last Year: No     Family History   Problem Relation Name Age of Onset    Osteoporosis Mother Miranda Mesa     Arthritis Mother Miranda Mesa     Dementia Father      Breast cancer Maternal Grandmother Gabriellalux Montes 88    Cancer Maternal Grandmother Gabriella Simon     Breast cancer Other mat great aunt 70    Colon cancer Neg Hx      Colon polyps Neg Hx      Rectal cancer Neg Hx      Stomach cancer Neg  "Hx      Esophageal cancer Neg Hx      Liver cancer Neg Hx      Liver disease Neg Hx      Cirrhosis Neg Hx      Inflammatory bowel disease Neg Hx      Celiac disease Neg Hx      Crohn's disease Neg Hx      Ulcerative colitis Neg Hx      Ovarian cancer Neg Hx      Melanoma Neg Hx          Review of Systems   All other systems reviewed and are negative.    Objective:   /72 (BP Location: Right arm, Patient Position: Lying)   Pulse 66   Temp 98.4 °F (36.9 °C) (Oral)   Resp 18   Ht 5' 5" (1.651 m)   Wt 59.4 kg (130 lb 14.4 oz)   SpO2 99%   Breastfeeding No   BMI 21.78 kg/m²     Physical Exam   Vitals reviewed.  Constitutional: She is oriented to person, place, and time.   Cardiovascular:  Normal rate.            Pulmonary/Chest: Effort normal. No respiratory distress. Right breast exhibits no inverted nipple, no mass, no nipple discharge, no skin change and no tenderness. Left breast exhibits no inverted nipple, no mass, no nipple discharge, no skin change and no tenderness.   Bilateral well healed scars.  Minimal skin change from radiation       Abdominal: Normal appearance.   Musculoskeletal: Lymphadenopathy:      Cervical: No cervical adenopathy.      Upper Body:      Right upper body: No supraclavicular or axillary adenopathy.      Left upper body: No supraclavicular or axillary adenopathy.     Neurological: She is alert and oriented to person, place, and time.   Skin: Skin is warm and dry.     Psychiatric: Her behavior is normal. Mood normal.       Radiology review: Images personally reviewed by me in the clinic and shown to the patient during the consultation.     Assessment:       1. Malignant neoplasm of upper-inner quadrant of right breast in female, estrogen receptor positive    2. Personal history of breast cancer        Plan:   Multidisciplinary nature of breast cancer care was discussed in detail at today's visit.  She was then established patient of Dr. Mckee.     According to National " Comprehensive Cancer Network guidelines, systemic staging is recommended in the setting of recurrence. This generally consists of either a PET/CT scan or a CT of the chest, abdomen, and pelvis, as well as a bone scan.  Ordered.    We discussed surgical options in the setting of recurrent breast cancer with prior lumpectomy and radiation. While there is no survival benefit to undergoing a mastectomy compared to lumpectomy, there is a high local recurrence risk of preforming a lumpectomy without radiation. Radiation is not typically given twice to the same breast. She completed radiation therapy after her first breast cancer diagnosis.  We discussed that with her small breast cancer size we could look into the option for a lumpectomy partial breast radiation but I would have to discuss with Radiation Oncology.  She desires mastectomy.  We discussed mastectomy for local control of the new breast cancer diagnosis.  Surgical technique and rationale was discussed with the patient. Risks and benefits of the procedure were discussed in detail.  Risks include but are not limited to infection, bleeding, poor cosmesis, positive margins requiring reexcision, and local and distant recurrence.     We discussed the option for contralateral prophylactic mastectomy.  Undergoing a contralateral prophylactic mastectomy does not approve the cure rate for the known cancer or reduce the risk of the that cancer returning.  Overall most women diagnosed with breast cancer have a 2-6% risk of developing a breast cancer in the opposite breast in the next 10 years.  Contralateral prophylactic mastectomy is not 100% protected against development of a new breast cancer.  Undergoing surgery on the contralateral breast has the risk of surgical site complications which could delay cancer treatments.  Most women who proceed with contralateral prophylactic mastectomy do so due to symmetry concerns or for peace of mind.     Reconstruction after  mastectomy was discussed.  Reconstructive generally include implant or autologous tissue reconstruction.  Prior radiation can increase risks associated reconstruction.  She has a significant abdominal surgical history may not be a candidate for abdominal flap.  I will facilitate consultation with Plastic surgery to further discuss her options for reconstruction.     Based on her medical history she is a low risk of complications. Materials utilized in the surgery include surgical metal clips, suture material, and skin adhesives. These materials can lead to allergic reactions, skin irration, and other complications. Medications utilized in surgery include but are not limited to antibiotics, isosulfan blue dye, and technetium sulfa colloid.  Minimal cross-reactivity to dyes utilized for sentinel lymph node biopsy in the setting of sulfa allergy.  Due to high-risk of inability to map recommend proceeding with both eyes despite allergy. We will consider pretreatment in OR for allergy.    We also discussed axillary staging using sentinel node biopsy.  [place] lymph node biopsies performed utilizing the injection of blue and radioactive dye.  This dye travels to the 1st few lymph nodes that drain the breast.  Lymph nodes that uptake the blue or radioactive dye or are palpable are surgically removed and sent to pathology.  Typically 1-5 lymph nodes are removed during this procedure although exact numbers vary depending on the patient.  This procedure allows sampling of the lymph nodes most at risk for metastasis.  The risks and benefits of the procedure discussed the patient.  Risks include but are not limited to lymphedema, bleeding, infection, poor cosmesis, numbness of the incision site or arm, seroma, failure of dye to map, and need for additional surgery.  If metastatic disease is identified on pathology of the lymph nodes been axillary dissection will most likely be recommended.  We discussed the risk of lymphedema  in particular as it is elevated due to her prior sentinel lymph node biopsy.  We also discussed the risk of failure of the dye to map in the setting of prior sentinel lymph node biopsy.  Due to the low risk features of her breast cancer and age of 68, if the dye does not map then I would defer immediate axilla lymph node dissection and discuss at tumor board.    We also discussed the role of systemic therapy in the treatment of early stage breast cancer. We discussed that this is based on tumor biology and tre status and will be determined based on final pathology.  She has an early stage hormone receptor positive tumor. I do not anticipate she will need chemotherapy although final recommendations are pending surgical pathology. She will likely be recommended for repeat anti-estrogen therapy. We discussed that if the cancer is hormone positive, endocrine therapy would be recommended in most cases and its use can reduce the risk of recurrence as well as improve survival.     She completed genetic testing in 2019 which was negative.    Following her discussion today, she will be scheduled for a right mastectomy, she is considering contralateral mastectomy, right axillary sentinel lymph node biopsy.  If dye does not map we will defer axilla lymph node dissection.  May benefit for pretreatment for sulfa allergy.  Staging scans pending.    Surgery will be scheduled once reconstructive plans are finalized. Follow-up in clinic roughly 14 days after surgery.      Patient was given the patient information packet.  All her questions were answered.    Total time spent with the patient: 60 minutes.  45 minutes of face to face consultation and 15 minutes of chart review and coordination of care.

## 2024-09-09 NOTE — BRIEF OP NOTE
Brief Operative Note     SUMMARY     Surgery Date: 9/9/2024     Surgeons and Role:  Panel 1:     * Pito Holloway DO - Primary     * Luis Daniel Mcneal MD - Co-Surgeon  Panel 2:     * NADIRA Carroll MD - Primary    Assisting Surgeon: None    Pre-op Diagnosis:  Malignant neoplasm of upper-outer quadrant of right female breast [C50.411]    Post-op Diagnosis:  Malignant neoplasm of upper-outer quadrant of right female breast [C50.411]    Procedure(s) (LRB):  RECONSTRUCTION, BREAST, USING RODRIGUEZ FREE FLAP / BILATERAL RODRIGUEZ FLAP (Bilateral)  INJECTION, AGENT, INTRAVENOUS, FOR ASSESSMENT OF BLOOD FLOW IN FLAP OR GRAFT (N/A)  MASTECTOMY, BILATERAL (Bilateral)  BIOPSY, LYMPH NODE, SENTINEL (Right)  INJECTION, FOR SENTINEL NODE IDENTIFICATION (Right)    Anesthesia: General    Description of Procedure:   Immediate Bilateral RODRIGUEZ free flap breast reconstruction  Use of intraoperatively ICG angiography  Bilateral TAP and intercostal rib blocks  TUAN drain x4 placement     Findings/Key Components:  See op note    Estimated Blood Loss: less than 100 mL         Specimens Removed:   Specimen (24h ago, onward)       Start     Ordered    09/09/24 0833  Specimen to Pathology, Surgery Breast  Once        Comments: Pre-op Diagnosis: Malignant neoplasm of upper-outer quadrant of right female breast [C50.411]Procedure(s):RECONSTRUCTION, BREAST, USING RODRIGUEZ FREE FLAP / BILATERAL RODRIGUEZ FLAPINJECTION, AGENT, INTRAVENOUS, FOR ASSESSMENT OF BLOOD FLOW IN FLAP OR GRAFTMASTECTOMY, BILATERALBIOPSY, LYMPH NODE, SENTINELINJECTION, FOR SENTINEL NODE IDENTIFICATION Number of specimens: 3Name of specimens: 1. Left Mastectomy Short Stitch Superior Long Stitch Lateral2. Right Mastectomy Short Stitch Superior Long Stitch Lateral3. Right Axillary [place] Lymph Node #1 Hot 307     References:    Click here for ordering Quick Tip   Question Answer Comment   Procedure Type: Breast    Release to patient Immediate        09/09/24 9550

## 2024-09-09 NOTE — ANESTHESIA PROCEDURE NOTES
Intubation    Date/Time: 9/9/2024 7:15 AM    Performed by: Татьяна Allred CRNA  Authorized by: Darrell Smith MD    Intubation:     Induction:  Intravenous    Intubated:  Postinduction    Mask Ventilation:  Easy mask    Attempts:  1    Attempted By:  CRNA    Method of Intubation:  Video laryngoscopy    Blade:  Delatorre 3    Laryngeal View Grade: Grade I - full view of cords      Difficult Airway Encountered?: No      Complications:  None    Airway Device:  Oral endotracheal tube    Airway Device Size:  7.0    Style/Cuff Inflation:  Cuffed (inflated to minimal occlusive pressure)    Inflation Amount (mL):  3    Tube secured:  21    Secured at:  The lips    Placement Verified By:  Capnometry    Complicating Factors:  None    Findings Post-Intubation:  BS equal bilateral and atraumatic/condition of teeth unchanged

## 2024-09-09 NOTE — BRIEF OP NOTE
[facility], operated by [facility] - Surgery ([place])  Brief Operative Note    SUMMARY     Surgery Date: 9/9/2024     Surgeons and Role:  Panel 1:     * Pito Holloway DO - Primary  Panel 2:     * NADIRA Carroll MD - Primary    Assisting Surgeon: None    Pre-op Diagnosis:  Malignant neoplasm of upper-outer quadrant of right female breast [C50.411]    Post-op Diagnosis:  Post-Op Diagnosis Codes:     * Malignant neoplasm of upper-outer quadrant of right female breast [C50.411]     * Malignant neoplasm of upper-inner quadrant of right female breast [C50.211]     * Estrogen receptor positive [Z17.0]    Procedure(s) (LRB):  RECONSTRUCTION, BREAST, USING RODRIGUEZ FREE FLAP / BILATERAL RODRIGUEZ FLAP (Bilateral)  INJECTION, AGENT, INTRAVENOUS, FOR ASSESSMENT OF BLOOD FLOW IN FLAP OR GRAFT (N/A)  MASTECTOMY, BILATERAL (Bilateral)  BIOPSY, LYMPH NODE, SENTINEL (Right)  INJECTION, FOR SENTINEL NODE IDENTIFICATION (Right)    Anesthesia: General    Implants:  * No implants in log *    Operative Findings: Bilateral mastectomy with right sentinel lymph node biopsy without apparent complication. Specimens sent to pathology. RODRIGUEZ flap reconstruction to be dictated by Dr. Holloway    Estimated Blood Loss: minimal           Specimens:   Specimen (24h ago, onward)      None            [id number]

## 2024-09-09 NOTE — ANESTHESIA POSTPROCEDURE EVALUATION
Anesthesia Post Evaluation    Patient: Sherry Torres    Procedure(s) Performed: Procedure(s) (LRB):  RECONSTRUCTION, BREAST, USING RODRIGUEZ FREE FLAP / BILATERAL RODRIGUEZ FLAP (Bilateral)  INJECTION, AGENT, INTRAVENOUS, FOR ASSESSMENT OF BLOOD FLOW IN FLAP OR GRAFT (N/A)  MASTECTOMY, BILATERAL (Bilateral)  BIOPSY, LYMPH NODE, SENTINEL (Right)  INJECTION, FOR SENTINEL NODE IDENTIFICATION (Right)    Final Anesthesia Type: general      Patient location during evaluation: PACU  Patient participation: Yes- Able to Participate  Level of consciousness: awake and alert  Post-procedure vital signs: reviewed and stable  Pain management: adequate  Airway patency: patent  NELIA mitigation strategies: Extubation while patient is awake  PONV status at discharge: No PONV  Anesthetic complications: no      Cardiovascular status: hemodynamically stable  Respiratory status: unassisted  Hydration status: euvolemic  Follow-up not needed.              Vitals Value Taken Time   /56 09/09/24 1719   Temp 36.3 °C (97.4 °F) 09/09/24 1719   Pulse 88 09/09/24 1719   Resp 15 09/09/24 1719   SpO2 96 % 09/09/24 1719         Event Time   Out of Recovery 17:08:12         Pain/Sheldon Score: Pain Rating Prior to Med Admin: 3 (9/9/2024  5:59 PM)  Pain Rating Post Med Admin: 3 (9/9/2024  4:00 PM)  Sheldon Score: 10 (9/9/2024  4:30 PM)

## 2024-09-09 NOTE — NURSING TRANSFER
Nursing Transfer Note      9/9/2024   4:33 PM    Nurse giving handoff:kelly kennedy  Nurse receiving handoff:rn room 358    Reason patient is being transferred: postop    Transfer To: room 378    Transfer via bed    Transfer with x 4 drains, internal doppler    Transported by rn    Transfer Vital Signs:  Blood Pressure:see flowsheet  Heart Rate:  O2:  Temperature:  Respirations:    Telemetry:   Order for Tele Monitor? No    Additional Lines: Reis Catheter    4eyes on Skin: yes    Medicines sent:     Any special needs or follow-up needed:     Patient belongings transferred with patient: No    Chart send with patient: Yes    Notified: spouse    Patient reassessed at:  (date, time)  1  Upon arrival to floor:

## 2024-09-10 LAB
ANION GAP SERPL CALC-SCNC: 7 MMOL/L (ref 8–16)
BLD PROD TYP BPU: NORMAL
BLD PROD TYP BPU: NORMAL
BLOOD UNIT EXPIRATION DATE: NORMAL
BLOOD UNIT EXPIRATION DATE: NORMAL
BLOOD UNIT TYPE CODE: 6200
BLOOD UNIT TYPE CODE: 6200
BLOOD UNIT TYPE: NORMAL
BLOOD UNIT TYPE: NORMAL
BUN SERPL-MCNC: 10 MG/DL (ref 8–23)
CALCIUM SERPL-MCNC: 8.8 MG/DL (ref 8.7–10.5)
CHLORIDE SERPL-SCNC: 106 MMOL/L (ref 95–110)
CO2 SERPL-SCNC: 23 MMOL/L (ref 23–29)
CODING SYSTEM: NORMAL
CODING SYSTEM: NORMAL
CREAT SERPL-MCNC: 0.9 MG/DL (ref 0.5–1.4)
CROSSMATCH INTERPRETATION: NORMAL
CROSSMATCH INTERPRETATION: NORMAL
DISPENSE STATUS: NORMAL
DISPENSE STATUS: NORMAL
ERYTHROCYTE [DISTWIDTH] IN BLOOD BY AUTOMATED COUNT: 12.9 % (ref 11.5–14.5)
EST. GFR  (NO RACE VARIABLE): >60 ML/MIN/1.73 M^2
GLUCOSE SERPL-MCNC: 128 MG/DL (ref 70–110)
HCT VFR BLD AUTO: 29.6 % (ref 37–48.5)
HGB BLD-MCNC: 9.8 G/DL (ref 12–16)
MCH RBC QN AUTO: 31.4 PG (ref 27–31)
MCHC RBC AUTO-ENTMCNC: 33.1 G/DL (ref 32–36)
MCV RBC AUTO: 95 FL (ref 82–98)
NUM UNITS TRANS PACKED RBC: NORMAL
NUM UNITS TRANS PACKED RBC: NORMAL
PLATELET # BLD AUTO: 249 K/UL (ref 150–450)
PMV BLD AUTO: 9.2 FL (ref 9.2–12.9)
POTASSIUM SERPL-SCNC: 4.2 MMOL/L (ref 3.5–5.1)
RBC # BLD AUTO: 3.12 M/UL (ref 4–5.4)
SODIUM SERPL-SCNC: 136 MMOL/L (ref 136–145)
WBC # BLD AUTO: 9.38 K/UL (ref 3.9–12.7)

## 2024-09-10 PROCEDURE — 36415 COLL VENOUS BLD VENIPUNCTURE: CPT | Performed by: SURGERY

## 2024-09-10 PROCEDURE — 25000003 PHARM REV CODE 250: Performed by: SURGERY

## 2024-09-10 PROCEDURE — 85027 COMPLETE CBC AUTOMATED: CPT | Performed by: SURGERY

## 2024-09-10 PROCEDURE — 63600175 PHARM REV CODE 636 W HCPCS: Performed by: NURSE ANESTHETIST, CERTIFIED REGISTERED

## 2024-09-10 PROCEDURE — 63600175 PHARM REV CODE 636 W HCPCS: Performed by: SURGERY

## 2024-09-10 PROCEDURE — 80048 BASIC METABOLIC PNL TOTAL CA: CPT | Performed by: SURGERY

## 2024-09-10 PROCEDURE — 27000221 HC OXYGEN, UP TO 24 HOURS

## 2024-09-10 PROCEDURE — 11000001 HC ACUTE MED/SURG PRIVATE ROOM

## 2024-09-10 PROCEDURE — 94761 N-INVAS EAR/PLS OXIMETRY MLT: CPT

## 2024-09-10 PROCEDURE — 30233N1 TRANSFUSION OF NONAUTOLOGOUS RED BLOOD CELLS INTO PERIPHERAL VEIN, PERCUTANEOUS APPROACH: ICD-10-PCS | Performed by: SURGERY

## 2024-09-10 RX ADMIN — LEVOTHYROXINE SODIUM 88 MCG: 88 TABLET ORAL at 05:09

## 2024-09-10 RX ADMIN — IBUPROFEN 400 MG: 400 TABLET ORAL at 06:09

## 2024-09-10 RX ADMIN — ACETAMINOPHEN 1000 MG: 500 TABLET ORAL at 09:09

## 2024-09-10 RX ADMIN — METHOCARBAMOL 500 MG: 500 TABLET ORAL at 09:09

## 2024-09-10 RX ADMIN — SODIUM CHLORIDE, SODIUM LACTATE, POTASSIUM CHLORIDE, AND CALCIUM CHLORIDE: .6; .31; .03; .02 INJECTION, SOLUTION INTRAVENOUS at 12:09

## 2024-09-10 RX ADMIN — DOCUSATE SODIUM 100 MG: 100 CAPSULE, LIQUID FILLED ORAL at 09:09

## 2024-09-10 RX ADMIN — IBUPROFEN 400 MG: 400 TABLET ORAL at 05:09

## 2024-09-10 RX ADMIN — MUPIROCIN: 20 OINTMENT TOPICAL at 09:09

## 2024-09-10 RX ADMIN — GABAPENTIN 300 MG: 300 CAPSULE ORAL at 02:09

## 2024-09-10 RX ADMIN — CEFAZOLIN 2 G: 2 INJECTION, POWDER, FOR SOLUTION INTRAMUSCULAR; INTRAVENOUS at 04:09

## 2024-09-10 RX ADMIN — GABAPENTIN 300 MG: 300 CAPSULE ORAL at 09:09

## 2024-09-10 RX ADMIN — METHOCARBAMOL 500 MG: 500 TABLET ORAL at 02:09

## 2024-09-10 RX ADMIN — SODIUM CHLORIDE, SODIUM LACTATE, POTASSIUM CHLORIDE, CALCIUM CHLORIDE AND DEXTROSE MONOHYDRATE: 5; 600; 310; 30; 20 INJECTION, SOLUTION INTRAVENOUS at 01:09

## 2024-09-10 RX ADMIN — ACETAMINOPHEN 1000 MG: 500 TABLET ORAL at 02:09

## 2024-09-10 RX ADMIN — ENOXAPARIN SODIUM 40 MG: 40 INJECTION SUBCUTANEOUS at 09:09

## 2024-09-10 RX ADMIN — IBUPROFEN 400 MG: 400 TABLET ORAL at 11:09

## 2024-09-10 RX ADMIN — ACETAMINOPHEN 1000 MG: 500 TABLET ORAL at 05:09

## 2024-09-10 RX ADMIN — IBUPROFEN 400 MG: 400 TABLET ORAL at 01:09

## 2024-09-10 NOTE — PROGRESS NOTES
Vital signs stable, Pt comfortable, Pacu Discharge criteria met, Post op chest xray done, dr Chandra informed

## 2024-09-10 NOTE — PROGRESS NOTES
[facility] - [facility] Surg ([address])  General Surgery  Progress Note    Subjective:     History of Present Illness:  No notes on file    Post-Op Info:  Procedure(s) (LRB):  EVACUATION, HEMATOMA (Left)   1 Day Post-Op     Interval History: Hematoma evacuated in OR last night, received 1u prbcs, doing well this am. No N/V, pain well controlled, denies any new breast swelling, TUAN drains SS output, incisions CDI    Medications:  Continuous Infusions:  Scheduled Meds:   acetaminophen  1,000 mg Oral Q8H    docusate sodium  100 mg Oral Daily    enoxparin  40 mg Subcutaneous Q24H (treatment, non-standard time)    gabapentin  300 mg Oral TID    ibuprofen  400 mg Oral Q6H    levothyroxine  88 mcg Oral Before breakfast    methocarbamoL  500 mg Oral TID    mupirocin   Nasal BID     PRN Meds:  Current Facility-Administered Medications:     0.9%  NaCl infusion (for blood administration), , Intravenous, Q24H PRN    dextrose 10%, 12.5 g, Intravenous, PRN    dextrose 10%, 25 g, Intravenous, PRN    glucagon (human recombinant), 1 mg, Intramuscular, PRN    HYDROmorphone, 0.4 mg, Intravenous, Q5 Min PRN    HYDROmorphone, 0.5 mg, Intravenous, Q4H PRN    meperidine, 12.5 mg, Intravenous, Once PRN    ondansetron, 4 mg, Intravenous, Q6H PRN    oxyCODONE, 5 mg, Oral, Q4H PRN    oxyCODONE, 5 mg, Oral, Q3H PRN    prochlorperazine, 5 mg, Intravenous, Q30 Min PRN    sodium chloride 0.9%, 3 mL, Intravenous, PRN     Review of patient's allergies indicates:   Allergen Reactions    Codeine Nausea Only    Sulfa (sulfonamide antibiotics) Nausea Only     Objective:     Vital Signs (Most Recent):  Temp: 98.8 °F (37.1 °C) (09/10/24 0756)  Pulse: 88 (09/10/24 0756)  Resp: 18 (09/10/24 0756)  BP: (!) 90/55 (09/10/24 0756)  SpO2: (!) 94 % (09/10/24 0756) Vital Signs (24h Range):  Temp:  [96.8 °F (36 °C)-99 °F (37.2 °C)] 98.8 °F (37.1 °C)  Pulse:  [] 88  Resp:  [12-20] 18  SpO2:  [88 %-100 %] 94 %  BP: ()/(51-68) 90/55     Weight: 65 kg (143 lb  4.8 oz)  Body mass index is 23.85 kg/m².    Intake/Output - Last 3 Shifts         09/08 0700  09/09 0659 09/09 0700  09/10 0659 09/10 0700 09/11 0659    P.O.  120     I.V. (mL/kg)  2020.1 (31.1)     Blood  200     IV Piggyback  3148.4     Total Intake(mL/kg)  5488.5 (84.4)     Urine (mL/kg/hr)  1825 (1.2)     Emesis/NG output  100     Drains  255     Stool  0     Blood  300     Total Output  2480     Net  +3008.5            Stool Occurrence  0 x              Physical Exam  Constitutional:       Appearance: Normal appearance.   HENT:      Head: Normocephalic and atraumatic.      Mouth/Throat:      Mouth: Mucous membranes are moist.   Cardiovascular:      Rate and Rhythm: Regular rhythm.   Pulmonary:      Effort: Pulmonary effort is normal. No respiratory distress.   Chest:      Comments: TUAN drains SS output, incisions CDI, abd pad and surgical bra in place  Abdominal:      Comments: Abdominal binder in place, incisions CDI, TUAN drains SS output   Skin:     General: Skin is warm.   Neurological:      General: No focal deficit present.      Mental Status: She is alert and oriented to person, place, and time. Mental status is at baseline.   Psychiatric:         Mood and Affect: Mood normal.         Behavior: Behavior normal.         Thought Content: Thought content normal.          Significant Labs:  I have reviewed all pertinent lab results within the past 24 hours.    Significant Diagnostics:  I have reviewed all pertinent imaging results/findings within the past 24 hours.  Assessment/Plan:     Malignant neoplasm of upper-inner quadrant of right breast in female, estrogen receptor positive  67 yo F w/ hx recurrent right breast invasive ductal carcinoma s/p bilateral mastectomy with RODRIGUEZ flap reconstruction 9/9/24. S/p hematoma evacuation evening 9/9.     - [facility]   - continue flap checks  - Monitor TUAN drain output   - Diet per plastic surgery   - Rest of care per plastic surgery team        Robe Garland MD  General  Surgery  [facility] - [facility] ([address])

## 2024-09-10 NOTE — ANESTHESIA POSTPROCEDURE EVALUATION
Anesthesia Post Evaluation    Patient: Sherry Torres    Procedure(s) Performed: Procedure(s) (LRB):  EVACUATION, HEMATOMA (Left)    Final Anesthesia Type: general      Patient location during evaluation: PACU  Patient participation: Yes- Able to Participate  Level of consciousness: awake and alert  Post-procedure vital signs: reviewed and stable  Pain management: adequate  Airway patency: patent  NELIA mitigation strategies: Extubation while patient is awake  PONV status at discharge: No PONV  Anesthetic complications: no      Cardiovascular status: hemodynamically stable  Respiratory status: unassisted  Hydration status: euvolemic  Follow-up not needed.              Vitals Value Taken Time   /56 09/10/24 0016   Temp 36.1 °C (97 °F) 09/10/24 0003   Pulse 86 09/10/24 0019   Resp 11 09/10/24 0019   SpO2 97 % 09/10/24 0019   Vitals shown include unfiled device data.      No case tracking events are documented in the log.      Pain/Sheldon Score: Pain Rating Prior to Med Admin: 4 (9/9/2024  9:03 PM)  Pain Rating Post Med Admin: 2 (9/9/2024  6:59 PM)  Sheldon Score: 10 (9/9/2024  4:30 PM)

## 2024-09-10 NOTE — NURSING TRANSFER
Nursing Transfer Note      9/10/2024   12:41 AM    Nurse giving handoff:f purp  Nurse receiving handoff:brandi hunter    Reason patient is being transferred: post op    Transfer To: room 358    Transfer via bed    Transfer with x 4 drains, internal dopller    Transported by     Transfer Vital Signs:  Blood Pressure:see flowsheet  Heart Rate:  O2:  Temperature:  Respirations:    Telemetry:   Order for Tele Monitor?     Additional Lines: Reis Catheter    4eyes on Skin: yes    Medicines sent:     Any special needs or follow-up needed:     Patient belongings transferred with patient: No    Chart send with patient: Yes    Notified: spouse    Patient reassessed at:  (date, time)  1  Upon arrival to floor:

## 2024-09-10 NOTE — TRANSFER OF CARE
"Anesthesia Transfer of Care Note    Patient: Sherry Torres    Procedure(s) Performed: Procedure(s) (LRB):  EVACUATION, HEMATOMA (Left)    Patient location: PACU    Anesthesia Type: general    Transport from OR: Transported from OR on 6-10 L/min O2 by face mask with adequate spontaneous ventilation    Post pain: adequate analgesia    Post assessment: tolerated procedure well and no apparent anesthetic complications    Post vital signs: stable    Level of consciousness: awake and responds to stimulation    Nausea/Vomiting: vomiting    Complications: other (see comments), possible aspiration    Transfer of care protocol was followed      Last vitals: Visit Vitals  BP (!) 118/58 (BP Location: Right arm, Patient Position: Lying)   Pulse 88   Temp 36.1 °C (97 °F) (Temporal)   Resp 17   Ht 5' 5" (1.651 m)   Wt 65 kg (143 lb 4.8 oz)   SpO2 98%   Breastfeeding No   BMI 23.85 kg/m²     "

## 2024-09-10 NOTE — PLAN OF CARE
Problem: Adult Inpatient Plan of Care  Goal: Plan of Care Review  Outcome: Progressing     Problem: Wound  Goal: Optimal Pain Control and Function  Outcome: Progressing     Problem: Infection  Goal: Absence of Infection Signs and Symptoms  Outcome: Progressing     Problem: Fall Injury Risk  Goal: Absence of Fall and Fall-Related Injury  Outcome: Progressing

## 2024-09-10 NOTE — NURSING
During rounding pt left breast noted to be grossly swollen, hard, doppler present but with whooshing noted, drain emptied but large clot noted in bulb.  VSS.  Neuro status unchanged.  Call placed to MD Holloway to notify.  MD states will take back to surgery shortly.

## 2024-09-10 NOTE — ASSESSMENT & PLAN NOTE
69 yo F w/ hx recurrent right breast invasive ductal carcinoma s/p bilateral mastectomy with RODRIGUEZ flap reconstruction 9/9/24. S/p hematoma evacuation evening 9/9.     - [facility]   - continue flap checks  - Monitor TUAN drain output   - Diet per plastic surgery   - Rest of care per plastic surgery team

## 2024-09-10 NOTE — BRIEF OP NOTE
Brief Operative Note     SUMMARY     Surgery Date: 9/9/2024     Surgeons and Role:     * Pito Holloway, DO - Primary    Assisting Surgeon: None    Pre-op Diagnosis:  Malignant neoplasm of upper-inner quadrant of right breast in female, estrogen receptor positive [C50.211, Z17.0]    Post-op Diagnosis:  Malignant neoplasm of upper-inner quadrant of right breast in female, estrogen receptor positive [C50.211, Z17.0]    Procedure(s) (LRB):  EVACUATION, HEMATOMA (Left)    Anesthesia: General    Description of Procedure:   Evacuation left breast hematoma   Exploration of previous RODRIGUEZ flap    Findings/Key Components:  Large hematoma approx 250cc clotted blood  RODRIGUEZ flap healthy, viable   Pedicle inspected without any issue    Estimated Blood Loss: 200 mL         Specimens Removed:   Specimen (24h ago, onward)       Start     Ordered    09/09/24 0833  Specimen to Pathology, Surgery Breast  Once        Comments: Pre-op Diagnosis: Malignant neoplasm of upper-outer quadrant of right female breast [C50.411]Procedure(s):RECONSTRUCTION, BREAST, USING RODRIGUEZ FREE FLAP / BILATERAL RODRIGUEZ FLAPINJECTION, AGENT, INTRAVENOUS, FOR ASSESSMENT OF BLOOD FLOW IN FLAP OR GRAFTMASTECTOMY, BILATERALBIOPSY, LYMPH NODE, SENTINELINJECTION, FOR SENTINEL NODE IDENTIFICATION Number of specimens: 3Name of specimens: 1. Left Mastectomy Short Stitch Superior Long Stitch Lateral2. Right Mastectomy Short Stitch Superior Long Stitch Lateral3. Right Axillary [place] Lymph Node #1 Hot 307     References:    Click here for ordering Quick Tip   Question Answer Comment   Procedure Type: Breast    Release to patient Immediate        09/09/24 0902

## 2024-09-10 NOTE — SUBJECTIVE & OBJECTIVE
Interval History: Hematoma evacuated in OR last night, received 1u prbcs, doing well this am. No N/V, pain well controlled, denies any new breast swelling, TUAN drains SS output, incisions CDI    Medications:  Continuous Infusions:  Scheduled Meds:   acetaminophen  1,000 mg Oral Q8H    docusate sodium  100 mg Oral Daily    enoxparin  40 mg Subcutaneous Q24H (treatment, non-standard time)    gabapentin  300 mg Oral TID    ibuprofen  400 mg Oral Q6H    levothyroxine  88 mcg Oral Before breakfast    methocarbamoL  500 mg Oral TID    mupirocin   Nasal BID     PRN Meds:  Current Facility-Administered Medications:     0.9%  NaCl infusion (for blood administration), , Intravenous, Q24H PRN    dextrose 10%, 12.5 g, Intravenous, PRN    dextrose 10%, 25 g, Intravenous, PRN    glucagon (human recombinant), 1 mg, Intramuscular, PRN    HYDROmorphone, 0.4 mg, Intravenous, Q5 Min PRN    HYDROmorphone, 0.5 mg, Intravenous, Q4H PRN    meperidine, 12.5 mg, Intravenous, Once PRN    ondansetron, 4 mg, Intravenous, Q6H PRN    oxyCODONE, 5 mg, Oral, Q4H PRN    oxyCODONE, 5 mg, Oral, Q3H PRN    prochlorperazine, 5 mg, Intravenous, Q30 Min PRN    sodium chloride 0.9%, 3 mL, Intravenous, PRN     Review of patient's allergies indicates:   Allergen Reactions    Codeine Nausea Only    Sulfa (sulfonamide antibiotics) Nausea Only     Objective:     Vital Signs (Most Recent):  Temp: 98.8 °F (37.1 °C) (09/10/24 0756)  Pulse: 88 (09/10/24 0756)  Resp: 18 (09/10/24 0756)  BP: (!) 90/55 (09/10/24 0756)  SpO2: (!) 94 % (09/10/24 0756) Vital Signs (24h Range):  Temp:  [96.8 °F (36 °C)-99 °F (37.2 °C)] 98.8 °F (37.1 °C)  Pulse:  [] 88  Resp:  [12-20] 18  SpO2:  [88 %-100 %] 94 %  BP: ()/(51-68) 90/55     Weight: 65 kg (143 lb 4.8 oz)  Body mass index is 23.85 kg/m².    Intake/Output - Last 3 Shifts         09/08 0700  09/09 0659 09/09 0700  09/10 0659 09/10 0700 09/11 0659    P.O.  120     I.V. (mL/kg)  2020.1 (31.1)     Blood  200     IV  Piggyback  3148.4     Total Intake(mL/kg)  5488.5 (84.4)     Urine (mL/kg/hr)  1825 (1.2)     Emesis/NG output  100     Drains  255     Stool  0     Blood  300     Total Output  2480     Net  +3008.5            Stool Occurrence  0 x              Physical Exam  Constitutional:       Appearance: Normal appearance.   HENT:      Head: Normocephalic and atraumatic.      Mouth/Throat:      Mouth: Mucous membranes are moist.   Cardiovascular:      Rate and Rhythm: Regular rhythm.   Pulmonary:      Effort: Pulmonary effort is normal. No respiratory distress.   Chest:      Comments: TUAN drains SS output, incisions CDI, abd pad and surgical bra in place  Abdominal:      Comments: Abdominal binder in place, incisions CDI, TUAN drains SS output   Skin:     General: Skin is warm.   Neurological:      General: No focal deficit present.      Mental Status: She is alert and oriented to person, place, and time. Mental status is at baseline.   Psychiatric:         Mood and Affect: Mood normal.         Behavior: Behavior normal.         Thought Content: Thought content normal.          Significant Labs:  I have reviewed all pertinent lab results within the past 24 hours.    Significant Diagnostics:  I have reviewed all pertinent imaging results/findings within the past 24 hours.

## 2024-09-10 NOTE — OP NOTE
[facility] ([address])  Plastic Surgery  Operative Note    SUMMARY     Date of Procedure: 9/9/2024     Location:  [facility]    Procedure(s): Procedure(s) (LRB):  EVACUATION, HEMATOMA (Left)     Evacuation left breast hematoma   Exploration of previous RODRIGUEZ flap    Surgeons and Role:     * Pito Holloway, DO - Primary    Assistant: Viraj Goldstein MD, Fellow    Pre-Operative Diagnosis: Malignant neoplasm of upper-inner quadrant of right breast in female, estrogen receptor positive [C50.211, Z17.0]    Post-Operative Diagnosis: same    Anesthesia: General    Indications for Procedure:  so it was postop day 0 from bilateral it was skin sparing mastectomy with immediate RODRIGUEZ flap reconstruction.  It was 9:00 p.m. I was called with marked swelling of the left breast and significant bloody drain output.  The arterial signal was unchanged.  She was taken to the operating room urgently for exploration    Operative Findings (including complications, if any):  large left breast hematoma.  Probably 3-400 cc of layered clot.  No signs of flap congestion    Description of Procedures:  patient was seen in the floor.  I discussed the procedure in detail.  Surgical and blood consents were signed.  Labs were also drawn on the floor.  The patient was taken the operating room.  The previous dressings were removed from the left breast.  It was prepped and draped in the sterile fashion.  The Doppler quarters hooked up.  It was a strong biphasic arterial signal.      The previous tailorer  wise pattern incision was reopened.  Immediately could see healthy yellow RODRIGUEZ flap.  It was a large amount of layered clot anteriorly.  I then irrigated with warm saline the entire mastectomy pocket, underneath the flap and also medially.  There was no active bleeding noted.    Next I inspected the Rodriguez flap.  The fat was bright yellow there is no signs of congestion.  The superficial vein that was clipped was not significantly  dilated.  I then removed 3 sutures from the inset so I could flap to the flap medially.  It was a fair amount of blood underneath the flap that I irrigated with warm saline.  It was able to identify the pedicle.  There was a visible pulse in the pedicle.  Both of the vena comitans were not dilated.  The vein was very soft.  It was able to follow up with a course of both of perforators all the way into the flap.  I did remove some blood clot from underneath the flap.  It was no bleeding from the underside of the flap from the chest wall.  Next the flap was then re inset to the chest wall.  Several interrupted 2-0 Vicryl sutures were used medially, superiorly and 6:00 a.m. to re anchor of the flap.      I then irrigated the flap 1 final time and checked very diligently for hemostasis.  It was a few small areas that were bovied.  There is no bleeding.  It was satisfied with the hemostasis of the pocket.  I then reclosed the mastectomy skin flaps.  A 3-0 Monocryl was used at the T point.  Next the deep dermis of the horizontal and vertical pattern were closed with buried 3-0 Monocryl.  It was de-epithelialized lower pole skin that was imbricated.  Finally the skin was closed with a subcuticular running 3-0 Stratafix.  Prineo tape was placed in the wound.  The Doppler cord was secured with a Tegaderm and silver Mepilex dressing were used on the breast.  I also replaced the CHG  drain dressing.    She did vomit shortly after being taken to the operating room.  They did perform a rapid sequence intubation.  She also received 1 unit of blood for some hypotension during the case.  She was extubated and taken to the recovery room.  We did order a postop chest x-ray.    Significant Surgical Tasks Conducted by the Assistant(s), if Applicable: The indicated resident served as first assistant for this procedure.    Estimated Blood Loss (EBL): 400cc           Implants: * No implants in log *    Specimens:   Specimen (24h ago,  onward)       Start     Ordered    09/09/24 0833  Specimen to Pathology, Surgery Breast  Once        Comments: Pre-op Diagnosis: Malignant neoplasm of upper-outer quadrant of right female breast [C50.411]Procedure(s):RECONSTRUCTION, BREAST, USING RODRIGUEZ FREE FLAP / BILATERAL RODRIGUEZ FLAPINJECTION, AGENT, INTRAVENOUS, FOR ASSESSMENT OF BLOOD FLOW IN FLAP OR GRAFTMASTECTOMY, BILATERALBIOPSY, LYMPH NODE, SENTINELINJECTION, FOR SENTINEL NODE IDENTIFICATION Number of specimens: 3Name of specimens: 1. Left Mastectomy Short Stitch Superior Long Stitch Lateral2. Right Mastectomy Short Stitch Superior Long Stitch Lateral3. Right Axillary [place] Lymph Node #1 Hot 307     References:    Click here for ordering Quick Tip   Question Answer Comment   Procedure Type: Breast    Release to patient Immediate        09/09/24 0956                            Condition: Good    Disposition: PACU - hemodynamically stable., return to the floor    Attestation: I was present and scrubbed for the entire procedure.

## 2024-09-10 NOTE — ANESTHESIA PREPROCEDURE EVALUATION
09/09/2024  Sherry Torres is a 68 y.o., female.      Pre-op Assessment    I have reviewed the Patient Summary Reports.     I have reviewed the Nursing Notes. I have reviewed the NPO Status.   I have reviewed the Medications.     Review of Systems  Anesthesia Hx:    PONV           Denies Family Hx of Anesthesia complications.   Personal Hx of Anesthesia complications, Post-Operative Nausea/Vomiting                    Social:  Non-Smoker       Hematology/Oncology:  Hematology Normal                     Current/Recent Cancer.  --  Cancer in past history:       Breast    right   surgery   Oncology Comments: Previous thyroid ca  Now breast ca     EENT/Dental:  EENT/Dental Normal           Cardiovascular:      Denies Hypertension. Valvular problems/Murmurs, MVP          hyperlipidemia                             Pulmonary:  Pulmonary Normal                       Renal/:  Renal/ Normal                 Hepatic/GI:     GERD, well controlled             Musculoskeletal:  Musculoskeletal Normal                Neurological:  Neurology Normal                                      Endocrine:   Hypothyroidism  Was on Semaglutide        Dermatological:  Skin Normal    Psych:  Psychiatric History                Physical Exam  General: Cooperative, Alert and Oriented    Airway:  Mallampati: II   Mouth Opening: Normal  Neck ROM: Normal ROM    Dental:  Intact, Caps / Implants      Anesthesia Plan  Type of Anesthesia, risks & benefits discussed:    Anesthesia Type: Gen ETT  Intra-op Monitoring Plan: Standard ASA Monitors  Post Op Pain Control Plan: multimodal analgesia  Induction:  IV  Informed Consent: Informed consent signed with the Patient and all parties understand the risks and agree with anesthesia plan.  All questions answered.   ASA Score: 3    Ready For Surgery From Anesthesia Perspective.     .

## 2024-09-10 NOTE — ANESTHESIA PROCEDURE NOTES
Intubation    Date/Time: 9/9/2024 10:48 PM    Performed by: Petty Hollingsworth CRNA  Authorized by: Hernán Haq MD    Intubation:     Induction:  Rapid sequence induction    Intubated:  Postinduction    Mask Ventilation:  N/a    Attempts:  1    Attempted By:  CRNA    Method of Intubation:  Video laryngoscopy    Blade:  Delatorre 3    Laryngeal View Grade: Grade I - full view of cords      Difficult Airway Encountered?: No      Complications:  Reflux of gastric contents - possible aspiration    Airway Device:  Oral endotracheal tube    Airway Device Size:  7.0    Style/Cuff Inflation:  Cuffed (inflated to minimal occlusive pressure)    Tube secured:  21    Secured at:  The lips    Placement Verified By:  Capnometry    Complicating Factors:  None    Findings Post-Intubation:  BS equal bilateral and atraumatic/condition of teeth unchanged

## 2024-09-10 NOTE — NURSING
09-- pt does not have post op day 1 orders.  Pt is post op 1 and also had a hematoma evac last night.  Reached out to 1st call fellow and 1st call resident-- left message and left page.   Sent secure chat as well- Physicians listed in procedure at [facility].   1002- 2nd call fellow called - left message.

## 2024-09-10 NOTE — NURSING
Pt arrived to unit from recovery.  Responsive to verbal stimuli.  Respirations unlabored on 2L/NC.  Skin w/d.  Flap check performed with charge nurse and WNL.  No s/s of distress.  Fall/safety precautions resumed.

## 2024-09-11 LAB — POCT GLUCOSE: 115 MG/DL (ref 70–110)

## 2024-09-11 PROCEDURE — 27000221 HC OXYGEN, UP TO 24 HOURS

## 2024-09-11 PROCEDURE — 11000001 HC ACUTE MED/SURG PRIVATE ROOM

## 2024-09-11 PROCEDURE — 25000003 PHARM REV CODE 250: Performed by: SURGERY

## 2024-09-11 PROCEDURE — 63600175 PHARM REV CODE 636 W HCPCS: Performed by: SURGERY

## 2024-09-11 PROCEDURE — 97162 PT EVAL MOD COMPLEX 30 MIN: CPT

## 2024-09-11 PROCEDURE — 99900035 HC TECH TIME PER 15 MIN (STAT)

## 2024-09-11 PROCEDURE — 94761 N-INVAS EAR/PLS OXIMETRY MLT: CPT

## 2024-09-11 RX ADMIN — IBUPROFEN 400 MG: 400 TABLET ORAL at 05:09

## 2024-09-11 RX ADMIN — ACETAMINOPHEN 1000 MG: 500 TABLET ORAL at 09:09

## 2024-09-11 RX ADMIN — LEVOTHYROXINE SODIUM 88 MCG: 88 TABLET ORAL at 05:09

## 2024-09-11 RX ADMIN — GABAPENTIN 300 MG: 300 CAPSULE ORAL at 09:09

## 2024-09-11 RX ADMIN — METHOCARBAMOL 500 MG: 500 TABLET ORAL at 08:09

## 2024-09-11 RX ADMIN — MUPIROCIN: 20 OINTMENT TOPICAL at 09:09

## 2024-09-11 RX ADMIN — IBUPROFEN 400 MG: 400 TABLET ORAL at 11:09

## 2024-09-11 RX ADMIN — DOCUSATE SODIUM 100 MG: 100 CAPSULE, LIQUID FILLED ORAL at 08:09

## 2024-09-11 RX ADMIN — METHOCARBAMOL 500 MG: 500 TABLET ORAL at 03:09

## 2024-09-11 RX ADMIN — ACETAMINOPHEN 1000 MG: 500 TABLET ORAL at 03:09

## 2024-09-11 RX ADMIN — MUPIROCIN: 20 OINTMENT TOPICAL at 11:09

## 2024-09-11 RX ADMIN — ACETAMINOPHEN 1000 MG: 500 TABLET ORAL at 05:09

## 2024-09-11 RX ADMIN — GABAPENTIN 300 MG: 300 CAPSULE ORAL at 03:09

## 2024-09-11 RX ADMIN — ENOXAPARIN SODIUM 40 MG: 40 INJECTION SUBCUTANEOUS at 08:09

## 2024-09-11 RX ADMIN — GABAPENTIN 300 MG: 300 CAPSULE ORAL at 08:09

## 2024-09-11 RX ADMIN — METHOCARBAMOL 500 MG: 500 TABLET ORAL at 09:09

## 2024-09-11 NOTE — PLAN OF CARE
Problem: Adult Inpatient Plan of Care  Goal: Plan of Care Review  Outcome: Progressing  Goal: Patient-Specific Goal (Individualized)  Outcome: Progressing  Goal: Absence of Hospital-Acquired Illness or Injury  Outcome: Progressing  Goal: Optimal Comfort and Wellbeing  Outcome: Progressing   POC reviewed with pt. A&Ox4. Room air. No acute changes. Flap checks performed per orders. Incisions CDI. Drain care performed. Up with assist. Safety checks performed.

## 2024-09-11 NOTE — PT/OT/SLP EVAL
Physical Therapy Evaluation    Patient Name:  Sherry Torres   MRN:  [id number]    Recommendations:     Discharge Recommendations: Low Intensity Therapy when cleared by MD  Discharge Equipment Recommendations: none   Barriers to discharge: None    Assessment:     Sherry Torres is a 68 y.o. female admitted with a medical diagnosis of see sx below.  She presents with the following impairments/functional limitations: weakness, impaired endurance, impaired functional mobility, pain.    Pt evaluated, able to transfer OOB and ambulate with minimal assistance and without significant pain complaints. Pt able to adhere to all surgical precautions during session. Pt would benefit from continued P.T. services to address post-op mobility deficits and restore PLOF.     Rehab Prognosis: Good; patient would benefit from acute skilled PT services to address these deficits and reach maximum level of function.    Recent Surgery: Procedure(s) (LRB):  EVACUATION, HEMATOMA (Left) 2 Days Post-Op    Plan:     During this hospitalization, patient to be seen 4 x/week to address the identified rehab impairments via gait training, therapeutic activities, therapeutic exercises and progress toward the following goals:    Plan of Care Expires:  09/25/24    Subjective     Chief Complaint: Pt reports feeling generally weak but that she has been able to walk to bathroom multiple times on her own  Patient/Family Comments/goals: Pt agreeable to PT eval  Pain/Comfort:  Pain Rating 1: 4/10  Location 1: abdomen  Pain Addressed 1: Pre-medicate for activity, Cessation of Activity    Patients cultural, spiritual, Yarsani conflicts given the current situation: no    Living Environment:  [facility] with threshold to enter  Prior to admission, patients level of function was I.  Equipment used at home: none.  DME owned (not currently used): none.  Upon discharge, patient will have assistance from lives with .    Objective:     Patient found supine  with TUAN drain, peripheral IV, oxygen  upon PT entry to room.    General Precautions: Standard, fall, other (see comments)- RODRIGUEZ flap and mastectomy precautions (keep flexed at hips, no heavy lifting, no overhead)  Orthopedic Precautions:N/A   Braces: abdominal binder   Respiratory Status: Nasal cannula, flow 2 L/min    Exams:  B LE ROM and MMT WFL    Functional Mobility:  Bed Mobility: Supine<>sit SBA  Transfers: Sit<>stand SBA  Gait: 150' Min HHA with pt holding onto railing in avila, pt ambulated with flexed posture to adhere to precautions      AM-PAC 6 CLICK MOBILITY  Total Score:21       Treatment & Education:  Pt educated on role of PT, POC, mobility training, and issued handout on surgical precautions    Patient left supine with all lines intact and call button in reach.    GOALS:   Multidisciplinary Problems       Physical Therapy Goals          Problem: Physical Therapy    Goal Priority Disciplines Outcome Goal Variances Interventions   Physical Therapy Goal     PT, PT/OT Progressing     Description: P.T goals to be met in 2 weeks as follows:  1. Mod I Bed Mobility  2. Mod I T/F  3. Gait 300' Mod I   4. Tolerate OOB x 2 hours                          History:     Past Medical History:   Diagnosis Date    Anxiety disorder, unspecified     r/t dx    Atypical ductal hyperplasia, breast 2008    left side    Breast cancer 08/2016    right side    Breast cyst approx. 40 years ago    Cancer     Depression     r/t dx    GERD (gastroesophageal reflux disease)     History of thyroid cancer 2006    Lobular carcinoma in situ not certain of 2016 diagnosis    Malignant neoplasm of upper-outer quadrant of right breast in female, estrogen receptor positive 09/15/2016    Mitral valve prolapse     PONV (postoperative nausea and vomiting)        Past Surgical History:   Procedure Laterality Date    BILATERAL MASTECTOMY Bilateral 9/9/2024    Procedure: MASTECTOMY, BILATERAL;  Surgeon: NADIRA Carroll MD;  Location: [facility];   Service: General;  Laterality: Bilateral;    BREAST BIOPSY Right 2016    BREAST BIOPSY Left     exc bx    BREAST LUMPECTOMY Right 2016    w/radiation    BREAST SURGERY   AND  OR     CATARACT EXTRACTION W/  INTRAOCULAR LENS IMPLANT Right 2024    Procedure: EXTRACTION, CATARACT, WITH IOL INSERTION;  Surgeon: Zhane Khan MD;  Location[facility] OR;  Service: Ophthalmology;  Laterality: Right;     SECTION      x 2  and     COLONOSCOPY N/A 10/08/2019    Procedure: COLONOSCOPY;  Surgeon: Eliceo Holloway MD;  Location: [facility] (4TH FLR);  Service: Endoscopy;  Laterality: N/A;  Okay for Rice or Holloway. However, she needs it done before end , so if they are not available before then, okay for any provided.    CYST REMOVED FROM RIGHT BREAST IN       EVACUATION OF HEMATOMA Left 2024    Procedure: EVACUATION, HEMATOMA;  Surgeon: Pito Holloway DO;  Location: [facility];  Service: Plastics;  Laterality: Left;    EXTRACTION OF CATARACT Left 2023    Procedure: EXTRACTION, CATARACT;  Surgeon: Eric Cardona MD;  [facility]: [facility] OR;  Service: Ophthalmology;  Laterality: Left;  DiB00 +21.0D    HERNIA REPAIR      over C section incision     INJECTION FOR SENTINEL NODE IDENTIFICATION Right 2024    Procedure: INJECTION, FOR SENTINEL NODE IDENTIFICATION;  Surgeon: NADIRA Carroll MD;  Location: [facility];  Service: General;  Laterality: Right;    INJECTION, AGENT, INTRAVENOUS, FOR ASSESSMENT OF BLOOD FLOW IN FLAP OR GRAFT N/A 2024    Procedure: INJECTION, AGENT, INTRAVENOUS, FOR ASSESSMENT OF BLOOD FLOW IN FLAP OR GRAFT;  Surgeon: Pito Holloway DO;  Location: [facility];  Service: Plastics;  Laterality: N/A;    left breast wire localization excisional biopsy with bracketed wires using 3 wires and excision through 1 incision.       LIPOMA RESECTION N/A 10/04/2023    Procedure: EXCISION, LIPOMA Back;  Surgeon: Kenneth Hernández MD;  Location: [facility]  2ND FLR;  Service: General;  Laterality: N/A;    PHACOEMULSIFICATION, CATARACT, WITH IOL INSERTION Left 12/27/2023    Procedure: PHACOEMULSIFICATION, CATARACT, WITH IOL INSERTION;  Surgeon: Eric Cardona MD;  Location: [facility] OR;  Service: Ophthalmology;  Laterality: Left;    RECONSTRUCTION OF BREAST WITH DEEP INFERIOR EPIGASTRIC ARTERY  (RODRIGUEZ) FREE FLAP Bilateral 9/9/2024    Procedure: RECONSTRUCTION, BREAST, USING RODRIGUEZ FREE FLAP / BILATERAL RODRIGUEZ FLAP;  Surgeon: Pito Holloway DO;  Location: [facility] OR;  Service: Plastics;  Laterality: Bilateral;  2-3 DAY STAY    SENTINEL LYMPH NODE BIOPSY Right 9/9/2024    Procedure: BIOPSY, LYMPH NODE, SENTINEL;  Surgeon: NADIRA Carroll MD;  Location: [facility] OR;  Service: General;  Laterality: Right;    THYROIDECTOMY      TRANSFORAMINAL EPIDURAL INJECTION OF STEROID Right 07/01/2019    Procedure: Injection,steroid,epidural,transforaminal approach LUMBAR TRANSFORAMINAL RIGHT L5 AND S1 TF ARNOLDO;  Surgeon: Nadya Mcfarland MD;  Location: [facility] PAIN MGT;  Service: Pain Management;  Laterality: Right;  NEEDS CONSENT    TUBAL LIGATION         Time Tracking:     PT Received On: 09/11/24  PT Start Time: 0906     PT Stop Time: 0917  PT Total Time (min): 11 min     Billable Minutes: Evaluation 9 09/11/2024

## 2024-09-11 NOTE — PROGRESS NOTES
Plastic and Reconstructive Surgery   Progress Note    Subjective:    No issues overnight  Doing well  Voiding  Kandace Reg diet  Ambulating  +strong int dopplers  TUAN x4 serosang, 117cc/overnight    Objective:  Vital signs in last 24 hours:  Temp:  [97.5 °F (36.4 °C)-99.1 °F (37.3 °C)] 97.5 °F (36.4 °C)  Pulse:  [73-88] 73  Resp:  [14-18] 18  SpO2:  [92 %-96 %] 94 %  BP: ()/(53-62) 134/62    Intake/Output last 3 shifts:  I/O last 3 completed shifts:  In: 2928.5 [P.O.:360; I.V.:1020.1; Blood:200; IV Piggyback:1348.4]  Out: 2342.5 [Urine:1650; Emesis/NG output:100; Drains:492.5; Blood:100]    Intake/Output this shift:  No intake/output data recorded.        Physical Exam:  VITAL SIGNS:   Vitals:    09/10/24 2019 09/10/24 2106 09/10/24 2309 24 0419   BP: (!) 90/55 (!) 117/58 (!) 96/53 134/62   BP Location: Right arm Right leg Right arm Right arm   Patient Position: Lying Lying Lying Lying   Pulse: 80 80 77 73   Resp: 14  16 18   Temp: 98.5 °F (36.9 °C)  97.9 °F (36.6 °C) 97.5 °F (36.4 °C)   TempSrc: Oral  Oral Oral   SpO2: (!) 93%  95% (!) 94%   Weight:       Height:         TMAX: Temp (24hrs), Av.5 °F (36.9 °C), Min:97.5 °F (36.4 °C), Max:99.1 °F (37.3 °C)    General: Alert; No acute distress  Cardiovascular: Regular rate   Respiratory: Normal respiratory effort. Chest rise symmetric.   Abdomen: Soft, nontender, nondistended  Extremity: Moves all extremities equally.  Neurologic: No focal deficit. Speech normal    Breast soft, symmetric, no hematoma, TUAN x4 serosang, +strong internal dopplers  Abd dressings c/d/i    Scheduled Medications acetaminophen, 1,000 mg, Q8H  docusate sodium, 100 mg, Daily  enoxparin, 40 mg, Q24H (treatment, non-standard time)  gabapentin, 300 mg, TID  ibuprofen, 400 mg, Q6H  levothyroxine, 88 mcg, Before breakfast  methocarbamoL, 500 mg, TID  mupirocin, , BID      PRN Medications   Current Facility-Administered Medications:     0.9%  NaCl infusion (for blood administration), ,  Intravenous, Q24H PRN    dextrose 10%, 12.5 g, Intravenous, PRN    dextrose 10%, 25 g, Intravenous, PRN    glucagon (human recombinant), 1 mg, Intramuscular, PRN    HYDROmorphone, 0.4 mg, Intravenous, Q5 Min PRN    HYDROmorphone, 0.5 mg, Intravenous, Q4H PRN    ondansetron, 4 mg, Intravenous, Q6H PRN    oxyCODONE, 5 mg, Oral, Q4H PRN    oxyCODONE, 5 mg, Oral, Q3H PRN    prochlorperazine, 5 mg, Intravenous, Q30 Min PRN    sodium chloride 0.9%, 3 mL, Intravenous, PRN    Recent Labs:   Lab Results   Component Value Date    WBC 9.38 09/10/2024    HGB 9.8 (L) 09/10/2024    HCT 29.6 (L) 09/10/2024    MCV 95 09/10/2024     09/10/2024     Lab Results   Component Value Date     (H) 09/10/2024     09/10/2024    K 4.2 09/10/2024     09/10/2024    BUN 10 09/10/2024         Assessment: 68 y.o. y/o female s/p Bilateral immediate RODRIGUEZ flap breast reconstruction    S/p Evacuation of left breast hematoma, exploration of previous RODRIGUEZ flap    Plan  Continue q4 flap checks  Reg diet  Monitor TUAN outputs  Voiding  Ambulate  Incentive spirometer  DVT ppx- Lovenox  Likely home tomorrow    Maurisio Goldstein MD  Plastic Surgery Fellow  09/11/2024

## 2024-09-11 NOTE — PLAN OF CARE
D/C Rec: Low Intensity Therapy when cleared by MD TSAI Rec: None    Pt evaluated, able to transfer OOB and ambulate with minimal assistance and without significant pain complaints. Pt able to adhere to all surgical precautions during session. Pt would benefit from continued P.T. services to address post-op mobility deficits and restore PLOF.     Problem: Physical Therapy  Goal: Physical Therapy Goal  Description: P.T goals to be met in 2 weeks as follows:  1. Mod I Bed Mobility  2. Mod I T/F  3. Gait 300' Mod I   4. Tolerate OOB x 2 hours     Outcome: Progressing

## 2024-09-11 NOTE — PLAN OF CARE
POC reviewed with patient this shift.  Remains A/O x4.  Respirations unlabored.  Skin w/d.  Continent of b/b.  Ambulates to restroom with standby/min assist.  Flap checks continue Q4H/PRN as need with no issues noted.  Pain well controlled.  No PRN pain med administered this shift.  Tolerates meds whole with water without difficulty.  VSS.  See flowsheet for full assessment.  Able to verbalize wants/needs.  No s/s of distress.  Fall/safety precautions maintained.      Problem: Adult Inpatient Plan of Care  Goal: Plan of Care Review  Outcome: Progressing     Problem: Wound  Goal: Absence of Infection Signs and Symptoms  Outcome: Progressing     Problem: Infection  Goal: Absence of Infection Signs and Symptoms  Outcome: Progressing     Problem: Fall Injury Risk  Goal: Absence of Fall and Fall-Related Injury  Outcome: Progressing

## 2024-09-12 ENCOUNTER — PATIENT MESSAGE (OUTPATIENT)
Dept: PLASTIC SURGERY | Facility: CLINIC | Age: 69
End: 2024-09-12
Payer: MEDICARE

## 2024-09-12 VITALS
DIASTOLIC BLOOD PRESSURE: 57 MMHG | BODY MASS INDEX: 23.88 KG/M2 | HEIGHT: 65 IN | WEIGHT: 143.31 LBS | OXYGEN SATURATION: 97 % | HEART RATE: 88 BPM | SYSTOLIC BLOOD PRESSURE: 117 MMHG | RESPIRATION RATE: 18 BRPM | TEMPERATURE: 98 F

## 2024-09-12 PROCEDURE — 63600175 PHARM REV CODE 636 W HCPCS: Performed by: SURGERY

## 2024-09-12 PROCEDURE — 25000003 PHARM REV CODE 250: Performed by: SURGERY

## 2024-09-12 PROCEDURE — 97116 GAIT TRAINING THERAPY: CPT | Mod: CQ

## 2024-09-12 PROCEDURE — 97165 OT EVAL LOW COMPLEX 30 MIN: CPT

## 2024-09-12 RX ORDER — IBUPROFEN 600 MG/1
600 TABLET ORAL EVERY 6 HOURS
Qty: 28 TABLET | Refills: 0 | Status: SHIPPED | OUTPATIENT
Start: 2024-09-12 | End: 2024-09-19

## 2024-09-12 RX ORDER — ACETAMINOPHEN 500 MG
1000 TABLET ORAL EVERY 8 HOURS
Qty: 42 TABLET | Refills: 0 | Status: SHIPPED | OUTPATIENT
Start: 2024-09-12 | End: 2024-09-19

## 2024-09-12 RX ORDER — METHOCARBAMOL 500 MG/1
500 TABLET, FILM COATED ORAL 3 TIMES DAILY
Qty: 21 TABLET | Refills: 0 | Status: SHIPPED | OUTPATIENT
Start: 2024-09-12 | End: 2024-09-19

## 2024-09-12 RX ORDER — GABAPENTIN 300 MG/1
300 CAPSULE ORAL 3 TIMES DAILY
Qty: 21 CAPSULE | Refills: 0 | Status: SHIPPED | OUTPATIENT
Start: 2024-09-12 | End: 2025-01-15 | Stop reason: ALTCHOICE

## 2024-09-12 RX ORDER — ONDANSETRON 4 MG/1
4 TABLET, ORALLY DISINTEGRATING ORAL EVERY 6 HOURS PRN
Qty: 10 TABLET | Refills: 0 | Status: SHIPPED | OUTPATIENT
Start: 2024-09-12 | End: 2025-01-15 | Stop reason: ALTCHOICE

## 2024-09-12 RX ORDER — OXYCODONE HYDROCHLORIDE 5 MG/1
5 TABLET ORAL EVERY 4 HOURS PRN
Qty: 10 TABLET | Refills: 0 | Status: SHIPPED | OUTPATIENT
Start: 2024-09-12 | End: 2025-01-15 | Stop reason: ALTCHOICE

## 2024-09-12 RX ADMIN — ACETAMINOPHEN 1000 MG: 500 TABLET ORAL at 05:09

## 2024-09-12 RX ADMIN — DOCUSATE SODIUM 100 MG: 100 CAPSULE, LIQUID FILLED ORAL at 08:09

## 2024-09-12 RX ADMIN — METHOCARBAMOL 500 MG: 500 TABLET ORAL at 08:09

## 2024-09-12 RX ADMIN — LEVOTHYROXINE SODIUM 88 MCG: 88 TABLET ORAL at 05:09

## 2024-09-12 RX ADMIN — GABAPENTIN 300 MG: 300 CAPSULE ORAL at 02:09

## 2024-09-12 RX ADMIN — IBUPROFEN 400 MG: 400 TABLET ORAL at 05:09

## 2024-09-12 RX ADMIN — METHOCARBAMOL 500 MG: 500 TABLET ORAL at 02:09

## 2024-09-12 RX ADMIN — ENOXAPARIN SODIUM 40 MG: 40 INJECTION SUBCUTANEOUS at 08:09

## 2024-09-12 RX ADMIN — IBUPROFEN 400 MG: 400 TABLET ORAL at 11:09

## 2024-09-12 RX ADMIN — GABAPENTIN 300 MG: 300 CAPSULE ORAL at 08:09

## 2024-09-12 NOTE — PLAN OF CARE
Case Management Final Discharge Note      Discharge Disposition: home with home health    New DME ordered / company name: none    Relevant SDOH / Transition of Care Barriers:  none    Primary Caretaker and contact information: self    Scheduled followup appointment: Surgery follow up    Referrals placed: none    Transportation: patient  will provide transportation home.        Patient and family educated on discharge services and updated on DC plan. Bedside RN notified, patient clear to discharge from Case Management Perspective.       [facility] ([address])  Discharge Final Note    Primary Care Provider: Adrián Javier MD    Expected Discharge Date: 9/12/2024    Final Discharge Note (most recent)       Final Note - 09/12/24 1212          Final Note    Assessment Type Final Discharge Note     Anticipated Discharge Disposition Home-Health Care [facility]     Hospital Resources/Appts/Education Provided Provided patient/caregiver with written discharge plan information;Appointments scheduled and added to AVS        Post-Acute Status    Post-Acute Authorization Home Health     Home Health Status Set-up Complete/Auth obtained     Discharge Delays None known at this time                     Important Message from Medicare             Contact Info       [facility]   Specialty: Home Health Services, Home Therapy Services, Home Living Aide Services    [address]  [place] [address]   Phone: [phone]       Next Steps: Follow up

## 2024-09-12 NOTE — PLAN OF CARE
Problem: Occupational Therapy  Goal: Occupational Therapy Goal  Description: Goals to be met by: 9/30/2024     Patient will increase functional independence with ADLs by performing:    UE Dressing with Supervision.  LE Dressing with Supervision.  Grooming while standing at sink with Supervision.  Toileting from toilet with Supervision for hygiene and clothing management.   Toilet transfer to toilet with Supervision.    Outcome: Progressing     OT evaluation complete and POC established.  Low intensity therapy (with cancer specialist once cleared by MD) is recommended upon d/c from acute care to further address deficits and help pt improve overall functional independence.     ROHINI Monahan  9/12/2024

## 2024-09-12 NOTE — PT/OT/SLP PROGRESS
"Physical Therapy Treatment    Patient Name:  Sherry Torres   MRN:  [id number]    Recommendations:     Discharge Recommendations: Low Intensity Therapy when cleared by MD  Discharge Equipment Recommendations: none  Barriers to discharge: None    Assessment:     Sherry Torres is a 68 y.o. female admitted with a medical diagnosis of Malignant neoplasm of upper-inner quadrant of right breast in female, estrogen receptor positive.  She presents with the following impairments/functional limitations: weakness, pain, impaired functional mobility, impaired self care skills, decreased ROM, decreased upper extremity function.    Sit>stand from chair with CGA  Amb 2 x 180' with HHA/CGA, decreased stability but no LoB, pt wishing to walk close to wall rail "just in case"  Ascended/descended 4" curb step with HHA and CGA, x 2 trials  Pt demos good mobility, pain managed  Rec Low Intensity Therapy when cleared by MD    Rehab Prognosis: Good; patient would benefit from acute skilled PT services to address these deficits and reach maximum level of function.    Recent Surgery: Procedure(s) (LRB):  EVACUATION, HEMATOMA (Left) 3 Days Post-Op    Plan:     During this hospitalization, patient to be seen 4 x/week to address the identified rehab impairments via gait training, therapeutic activities, therapeutic exercises and progress toward the following goals:    Plan of Care Expires:  09/25/24    Subjective     Chief Complaint: mild pain  Patient/Family Comments/goals: I know it's good for me to walk  Pain/Comfort:  Pain Rating 1: other (see comments) (unrated, but mild)  Location 1: abdomen  Pain Addressed 1: Pre-medicate for activity, Reposition, Distraction, Cessation of Activity  Pain Rating Post-Intervention 1: other (see comments) (mild)      Objective:     Communicated with nurse Reynolds prior to session.  Patient found up in chair with TUAN drain, peripheral IV upon PT entry to room.     General Precautions: Standard, fall, " "other (see comments)  Orthopedic Precautions: N/A  Braces:    Respiratory Status: Room air     Functional Mobility:  Transfers:     Sit to Stand:  contact guard assistance with no AD  Gait: 2 x 180' with HHA/CGA, decreased stability but no LoB, pt wishing to walk close to wall rail "just in case"  Stairs:  Pt ascended/descended 4" curb step with No Assistive Device with no handrails with Contact Guard Assistance and HHA.       AM-PAC 6 CLICK MOBILITY  Turning over in bed (including adjusting bedclothes, sheets and blankets)?: 4  Sitting down on and standing up from a chair with arms (e.g., wheelchair, bedside commode, etc.): 4  Moving from lying on back to sitting on the side of the bed?: 4  Moving to and from a bed to a chair (including a wheelchair)?: 3  Need to walk in hospital room?: 3  Climbing 3-5 steps with a railing?: 3  Basic Mobility Total Score: 21       Treatment & Education:  Gait and stair training as noted    Patient left up in chair with all lines intact, call button in reach, and nurse Deshanw notified..    GOALS:   Multidisciplinary Problems       Physical Therapy Goals          Problem: Physical Therapy    Goal Priority Disciplines Outcome Goal Variances Interventions   Physical Therapy Goal     PT, PT/OT Progressing     Description: P.T goals to be met in 2 weeks as follows:  1. Mod I Bed Mobility  2. Mod I T/F  3. Gait 300' Mod I   4. Tolerate OOB x 2 hours                          Time Tracking:     PT Received On: 09/12/24  PT Start Time: 1119     PT Stop Time: 1130  PT Total Time (min): 11 min     Billable Minutes: Gait Training 11    Treatment Type: Treatment  PT/PTA: PTA     Number of PTA visits since last PT visit: 1 09/12/2024  "

## 2024-09-12 NOTE — PROGRESS NOTES
3:19 PM  Patient requests for nurse to check flap prior to dc home.  Flap remains s change and Verbalizes relief p rolled gauze placed in axilla.    Patient and family VU of necessity to call providers with changes

## 2024-09-12 NOTE — PLAN OF CARE
Medicare Message     Important Message from Medicare regarding Discharge Appeal Rights Explained to patient/caregiver; Signed/date by patient/caregiver   Date IMM was signed 9/12/2024   Time IMM was signed [id number]

## 2024-09-12 NOTE — DISCHARGE INSTRUCTIONS
Plastic Surgery Discharge Instructions  Bennett Holloway DO    Wound Care  1. Shower daily beginning 48hrs after surgery  2. Okay to let warm soapy water run over the wound at that time  3. Do not submerge wounds in the bath  4. Leave any skin glue or mesh tape in place    Drain Care  If you have drains, strip tubing and record output when drain bulb becomes half full, or at least twice daily  Record output for each drain and bring to our follow up appointment    Diet  Heart healthy    Activity  Light activity only - no lifting greater than 10 lbs  Avoid strenuous activity that would cause you to get hot or sweaty    Medications  You have been given a prescription for narcotic pain medication, anti-inflammatory pain, muscle relaxer, and nerve pain  Unless otherwise contraindicated by your doctor, take 2 extra strength Tylenol and 600mg of ibuprofen every 8 hours  Please take medications as prescribed     What to call for:  1. Sustained fever > 101.0  2. Changes in color, sensation, temperature or pain at surgical site  3. Redness and/or drainage from the surgical site  4. Any reaction to prescribed medications  5. Continuous bloody drainage from surgical drains  6. Wound vac malfunction  7. Questions related to the procedure    Follow-up  1. Please call [phone] to schedule or confirm your follow up visit at the [facility], [address]  2. Call with any questions or concerns.  2. After hours call [phone] - ask to speak with the Plastic Surgery fellow on call

## 2024-09-12 NOTE — PT/OT/SLP EVAL
Occupational Therapy   Evaluation    Name: Sherry Torres  MRN: [id number]  Admitting Diagnosis: Malignant neoplasm of upper-inner quadrant of right breast in female, estrogen receptor positive  Recent Surgery: Procedure(s) (LRB):  EVACUATION, HEMATOMA (Left) 3 Days Post-Op    Recommendations:     Discharge Recommendations: Low Intensity Therapy  Discharge Equipment Recommendations:  none  Barriers to discharge:   none    Assessment:     Sherry Torres is a 68 y.o. female with a medical diagnosis of Malignant neoplasm of upper-inner quadrant of right breast in female, estrogen receptor positive.  She presents with mild pain. Performance deficits affecting function: weakness, impaired self care skills, impaired functional mobility, decreased ROM, decreased upper extremity function, pain.  Pt agreeable to participating in therapy upon arrival to room.  Pt demonstrates strength and ROM in (B) UE needed for ADLs that is WFL while adhering to post surgical precautions.  Pt able to perform grooming task standing at sink with supervision.      Overall, pt tolerated therapy well and was able to adhere to post surgical precautions.  Pt would benefit from skilled OT services to address problems listed above and increase independence with ADLs.  Low intensity therapy (OP PT with cancer specialist once cleared by MD) is recommended upon d/c from acute care to further address deficits and help pt improve overall functional independence.           Rehab Prognosis: Good; patient would benefit from acute skilled OT services to address these deficits and reach maximum level of function.       Plan:     Patient to be seen 3 x/week to address the above listed problems via self-care/home management, therapeutic activities, therapeutic exercises  Plan of Care Expires:    Plan of Care Reviewed with: patient    Subjective     Chief Complaint: none stated  Patient/Family Comments/goals: get back to walking, try pickle  ball    Occupational Profile:  Living Environment: Pt lives with  in 2SH, 1-2 JAILYN.  Bathroom has walk-in shower and tub/shower.  Previous level of function: Independent with ADLs and functional mobility   Roles and Routines: wife, mother, grandmother, enjoys walking  Equipment Used at Home: none  Assistance upon Discharge: family able to provide assist    Pain/Comfort:  Pain Rating 1:  (mild)  Location 1: abdomen  Pain Rating Post-Intervention 1:  (pt comfortable at rest)    Patients cultural, spiritual, Caodaism conflicts given the current situation: no    Objective:     Communicated with: RN Carmencita) prior to session.  Patient found HOB elevated with TUAN drain, peripheral IV upon OT entry to room.    General Precautions: Standard,  (post op bilateral breast flap)  Orthopedic Precautions: N/A  Braces: N/A  Respiratory Status: Room air    Occupational Performance:    Bed Mobility:    Supine <> sit: SBA  Scooting: SBA seated towards EOB    Functional Mobility/Transfers:  Sit <> Stand: SBA x 1 trial from EOB  Pt reported feeling slightly dizzy upon first sitting; feeling passed before standing  Functional Mobility: Pt ambulated ~30 ft in room with supervision.  No instances of LOB noted    Activities of Daily Living:  Grooming: supervision for washing hands while standing at sink.    Cognitive/Visual Perceptual:  Cognitive/Psychosocial Skills:    -       Oriented to: Person, Place, Time, and Situation   -       Follows Commands/attention: Follows 100% of one step commands  -       Communication: clear/fluent  -       Memory: No Deficits noted  -       Safety awareness/insight to disability: intact   -       Mood/Affect/Coping skills/emotional control: Cooperative and Pleasant    Physical Exam:  Postural examination/scapula alignment:    -       No postural abnormalities identified  Skin integrity: Visible skin intact  Edema:  None noted  Sensation: -       Intact  Motor Planning: -       WNL  Dominant hand: -        Right  Upper Extremity Range of Motion: Adhering to post surgical precautions  -       Right Upper Extremity: WFL  -       Left Upper Extremity: WFL  Upper Extremity Strength: Adhering to post surgical precautions  -       Right Upper Extremity: WFL  -       Left Upper Extremity: WFL   Strength: WFL  Fine Motor Coordination: -       Intact  Gross motor coordination: WFL  Balance: Sitting- Independent; Standing- Supervision    AMPA 6 Click ADL:  AMPA Total Score: 19    Treatment & Education:  *Pt educated on:  -role of OT in acute care setting  -post surgical precautions: avoid heavy lifting, limit overhead movements to shoulder height, avoid crossing midline, no excessive pushing/pulling  -log roll for bed mobility  -safety with ADLs  -UB dressing technique  -setting up bathroom space and closet to promote adherence to post surgical precautions    Patient left up in chair with all lines intact and call button in reach    GOALS:   Multidisciplinary Problems       Occupational Therapy Goals          Problem: Occupational Therapy    Goal Priority Disciplines Outcome Interventions   Occupational Therapy Goal     OT, PT/OT Progressing    Description: Goals to be met by: 9/30/2024     Patient will increase functional independence with ADLs by performing:    UE Dressing with Supervision.  LE Dressing with Supervision.  Grooming while standing at sink with Supervision.  Toileting from toilet with Supervision for hygiene and clothing management.   Toilet transfer to toilet with Supervision.                         History:     Past Medical History:   Diagnosis Date    Anxiety disorder, unspecified     r/t dx    Atypical ductal hyperplasia, breast 2008    left side    Breast cancer 08/2016    right side    Breast cyst approx. 40 years ago    Cancer     Depression     r/t dx    GERD (gastroesophageal reflux disease)     History of thyroid cancer 2006    Lobular carcinoma in situ not certain of 2016 diagnosis     Malignant neoplasm of upper-outer quadrant of right breast in female, estrogen receptor positive 09/15/2016    Mitral valve prolapse     PONV (postoperative nausea and vomiting)          Past Surgical History:   Procedure Laterality Date    BILATERAL MASTECTOMY Bilateral 2024    Procedure: MASTECTOMY, BILATERAL;  Surgeon: NADIRA Carroll MD;  Location: [facility];  Service: General;  Laterality: Bilateral;    BREAST BIOPSY Right 2016    BREAST BIOPSY Left     exc bx    BREAST LUMPECTOMY Right 2016    w/radiation    BREAST SURGERY   AND  OR     CATARACT EXTRACTION W/  INTRAOCULAR LENS IMPLANT Right 2024    Procedure: EXTRACTION, CATARACT, WITH IOL INSERTION;  Surgeon: Zhane Khan MD;  Location: [facility] OR;  Service: Ophthalmology;  Laterality: Right;     SECTION      x 2  and     COLONOSCOPY N/A 10/08/2019    Procedure: COLONOSCOPY;  Surgeon: Eliceo Holloway MD;  Location: [facility] ENDO (4TH FLR);  Service: Endoscopy;  Laterality: N/A;  Okay for Freeman or Jewel. However, she needs it done before end , so if they are not available before then, okay for any provided.    CYST REMOVED FROM RIGHT BREAST IN       EVACUATION OF HEMATOMA Left 2024    Procedure: EVACUATION, HEMATOMA;  Surgeon: Pito Holloway DO;  Location: [facility] OR;  Service: Plastics;  Laterality: Left;    EXTRACTION OF CATARACT Left 2023    Procedure: EXTRACTION, CATARACT;  Surgeon: Eric Cardona MD;  Location: [facility] OR;  Service: Ophthalmology;  Laterality: Left;  DiB00 +21.0D    HERNIA REPAIR      over C section incision     INJECTION FOR SENTINEL NODE IDENTIFICATION Right 2024    Procedure: INJECTION, FOR SENTINEL NODE IDENTIFICATION;  Surgeon: NADIRA Carroll MD;  Location: [facility] OR;  Service: General;  Laterality: Right;    INJECTION, AGENT, INTRAVENOUS, FOR ASSESSMENT OF BLOOD FLOW IN FLAP OR GRAFT N/A 2024    Procedure: INJECTION, AGENT, INTRAVENOUS, FOR ASSESSMENT OF  BLOOD FLOW IN FLAP OR GRAFT;  Surgeon: Pito Holloway DO;  Location: [facility] OR;  Service: Plastics;  Laterality: N/A;    left breast wire localization excisional biopsy with bracketed wires using 3 wires and excision through 1 incision.       LIPOMA RESECTION N/A 10/04/2023    Procedure: EXCISION, LIPOMA Back;  Surgeon: Kenneth Hernández MD;  [address];  Service: General;  Laterality: N/A;    PHACOEMULSIFICATION, CATARACT, WITH IOL INSERTION Left 12/27/2023    Procedure: PHACOEMULSIFICATION, CATARACT, WITH IOL INSERTION;  Surgeon: Eric Cardona MD;  Location: [facility] OR;  Service: Ophthalmology;  Laterality: Left;    RECONSTRUCTION OF BREAST WITH DEEP INFERIOR EPIGASTRIC ARTERY  (RODRIGUEZ) FREE FLAP Bilateral 9/9/2024    Procedure: RECONSTRUCTION, BREAST, USING RODRIGUEZ FREE FLAP / BILATERAL RODRIGUEZ FLAP;  Surgeon: Pito Holloway DO;  Location: [facility] OR;  Service: Plastics;  Laterality: Bilateral;  2-3 DAY STAY    SENTINEL LYMPH NODE BIOPSY Right 9/9/2024    Procedure: BIOPSY, LYMPH NODE, SENTINEL;  Surgeon: NADIRA Carroll MD;  Location: [facility] OR;  Service: General;  Laterality: Right;    THYROIDECTOMY      TRANSFORAMINAL EPIDURAL INJECTION OF STEROID Right 07/01/2019    Procedure: Injection,steroid,epidural,transforaminal approach LUMBAR TRANSFORAMINAL RIGHT L5 AND S1 TF ARNOLDO;  Surgeon: Nadya Mcfarland MD;  Location: [facility] PAIN MGT;  Service: Pain Management;  Laterality: Right;  NEEDS CONSENT    TUBAL LIGATION         Time Tracking:     OT Date of Treatment: 09/12/24  OT Start Time: 0957  OT Stop Time: 1009  OT Total Time (min): 12 min    Billable Minutes:Evaluation 12    ROHINI Monahan  9/12/2024

## 2024-09-12 NOTE — PLAN OF CARE
[facility] ([address])    HOME HEALTH ORDERS  FACE TO FACE ENCOUNTER    Patient Name: Sherry Torres  YOB: 1955    PCP: Adrián Javier MD   PCP Address: [address] / [place] [address]  PCP Phone Number: [phone]  PCP Fax: [phone]    Encounter Date: 09/12/2024    Admit to Home Health    Diagnoses:  Active Hospital Problems    Diagnosis  POA    *Malignant neoplasm of upper-inner quadrant of right breast in female, estrogen receptor positive [C50.211, Z17.0]  Not Applicable      Resolved Hospital Problems   No resolved problems to display.       Future Appointments   Date Time Provider Department Center   9/17/2024 10:00 AM Pito Holloway, DO [facility] [place]   9/24/2024 10:30 AM Pito Holloway, DO [facility] [place]   9/25/2024 11:30 AM NADIRA Carroll MD [facility] [place]   9/26/2024  9:00 AM LAB, [facility] [place]   10/3/2024  3:20 PM DAMARI JEFFERS [facility] FLORENTIN [place]           I have seen and examined this patient face to face today. My clinical findings that support the need for the home health skilled services and home bound status are the following:  Weakness/numbness causing balance and gait disturbance due to Surgery making it taxing to leave home.    Allergies:  Review of patient's allergies indicates:   Allergen Reactions    Codeine Nausea Only    Sulfa (sulfonamide antibiotics) Nausea Only       Diet: regular diet    Activities: no driving while on analgesics and See Discharge instructions    Nursing:   SN to complete comprehensive assessment including routine vital signs. Instruct on disease process and s/s of complications to report to MD. Review/verify medication list sent home with the patient at time of discharge  and instruct patient/caregiver as needed. Frequency may be adjusted depending on start of care date. If patient has enteral feeding tube (NG, PEG, J-tube, G-tube), flush tube before and  after feeding and/or medication administration with 20-30 mL of water.    Notify MD if SBP > 160 or < 90; DBP > 90 or < 50; HR > 120 or < 50; Temp > 101;       CONSULTS:    Physical Therapy to evaluate and treat. Evaluate for home safety and equipment needs; Establish/upgrade home exercise program. Perform / instruct on therapeutic exercises, gait training, transfer training, and Range of Motion.  Occupational Therapy to evaluate and treat. Evaluate home environment for safety and equipment needs. Perform/Instruct on transfers, ADL training, ROM, and therapeutic exercises.    MISCELLANEOUS CARE:  N/A    WOUND CARE ORDERS  yes:  If drain exists assess drain site for signs and symptoms of infection. Empty drain and record output daily and PRN. Monitor for signs and symptoms of infection. Report as necessary. For peripheral wounds, wound spray or saline for wound(s) cleaning with all dressing changes. SN to assess wound(s) with each visit.  Instruct/teach patient/caregiver on wound care protocol.          Medications: Review discharge medications with patient and family and provide education.      Current Discharge Medication List        START taking these medications    Details   acetaminophen (TYLENOL) 500 MG tablet Take 2 tablets (1,000 mg total) by mouth every 8 (eight) hours. for 7 days  Qty: 42 tablet, Refills: 0      gabapentin (NEURONTIN) 300 MG capsule Take 1 capsule (300 mg total) by mouth 3 (three) times daily. for 7 days  Qty: 21 capsule, Refills: 0      ibuprofen (ADVIL,MOTRIN) 600 MG tablet Take 1 tablet (600 mg total) by mouth every 6 (six) hours. for 7 days  Qty: 28 tablet, Refills: 0      methocarbamoL (ROBAXIN) 500 MG Tab Take 1 tablet (500 mg total) by mouth 3 (three) times daily. for 7 days  Qty: 21 tablet, Refills: 0      oxyCODONE (ROXICODONE) 5 MG immediate release tablet Take 1 tablet (5 mg total) by mouth every 4 (four) hours as needed.  Qty: 10 tablet, Refills: 0    Comments: Quantity  prescribed more than 7 day supply? No           CONTINUE these medications which have CHANGED    Details   ondansetron (ZOFRAN-ODT) 4 MG TbDL Take 1 tablet (4 mg total) by mouth every 6 (six) hours as needed.  Qty: 10 tablet, Refills: 0           CONTINUE these medications which have NOT CHANGED    Details   levothyroxine (SYNTHROID) 88 MCG tablet Take 1 tablet (88 mcg total) by mouth before breakfast.  Qty: 30 tablet, Refills: 11    Associated Diagnoses: Post-surgical hypothyroidism      loratadine (CLARITIN) 10 mg tablet Take 10 mg by mouth once daily. AS NEEDED FOR ALLERGIES      pantoprazole (PROTONIX) 40 MG tablet Take 1 tablet (40 mg total) by mouth once daily.  Qty: 90 tablet, Refills: 3    Associated Diagnoses: Hiatal hernia with GERD      potassium chloride SA (K-DUR,KLOR-CON) 10 MEQ tablet Take 20 mEq by mouth once daily.      tretinoin (RETIN-A) 0.025 % cream Compound tretinoin 0.025% / niacinamide 2% cream / hyaluronic acid 0.25%. Apply a pea-sized amount to entire face qhs.  Qty: 30 g, Refills: 11    Associated Diagnoses: Rhytides      famotidine (PEPCID) 20 MG tablet Take 20 mg by mouth 2 (two) times daily.      semaglutide (OZEMPIC) 0.25 mg or 0.5 mg (2 mg/3 mL) pen injector Inject 0.5 mg into the skin every 7 days. On tuesday             I certify that this patient is confined to her home and needs physical therapy and occupational therapy.

## 2024-09-12 NOTE — PLAN OF CARE
09/12/24 1140   Post-Acute Status   Post-Acute Authorization Home Health   Home Health Status Referrals Sent   Patient choice form signed by patient/caregiver List with quality metrics by geographic area provided;List from CMS Compare;List from System Post-Acute Care   Discharge Delays None known at this time   Discharge Plan   Discharge Plan A Home Health;Home with family   Discharge Plan B Home with family     I certify I provided patient choice and a list to the patient/family of CMS rated Home Health, SNF, IRF, LTACH, longterm Nursing Homes.  Patient/Family signed Patient's Choice Disclosure Form choosing the first available  agency.

## 2024-09-12 NOTE — PLAN OF CARE
Free from falls, injury, or skin breakdown this hospital admission. Pt eager & in agreement w/ DC. VU of DC instructions and the need to attend f/u--paperwork  passed & explained. All scripts filled and delivered to bedside. IV removed w/ cath tip intact, WNL. Voiding, ambulating, & tolerating PO well. Incision WNL. Flaps WNL. Cryil X4; in place draining serosanguineous fluid.To be DCd home w/ family--will be escorted downstairs via  transport team once dressed, ready & ride arrives. Pt discharged in no distress w/ spouse. Pt given extra ABD PADS, bra, measuring cups, kei hose,

## 2024-09-12 NOTE — DISCHARGE SUMMARY
[facility] ([address])  Plastic Surgery  Discharge Summary      Patient Name: Sherry Torres  MRN: [id number]  Admission Date: 9/9/2024  Hospital Length of Stay: 3 days  Discharge Date and Time:  09/12/2024 9:06 AM  Attending Physician: Pito Holloway DO   Discharging Provider: Maurisio Goldstein MD  Primary Care Provider: Adrián Javier MD     HPI: 68-year-old woman with a history of right breast cancer. She previously underwent lumpectomy and adjuvant radiotherapy. She has a recurrence in the right breast. A right mastectomy was recommended. She preferred bilateral mastectomy with autologous reconstruction     Procedure(s) (LRB):  EVACUATION, HEMATOMA (Left)     Hospital Course:  She underwent bilateral immediate RODRIGUEZ flap breast reconstruction however was complicated on postop day 0 with marked swelling of the left breast and significant bloody drain output.  She was then taken back to the operating room for evacuation of left breast hematoma proximally 300 cc with exploration previous RODRIGUEZ flap.  The flap was viable and healthy with great arterial Doppler signal and there were no signs of venous thrombosis.  She tolerated the procedure well was transferred back to floor for continued monitoring and flap checks.  The following day her Reis was discontinued and she was advanced to regular diet.  She was ambulating and voiding.  Her pain was well-controlled.  She had no further signs of recurrent breast hematoma.  Her drains became serosanguineous.  She was on DVT prophylaxis.  She was eventually discharged in stable condition.  Her Doppler wires were cut at bedside.  She was given multimodal pain control upon discharge.  All questions and concerns were answered.  She was to follow up with Dr. Holloway.        Significant Diagnostic Studies:   Specimen (24h ago, onward)      None            Pending Diagnostic Studies:       Procedure Component Value Units Date/Time    Specimen to Pathology,  Surgery Breast [[id number]] Collected: 09/09/24 1323    Order Status: Sent Lab Status: In process Updated: 09/09/24 1520    Specimen: Tissue           Final Active Diagnoses:    Diagnosis Date Noted POA    PRINCIPAL PROBLEM:  Malignant neoplasm of upper-inner quadrant of right breast in female, estrogen receptor positive [C50.211, Z17.0] 07/31/2024 Not Applicable      Problems Resolved During this Admission:      Discharged Condition: good    Disposition: Home or Self Care    Follow Up:    Patient Instructions:      Notify your health care provider if you experience any of the following:  temperature >100.4     Notify your health care provider if you experience any of the following:  persistent nausea and vomiting or diarrhea     Notify your health care provider if you experience any of the following:  severe uncontrolled pain     Notify your health care provider if you experience any of the following:  redness, tenderness, or signs of infection (pain, swelling, redness, odor or green/yellow discharge around incision site)     Notify your health care provider if you experience any of the following:  difficulty breathing or increased cough     Notify your health care provider if you experience any of the following:  severe persistent headache     Notify your health care provider if you experience any of the following:  worsening rash     Notify your health care provider if you experience any of the following:  persistent dizziness, light-headedness, or visual disturbances     Notify your health care provider if you experience any of the following:  increased confusion or weakness     Medications:  Reconciled Home Medications:      Medication List        START taking these medications      acetaminophen 500 MG tablet  Commonly known as: TYLENOL  Take 2 tablets (1,000 mg total) by mouth every 8 (eight) hours. for 7 days     gabapentin 300 MG capsule  Commonly known as: NEURONTIN  Take 1 capsule (300 mg total) by mouth  3 (three) times daily. for 7 days     ibuprofen 600 MG tablet  Commonly known as: ADVIL,MOTRIN  Take 1 tablet (600 mg total) by mouth every 6 (six) hours. for 7 days     methocarbamoL 500 MG Tab  Commonly known as: ROBAXIN  Take 1 tablet (500 mg total) by mouth 3 (three) times daily. for 7 days     oxyCODONE 5 MG immediate release tablet  Commonly known as: ROXICODONE  Take 1 tablet (5 mg total) by mouth every 4 (four) hours as needed.            CONTINUE taking these medications      CLARITIN 10 mg tablet  Generic drug: loratadine  Take 10 mg by mouth once daily. AS NEEDED FOR ALLERGIES     famotidine 20 MG tablet  Commonly known as: PEPCID  Take 20 mg by mouth 2 (two) times daily.     levothyroxine 88 MCG tablet  Commonly known as: SYNTHROID  Take 1 tablet (88 mcg total) by mouth before breakfast.     ondansetron 4 MG Tbdl  Commonly known as: ZOFRAN-ODT  Take 1 tablet (4 mg total) by mouth every 6 (six) hours as needed.     pantoprazole 40 MG tablet  Commonly known as: PROTONIX  Take 1 tablet (40 mg total) by mouth once daily.     potassium chloride SA 10 MEQ tablet  Commonly known as: K-DUR,KLOR-CON M  Take 20 mEq by mouth once daily.     semaglutide 0.25 mg or 0.5 mg (2 mg/3 mL) pen injector  Commonly known as: OZEMPIC  Inject 0.5 mg into the skin every 7 days. On tuesday     tretinoin 0.025 % cream  Commonly known as: RETIN-A  Compound tretinoin 0.025% / niacinamide 2% cream / hyaluronic acid 0.25%. Apply a pea-sized amount to entire face qhs.              Maurisio Goldstein MD  Plastic Surgery  [facility] ([address])

## 2024-09-13 ENCOUNTER — PATIENT OUTREACH (OUTPATIENT)
Dept: ADMINISTRATIVE | Facility: CLINIC | Age: 69
End: 2024-09-13
Payer: MEDICARE

## 2024-09-13 PROCEDURE — G0180 MD CERTIFICATION HHA PATIENT: HCPCS | Mod: ,,, | Performed by: SURGERY

## 2024-09-13 NOTE — PROGRESS NOTES
C3 nurse attempted to contact Sherry Torres for a TCC post hospital discharge follow-up call. The patient politely declined call at this time, stating HH came today and reviewed her meds, she has the OOC number, and she doesn't need a HOSFU with her PCP. No messages routed at this time.

## 2024-09-17 ENCOUNTER — OFFICE VISIT (OUTPATIENT)
Dept: PLASTIC SURGERY | Facility: CLINIC | Age: 69
End: 2024-09-17
Payer: MEDICARE

## 2024-09-17 VITALS
BODY MASS INDEX: 23.88 KG/M2 | HEIGHT: 65 IN | DIASTOLIC BLOOD PRESSURE: 81 MMHG | OXYGEN SATURATION: 98 % | SYSTOLIC BLOOD PRESSURE: 130 MMHG | WEIGHT: 143.31 LBS | HEART RATE: 77 BPM | RESPIRATION RATE: 20 BRPM

## 2024-09-17 DIAGNOSIS — Z17.0 MALIGNANT NEOPLASM OF UPPER-INNER QUADRANT OF RIGHT BREAST IN FEMALE, ESTROGEN RECEPTOR POSITIVE: Primary | ICD-10-CM

## 2024-09-17 DIAGNOSIS — C50.211 MALIGNANT NEOPLASM OF UPPER-INNER QUADRANT OF RIGHT BREAST IN FEMALE, ESTROGEN RECEPTOR POSITIVE: Primary | ICD-10-CM

## 2024-09-17 PROCEDURE — 99999 PR PBB SHADOW E&M-EST. PATIENT-LVL III: CPT | Mod: PBBFAC,,, | Performed by: SURGERY

## 2024-09-19 NOTE — PROGRESS NOTES
Sherry Torres presents to Plastic Surgery Clinic for a follow up visit status post bilateral RODRIGUEZ flap reconstruction on 9/9/24. Patient is doing well today with no issues since discharge.  She did develop hematoma of the left mastectomy flap on postop day 0 that required returned to the operating room for hematoma evacuation.  It was no flap compromise.  Overall her pain has been controlled.  She was not taking any narcotic pain pills.  She feels like she was getting around her house well      PHYSICAL EXAMINATION  Bilateral breast flap incision(s) well approximated with no issues.  She was buried flaps with a bilateral wise pattern   Flap is soft   Nipple(s) is/are surgically absent   Abdominal incision is healing well with no issues   Umbilical incision healing well with no issues  sutures were removed around the umbilicus  Drains are in place with low output       ASSESSMENT/PLAN  Sherry Torres is s/p bilateral immediate RODRIGUEZ flap one-week ago  Removed both breast drains in the right abdominal drain.  Also removed sutures around the umbilicus  - Recommend antibiotic ointment around the umbilicus.  She was to continue on gauze pads over her abdominal and breast incisions.  She was still has Prineo tape in place.  Continue abdominal compression supportive bra  - Follow up one-week      All questions were answered. The patient was advised to contact the clinic with any questions or concerns prior to their next visit.       Pito Holloway  Plastic and Reconstructive Surgery

## 2024-09-24 ENCOUNTER — OFFICE VISIT (OUTPATIENT)
Dept: PLASTIC SURGERY | Facility: CLINIC | Age: 69
End: 2024-09-24
Payer: MEDICARE

## 2024-09-24 VITALS — SYSTOLIC BLOOD PRESSURE: 127 MMHG | HEART RATE: 78 BPM | DIASTOLIC BLOOD PRESSURE: 67 MMHG

## 2024-09-24 DIAGNOSIS — Z09 SURGERY FOLLOW-UP: Primary | ICD-10-CM

## 2024-09-24 LAB
COMMENT: NORMAL
FINAL PATHOLOGIC DIAGNOSIS: NORMAL
GROSS: NORMAL
Lab: NORMAL

## 2024-09-24 PROCEDURE — 1125F AMNT PAIN NOTED PAIN PRSNT: CPT | Mod: CPTII,S$GLB,, | Performed by: SURGERY

## 2024-09-24 PROCEDURE — 3074F SYST BP LT 130 MM HG: CPT | Mod: CPTII,S$GLB,, | Performed by: SURGERY

## 2024-09-24 PROCEDURE — 3078F DIAST BP <80 MM HG: CPT | Mod: CPTII,S$GLB,, | Performed by: SURGERY

## 2024-09-24 PROCEDURE — 99999 PR PBB SHADOW E&M-EST. PATIENT-LVL III: CPT | Mod: PBBFAC,,, | Performed by: SURGERY

## 2024-09-24 PROCEDURE — 1101F PT FALLS ASSESS-DOCD LE1/YR: CPT | Mod: CPTII,S$GLB,, | Performed by: SURGERY

## 2024-09-24 PROCEDURE — 99024 POSTOP FOLLOW-UP VISIT: CPT | Mod: S$GLB,,, | Performed by: SURGERY

## 2024-09-24 PROCEDURE — 3288F FALL RISK ASSESSMENT DOCD: CPT | Mod: CPTII,S$GLB,, | Performed by: SURGERY

## 2024-09-24 PROCEDURE — 3044F HG A1C LEVEL LT 7.0%: CPT | Mod: CPTII,S$GLB,, | Performed by: SURGERY

## 2024-09-24 NOTE — PROGRESS NOTES
Sherry Torres presents to Plastic Surgery Clinic for a follow up visit status post bilateral RODRIGUEZ flap reconstruction on 9/9/24. Patient is doing well today with no issues since discharge.  She did develop hematoma of the left mastectomy flap on postop day 0 that required return to the operating room for hematoma evacuation.  There was no flap compromise.  Overall her pain has been controlled. She does notice intermittent sharp shooting pains, mostly in her abdomen. The robaxin and gabapentin help with this. Her left TUAN drain has low output.     PHYSICAL EXAMINATION  Bilateral breast flap incision(s) well approximated with superficial T point breakdown of the left medial T point.  She has buried flaps with a bilateral wise pattern.   Flaps are  soft   Nipple(s) is/are surgically absent   Abdominal incision is healing well with no issues   Umbilical incision healing well with no issues  Drains are in place with low output         ASSESSMENT/PLAN  Sherry Torres is s/p bilateral immediate RODRIGUEZ flap   - Removed right abdominal drain. Continue compression for another week.  - Counseled on scar massage and incision care  - Follow up 2 weeks since she has that T point breakdown        All questions were answered. The patient was advised to contact the clinic with any questions or concerns prior to their next visit.         Evelyn Callahan  Plastic and Reconstructive Surgery

## 2024-09-25 ENCOUNTER — OFFICE VISIT (OUTPATIENT)
Dept: SURGERY | Facility: CLINIC | Age: 69
End: 2024-09-25
Payer: MEDICARE

## 2024-09-25 VITALS
HEART RATE: 82 BPM | WEIGHT: 143 LBS | DIASTOLIC BLOOD PRESSURE: 73 MMHG | BODY MASS INDEX: 23.82 KG/M2 | OXYGEN SATURATION: 100 % | HEIGHT: 65 IN | SYSTOLIC BLOOD PRESSURE: 123 MMHG

## 2024-09-25 DIAGNOSIS — Z17.0 CARCINOMA OF UPPER-OUTER QUADRANT OF RIGHT BREAST IN FEMALE, ESTROGEN RECEPTOR POSITIVE: Primary | ICD-10-CM

## 2024-09-25 DIAGNOSIS — C50.411 CARCINOMA OF UPPER-OUTER QUADRANT OF RIGHT BREAST IN FEMALE, ESTROGEN RECEPTOR POSITIVE: Primary | ICD-10-CM

## 2024-09-25 PROCEDURE — 99999 PR PBB SHADOW E&M-EST. PATIENT-LVL III: CPT | Mod: PBBFAC,,, | Performed by: SURGERY

## 2024-09-25 NOTE — PROGRESS NOTES
[facility]       Post-Op        REFERRING PHYSICIAN:  No referring provider defined for this encounter.       Adrián Javier MD    MEDICAL ONCOLOGIST:    Phillip Mckee MD  RADIATION ONCOLOGIST:   pending    DIAGNOSIS:    This is a 68 y.o. female with a pT1c N0 M0 grade 2 ER + CT + HER2 - IDC of the right breast.    TREATMENT SUMMARY:  The patient is status post bilateral mastectomy and right sentinel node biopsy with RODRIGUEZ flap reconstruction on 9/9/2024.  Final pathology showed     1. LEFT BREAST, MASTECTOMY:  - Atypical ductal hyperplasia (ADH), focal.  - Atypical lobular hyperplasia (ALH).  - Additional findings: Fibroadenomas (6 mm and 8 mm), columnar cell change, radial scar (3 mm), sclerosing adenosis, benign intraductal papilloma (1 mm), usual ductal hyperplasia (UDH), apocrine metaplasia, microcysts and benign nipple.  - Microcalcifications seen in the ADH and in benign breast tissue.  - Biopsy clip identified.    2. RIGHT BREAST, MASTECTOMY:  - Invasive ductal carcinoma, grade 2, 14 mm.  - Atypical lobular hyperplasia (ALH).  - Additional benign findings: Benign nipple, fibroadenoma (4 mm) and columnar cell change.  - Microcalcifications are seen in association with the invasive carcinoma and in benign breast tissue.  - Biopsy clip is identified.  - See CAP synoptic report below.    3. RIGHT AXILLARY SENTINEL LYMPH NODE #1, , EXCISION:  - One lymph node, negative for carcinoma (0/1).  - The specimen is entirely submitted for histologic examination.    - See CAP synoptic report below.     INTERVAL HISTORY:   Sherry Torres comes in for a post-op check. Her pain is well controlled.      MEDICATIONS:  Current Outpatient Medications   Medication Sig Dispense Refill    famotidine (PEPCID) 20 MG tablet Take 20 mg by mouth 2 (two) times daily.      levothyroxine (SYNTHROID) 88 MCG tablet Take 1 tablet (88 mcg total) by mouth before breakfast. 30 tablet 11    loratadine (CLARITIN) 10 mg  tablet Take 10 mg by mouth once daily. AS NEEDED FOR ALLERGIES      ondansetron (ZOFRAN-ODT) 4 MG TbDL Take 1 tablet (4 mg total) by mouth every 6 (six) hours as needed. 10 tablet 0    gabapentin (NEURONTIN) 300 MG capsule Take 1 capsule (300 mg total) by mouth 3 (three) times daily. for 7 days 21 capsule 0    oxyCODONE (ROXICODONE) 5 MG immediate release tablet Take 1 tablet (5 mg total) by mouth every 4 (four) hours as needed. 10 tablet 0    pantoprazole (PROTONIX) 40 MG tablet Take 1 tablet (40 mg total) by mouth once daily. 90 tablet 3    potassium chloride SA (K-DUR,KLOR-CON) 10 MEQ tablet Take 20 mEq by mouth once daily. (Patient not taking: Reported on 9/25/2024)      semaglutide (OZEMPIC) 0.25 mg or 0.5 mg (2 mg/3 mL) pen injector Inject 0.5 mg into the skin every 7 days. On tuesday (Patient not taking: Reported on 9/25/2024)      tretinoin (RETIN-A) 0.025 % cream Compound tretinoin 0.025% / niacinamide 2% cream / hyaluronic acid 0.25%. Apply a pea-sized amount to entire face qhs. (Patient not taking: Reported on 9/25/2024) 30 g 11     No current facility-administered medications for this visit.     Facility-Administered Medications Ordered in Other Visits   Medication Dose Route Frequency Provider Last Rate Last Admin    0.9%  NaCl infusion   Intravenous Continuous Hakan Garces PA-C 70 mL/hr at 10/04/23 1113 New Bag at 10/04/23 1113    haloperidol lactate injection 0.5 mg  0.5 mg Intravenous Q10 Min PRN Crystal Holloway MD        HYDROmorphone injection 0.2 mg  0.2 mg Intravenous Q5 Min PRN Crystal Holloway MD        LIDOcaine (PF) 10 mg/ml (1%) injection 10 mg  1 mL Intradermal Once Eric Cardona MD        sodium chloride 0.9% flush 10 mL  10 mL Intravenous PRN Crystal Holloway MD           ALLERGIES:   Review of patient's allergies indicates:   Allergen Reactions    Codeine Nausea Only    Sulfa (sulfonamide antibiotics) Nausea Only       PHYSICAL EXAMINATION:   General:  This is a well appearing  female with appropriate speech, affect and gait.     Breast:  Incision clean, dry, and intact    IMPRESSION:   The patient has had an uneventful postoperative course.    PLAN:   1. return in 6 months for a follow up office visit and breast exam  2. The patient is advised in continued exam of the breast chest wall and to report to this office sooner should she note any areas of abnormality or concern.   3.  She has been instructed to meet with medical oncology for discussion of adjuvant treatment recommendations. No indication for radiation.   4. Oncotype ordered.

## 2024-09-26 ENCOUNTER — LAB VISIT (OUTPATIENT)
Dept: LAB | Facility: HOSPITAL | Age: 69
End: 2024-09-26
Attending: NURSE PRACTITIONER
Payer: MEDICARE

## 2024-09-26 DIAGNOSIS — E55.9 VITAMIN D DEFICIENCY: ICD-10-CM

## 2024-09-26 DIAGNOSIS — Z85.850 HISTORY OF THYROID CANCER: ICD-10-CM

## 2024-09-26 DIAGNOSIS — E89.0 POST-SURGICAL HYPOTHYROIDISM: ICD-10-CM

## 2024-09-26 LAB
25(OH)D3+25(OH)D2 SERPL-MCNC: 21 NG/ML (ref 30–96)
T4 FREE SERPL-MCNC: 0.99 NG/DL (ref 0.71–1.51)
TSH SERPL DL<=0.005 MIU/L-ACNC: 8.14 UIU/ML (ref 0.4–4)

## 2024-09-26 PROCEDURE — 86800 THYROGLOBULIN ANTIBODY: CPT | Performed by: NURSE PRACTITIONER

## 2024-09-26 PROCEDURE — 84443 ASSAY THYROID STIM HORMONE: CPT | Performed by: NURSE PRACTITIONER

## 2024-09-26 PROCEDURE — 82306 VITAMIN D 25 HYDROXY: CPT | Performed by: NURSE PRACTITIONER

## 2024-09-26 PROCEDURE — 84439 ASSAY OF FREE THYROXINE: CPT | Performed by: NURSE PRACTITIONER

## 2024-09-30 ENCOUNTER — DOCUMENTATION ONLY (OUTPATIENT)
Dept: SURGERY | Facility: CLINIC | Age: 69
End: 2024-09-30
Payer: MEDICARE

## 2024-09-30 NOTE — PROGRESS NOTES
Subjective:      Patient ID: Sherry Torres is a 68 y.o. female.    Chief Complaint:  Hypothyroidism    History of Present Illness  Sherry Torres is here for follow up of Postsurgical Hypothyroidism.  Previously seen by me 9/2023.  This is a MyChart video visit.    The patient location is: [place]  The chief complaint leading to consultation is: Hypothyroid  Visit type: Virtual visit with synchronous audio and video  Total time spent with patient: see below  Each patient to whom he or she provides medical services by telemedicine is:  (1) informed of the relationship between the physician and patient and the respective role of any other health care provider with respect to management of the patient; and (2) notified that he or she may decline to receive medical services by telemedicine and may withdraw from such care at any time.    She is status post bilateral mastectomy and right sentinel node biopsy with RODRIGUEZ flap reconstruction on 9/9/2024. Of note, multiple scans. Doing well!    With regards to Hypothyroidism:    She had thyroid cancer surgery in 2006 by Dr. Pardo. It was stage III: T2, N0, M0. 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 She has had 3 total body scans, 2007, 2008 and 2009, all negative. She was treated with 100 mCi I-131 postsurgery.    Lab Results   Component Value Date    TSH 8.140 (H) 09/26/2024    FREET4 0.99 09/26/2024      Latest Reference Range & Units 09/26/24 08:57   Thyroglobulin Interpretation  SEE BELOW   Thyroglobulin Antibody Screen <1.8 IU/mL <1.8   Thyroglobulin, Tumor Marker ng/mL <0.1       Thyroid US  10/2019  Status post thyroidectomy.  No sonographic evidence of malignancy in this patient with an undetectable serum thyroglobulin.    Current medication:  Levothyroxine 88mcg daily   Reduced 6/2024    Calcium/Iron: None since surgery but was taking some > 4 hours from LT4    Biotin Use: Denies     Takes thyroid medication properly without food first thing in  the morning.    Current Symptoms:   Denies unexplained weight changes  Reports Fatigue but improving since surgery   Denies Constipation   Denies Hair loss  Denies Brittle nails  Denies Mental fog   Denies cp, palpations or sob  Denies Heat intolerance  Reports Cold intolerance    BMD  9/2023  Impression:  *Low bone mass (Osteopenia); FRAX calculations do not support treatment as osteoporosis. Wide variation in density between L2 and L3 vertebral bodies.  *Compared with previous DXA, BMD at the lumbar spine has remained stable, and BMD at the total hip has remained stable.  RECOMMENDATIONS:  *Daily calcium intake 7297-6572 mg, dietary sources preferred; Vitamin D 4985-6532 IU daily.  *Weight bearing exercise and fall precautions.  *If dedicated imaging is negative for vertebral fracture, then no additional treatment is recommended at this time. If positive for fracture, would treat as osteoporosis.  *Repeat BMD in 2 years.      Vitamin D: multivitamin     Review of Systems  as above      There were no vitals taken for this visit.    There is no height or weight on file to calculate BMI.    Lab Review:   Lab Results   Component Value Date    HGBA1C 5.2 06/14/2024     Lab Results   Component Value Date    CHOL 214 (H) 06/14/2024    HDL 50 06/14/2024    LDLCALC 144.6 06/14/2024    TRIG 97 06/14/2024    CHOLHDL 23.4 06/14/2024     Lab Results   Component Value Date     09/10/2024    K 4.2 09/10/2024     09/10/2024    CO2 23 09/10/2024     (H) 09/10/2024    BUN 10 09/10/2024    CREATININE 0.9 09/10/2024    CALCIUM 8.8 09/10/2024    PROT 6.6 06/14/2024    ALBUMIN 3.6 06/14/2024    BILITOT 0.6 06/14/2024    ALKPHOS 38 (L) 06/14/2024    AST 15 06/14/2024    ALT 9 (L) 06/14/2024    ANIONGAP 7 (L) 09/10/2024    ESTGFRAFRICA >60.0 07/27/2022    EGFRNONAA >60.0 07/27/2022    TSH 8.140 (H) 09/26/2024     Vit D, 25-Hydroxy   Date Value Ref Range Status   09/26/2024 21 (L) 30 - 96 ng/mL Final     Comment:      Vitamin D deficiency.........<10 ng/mL                              Vitamin D insufficiency......10-29 ng/mL       Vitamin D sufficiency........> or equal to 30 ng/mL  Vitamin D toxicity............>100 ng/mL       Assessment and Plan     1. Post-surgical hypothyroidism  TSH    US Soft Tissue Head Neck      2. History of thyroid cancer  TSH    US Soft Tissue Head Neck      3. Vitamin D deficiency          Post-surgical hypothyroidism  -- Labs in 6 weeks.  -- Reviewed TSH (9/2024) above goal - of note, multiple scans/surgery/etc over the last month. Adjust medication based on repeat labs.  -- Calcium and iron need to be  from thyroid hormone replacement by 4 or > hours.  -- Please note: if you are taking biotin please hold it for 5 days prior to labs as it can interfere with the thyroid testing.  -- Medication Changes:   Levothyroxine 88mcg daily   -- Goal is a low normal TSH.  -- Avoid exogenous hyperthyroidism as this can accelerate bone loss and increase risk of CV complications.  -- Advised to take LT4 on an empty stomach with water and to wait 30-45 minutes before eating or taking other medications   -- Reviewed usual times of thyroid hormone changes  -- Reviewed that symptoms of hypothyroidism may not correlate with tsh, and a normal TSH is the goal of therapy. Symptoms are not a justification for over treatment.    History of thyroid cancer  -- Reviewed Tg (9/2024) negative. Repeat annually.  -- Update neck ultrasound.     Vitamin D deficiency  -- Resume supplement.       Follow up in about 1 year (around 10/3/2025).      I spent 20 minutes face-to-face with the patient, over half of the visit was spent on counseling and/or coordinating the care of the patient.    Counseling includes:  Diagnostic results, impressions, recommendations   Prognosis   Risk and benefits of management/treatment options   Instructions for management treatment and or follow-up   Importance of compliance with management   Risk  factor reduction   Patient education    Visit today included increased complexity associated with the care of the problems addressed and managing the longitudinal care of the patient due to the serious and/or complex managed problems.

## 2024-09-30 NOTE — PROGRESS NOTES
Oncotype submitted. Pt scheduled with Dr. Mckee for 10/16/24. Pt verbalized understanding.    Oncology Navigation   Intake  Date of Diagnosis: 07/09/24  Cancer Type: Breast  Type of Referral: Internal  Date of Referral: 07/11/24  Initial Nurse Navigator Contact: 07/11/24  Referral to Initial Contact Timeline (days): 0  First Appointment Available: 07/17/24  Appointment Date: 07/17/24  First Available Date vs. Scheduled Date (days): 0     Treatment  Current Status: Active    Surgery: Planned  Surgical Oncologist: Dr. Carroll  Plastic Surgeon: Jewel  Type of Surgery: Uni vs bilateral mastectomy with SLNB and TE  Consult Date: 07/17/24  Surgery Schedule Date: 09/09/24    Medical Oncologist: -- (EP appt 10/16/24)       Procedures: CT; Bone scan  Biopsy Schedule Date: 07/09/24  Bone Scan Schedule Date: 07/19/24  CT Schedule Date: 07/19/24  Diagnostic Mammo Schedule Date: 07/02/24  Ultrasound Schedule Date: 07/02/24    Physical Therapy Referral Date: 07/17/24    ER: Positive  MD: Positive  Her2: Negative       Support Systems: Spouse/significant other     Acuity      Follow Up  Follow up in 2 weeks (on 10/14/2024) for Oncotype Results.

## 2024-10-03 ENCOUNTER — OFFICE VISIT (OUTPATIENT)
Dept: ENDOCRINOLOGY | Facility: CLINIC | Age: 69
End: 2024-10-03
Payer: MEDICARE

## 2024-10-03 ENCOUNTER — TELEPHONE (OUTPATIENT)
Dept: ENDOCRINOLOGY | Facility: CLINIC | Age: 69
End: 2024-10-03

## 2024-10-03 DIAGNOSIS — Z85.850 HISTORY OF THYROID CANCER: ICD-10-CM

## 2024-10-03 DIAGNOSIS — E55.9 VITAMIN D DEFICIENCY: ICD-10-CM

## 2024-10-03 DIAGNOSIS — E89.0 POST-SURGICAL HYPOTHYROIDISM: Primary | ICD-10-CM

## 2024-10-03 PROCEDURE — 1160F RVW MEDS BY RX/DR IN RCRD: CPT | Mod: CPTII,95,, | Performed by: INTERNAL MEDICINE

## 2024-10-03 PROCEDURE — G2211 COMPLEX E/M VISIT ADD ON: HCPCS | Mod: 95,,, | Performed by: INTERNAL MEDICINE

## 2024-10-03 PROCEDURE — 3044F HG A1C LEVEL LT 7.0%: CPT | Mod: CPTII,95,, | Performed by: INTERNAL MEDICINE

## 2024-10-03 PROCEDURE — 1159F MED LIST DOCD IN RCRD: CPT | Mod: CPTII,95,, | Performed by: INTERNAL MEDICINE

## 2024-10-03 PROCEDURE — 99214 OFFICE O/P EST MOD 30 MIN: CPT | Mod: 95,,, | Performed by: INTERNAL MEDICINE

## 2024-10-03 PROCEDURE — 1111F DSCHRG MED/CURRENT MED MERGE: CPT | Mod: CPTII,95,, | Performed by: INTERNAL MEDICINE

## 2024-10-03 NOTE — ASSESSMENT & PLAN NOTE
-- Labs in 6 weeks.  -- Reviewed TSH (9/2024) above goal - of note, multiple scans/surgery/etc over the last month. Adjust medication based on repeat labs.  -- Calcium and iron need to be  from thyroid hormone replacement by 4 or > hours.  -- Please note: if you are taking biotin please hold it for 5 days prior to labs as it can interfere with the thyroid testing.  -- Medication Changes:   Levothyroxine 88mcg daily   -- Goal is a low normal TSH.  -- Avoid exogenous hyperthyroidism as this can accelerate bone loss and increase risk of CV complications.  -- Advised to take LT4 on an empty stomach with water and to wait 30-45 minutes before eating or taking other medications   -- Reviewed usual times of thyroid hormone changes  -- Reviewed that symptoms of hypothyroidism may not correlate with tsh, and a normal TSH is the goal of therapy. Symptoms are not a justification for over treatment.

## 2024-10-03 NOTE — Clinical Note
US when convenient for patient Labs in 6 weeks RTC 1 year Orders Placed This Encounter     US Soft Tissue Head Neck     TSH

## 2024-10-06 ENCOUNTER — EXTERNAL HOME HEALTH (OUTPATIENT)
Dept: HOME HEALTH SERVICES | Facility: HOSPITAL | Age: 69
End: 2024-10-06
Payer: MEDICARE

## 2024-10-08 ENCOUNTER — PATIENT MESSAGE (OUTPATIENT)
Dept: PLASTIC SURGERY | Facility: CLINIC | Age: 69
End: 2024-10-08
Payer: MEDICARE

## 2024-10-11 ENCOUNTER — OFFICE VISIT (OUTPATIENT)
Dept: PLASTIC SURGERY | Facility: CLINIC | Age: 69
End: 2024-10-11
Payer: MEDICARE

## 2024-10-11 ENCOUNTER — PATIENT MESSAGE (OUTPATIENT)
Dept: PLASTIC SURGERY | Facility: CLINIC | Age: 69
End: 2024-10-11

## 2024-10-11 VITALS — DIASTOLIC BLOOD PRESSURE: 78 MMHG | SYSTOLIC BLOOD PRESSURE: 126 MMHG | HEART RATE: 80 BPM

## 2024-10-11 DIAGNOSIS — Z17.0 MALIGNANT NEOPLASM OF UPPER-INNER QUADRANT OF RIGHT BREAST IN FEMALE, ESTROGEN RECEPTOR POSITIVE: Primary | ICD-10-CM

## 2024-10-11 DIAGNOSIS — T81.30XA WOUND DEHISCENCE: ICD-10-CM

## 2024-10-11 DIAGNOSIS — Z09 SURGERY FOLLOW-UP: Primary | ICD-10-CM

## 2024-10-11 DIAGNOSIS — C50.211 MALIGNANT NEOPLASM OF UPPER-INNER QUADRANT OF RIGHT BREAST IN FEMALE, ESTROGEN RECEPTOR POSITIVE: Primary | ICD-10-CM

## 2024-10-11 PROCEDURE — 99999 PR PBB SHADOW E&M-EST. PATIENT-LVL III: CPT | Mod: PBBFAC,,, | Performed by: PHYSICIAN ASSISTANT

## 2024-10-11 NOTE — H&P (VIEW-ONLY)
Plastic Surgery History & Physical    SUBJECTIVE:   Chief complaint: wound dehiscence     History of Present Illness:  68 y.o. female with a PMH of IDC of the right breast s/p bilateral skin sparing mastectomy with immediate RODRIGUEZ flap reconstruction on 24. She presented to clinic for routine follow up today and noted left breast incision wound breakdown since her visit last week. She's been applying antibiotic ointment and covering with a nonstick gauze. The right breast also has a small superficial area of wound breakdown at the t point. Denies fever, chills, pain, swelling.    Past Medical History:   Diagnosis Date    Anxiety disorder, unspecified     r/t dx    Atypical ductal hyperplasia, breast     left side    Breast cancer 2016    right side    Breast cyst approx. 40 years ago    Cancer     Depression     r/t dx    GERD (gastroesophageal reflux disease)     History of thyroid cancer     Lobular carcinoma in situ not certain of 2016 diagnosis    Malignant neoplasm of upper-outer quadrant of right breast in female, estrogen receptor positive 09/15/2016    Mitral valve prolapse     PONV (postoperative nausea and vomiting)        Past Surgical History:   Procedure Laterality Date    BILATERAL MASTECTOMY Bilateral 2024    Procedure: MASTECTOMY, BILATERAL;  Surgeon: NADIRA Carroll MD;  Location: [facility] OR;  Service: General;  Laterality: Bilateral;    BREAST BIOPSY Right 2016    BREAST BIOPSY Left     exc bx    BREAST LUMPECTOMY Right 2016    w/radiation    BREAST SURGERY   AND  OR     CATARACT EXTRACTION W/  INTRAOCULAR LENS IMPLANT Right 2024    Procedure: EXTRACTION, CATARACT, WITH IOL INSERTION;  Surgeon: Zhane Khan MD;  Location: [facility] OR;  Service: Ophthalmology;  Laterality: Right;     SECTION      x 2  and     COLONOSCOPY N/A 10/08/2019    Procedure: COLONOSCOPY;  Surgeon: Eliceo Holloway MD;  Location: [facility] ENDO ([address] [place] [address]);  Service:  Endoscopy;  Laterality: N/A;  Okay for Freeman or Holloway. However, she needs it done before end of October, so if they are not available before then, okay for any provided.    CYST REMOVED FROM RIGHT BREAST IN 1969      EVACUATION OF HEMATOMA Left 9/9/2024    Procedure: EVACUATION, HEMATOMA;  Surgeon: Pito Holloway DO;  Location: [facility];  Service: Plastics;  Laterality: Left;    EXTRACTION OF CATARACT Left 12/27/2023    Procedure: EXTRACTION, CATARACT;  Surgeon: Eric Cardona MD;  Location: [facility] OR;  Service: Ophthalmology;  Laterality: Left;  DiB00 +21.0D    HERNIA REPAIR      over C section incision     INJECTION FOR SENTINEL NODE IDENTIFICATION Right 9/9/2024    Procedure: INJECTION, FOR SENTINEL NODE IDENTIFICATION;  Surgeon: NADIRA Carroll MD;  Location: [facility];  Service: General;  Laterality: Right;    INJECTION, AGENT, INTRAVENOUS, FOR ASSESSMENT OF BLOOD FLOW IN FLAP OR GRAFT N/A 9/9/2024    Procedure: INJECTION, AGENT, INTRAVENOUS, FOR ASSESSMENT OF BLOOD FLOW IN FLAP OR GRAFT;  Surgeon: Pito Holloway DO;  Location: [facility];  Service: Plastics;  Laterality: N/A;    left breast wire localization excisional biopsy with bracketed wires using 3 wires and excision through 1 incision.       LIPOMA RESECTION N/A 10/04/2023    Procedure: EXCISION, LIPOMA Back;  Surgeon: Kenneth Hernández MD;  Location: [address];  Service: General;  Laterality: N/A;    PHACOEMULSIFICATION, CATARACT, WITH IOL INSERTION Left 12/27/2023    Procedure: PHACOEMULSIFICATION, CATARACT, WITH IOL INSERTION;  Surgeon: Eric Cardona MD;  Location: [facility];  Service: Ophthalmology;  Laterality: Left;    RECONSTRUCTION OF BREAST WITH DEEP INFERIOR EPIGASTRIC ARTERY  (RODRIGUEZ) FREE FLAP Bilateral 9/9/2024    Procedure: RECONSTRUCTION, BREAST, USING RODRIGUEZ FREE FLAP / BILATERAL RODRIGUEZ FLAP;  Surgeon: Pito Holloway DO;  Location: [facility];  Service: Plastics;  Laterality: Bilateral;  2-3 DAY STAY    SENTINEL  LYMPH NODE BIOPSY Right 2024    Procedure: BIOPSY, LYMPH NODE, SENTINEL;  Surgeon: NADIRA Carroll MD;  Location: [facility] OR;  Service: General;  Laterality: Right;    THYROIDECTOMY      TRANSFORAMINAL EPIDURAL INJECTION OF STEROID Right 2019    Procedure: Injection,steroid,epidural,transforaminal approach LUMBAR TRANSFORAMINAL RIGHT L5 AND S1 TF ARNOLDO;  Surgeon: Nadya Mcfarland MD;  Location: [facility] PAIN MGT;  Service: Pain Management;  Laterality: Right;  NEEDS CONSENT    TUBAL LIGATION         Family History   Problem Relation Name Age of Onset    Osteoporosis Mother Miranda Mesa     Arthritis Mother Miranda Mesa     Dementia Father      Breast cancer Maternal Grandmother Gabriella Montes 88    Cancer Maternal Grandmother Gabriella Montes     Breast cancer Other mat great aunt 70    Colon cancer Neg Hx      Colon polyps Neg Hx      Rectal cancer Neg Hx      Stomach cancer Neg Hx      Esophageal cancer Neg Hx      Liver cancer Neg Hx      Liver disease Neg Hx      Cirrhosis Neg Hx      Inflammatory bowel disease Neg Hx      Celiac disease Neg Hx      Crohn's disease Neg Hx      Ulcerative colitis Neg Hx      Ovarian cancer Neg Hx      Melanoma Neg Hx         Social History     Socioeconomic History    Marital status:     Number of children: 2   Occupational History     Comment:    Tobacco Use    Smoking status: Former     Current packs/day: 0.00     Average packs/day: 0.5 packs/day for 2.9 years (1.4 ttl pk-yrs)     Types: Cigarettes     Start date: 2/10/1985     Quit date: 1988     Years since quittin.8    Smokeless tobacco: Never    Tobacco comments:     social smoker for several years; 1-2 cigarettes/week.  Quit ,   Substance and Sexual Activity    Alcohol use: Yes     Alcohol/week: 6.0 standard drinks of alcohol     Types: 2 Glasses of wine, 4 Drinks containing 0.5 oz of alcohol per week     Comment: Don't drink much as it aggravates GERD    Drug use: Never    Sexual activity: Not Currently      Partners: Male     Birth control/protection: Post-menopausal   Other Topics Concern    Patient feels they ought to cut down on drinking/drug use No    Patient annoyed by others criticizing their drinking/drug use No    Patient has felt bad or guilty about drinking/drug use No    Patient has had a drink/used drugs as an eye opener in the AM No   Social History Narrative    ,  x 40 years, 2 children, 2 grandchildren, [employer]     Social Drivers of Health     Financial Resource Strain: Low Risk  (9/10/2024)    Overall Financial Resource Strain (CARDIA)     Difficulty of Paying Living Expenses: Not hard at all   Food Insecurity: No Food Insecurity (9/10/2024)    Hunger Vital Sign     Worried About Running Out of Food in the Last Year: Never true     Ran Out of Food in the Last Year: Never true   Transportation Needs: No Transportation Needs (9/10/2024)    TRANSPORTATION NEEDS     Transportation : No   Physical Activity: Inactive (9/10/2024)    Exercise Vital Sign     Days of Exercise per Week: 0 days     Minutes of Exercise per Session: 0 min   Stress: No Stress Concern Present (9/10/2024)    [employer] [employer] - Occupational Stress Questionnaire     Feeling of Stress : Not at all   Housing Stability: Low Risk  (9/10/2024)    Housing Stability Vital Sign     Unable to Pay for Housing in the Last Year: No     Homeless in the Last Year: No       Current Outpatient Medications   Medication Sig Dispense Refill    famotidine (PEPCID) 20 MG tablet Take 20 mg by mouth 2 (two) times daily.      levothyroxine (SYNTHROID) 88 MCG tablet Take 1 tablet (88 mcg total) by mouth before breakfast. 30 tablet 11    loratadine (CLARITIN) 10 mg tablet Take 10 mg by mouth once daily. AS NEEDED FOR ALLERGIES      ondansetron (ZOFRAN-ODT) 4 MG TbDL Take 1 tablet (4 mg total) by mouth every 6 (six) hours as needed. 10 tablet 0    oxyCODONE (ROXICODONE) 5 MG immediate release tablet Take 1 tablet (5  mg total) by mouth every 4 (four) hours as needed. 10 tablet 0    pantoprazole (PROTONIX) 40 MG tablet Take 1 tablet (40 mg total) by mouth once daily. 90 tablet 3    gabapentin (NEURONTIN) 300 MG capsule Take 1 capsule (300 mg total) by mouth 3 (three) times daily. for 7 days 21 capsule 0    potassium chloride SA (K-DUR,KLOR-CON) 10 MEQ tablet Take 20 mEq by mouth once daily. (Patient not taking: Reported on 10/11/2024)      semaglutide (OZEMPIC) 0.25 mg or 0.5 mg (2 mg/3 mL) pen injector Inject 0.5 mg into the skin every 7 days. On tuesday (Patient not taking: Reported on 10/11/2024)      tretinoin (RETIN-A) 0.025 % cream Compound tretinoin 0.025% / niacinamide 2% cream / hyaluronic acid 0.25%. Apply a pea-sized amount to entire face qhs. (Patient not taking: Reported on 10/11/2024) 30 g 11     No current facility-administered medications for this visit.     Facility-Administered Medications Ordered in Other Visits   Medication Dose Route Frequency Provider Last Rate Last Admin    0.9%  NaCl infusion   Intravenous Continuous Hakan Garces PA-C 70 mL/hr at 10/04/23 1113 New Bag at 10/04/23 1113    haloperidol lactate injection 0.5 mg  0.5 mg Intravenous Q10 Min PRN Crystal Holloway MD        HYDROmorphone injection 0.2 mg  0.2 mg Intravenous Q5 Min PRN Crystal Holloway MD        LIDOcaine (PF) 10 mg/ml (1%) injection 10 mg  1 mL Intradermal Once Eric Cardona MD        sodium chloride 0.9% flush 10 mL  10 mL Intravenous PRN Crystal Holloway MD           Review of patient's allergies indicates:   Allergen Reactions    Codeine Nausea Only    Sulfa (sulfonamide antibiotics) Nausea Only         Review of Systems:  Review of Systems        OBJECTIVE:     /78   Pulse 80       Physical Exam:  Gen: NAD, appears stated age  Neuro: normal without focal findings, mental status and speech normal  HEENT: NCAT, neck supple, PEERL  CV: RRR  Pulm: Breathing non-labored, chest wall movement equal bilaterally    Breast:  Bilateral reconstructed breasts with soft flaps, nipples surgically absent, left breast incision with 3 x 2 cm area of breakdown at the T point and medially along the IMF incision. Right t point with smaller superficial to partial thickness breakdown.  Abdomen: soft, nontender, no guarding  Gu: genitalia not examined  Extremity:normal strength, no cyanosis or edema  Skin: Surgical wound dehiscence.   Psych: oriented to time, place and person          ASSESSMENT/PLAN:     Her wound breakdown at the T point on the left is full thickness. She has a smaller wound on the right at the t point. The left IMF wound was sharply debrided at the bedside using a 15 blade until healthy tissue was visualized at the wound base and margins. She tolerated this well. We discussed options for addressing the wound including local wound care or the option to return to surgery for a wound debridement and closure. Patient prefers to proceed with surgical wound debridement. We will book her next week and see her day of surgery for consent. She will do wound care with xeroform and bacitracin, dressing changes daily, until then.      Evelyn Callahan PA-C  Plastic and Reconstructive Surgery    This includes face to face time and non-face to face time preparing to see the patient (eg, review of tests), obtaining and/or reviewing separately obtained history, documenting clinical information in the electronic or other health record, independently interpreting results and communicating results to the patient/family/caregiver, or care coordinator.

## 2024-10-11 NOTE — PROGRESS NOTES
Plastic Surgery History & Physical    SUBJECTIVE:   Chief complaint: wound dehiscence     History of Present Illness:  68 y.o. female with a PMH of IDC of the right breast s/p bilateral skin sparing mastectomy with immediate RODRIGUEZ flap reconstruction on 24. She presented to clinic for routine follow up today and noted left breast incision wound breakdown since her visit last week. She's been applying antibiotic ointment and covering with a nonstick gauze. The right breast also has a small superficial area of wound breakdown at the t point. Denies fever, chills, pain, swelling.    Past Medical History:   Diagnosis Date    Anxiety disorder, unspecified     r/t dx    Atypical ductal hyperplasia, breast     left side    Breast cancer 2016    right side    Breast cyst approx. 40 years ago    Cancer     Depression     r/t dx    GERD (gastroesophageal reflux disease)     History of thyroid cancer     Lobular carcinoma in situ not certain of 2016 diagnosis    Malignant neoplasm of upper-outer quadrant of right breast in female, estrogen receptor positive 09/15/2016    Mitral valve prolapse     PONV (postoperative nausea and vomiting)        Past Surgical History:   Procedure Laterality Date    BILATERAL MASTECTOMY Bilateral 2024    Procedure: MASTECTOMY, BILATERAL;  Surgeon: NADIRA Carroll MD;  [place] [facility] [place];  Service: General;  Laterality: Bilateral;    BREAST BIOPSY Right 2016    BREAST BIOPSY Left     exc bx    BREAST LUMPECTOMY Right 2016    w/radiation    BREAST SURGERY   AND  OR     CATARACT EXTRACTION W/  INTRAOCULAR LENS IMPLANT Right 2024    Procedure: EXTRACTION, CATARACT, WITH IOL INSERTION;  Surgeon: Zhane Khan MD;  Location: [facility] OR;  Service: Ophthalmology;  Laterality: Right;     SECTION      x 2  and     COLONOSCOPY N/A 10/08/2019    Procedure: COLONOSCOPY;  Surgeon: Eliceo Holloway MD;  Location: [facility] ENDO ([address] [place] [address]);  Service:  Endoscopy;  Laterality: N/A;  Okay for Freeman or Holloway. However, she needs it done before end of October, so if they are not available before then, okay for any provided.    CYST REMOVED FROM RIGHT BREAST IN 1969      EVACUATION OF HEMATOMA Left 9/9/2024    Procedure: EVACUATION, HEMATOMA;  Surgeon: Pito Holloway DO;  Location: [facility];  Service: Plastics;  Laterality: Left;    EXTRACTION OF CATARACT Left 12/27/2023    Procedure: EXTRACTION, CATARACT;  Surgeon: Eric Cardona MD;  Location: [facility] OR;  Service: Ophthalmology;  Laterality: Left;  DiB00 +21.0D    HERNIA REPAIR      over C section incision     INJECTION FOR SENTINEL NODE IDENTIFICATION Right 9/9/2024    Procedure: INJECTION, FOR SENTINEL NODE IDENTIFICATION;  Surgeon: NADIRA Carroll MD;  Location: [facility];  Service: General;  Laterality: Right;    INJECTION, AGENT, INTRAVENOUS, FOR ASSESSMENT OF BLOOD FLOW IN FLAP OR GRAFT N/A 9/9/2024    Procedure: INJECTION, AGENT, INTRAVENOUS, FOR ASSESSMENT OF BLOOD FLOW IN FLAP OR GRAFT;  Surgeon: Pito Holloway DO;  Location: [facility];  Service: Plastics;  Laterality: N/A;    left breast wire localization excisional biopsy with bracketed wires using 3 wires and excision through 1 incision.       LIPOMA RESECTION N/A 10/04/2023    Procedure: EXCISION, LIPOMA Back;  Surgeon: Kenneth Hernández MD;  Location: [address];  Service: General;  Laterality: N/A;    PHACOEMULSIFICATION, CATARACT, WITH IOL INSERTION Left 12/27/2023    Procedure: PHACOEMULSIFICATION, CATARACT, WITH IOL INSERTION;  Surgeon: Eric Cardona MD;  Location: [facility];  Service: Ophthalmology;  Laterality: Left;    RECONSTRUCTION OF BREAST WITH DEEP INFERIOR EPIGASTRIC ARTERY  (RODRIGUEZ) FREE FLAP Bilateral 9/9/2024    Procedure: RECONSTRUCTION, BREAST, USING RODRIGUEZ FREE FLAP / BILATERAL RODRIGUEZ FLAP;  Surgeon: Pito Holloway DO;  Location: [facility];  Service: Plastics;  Laterality: Bilateral;  2-3 DAY STAY    SENTINEL  LYMPH NODE BIOPSY Right 2024    Procedure: BIOPSY, LYMPH NODE, SENTINEL;  Surgeon: NADIRA Carroll MD;  Location: [facility] OR;  Service: General;  Laterality: Right;    THYROIDECTOMY      TRANSFORAMINAL EPIDURAL INJECTION OF STEROID Right 2019    Procedure: Injection,steroid,epidural,transforaminal approach LUMBAR TRANSFORAMINAL RIGHT L5 AND S1 TF ARNOLDO;  Surgeon: Nadya Mcfarland MD;  Location: [facility] PAIN MGT;  Service: Pain Management;  Laterality: Right;  NEEDS CONSENT    TUBAL LIGATION         Family History   Problem Relation Name Age of Onset    Osteoporosis Mother Miranda Mesa     Arthritis Mother Miranda Mesa     Dementia Father      Breast cancer Maternal Grandmother Gabriella Montes 88    Cancer Maternal Grandmother Gabriella Montes     Breast cancer Other mat great aunt 70    Colon cancer Neg Hx      Colon polyps Neg Hx      Rectal cancer Neg Hx      Stomach cancer Neg Hx      Esophageal cancer Neg Hx      Liver cancer Neg Hx      Liver disease Neg Hx      Cirrhosis Neg Hx      Inflammatory bowel disease Neg Hx      Celiac disease Neg Hx      Crohn's disease Neg Hx      Ulcerative colitis Neg Hx      Ovarian cancer Neg Hx      Melanoma Neg Hx         Social History     Socioeconomic History    Marital status:     Number of children: 2   Occupational History     Comment:    Tobacco Use    Smoking status: Former     Current packs/day: 0.00     Average packs/day: 0.5 packs/day for 2.9 years (1.4 ttl pk-yrs)     Types: Cigarettes     Start date: 2/10/1985     Quit date: 1988     Years since quittin.8    Smokeless tobacco: Never    Tobacco comments:     social smoker for several years; 1-2 cigarettes/week.  Quit ,   Substance and Sexual Activity    Alcohol use: Yes     Alcohol/week: 6.0 standard drinks of alcohol     Types: 2 Glasses of wine, 4 Drinks containing 0.5 oz of alcohol per week     Comment: Don't drink much as it aggravates GERD    Drug use: Never    Sexual activity: Not Currently      Partners: Male     Birth control/protection: Post-menopausal   Other Topics Concern    Patient feels they ought to cut down on drinking/drug use No    Patient annoyed by others criticizing their drinking/drug use No    Patient has felt bad or guilty about drinking/drug use No    Patient has had a drink/used drugs as an eye opener in the AM No   Social History Narrative    ,  x 40 years, 2 children, 2 grandchildren, [employer]     Social Drivers of Health     Financial Resource Strain: Low Risk  (9/10/2024)    Overall Financial Resource Strain (CARDIA)     Difficulty of Paying Living Expenses: Not hard at all   Food Insecurity: No Food Insecurity (9/10/2024)    Hunger Vital Sign     Worried About Running Out of Food in the Last Year: Never true     Ran Out of Food in the Last Year: Never true   Transportation Needs: No Transportation Needs (9/10/2024)    TRANSPORTATION NEEDS     Transportation : No   Physical Activity: Inactive (9/10/2024)    Exercise Vital Sign     Days of Exercise per Week: 0 days     Minutes of Exercise per Session: 0 min   Stress: No Stress Concern Present (9/10/2024)    [employer] [place] [employer] - Occupational Stress Questionnaire     Feeling of Stress : Not at all   Housing Stability: Low Risk  (9/10/2024)    Housing Stability Vital Sign     Unable to Pay for Housing in the Last Year: No     Homeless in the Last Year: No       Current Outpatient Medications   Medication Sig Dispense Refill    famotidine (PEPCID) 20 MG tablet Take 20 mg by mouth 2 (two) times daily.      levothyroxine (SYNTHROID) 88 MCG tablet Take 1 tablet (88 mcg total) by mouth before breakfast. 30 tablet 11    loratadine (CLARITIN) 10 mg tablet Take 10 mg by mouth once daily. AS NEEDED FOR ALLERGIES      ondansetron (ZOFRAN-ODT) 4 MG TbDL Take 1 tablet (4 mg total) by mouth every 6 (six) hours as needed. 10 tablet 0    oxyCODONE (ROXICODONE) 5 MG immediate release tablet Take 1 tablet (5  mg total) by mouth every 4 (four) hours as needed. 10 tablet 0    pantoprazole (PROTONIX) 40 MG tablet Take 1 tablet (40 mg total) by mouth once daily. 90 tablet 3    gabapentin (NEURONTIN) 300 MG capsule Take 1 capsule (300 mg total) by mouth 3 (three) times daily. for 7 days 21 capsule 0    potassium chloride SA (K-DUR,KLOR-CON) 10 MEQ tablet Take 20 mEq by mouth once daily. (Patient not taking: Reported on 10/11/2024)      semaglutide (OZEMPIC) 0.25 mg or 0.5 mg (2 mg/3 mL) pen injector Inject 0.5 mg into the skin every 7 days. On tuesday (Patient not taking: Reported on 10/11/2024)      tretinoin (RETIN-A) 0.025 % cream Compound tretinoin 0.025% / niacinamide 2% cream / hyaluronic acid 0.25%. Apply a pea-sized amount to entire face qhs. (Patient not taking: Reported on 10/11/2024) 30 g 11     No current facility-administered medications for this visit.     Facility-Administered Medications Ordered in Other Visits   Medication Dose Route Frequency Provider Last Rate Last Admin    0.9%  NaCl infusion   Intravenous Continuous Hakan Garces PA-C 70 mL/hr at 10/04/23 1113 New Bag at 10/04/23 1113    haloperidol lactate injection 0.5 mg  0.5 mg Intravenous Q10 Min PRN Crystal Holloway MD        HYDROmorphone injection 0.2 mg  0.2 mg Intravenous Q5 Min PRN Crystal Holloway MD        LIDOcaine (PF) 10 mg/ml (1%) injection 10 mg  1 mL Intradermal Once Eric Cardona MD        sodium chloride 0.9% flush 10 mL  10 mL Intravenous PRN Crystal Holloway MD           Review of patient's allergies indicates:   Allergen Reactions    Codeine Nausea Only    Sulfa (sulfonamide antibiotics) Nausea Only         Review of Systems:  Review of Systems        OBJECTIVE:     /78   Pulse 80       Physical Exam:  Gen: NAD, appears stated age  Neuro: normal without focal findings, mental status and speech normal  HEENT: NCAT, neck supple, PEERL  CV: RRR  Pulm: Breathing non-labored, chest wall movement equal bilaterally    Breast:  Bilateral reconstructed breasts with soft flaps, nipples surgically absent, left breast incision with 3 x 2 cm area of breakdown at the T point and medially along the IMF incision. Right t point with smaller superficial to partial thickness breakdown.  Abdomen: soft, nontender, no guarding  Gu: genitalia not examined  Extremity:normal strength, no cyanosis or edema  Skin: Surgical wound dehiscence.   Psych: oriented to time, place and person          ASSESSMENT/PLAN:     Her wound breakdown at the T point on the left is full thickness. She has a smaller wound on the right at the t point. The left IMF wound was sharply debrided at the bedside using a 15 blade until healthy tissue was visualized at the wound base and margins. She tolerated this well. We discussed options for addressing the wound including local wound care or the option to return to surgery for a wound debridement and closure. Patient prefers to proceed with surgical wound debridement. We will book her next week and see her day of surgery for consent. She will do wound care with xeroform and bacitracin, dressing changes daily, until then.      Evelyn Callahan PA-C  Plastic and Reconstructive Surgery    This includes face to face time and non-face to face time preparing to see the patient (eg, review of tests), obtaining and/or reviewing separately obtained history, documenting clinical information in the electronic or other health record, independently interpreting results and communicating results to the patient/family/caregiver, or care coordinator.

## 2024-10-12 LAB
COMMENT: NORMAL
FINAL PATHOLOGIC DIAGNOSIS: NORMAL
GROSS: NORMAL
Lab: NORMAL
SUPPLEMENTAL DIAGNOSIS: NORMAL

## 2024-10-14 ENCOUNTER — TELEPHONE (OUTPATIENT)
Dept: ENDOCRINOLOGY | Facility: CLINIC | Age: 69
End: 2024-10-14
Payer: MEDICARE

## 2024-10-14 ENCOUNTER — PATIENT MESSAGE (OUTPATIENT)
Dept: PREADMISSION TESTING | Facility: OTHER | Age: 69
End: 2024-10-14
Payer: MEDICARE

## 2024-10-14 ENCOUNTER — ANESTHESIA EVENT (OUTPATIENT)
Dept: SURGERY | Facility: OTHER | Age: 69
End: 2024-10-14
Payer: MEDICARE

## 2024-10-14 NOTE — PRE-PROCEDURE INSTRUCTIONS
Pre admit phone call completed.    Instructions given to patient about NPO status as follows:     The evening before surgery do not eat anything after 9 p.m. ( this includes hard candy, chewing gum and mints).  You may only have GATORADE, POWERADE AND WATER from 9 p.m. until you leave your home. DO NOT  DRINK ANY LIQUIDS ON THE WAY TO THE HOSPITAL.      Patient was also instructed on the below information:    Park in the Parking lot behind the hospital or in the [employer] [facility] [facility] across the street from the parking lot.  Parking is complimentary.  If you will be discharged the same day as your procedure, please arrange for a responsible adult to drive you home or  to accompany you if traveling by taxi.  YOU WILL NOT BE PERMITTED TO DRIVE OR TO LEAVE THE HOSPITAL ALONE AFTER SURGERY.  It is strongly recommended that you arrange for someone to remain with you for the first 24 hrs following your surgery.    Patient verbalized understanding of above instructions.

## 2024-10-15 ENCOUNTER — PATIENT MESSAGE (OUTPATIENT)
Dept: PLASTIC SURGERY | Facility: CLINIC | Age: 69
End: 2024-10-15
Payer: MEDICARE

## 2024-10-15 ENCOUNTER — TELEPHONE (OUTPATIENT)
Dept: PLASTIC SURGERY | Facility: CLINIC | Age: 69
End: 2024-10-15
Payer: MEDICARE

## 2024-10-15 NOTE — TELEPHONE ENCOUNTER
Pre op call complete. Advised pt of arrival time for surgery, pt advised to arrive to [facility], 1st floor, [facility] on [facility], Same Day Surgery, at 1300 for 1500 surgery.-NPO status reinforced No Solids after 12 PM; can consume clear liquids up to 2 hours prior to scheduled arrival time.  Clear liquids include Gatorade, water, soda, black coffee or tea (no milk or creamer), and clear juices.  Clear liquids do NOT include anything with pulp or food particles (Chicken broth, ice cream, yogurt, Jello, etc.)   Ride and care giver arranged and verified.Pre op education reinforced. Pt Verbalized understanding, all questions and concerns addressed at this time.

## 2024-10-16 ENCOUNTER — OFFICE VISIT (OUTPATIENT)
Dept: HEMATOLOGY/ONCOLOGY | Facility: CLINIC | Age: 69
End: 2024-10-16
Payer: MEDICARE

## 2024-10-16 ENCOUNTER — ANESTHESIA (OUTPATIENT)
Dept: SURGERY | Facility: OTHER | Age: 69
End: 2024-10-16
Payer: MEDICARE

## 2024-10-16 ENCOUNTER — HOSPITAL ENCOUNTER (OUTPATIENT)
Facility: OTHER | Age: 69
Discharge: HOME | End: 2024-10-16
Attending: SURGERY | Admitting: SURGERY
Payer: MEDICARE

## 2024-10-16 VITALS
HEART RATE: 75 BPM | WEIGHT: 130.38 LBS | SYSTOLIC BLOOD PRESSURE: 125 MMHG | HEIGHT: 65 IN | OXYGEN SATURATION: 100 % | TEMPERATURE: 97 F | DIASTOLIC BLOOD PRESSURE: 79 MMHG | BODY MASS INDEX: 21.72 KG/M2

## 2024-10-16 DIAGNOSIS — C50.211 MALIGNANT NEOPLASM OF UPPER-INNER QUADRANT OF RIGHT BREAST IN FEMALE, ESTROGEN RECEPTOR POSITIVE: Primary | ICD-10-CM

## 2024-10-16 DIAGNOSIS — Z79.811 LONG TERM CURRENT USE OF AROMATASE INHIBITOR: ICD-10-CM

## 2024-10-16 DIAGNOSIS — Z17.0 MALIGNANT NEOPLASM OF UPPER-INNER QUADRANT OF RIGHT BREAST IN FEMALE, ESTROGEN RECEPTOR POSITIVE: Primary | ICD-10-CM

## 2024-10-16 DIAGNOSIS — Z01.818 PREOP TESTING: ICD-10-CM

## 2024-10-16 DIAGNOSIS — T86.828 SKIN FLAP NECROSIS: ICD-10-CM

## 2024-10-16 DIAGNOSIS — I96 SKIN FLAP NECROSIS: ICD-10-CM

## 2024-10-16 LAB
BASOPHILS # BLD AUTO: 0.05 K/UL (ref 0–0.2)
BASOPHILS NFR BLD: 0.7 % (ref 0–1.9)
DIFFERENTIAL METHOD BLD: ABNORMAL
EOSINOPHIL # BLD AUTO: 0.1 K/UL (ref 0–0.5)
EOSINOPHIL NFR BLD: 1.5 % (ref 0–8)
ERYTHROCYTE [DISTWIDTH] IN BLOOD BY AUTOMATED COUNT: 13.2 % (ref 11.5–14.5)
HCT VFR BLD AUTO: 34.9 % (ref 37–48.5)
HGB BLD-MCNC: 11.5 G/DL (ref 12–16)
IMM GRANULOCYTES # BLD AUTO: 0.01 K/UL (ref 0–0.04)
IMM GRANULOCYTES NFR BLD AUTO: 0.1 % (ref 0–0.5)
LYMPHOCYTES # BLD AUTO: 1.9 K/UL (ref 1–4.8)
LYMPHOCYTES NFR BLD: 26.6 % (ref 18–48)
MCH RBC QN AUTO: 30.4 PG (ref 27–31)
MCHC RBC AUTO-ENTMCNC: 33 G/DL (ref 32–36)
MCV RBC AUTO: 92 FL (ref 82–98)
MONOCYTES # BLD AUTO: 0.6 K/UL (ref 0.3–1)
MONOCYTES NFR BLD: 8.5 % (ref 4–15)
NEUTROPHILS # BLD AUTO: 4.5 K/UL (ref 1.8–7.7)
NEUTROPHILS NFR BLD: 62.6 % (ref 38–73)
NRBC BLD-RTO: 0 /100 WBC
PLATELET # BLD AUTO: 427 K/UL (ref 150–450)
PMV BLD AUTO: 8.7 FL (ref 9.2–12.9)
RBC # BLD AUTO: 3.78 M/UL (ref 4–5.4)
WBC # BLD AUTO: 7.15 K/UL (ref 3.9–12.7)

## 2024-10-16 PROCEDURE — 36000707: Performed by: SURGERY

## 2024-10-16 PROCEDURE — 63600175 PHARM REV CODE 636 W HCPCS: Performed by: STUDENT IN AN ORGANIZED HEALTH CARE EDUCATION/TRAINING PROGRAM

## 2024-10-16 PROCEDURE — 25000003 PHARM REV CODE 250: Performed by: STUDENT IN AN ORGANIZED HEALTH CARE EDUCATION/TRAINING PROGRAM

## 2024-10-16 PROCEDURE — 3008F BODY MASS INDEX DOCD: CPT | Mod: CPTII,S$GLB,, | Performed by: INTERNAL MEDICINE

## 2024-10-16 PROCEDURE — 19380 REVJ RECONSTRUCTED BREAST: CPT | Mod: 50,78,, | Performed by: SURGERY

## 2024-10-16 PROCEDURE — 25000003 PHARM REV CODE 250: Performed by: ANESTHESIOLOGY

## 2024-10-16 PROCEDURE — 88302 TISSUE EXAM BY PATHOLOGIST: CPT | Performed by: PATHOLOGY

## 2024-10-16 PROCEDURE — 71000016 HC POSTOP RECOV ADDL HR: Performed by: SURGERY

## 2024-10-16 PROCEDURE — 3044F HG A1C LEVEL LT 7.0%: CPT | Mod: CPTII,S$GLB,, | Performed by: INTERNAL MEDICINE

## 2024-10-16 PROCEDURE — 37000008 HC ANESTHESIA 1ST 15 MINUTES: Performed by: SURGERY

## 2024-10-16 PROCEDURE — 99999 PR PBB SHADOW E&M-EST. PATIENT-LVL III: CPT | Mod: PBBFAC,,, | Performed by: INTERNAL MEDICINE

## 2024-10-16 PROCEDURE — 99213 OFFICE O/P EST LOW 20 MIN: CPT | Mod: S$GLB,,, | Performed by: INTERNAL MEDICINE

## 2024-10-16 PROCEDURE — 63600175 PHARM REV CODE 636 W HCPCS: Performed by: PHYSICIAN ASSISTANT

## 2024-10-16 PROCEDURE — 1101F PT FALLS ASSESS-DOCD LE1/YR: CPT | Mod: CPTII,S$GLB,, | Performed by: INTERNAL MEDICINE

## 2024-10-16 PROCEDURE — 85025 COMPLETE CBC W/AUTO DIFF WBC: CPT | Performed by: SURGERY

## 2024-10-16 PROCEDURE — 1125F AMNT PAIN NOTED PAIN PRSNT: CPT | Mod: CPTII,S$GLB,, | Performed by: INTERNAL MEDICINE

## 2024-10-16 PROCEDURE — 71000015 HC POSTOP RECOV 1ST HR: Performed by: SURGERY

## 2024-10-16 PROCEDURE — 63600175 PHARM REV CODE 636 W HCPCS: Performed by: ANESTHESIOLOGY

## 2024-10-16 PROCEDURE — 1159F MED LIST DOCD IN RCRD: CPT | Mod: CPTII,S$GLB,, | Performed by: INTERNAL MEDICINE

## 2024-10-16 PROCEDURE — 88302 TISSUE EXAM BY PATHOLOGIST: CPT | Mod: 26,,, | Performed by: PATHOLOGY

## 2024-10-16 PROCEDURE — 3074F SYST BP LT 130 MM HG: CPT | Mod: CPTII,S$GLB,, | Performed by: INTERNAL MEDICINE

## 2024-10-16 PROCEDURE — 36415 COLL VENOUS BLD VENIPUNCTURE: CPT | Performed by: SURGERY

## 2024-10-16 PROCEDURE — 71000039 HC RECOVERY, EACH ADD'L HOUR: Performed by: SURGERY

## 2024-10-16 PROCEDURE — 3078F DIAST BP <80 MM HG: CPT | Mod: CPTII,S$GLB,, | Performed by: INTERNAL MEDICINE

## 2024-10-16 PROCEDURE — 71000033 HC RECOVERY, INTIAL HOUR: Performed by: SURGERY

## 2024-10-16 PROCEDURE — 3288F FALL RISK ASSESSMENT DOCD: CPT | Mod: CPTII,S$GLB,, | Performed by: INTERNAL MEDICINE

## 2024-10-16 PROCEDURE — 36000706: Performed by: SURGERY

## 2024-10-16 PROCEDURE — 25000003 PHARM REV CODE 250: Performed by: PHYSICIAN ASSISTANT

## 2024-10-16 PROCEDURE — 37000009 HC ANESTHESIA EA ADD 15 MINS: Performed by: SURGERY

## 2024-10-16 RX ORDER — SODIUM CHLORIDE 0.9 % (FLUSH) 0.9 %
10 SYRINGE (ML) INJECTION
Status: DISCONTINUED | OUTPATIENT
Start: 2024-10-16 | End: 2024-10-16 | Stop reason: HOSPADM

## 2024-10-16 RX ORDER — DEXAMETHASONE SODIUM PHOSPHATE 4 MG/ML
INJECTION, SOLUTION INTRA-ARTICULAR; INTRALESIONAL; INTRAMUSCULAR; INTRAVENOUS; SOFT TISSUE
Status: DISCONTINUED | OUTPATIENT
Start: 2024-10-16 | End: 2024-10-16

## 2024-10-16 RX ORDER — HYDROMORPHONE HYDROCHLORIDE 2 MG/ML
0.4 INJECTION, SOLUTION INTRAMUSCULAR; INTRAVENOUS; SUBCUTANEOUS EVERY 5 MIN PRN
Status: DISCONTINUED | OUTPATIENT
Start: 2024-10-16 | End: 2024-10-16 | Stop reason: HOSPADM

## 2024-10-16 RX ORDER — SODIUM CHLORIDE, SODIUM LACTATE, POTASSIUM CHLORIDE, CALCIUM CHLORIDE 600; 310; 30; 20 MG/100ML; MG/100ML; MG/100ML; MG/100ML
INJECTION, SOLUTION INTRAVENOUS CONTINUOUS
Status: DISCONTINUED | OUTPATIENT
Start: 2024-10-16 | End: 2024-10-16 | Stop reason: HOSPADM

## 2024-10-16 RX ORDER — ONDANSETRON HYDROCHLORIDE 2 MG/ML
4 INJECTION, SOLUTION INTRAVENOUS DAILY PRN
Status: DISCONTINUED | OUTPATIENT
Start: 2024-10-16 | End: 2024-10-16 | Stop reason: HOSPADM

## 2024-10-16 RX ORDER — OXYCODONE HYDROCHLORIDE 5 MG/1
5 TABLET ORAL
Status: DISCONTINUED | OUTPATIENT
Start: 2024-10-16 | End: 2024-10-16 | Stop reason: HOSPADM

## 2024-10-16 RX ORDER — LIDOCAINE HYDROCHLORIDE 10 MG/ML
1 INJECTION, SOLUTION EPIDURAL; INFILTRATION; INTRACAUDAL; PERINEURAL ONCE
Status: DISCONTINUED | OUTPATIENT
Start: 2024-10-16 | End: 2024-10-16 | Stop reason: HOSPADM

## 2024-10-16 RX ORDER — ACETAMINOPHEN 500 MG
1000 TABLET ORAL
Status: COMPLETED | OUTPATIENT
Start: 2024-10-16 | End: 2024-10-16

## 2024-10-16 RX ORDER — FENTANYL CITRATE 50 UG/ML
INJECTION, SOLUTION INTRAMUSCULAR; INTRAVENOUS
Status: DISCONTINUED | OUTPATIENT
Start: 2024-10-16 | End: 2024-10-16

## 2024-10-16 RX ORDER — EPHEDRINE SULFATE 50 MG/ML
INJECTION, SOLUTION INTRAVENOUS
Status: DISCONTINUED | OUTPATIENT
Start: 2024-10-16 | End: 2024-10-16

## 2024-10-16 RX ORDER — ENOXAPARIN SODIUM 100 MG/ML
40 INJECTION SUBCUTANEOUS
Status: COMPLETED | OUTPATIENT
Start: 2024-10-16 | End: 2024-10-16

## 2024-10-16 RX ORDER — PROPOFOL 10 MG/ML
VIAL (ML) INTRAVENOUS
Status: DISCONTINUED | OUTPATIENT
Start: 2024-10-16 | End: 2024-10-16

## 2024-10-16 RX ORDER — ONDANSETRON HYDROCHLORIDE 2 MG/ML
INJECTION, SOLUTION INTRAMUSCULAR; INTRAVENOUS
Status: DISCONTINUED | OUTPATIENT
Start: 2024-10-16 | End: 2024-10-16

## 2024-10-16 RX ORDER — SODIUM CHLORIDE 0.9 % (FLUSH) 0.9 %
3 SYRINGE (ML) INJECTION
Status: DISCONTINUED | OUTPATIENT
Start: 2024-10-16 | End: 2024-10-16 | Stop reason: HOSPADM

## 2024-10-16 RX ORDER — LIDOCAINE HYDROCHLORIDE 20 MG/ML
INJECTION INTRAVENOUS
Status: DISCONTINUED | OUTPATIENT
Start: 2024-10-16 | End: 2024-10-16

## 2024-10-16 RX ORDER — SCOLOPAMINE TRANSDERMAL SYSTEM 1 MG/1
1 PATCH, EXTENDED RELEASE TRANSDERMAL
Status: DISCONTINUED | OUTPATIENT
Start: 2024-10-16 | End: 2024-10-16 | Stop reason: HOSPADM

## 2024-10-16 RX ORDER — MEPERIDINE HYDROCHLORIDE 25 MG/ML
12.5 INJECTION INTRAMUSCULAR; INTRAVENOUS; SUBCUTANEOUS ONCE AS NEEDED
Status: DISCONTINUED | OUTPATIENT
Start: 2024-10-16 | End: 2024-10-16 | Stop reason: HOSPADM

## 2024-10-16 RX ORDER — LIDOCAINE HYDROCHLORIDE 10 MG/ML
0.5 INJECTION, SOLUTION EPIDURAL; INFILTRATION; INTRACAUDAL; PERINEURAL ONCE
Status: DISCONTINUED | OUTPATIENT
Start: 2024-10-16 | End: 2024-10-16 | Stop reason: HOSPADM

## 2024-10-16 RX ORDER — MUPIROCIN 20 MG/G
OINTMENT TOPICAL
Status: DISCONTINUED | OUTPATIENT
Start: 2024-10-16 | End: 2024-10-16 | Stop reason: HOSPADM

## 2024-10-16 RX ORDER — DIPHENHYDRAMINE CITRATE AND IBUPROFEN 38; 200 MG/1; MG/1
TABLET, COATED ORAL
COMMUNITY

## 2024-10-16 RX ORDER — CEFAZOLIN 2 G/1
2 INJECTION, POWDER, FOR SOLUTION INTRAMUSCULAR; INTRAVENOUS
Status: COMPLETED | OUTPATIENT
Start: 2024-10-16 | End: 2024-10-16

## 2024-10-16 RX ORDER — HALOPERIDOL 5 MG/ML
INJECTION INTRAMUSCULAR
Status: DISCONTINUED | OUTPATIENT
Start: 2024-10-16 | End: 2024-10-16

## 2024-10-16 RX ORDER — SCOLOPAMINE TRANSDERMAL SYSTEM 1 MG/1
1 PATCH, EXTENDED RELEASE TRANSDERMAL
Status: DISCONTINUED | OUTPATIENT
Start: 2024-10-16 | End: 2024-10-16

## 2024-10-16 RX ORDER — GLUCAGON 1 MG
1 KIT INJECTION
Status: DISCONTINUED | OUTPATIENT
Start: 2024-10-16 | End: 2024-10-16 | Stop reason: HOSPADM

## 2024-10-16 RX ADMIN — ENOXAPARIN SODIUM 40 MG: 40 INJECTION SUBCUTANEOUS at 02:10

## 2024-10-16 RX ADMIN — ACETAMINOPHEN 1000 MG: 500 TABLET, FILM COATED ORAL at 01:10

## 2024-10-16 RX ADMIN — EPHEDRINE SULFATE 10 MG: 50 INJECTION INTRAVENOUS at 04:10

## 2024-10-16 RX ADMIN — EPHEDRINE SULFATE 10 MG: 50 INJECTION INTRAVENOUS at 03:10

## 2024-10-16 RX ADMIN — DEXAMETHASONE SODIUM PHOSPHATE 8 MG: 4 INJECTION, SOLUTION INTRAMUSCULAR; INTRAVENOUS at 03:10

## 2024-10-16 RX ADMIN — FENTANYL CITRATE 100 MCG: 50 INJECTION, SOLUTION INTRAMUSCULAR; INTRAVENOUS at 03:10

## 2024-10-16 RX ADMIN — LIDOCAINE HYDROCHLORIDE 80 MG: 20 INJECTION, SOLUTION INTRAVENOUS at 03:10

## 2024-10-16 RX ADMIN — SCOPOLAMINE 1 PATCH: 1.5 PATCH, EXTENDED RELEASE TRANSDERMAL at 01:10

## 2024-10-16 RX ADMIN — HYDROMORPHONE HYDROCHLORIDE 0.4 MG: 2 INJECTION, SOLUTION INTRAMUSCULAR; INTRAVENOUS; SUBCUTANEOUS at 04:10

## 2024-10-16 RX ADMIN — PROPOFOL 150 MG: 10 INJECTION, EMULSION INTRAVENOUS at 03:10

## 2024-10-16 RX ADMIN — SODIUM CHLORIDE: 0.9 INJECTION, SOLUTION INTRAVENOUS at 03:10

## 2024-10-16 RX ADMIN — OXYCODONE HYDROCHLORIDE 5 MG: 5 TABLET ORAL at 04:10

## 2024-10-16 RX ADMIN — ONDANSETRON 4 MG: 2 INJECTION INTRAMUSCULAR; INTRAVENOUS at 03:10

## 2024-10-16 RX ADMIN — MUPIROCIN: 20 OINTMENT TOPICAL at 01:10

## 2024-10-16 RX ADMIN — CEFAZOLIN 2 G: 2 INJECTION, POWDER, FOR SOLUTION INTRAMUSCULAR; INTRAVENOUS at 03:10

## 2024-10-16 RX ADMIN — HALOPERIDOL LACTATE 1 MG: 5 INJECTION, SOLUTION INTRAMUSCULAR at 03:10

## 2024-10-16 NOTE — BRIEF OP NOTE
Brief Operative Note     SUMMARY     Surgery Date: 10/16/2024     Surgeons and Role:     * Pito Holloway DO - Primary    Assisting Surgeon: None    Pre-op Diagnosis:  Malignant neoplasm of upper-inner quadrant of right breast in female, estrogen receptor positive [C50.211, Z17.0]    Post-op Diagnosis:  Malignant neoplasm of upper-inner quadrant of right breast in female, estrogen receptor positive [C50.211, Z17.0]    Procedure(s) (LRB):  DEBRIDEMENT, WOUND (Bilateral)    Anesthesia: General    Description of Procedure:   Debridment of prior mastectomy flap with closure    Findings/Key Components:  See op note     Estimated Blood Loss: Minimal         Specimens Removed:   Specimen (24h ago, onward)      None            Discharge Note      SUMMARY     Admit Date: 10/16/2024    Attending Physician: Pito Holloway DO     Discharge Physician: Pito Holloway DO    Discharge Date: 10/16/2024     Final Diagnosis: Malignant neoplasm of upper-inner quadrant of right breast in female, estrogen receptor positive [C50.211, Z17.0]    Hospital Course: Patient was admitted for an outpatient procedure and tolerated the procedure well with no complications.    Disposition: Home or Self Care    Follow Up/Patient Instructions:   Current Discharge Medication List        CONTINUE these medications which have NOT CHANGED    Details   gabapentin (NEURONTIN) 300 MG capsule Take 1 capsule (300 mg total) by mouth 3 (three) times daily. for 7 days  Qty: 21 capsule, Refills: 0      ibuprofen-diphenhydrAMINE cit (ADVIL PM) 200-38 mg Tab Take by mouth.      levothyroxine (SYNTHROID) 88 MCG tablet Take 1 tablet (88 mcg total) by mouth before breakfast.  Qty: 30 tablet, Refills: 11    Associated Diagnoses: Post-surgical hypothyroidism      loratadine (CLARITIN) 10 mg tablet Take 10 mg by mouth once daily. AS NEEDED FOR ALLERGIES      ondansetron (ZOFRAN-ODT) 4 MG TbDL Take 1 tablet (4 mg total) by mouth every 6 (six) hours  as needed.  Qty: 10 tablet, Refills: 0      oxyCODONE (ROXICODONE) 5 MG immediate release tablet Take 1 tablet (5 mg total) by mouth every 4 (four) hours as needed.  Qty: 10 tablet, Refills: 0    Comments: Quantity prescribed more than 7 day supply? No      pantoprazole (PROTONIX) 40 MG tablet Take 1 tablet (40 mg total) by mouth once daily.  Qty: 90 tablet, Refills: 3    Associated Diagnoses: Hiatal hernia with GERD      famotidine (PEPCID) 20 MG tablet Take 20 mg by mouth 2 (two) times daily.      potassium chloride SA (K-DUR,KLOR-CON) 10 MEQ tablet Take 20 mEq by mouth once daily.      semaglutide (OZEMPIC) 0.25 mg or 0.5 mg (2 mg/3 mL) pen injector Inject 0.5 mg into the skin every 7 days. On tuesday      tretinoin (RETIN-A) 0.025 % cream Compound tretinoin 0.025% / niacinamide 2% cream / hyaluronic acid 0.25%. Apply a pea-sized amount to entire face qhs.  Qty: 30 g, Refills: 11    Associated Diagnoses: Rhytides             Discharge Procedure Orders (must include Diet, Follow-up, Activity)  No discharge procedures on file.

## 2024-10-16 NOTE — INTERVAL H&P NOTE
The patient has been examined and the H&P has been reviewed:    I concur with the findings and no changes have occurred since H&P was written.    Surgery risks, benefits and alternative options discussed and understood by patient/family.    Discussed excision of small area of mastectomy skin necrosis at each inverted T incision  All questions answered  Consent signed        There are no hospital problems to display for this patient.

## 2024-10-16 NOTE — ANESTHESIA POSTPROCEDURE EVALUATION
Anesthesia Post Evaluation    Patient: Sherry Torres    Procedure(s) Performed: Procedure(s) (LRB):  DEBRIDEMENT, WOUND (Bilateral)    Final Anesthesia Type: general      Patient location during evaluation: PACU  Patient participation: Yes- Able to Participate  Level of consciousness: awake and alert  Post-procedure vital signs: reviewed and stable  Pain management: adequate  Airway patency: patent    PONV status at discharge: No PONV  Anesthetic complications: no      Cardiovascular status: blood pressure returned to baseline  Respiratory status: unassisted and spontaneous ventilation  Hydration status: euvolemic  Follow-up not needed.              Vitals Value Taken Time   /58 10/16/24 1631   Temp 36.7 °C (98 °F) 10/16/24 1627   Pulse 86 10/16/24 1635   Resp 16 10/16/24 1627   SpO2 96 % 10/16/24 1635   Vitals shown include unfiled device data.      No case tracking events are documented in the log.      Pain/Sheldon Score: Pain Rating Prior to Med Admin: 0 (10/16/2024  1:36 PM)

## 2024-10-16 NOTE — PROGRESS NOTES
Subjective:       Patient ID: Sherry Torres is a 68 y.o. female.    Chief Complaint: No chief complaint on file.    HPI Ms Torres is a 68-year-old female seen in follow-up  of recurrent right breast cancer - in breast.     She has had some wound healing ssues and is going back to surgery today.  Otherwise is in good spirits.    History:  Screening mammogram on 6/21/24 showed an asymmetry in the upper right breast.    Diagnostic imaging on 7/2/24 showed  -an asymmetry seen in the upper region of the right breast in the posterior depth. The asymmetry correlates with the screening mammogram finding.    US Breast Right Limited  There is an irregularly shaped, hypoechoic mass with angular margins seen in the right breast at 1 o'clock    Biopsy of the right breast on 7/9/24 revealed:  Invasive ductal carcinoma, Grade 1 (tubular differentiation = 3, nuclear pleomorphism = 1, mitotic rate = 1)   Invasive carcinoma measures 3 mm (0.3 cm) in greatest linear dimension within the core biopsies   Calcifications associated with non-nonneoplastic breast     Breast molecular markers:   ER:  Positive (%, strong)   CT:  Positive (71-80%, intermediate)   HER2 IHC:  Negative (score 0)   Ki-67:  6%     CT chest on 7/19/24 showed several small nodules up to 5 mm - non-specific, Bone scan was negative    On September 9th 2024 she underwent bilateral mastectomy with RODRIGUEZ flap reconstruction  The right breast pathology showed a 14 mm invasive ductal carcinoma with some associated atypical lobular hyperplasia.    The left breast showed atypical ductal hyperplasia and atypical lobular hyperplasia.    Oncotype score - 17    Prior Breast cancer history:    She noted an abnormality on self examination at the end of July or early August 2016.  On August 8 she underwent diagnostic mammogram which showed an irregular mass was plated margins in the middle right breast and o'clock position.  By ultrasound this was a solid 1.7 x 1.0 x 1.5  cm mass.     On August 9 a core needle biopsy showed infiltrating ductal carcinoma which was well differentiated (histologic grade 2, nuclear grade 2, mitotic index 1).  The tumor was 100% ER positive, 70% WA positive and HER-2 1+.  On August 25 right breast lumpectomy and sentinel lymph node biopsy was performed.  That showed a 14 mm low-grade infiltrating ductal carcinoma.    The sentinel lymph node was positive for 1.5 mm micrometastasis.       Final pathological stage TI cN1 stage II    Mammaprint genomic assay  showed that she was low risk.  Score was +0.336 with a 5 year risk of distant recurrence at 5% and a 10 year risk at 10%.       She completed radiation in December 2016 and began Letrozole at that time.  She stopped letrozole after 5 years.    Review of Systems   Constitutional: Negative.    Respiratory: Negative.     Gastrointestinal: Negative.    Skin:         Non healing surgical wound   Neurological:  Negative for headaches.   Psychiatric/Behavioral: Negative.  The patient is not nervous/anxious.        Objective:      Physical Exam  Vitals reviewed.   Constitutional:       General: She is not in acute distress.     Appearance: Normal appearance. She is well-developed.   Neurological:      Mental Status: She is alert and oriented to person, place, and time.   Psychiatric:         Mood and Affect: Mood normal.         Behavior: Behavior normal.         Thought Content: Thought content normal.         Judgment: Judgment normal.       Assessment:       1. Malignant neoplasm of upper-inner quadrant of right breast in female, estrogen receptor positive        Plan:     She has an ER+, HER 2 negative tumor with low Oncotype and should have endocrine therapy alone.  I recommended that we restart letrozole after she heals from surgery.  RTC 3 M with DEXA  Will repeat chest CT in 6M.           Route Chart for Scheduling    Med Onc Chart Routing      Follow up with physician 3 months.   Follow up with MADELINE     Infusion scheduling note    Injection scheduling note    Labs None   Scheduling:  Preferred lab:  Lab interval:     Imaging DXA scan      Pharmacy appointment No pharmacy appointment needed      Other referrals no referral to Oncology Primary Care needed -  no Massage appointment needed    No additional referrals needed

## 2024-10-16 NOTE — DISCHARGE INSTRUCTIONS
Remove Scopolamine patch on post op day 2.    Ex (surgery on Mon, remove on Wed)    Wash hands thoroughly after removing patch and wash area where patch was placed.    Fold in half and discard in garbage.Plastic Surgery Discharge Instructions  Bennett Holloway, DO    Wound Care  1. Shower daily beginning 48 hours after surgery  2. Okay to let warm soapy water run over the wound at that time  3. Do not submerge wounds in the bath  4. Leave any skin glue or mesh tape in place    Drain Care  If you have drains, strip tubing and record output when drain bulb becomes half full, or at least twice daily  Record output for each drain and bring to our follow up appointment    Diet  Regular Diet    Activity  Light activity only - no lifting greater than 10 lbs  Avoid strenuous activity that would cause you to get hot or sweaty    Medications  You have been given a prescription for anti-inflammatory pain  Unless otherwise contraindicated by your doctor, take 2 extra strength Tylenol and 600mg of ibuprofen every 8 hours  Please take medications as prescribed     What to call for:  1. Sustained fever > 101.0  2. Changes in color, sensation, temperature or pain at surgical site  3. Redness and/or drainage from the surgical site  4. Any reaction to prescribed medications  5. Continuous bloody drainage from surgical drains  6. Wound vac malfunction  7. Questions related to the procedure    Follow-up  1. Please call [phone] to schedule or confirm your follow up visit at the [facility], [address]  2. Call with any questions or concerns.  2. After hours call [phone] - ask to speak with the Plastic Surgery fellow on call

## 2024-10-16 NOTE — ANESTHESIA PROCEDURE NOTES
Intubation    Date/Time: 10/16/2024 3:27 PM    Performed by: Lonnie Moreno CRNA  Authorized by: Rao Craft MD    Intubation:     Induction:  Intravenous    Intubated:  Postinduction    Mask Ventilation:  Easy mask    Attempts:  1    Attempted By:  CRNA    Difficult Airway Encountered?: No      Complications:  None    Airway Device:  Supraglottic airway/LMA    Airway Device Size:  4.0    Secured at:  The lips    Placement Verified By:  Capnometry    Complicating Factors:  None    Findings Post-Intubation:  BS equal bilateral and atraumatic/condition of teeth unchanged

## 2024-10-16 NOTE — TRANSFER OF CARE
"Anesthesia Transfer of Care Note    Patient: Sherry Torres    Procedure(s) Performed: Procedure(s) (LRB):  DEBRIDEMENT, WOUND (Bilateral)    Patient location: PACU    Anesthesia Type: general    Transport from OR: Transported from OR on room air with adequate spontaneous ventilation    Post pain: adequate analgesia    Post assessment: no apparent anesthetic complications and tolerated procedure well    Post vital signs: stable    Level of consciousness: awake and responds to stimulation    Nausea/Vomiting: no nausea/vomiting    Complications: none    Transfer of care protocol was followed      Last vitals: Visit Vitals  BP (!) 116/59 (BP Location: Left arm, Patient Position: Lying)   Pulse 78   Temp 36.7 °C (98 °F) (Oral)   Resp 16   Ht 5' 5" (1.651 m)   Wt 59 kg (130 lb)   SpO2 95%   Breastfeeding No   BMI 21.63 kg/m²     "

## 2024-10-16 NOTE — ANESTHESIA PREPROCEDURE EVALUATION
10/16/2024  Sherry Torres is a 68 y.o., female.      Pre-op Assessment    I have reviewed the Patient Summary Reports.     I have reviewed the Nursing Notes. I have reviewed the NPO Status.   I have reviewed the Medications.     Review of Systems  Anesthesia Hx:    PONV           Denies Family Hx of Anesthesia complications.   Personal Hx of Anesthesia complications, Post-Operative Nausea/Vomiting                    Social:  Non-Smoker       Hematology/Oncology:       -- Anemia:                  Current/Recent Cancer.  --  Cancer in past history:       Breast    right   surgery   Oncology Comments: Previous thyroid ca  Now breast ca     EENT/Dental:  EENT/Dental Normal           Cardiovascular:      Valvular problems/Murmurs, MVP          hyperlipidemia                               Pulmonary:  Pulmonary Normal                       Renal/:  Renal/ Normal                 Hepatic/GI:     GERD, well controlled                Musculoskeletal:         Spine Disorders: lumbar            Neurological:  Neurology Normal                                      Endocrine:   Hypothyroidism  Was on Semaglutide        Dermatological:  Skin Normal    Psych:  Psychiatric History anxiety depression              Physical Exam  General: Cooperative, Alert and Oriented    Airway:  Mallampati: II   Mouth Opening: Normal  TM Distance: Normal  Tongue: Normal  Neck ROM: Normal ROM    Dental:  Intact, Caps / Implants      Anesthesia Plan  Type of Anesthesia, risks & benefits discussed:    Anesthesia Type: Gen ETT  Intra-op Monitoring Plan: Standard ASA Monitors  Post Op Pain Control Plan: multimodal analgesia  Induction:  IV  Airway Plan: Video, Post-Induction  Informed Consent: Informed consent signed with the Patient and all parties understand the risks and agree with anesthesia plan.  All questions answered.   ASA Score:  3    Ready For Surgery From Anesthesia Perspective.     .

## 2024-10-17 VITALS
TEMPERATURE: 98 F | HEIGHT: 65 IN | SYSTOLIC BLOOD PRESSURE: 123 MMHG | OXYGEN SATURATION: 96 % | WEIGHT: 130 LBS | BODY MASS INDEX: 21.66 KG/M2 | DIASTOLIC BLOOD PRESSURE: 59 MMHG | HEART RATE: 82 BPM | RESPIRATION RATE: 16 BRPM

## 2024-10-21 LAB
FINAL PATHOLOGIC DIAGNOSIS: NORMAL
GROSS: NORMAL
Lab: NORMAL

## 2024-10-22 ENCOUNTER — OFFICE VISIT (OUTPATIENT)
Dept: PLASTIC SURGERY | Facility: CLINIC | Age: 69
End: 2024-10-22
Payer: MEDICARE

## 2024-10-22 VITALS — HEART RATE: 73 BPM | SYSTOLIC BLOOD PRESSURE: 117 MMHG | DIASTOLIC BLOOD PRESSURE: 78 MMHG

## 2024-10-22 DIAGNOSIS — Z09 SURGERY FOLLOW-UP: Primary | ICD-10-CM

## 2024-10-22 PROCEDURE — 3044F HG A1C LEVEL LT 7.0%: CPT | Mod: CPTII,S$GLB,, | Performed by: SURGERY

## 2024-10-22 PROCEDURE — 3288F FALL RISK ASSESSMENT DOCD: CPT | Mod: CPTII,S$GLB,, | Performed by: SURGERY

## 2024-10-22 PROCEDURE — 99999 PR PBB SHADOW E&M-EST. PATIENT-LVL III: CPT | Mod: PBBFAC,,, | Performed by: SURGERY

## 2024-10-22 PROCEDURE — 99024 POSTOP FOLLOW-UP VISIT: CPT | Mod: S$GLB,,, | Performed by: SURGERY

## 2024-10-22 PROCEDURE — 3078F DIAST BP <80 MM HG: CPT | Mod: CPTII,S$GLB,, | Performed by: SURGERY

## 2024-10-22 PROCEDURE — 1125F AMNT PAIN NOTED PAIN PRSNT: CPT | Mod: CPTII,S$GLB,, | Performed by: SURGERY

## 2024-10-22 PROCEDURE — 3074F SYST BP LT 130 MM HG: CPT | Mod: CPTII,S$GLB,, | Performed by: SURGERY

## 2024-10-22 PROCEDURE — 1101F PT FALLS ASSESS-DOCD LE1/YR: CPT | Mod: CPTII,S$GLB,, | Performed by: SURGERY

## 2024-10-22 NOTE — OP NOTE
[facility] - Surgery ([place])  Plastic Surgery  Operative Note    SUMMARY     Date of Procedure: 10/16/2024     Location:  [facility]    Procedure(s): Procedure(s) (LRB):  DEBRIDEMENT, WOUND (Bilateral)     Excision of dehisced surgical wound with re-closure.  The bilateral breasts    Surgeons and Role:     * Pito Holloway, DO - Primary    Assistant: SARAH Butterfield and Phillip Chilel MD, Fellow    Pre-Operative Diagnosis: Malignant neoplasm of upper-inner quadrant of right breast in female, estrogen receptor positive [C50.211, Z17.0]    Post-Operative Diagnosis: same    Anesthesia: General    Indications for Procedure:  This is a 68-year-old woman he was 1 month status post bilateral immediate RODRIGUEZ flap reconstruction after bilateral skin sparing mastectomy.  She had a wise pattern skin closure.  She developed a small strip of mastectomy skin flap necrosis on the right side and a larger area on the left side.  It was we had done local wound care.  The wound had stabilized.  We favored excision of this wound in the operating room with closure.    Operative Findings (including complications, if any):  It was bilateral edges of her these his to surgical it was excised.  Mastectomy flaps were re elevated were able to be reclosed in layers.    Description of Procedures:  The patient was seen in the preoperative holding area.  All questions were answered.  Surgical consents were signed.  She was taken the operating room general anesthesia was induced.  Both breasts were prepped and draped in a sterile fashion with a Betadine wet prep tray.    Beginning on the right side the vertical and lateral areas of mastectomy skin necrosis and open wound were excised sharply using a 15 blade.  Next using a skin hook the lateral mastectomy flap was re elevated.  It was then able to additional I have adequate excursion back to the meridian of the breast.    On the left side it was a larger wound.  All of the skin  edges were freshened with the 15 blade.  Using skin hooks completely elevated the medial and lateral mastectomy flaps away from the breast flap once it was fully mobilized they were able to come back to the breast meridian.  Enough skin was missed that it did tighten the lower pole of the left breasts and create some contour asymmetry from the right side.    Both wounds were irrigated and checked for hemostasis.  2-0 Vicryl sutures were used with the new T points.  Next the wound was closed in layers with the deep dermis buried 3-0 Monocryl and running 3-0 Stratafix.  Finally a few interrupted 3-0 nylon were placed to help offload tension in the highest areas.  Skin was dressed with Prineo tape and Mepilex dressings.  She was padded in a postoperative bra.  Tolerated the procedure well taken to recovery in stable condition    Significant Surgical Tasks Conducted by the Assistant(s), if Applicable: The indicated resident served as first assistant for this procedure.    Estimated Blood Loss (EBL): 20mL           Implants: * No implants in log *    Specimens:   Specimen (24h ago, onward)      None                    Condition: Good    Disposition: PACU - hemodynamically stable., Dc home, follow up one week    Attestation: I was present and scrubbed for the entire procedure.

## 2024-10-23 NOTE — PROGRESS NOTES
Sherry Torres presents to Plastic Surgery Clinic for a follow up visit status post bilateral RODRIGUEZ flap reconstruction on 9/9/24, left breast hematoma evacuation on 9/10/24 and bilateral wound debridement on 10/16/24. She's happy the wounds are closed. No complaints today.     PHYSICAL EXAMINATION  Bilateral breast flap incision(s) well approximated with prineo tape overlying bilateral wise pattern incisions, a few nylons reinforcing the closure on the left.  Flaps are soft, bruised  Nipple(s) is/are surgically absent   Abdominal incision is healing well with no issues   Umbilical incision healing well with no issues      ASSESSMENT/PLAN  Sherry Torres is s/p bilateral immediate RODRIGUEZ flap   - Prineo tape on breast incisions  - Counseled on scar massage and incision care for abdominal closure   - Follow up 2 weeks for prineo removal        All questions were answered. The patient was advised to contact the clinic with any questions or concerns prior to their next visit.         Evelyn Callahan  Plastic and Reconstructive Surgery

## 2024-10-30 ENCOUNTER — HOSPITAL ENCOUNTER (OUTPATIENT)
Dept: ENDOCRINOLOGY | Facility: CLINIC | Age: 69
Discharge: HOME | End: 2024-10-30
Attending: NURSE PRACTITIONER
Payer: MEDICARE

## 2024-10-30 DIAGNOSIS — Z85.850 HISTORY OF THYROID CANCER: ICD-10-CM

## 2024-10-30 DIAGNOSIS — E89.0 POST-SURGICAL HYPOTHYROIDISM: ICD-10-CM

## 2024-10-30 PROCEDURE — 76536 US EXAM OF HEAD AND NECK: CPT | Mod: S$GLB,,, | Performed by: INTERNAL MEDICINE

## 2024-11-05 ENCOUNTER — OFFICE VISIT (OUTPATIENT)
Dept: PLASTIC SURGERY | Facility: CLINIC | Age: 69
End: 2024-11-05
Payer: MEDICARE

## 2024-11-05 VITALS — HEART RATE: 70 BPM | DIASTOLIC BLOOD PRESSURE: 81 MMHG | SYSTOLIC BLOOD PRESSURE: 130 MMHG

## 2024-11-05 DIAGNOSIS — Z09 SURGERY FOLLOW-UP: Primary | ICD-10-CM

## 2024-11-05 PROCEDURE — 99024 POSTOP FOLLOW-UP VISIT: CPT | Mod: S$GLB,,, | Performed by: SURGERY

## 2024-11-05 PROCEDURE — 3075F SYST BP GE 130 - 139MM HG: CPT | Mod: CPTII,S$GLB,, | Performed by: SURGERY

## 2024-11-05 PROCEDURE — 3079F DIAST BP 80-89 MM HG: CPT | Mod: CPTII,S$GLB,, | Performed by: SURGERY

## 2024-11-05 PROCEDURE — 1126F AMNT PAIN NOTED NONE PRSNT: CPT | Mod: CPTII,S$GLB,, | Performed by: SURGERY

## 2024-11-05 PROCEDURE — 3044F HG A1C LEVEL LT 7.0%: CPT | Mod: CPTII,S$GLB,, | Performed by: SURGERY

## 2024-11-05 PROCEDURE — 1101F PT FALLS ASSESS-DOCD LE1/YR: CPT | Mod: CPTII,S$GLB,, | Performed by: SURGERY

## 2024-11-05 PROCEDURE — 99999 PR PBB SHADOW E&M-EST. PATIENT-LVL III: CPT | Mod: PBBFAC,,, | Performed by: SURGERY

## 2024-11-05 PROCEDURE — 3288F FALL RISK ASSESSMENT DOCD: CPT | Mod: CPTII,S$GLB,, | Performed by: SURGERY

## 2024-11-06 NOTE — PROGRESS NOTES
Sherry Torres presents to Plastic Surgery Clinic for a follow up visit status post bilateral RODRIGUEZ flap reconstruction on 9/9/24, left breast hematoma evacuation on 9/10/24 and bilateral wound debridement on 10/16/24. She's happy the wounds are closed. No complaints today. Here for prineo tape and suture removal.     PHYSICAL EXAMINATION  Bilateral breast flap incision(s) well approximated with prineo tape overlying bilateral wise pattern incisions, a few nylons reinforcing the closure on the left.  Flaps are soft, bruising has resolved  Nipple(s) is/are surgically absent   Abdominal incision is well healed  Umbilical incision is well healed, the scar is a little thick at the superior portion     ASSESSMENT/PLAN  Sherry Torres is s/p bilateral immediate RODRIGUEZ flap   - Prineo tape and nylon sutures removed  - Counseled on scar massage and incision care for abdominal closure   - Okay to start scar care on breast incisions in 1 week, recommend bacitracin to incisions until then  - Follow up 3 months or sooner if any concerns     All questions were answered. The patient was advised to contact the clinic with any questions or concerns prior to their next visit.         Evelyn Callahan  Plastic and Reconstructive Surgery

## 2024-11-14 ENCOUNTER — LAB VISIT (OUTPATIENT)
Dept: LAB | Facility: HOSPITAL | Age: 69
End: 2024-11-14
Attending: NURSE PRACTITIONER
Payer: MEDICARE

## 2024-11-14 DIAGNOSIS — E89.0 POST-SURGICAL HYPOTHYROIDISM: ICD-10-CM

## 2024-11-14 DIAGNOSIS — Z85.850 HISTORY OF THYROID CANCER: ICD-10-CM

## 2024-11-14 LAB
T4 FREE SERPL-MCNC: 1.21 NG/DL (ref 0.71–1.51)
TSH SERPL DL<=0.005 MIU/L-ACNC: 4.6 UIU/ML (ref 0.4–4)

## 2024-11-14 PROCEDURE — 84443 ASSAY THYROID STIM HORMONE: CPT | Performed by: NURSE PRACTITIONER

## 2024-11-14 PROCEDURE — 84439 ASSAY OF FREE THYROXINE: CPT | Performed by: NURSE PRACTITIONER

## 2024-11-14 PROCEDURE — 36415 COLL VENOUS BLD VENIPUNCTURE: CPT | Mod: PN | Performed by: NURSE PRACTITIONER

## 2024-11-18 ENCOUNTER — PATIENT MESSAGE (OUTPATIENT)
Dept: ENDOCRINOLOGY | Facility: CLINIC | Age: 69
End: 2024-11-18
Payer: MEDICARE

## 2024-11-18 DIAGNOSIS — E89.0 POST-SURGICAL HYPOTHYROIDISM: Primary | ICD-10-CM

## 2024-12-02 ENCOUNTER — OFFICE VISIT (OUTPATIENT)
Dept: URGENT CARE | Facility: CLINIC | Age: 69
End: 2024-12-02
Payer: MEDICARE

## 2024-12-02 VITALS
HEART RATE: 90 BPM | OXYGEN SATURATION: 97 % | HEIGHT: 65 IN | DIASTOLIC BLOOD PRESSURE: 84 MMHG | WEIGHT: 130 LBS | BODY MASS INDEX: 21.66 KG/M2 | TEMPERATURE: 98 F | SYSTOLIC BLOOD PRESSURE: 128 MMHG | RESPIRATION RATE: 16 BRPM

## 2024-12-02 DIAGNOSIS — N30.01 ACUTE CYSTITIS WITH HEMATURIA: Primary | ICD-10-CM

## 2024-12-02 DIAGNOSIS — R30.0 DYSURIA: ICD-10-CM

## 2024-12-02 LAB
BILIRUBIN, UA POC OHS: NEGATIVE
BLOOD, UA POC OHS: ABNORMAL
CLARITY, UA POC OHS: ABNORMAL
COLOR, UA POC OHS: YELLOW
GLUCOSE, UA POC OHS: NEGATIVE
KETONES, UA POC OHS: NEGATIVE
LEUKOCYTES, UA POC OHS: ABNORMAL
NITRITE, UA POC OHS: NEGATIVE
PH, UA POC OHS: 6
PROTEIN, UA POC OHS: 100
SPECIFIC GRAVITY, UA POC OHS: 1.01
UROBILINOGEN, UA POC OHS: 0.2

## 2024-12-02 PROCEDURE — 99213 OFFICE O/P EST LOW 20 MIN: CPT | Mod: S$GLB,,, | Performed by: PHYSICIAN ASSISTANT

## 2024-12-02 PROCEDURE — 81003 URINALYSIS AUTO W/O SCOPE: CPT | Mod: QW,S$GLB,, | Performed by: PHYSICIAN ASSISTANT

## 2024-12-02 PROCEDURE — 87086 URINE CULTURE/COLONY COUNT: CPT | Performed by: PHYSICIAN ASSISTANT

## 2024-12-02 PROCEDURE — 87186 SC STD MICRODIL/AGAR DIL: CPT | Performed by: PHYSICIAN ASSISTANT

## 2024-12-02 PROCEDURE — 87088 URINE BACTERIA CULTURE: CPT | Performed by: PHYSICIAN ASSISTANT

## 2024-12-02 RX ORDER — PHENAZOPYRIDINE HYDROCHLORIDE 200 MG/1
200 TABLET, FILM COATED ORAL 3 TIMES DAILY PRN
Qty: 6 TABLET | Refills: 0 | Status: SHIPPED | OUTPATIENT
Start: 2024-12-02 | End: 2024-12-04

## 2024-12-02 RX ORDER — NITROFURANTOIN 25; 75 MG/1; MG/1
100 CAPSULE ORAL 2 TIMES DAILY
Qty: 10 CAPSULE | Refills: 0 | Status: SHIPPED | OUTPATIENT
Start: 2024-12-02 | End: 2024-12-07

## 2024-12-03 NOTE — PATIENT INSTRUCTIONS
Urine culture was ordered if you have a history of recurrent UTIs, male, pediatrics, and elderly population. You will get a phone call within 3-5 days regarding urine culture results if there any concerns with antibiotic choice. All results will be available on [facility].   If you were prescribed antibiotics, please take them to completion.  If you were prescribed a fluoroquinolone antibiotic such as levofloxacin or ciprofloxacin, avoid heavy lifting for 1-2 weeks after completion of the antibiotic as these antibiotics have a rare complication of tendon rupture. Avoidance of heavy lifting or strenuous exercise reduces this risk.  If you were prescribed a narcotic medication, do not drive or operate heavy equipment or machinery while taking these medications.  Please drink plenty of fluids.  Please get plenty of rest.  If you were prescribed Pyridium (phenazopyridine), please be aware that if you wear contact lens that this medication may stain your contacts.  While taking this medication it is recommended that you do not wear your contacts until 24 hours after your last dose. If it not covered, you can  purchase Azo (phenazopyridine 99 mg) over the counter at drug stores.  If you  smoke, please stop smoking.  If not allergic, take Tylenol (Acetaminophen) 650 mg to  1 g every 6 hours as needed for fever and/or Motrin (Ibuprofen) 600 to 800 mg every 6 hours as needed for fever as directed for control of pain and/or fever      Please remember that you have received care at an urgent care today. Urgent cares are not emergency rooms and are not equipped to handle life threatening emergencies and cannot rule in or out certain medical conditions and you may be released before all of your medical problems are known or treated. Please arrange follow up with your primary care physician or speciality clinic  within 2-5 days if your signs and symptoms have not resolved or worsen. Patient can call our Referral Hotline at  [phone] to make an appointment.    Please return here or go to the Emergency Department for any concerns or worsening of condition.Patient was educated on signs/symptoms that would warrant emergent medical attention.   Signs of infection. These include a fever of 100.4°F (38°C) or higher, chills, back pain, nausea, throwing up, or bloody urine.  Signs come back after treatment ends  You notice more blood in your urine.  Your signs get worse or do not improve within 24 hours of starting treatment.  You are not able to urinate for more than 8 hours.  Your signs come back after treatment has stopped.

## 2024-12-03 NOTE — PROGRESS NOTES
"Subjective:      Patient ID: Sherry Torres is a 68 y.o. female.    Vitals:  height is 5' 5" (1.651 m) and weight is 59 kg (130 lb). Her oral temperature is 98.2 °F (36.8 °C). Her blood pressure is 128/84 and her pulse is 90. Her respiration is 16 and oxygen saturation is 97%.     Chief Complaint: Dysuria    Sherry Torres is a 68 y.o. female who complains of  dysuria and urinary frequency that started this afternoon. She c/o Cloudy urine.       Dysuria   This is a new problem. The current episode started today. The problem occurs every urination. The problem has been unchanged. The quality of the pain is described as burning. The pain is at a severity of 8/10. The pain is mild. There has been no fever. She is Not sexually active. There is No history of pyelonephritis. Associated symptoms include frequency. Pertinent negatives include no chills, flank pain, nausea, urgency or vomiting. She has tried nothing for the symptoms.       Constitution: Negative for activity change, appetite change, chills, sweating, fatigue, fever and generalized weakness.   Gastrointestinal:  Negative for abdominal pain, nausea and vomiting.   Genitourinary:  Positive for dysuria and frequency. Negative for urgency, flank pain, vaginal pain, vaginal discharge, vaginal bleeding, vaginal odor, painful intercourse, genital sore and pelvic pain.   Musculoskeletal:  Negative for back pain and muscle ache.      Objective:     Physical Exam   Constitutional: She is oriented to person, place, and time. She appears well-developed. No distress.      Comments:Patient is awake and alert, sitting up in exam chair, speaking and answering in complete sentences   normal  HENT:   Head: Normocephalic and atraumatic.   Ears:   Right Ear: External ear normal.   Left Ear: External ear normal.   Nose: Nose normal. No nasal deformity. No epistaxis.   Mouth/Throat: Oropharynx is clear and moist and mucous membranes are normal.   Eyes: Conjunctivae and " lids are normal. Pupils are equal, round, and reactive to light.   Neck: Trachea normal and phonation normal. Neck supple.   Cardiovascular: Normal rate, regular rhythm, normal heart sounds and normal pulses.   Pulmonary/Chest: Effort normal and breath sounds normal.   Abdominal: Normal appearance. She exhibits no distension and no mass. There is no abdominal tenderness. There is no rebound, no guarding, no left CVA tenderness and no right CVA tenderness. No hernia.   Neurological: She is alert and oriented to person, place, and time.   Skin: Skin is warm, dry and intact.   Psychiatric: Her speech is normal and behavior is normal. Mood, judgment and thought content normal.   Nursing note and vitals reviewed.      Assessment:     1. Acute cystitis with hematuria    2. Dysuria      Patient presents with clinical exam findings and history consistent with above.      On exam, patient is nontoxic appearing and vitals are stable.      Diagnostic testing results were reviewed and discussed with patient/guardian.   Tests ordered in clinic:  Results for orders placed or performed in visit on 12/02/24   POCT Urinalysis(Instrument)    Collection Time: 12/02/24  7:13 PM   Result Value Ref Range    Color, POC UA Yellow Yellow, Straw, Colorless    Clarity, POC UA Slight Cloudy (A) Clear    Glucose, POC UA Negative Negative    Bilirubin, POC UA Negative Negative    Ketones, POC UA Negative Negative    Spec Grav POC UA 1.015 1.005 - 1.030    Blood, POC UA Large (A) Negative    pH, POC UA 6.0 5.0 - 8.0    Protein, POC  (A) Negative    Urobilinogen, POC UA 0.2 <=1.0    Nitrite, POC UA Negative Negative    WBC, POC UA Large (A) Negative       Previous progress notes/admissions/labs and medications were reviewed.  Reviewed GFR > 60 from CMP on 9/1/24.       Plan:       Acute cystitis with hematuria  -     CULTURE, URINE  -     nitrofurantoin, macrocrystal-monohydrate, (MACROBID) 100 MG capsule; Take 1 capsule (100 mg total) by  "mouth 2 (two) times daily. for 5 days  Dispense: 10 capsule; Refill: 0  -     Ambulatory referral/consult to Internal Medicine    Dysuria  -     POCT Urinalysis(Instrument)  -     CULTURE, URINE  -     phenazopyridine (PYRIDIUM) 200 MG tablet; Take 1 tablet (200 mg total) by mouth 3 (three) times daily as needed for Pain.  Dispense: 6 tablet; Refill: 0                    1) See orders for this visit as documented in the electronic medical record.  2) Symptomatic therapy suggested: use acetaminophen/ibuprofen every 6-8 hours prn pain or fever, push fluids.   3) Call or return to clinic prn if these symptoms worsen or fail to improve as anticipated.    Discussed results/diagnosis/plan with patient in clinic.  We had shared decision making for patient's treatment. Patient verbalized understanding and in agreement with current treatment plan.     Patient was instructed to return for re-evaluation with urgent care or PCP for continued outpatient workup and management if symptoms do not improve/worsening symptoms. Strict ED versus clinic precautions given in depth.    Discharge and follow-up instructions given verbally/printed with the patient who expressed understanding. The instructions and results are also available on [facility].              Justinetammie Johnson PA-C (Jackie)          Patient Instructions   Urine culture was ordered if you have a history of recurrent UTIs, male, pediatrics, and elderly population. You will get a phone call within 3-5 days regarding urine culture results if there any concerns with antibiotic choice. All results will be available on [facility] My[facility].   If you were prescribed antibiotics, please take them to completion.  If you were prescribed a fluoroquinolone antibiotic such as levofloxacin or ciprofloxacin, avoid heavy lifting for 1-2 weeks after completion of the antibiotic as these antibiotics have a rare complication of tendon rupture. Avoidance of heavy lifting or strenuous exercise reduces " Quality 130: Documentation Of Current Medications In The Medical Record: Current Medications Documented Detail Level: Detailed this risk.  If you were prescribed a narcotic medication, do not drive or operate heavy equipment or machinery while taking these medications.  Please drink plenty of fluids.  Please get plenty of rest.  If you were prescribed Pyridium (phenazopyridine), please be aware that if you wear contact lens that this medication may stain your contacts.  While taking this medication it is recommended that you do not wear your contacts until 24 hours after your last dose. If it not covered, you can  purchase Azo (phenazopyridine 99 mg) over the counter at drug stores.  If you  smoke, please stop smoking.  If not allergic, take Tylenol (Acetaminophen) 650 mg to  1 g every 6 hours as needed for fever and/or Motrin (Ibuprofen) 600 to 800 mg every 6 hours as needed for fever as directed for control of pain and/or fever      Please remember that you have received care at an urgent care today. Urgent cares are not emergency rooms and are not equipped to handle life threatening emergencies and cannot rule in or out certain medical conditions and you may be released before all of your medical problems are known or treated. Please arrange follow up with your primary care physician or speciality clinic  within 2-5 days if your signs and symptoms have not resolved or worsen. Patient can call our Referral Hotline at [phone] to make an appointment.    Please return here or go to the Emergency Department for any concerns or worsening of condition.Patient was educated on signs/symptoms that would warrant emergent medical attention.   Signs of infection. These include a fever of 100.4°F (38°C) or higher, chills, back pain, nausea, throwing up, or bloody urine.  Signs come back after treatment ends  You notice more blood in your urine.  Your signs get worse or do not improve within 24 hours of starting treatment.  You are not able to urinate for more than 8 hours.  Your signs come back after treatment has stopped.               Quality 431: Preventive Care And Screening: Unhealthy Alcohol Use - Screening: Patient not identified as an unhealthy alcohol user when screened for unhealthy alcohol use using a systematic screening method Quality 110: Preventive Care And Screening: Influenza Immunization: Influenza immunization was not ordered or administered, reason not given Quality 226: Preventive Care And Screening: Tobacco Use: Screening And Cessation Intervention: Patient screened for tobacco use and is an ex/non-smoker

## 2024-12-06 ENCOUNTER — PATIENT MESSAGE (OUTPATIENT)
Dept: URGENT CARE | Facility: CLINIC | Age: 69
End: 2024-12-06
Payer: MEDICARE

## 2024-12-06 ENCOUNTER — TELEPHONE (OUTPATIENT)
Dept: URGENT CARE | Facility: CLINIC | Age: 69
End: 2024-12-06
Payer: MEDICARE

## 2024-12-06 LAB — BACTERIA UR CULT: ABNORMAL

## 2024-12-06 RX ORDER — CIPROFLOXACIN 500 MG/1
500 TABLET ORAL 2 TIMES DAILY
Qty: 20 TABLET | Refills: 0 | Status: SHIPPED | OUTPATIENT
Start: 2024-12-06 | End: 2024-12-16

## 2024-12-06 NOTE — TELEPHONE ENCOUNTER
S/w pt, informed her that her antibiotics were called into [facility] on [address].   Asked pt to please f/u for PCP or Healthcare Professional for future guidance & clarification.

## 2024-12-09 NOTE — OP NOTE
[facility] Surg [address]  Surgery Department  Operative Note    SUMMARY     Date of Procedure: 9/9/2024     Procedure: Procedure(s) (LRB):  RECONSTRUCTION, BREAST, USING RODRIGUEZ FREE FLAP / BILATERAL RODRIGUEZ FLAP (Bilateral)  INJECTION, AGENT, INTRAVENOUS, FOR ASSESSMENT OF BLOOD FLOW IN FLAP OR GRAFT (N/A)  MASTECTOMY, BILATERAL (Bilateral)  BIOPSY, LYMPH NODE, SENTINEL (Right)  INJECTION, FOR SENTINEL NODE IDENTIFICATION (Right)     Surgeons and Role:  Panel 1:     * Pito Holloway DO - Co- Surgeon     * Luis Daniel Mcneal MD - Co-Surgeon  Panel 2:     * NADIRA Carroll MD - Primary    Assisting Surgeon: None    Pre-Operative Diagnosis: Malignant neoplasm of upper-outer quadrant of right female breast [C50.411]    Post-Operative Diagnosis: Post-Op Diagnosis Codes:     * Malignant neoplasm of upper-outer quadrant of right female breast [C50.411]     * Malignant neoplasm of upper-inner quadrant of right female breast [C50.211]     * Estrogen receptor positive [Z17.0]    Anesthesia: General    Technical Procedures Used: see below    Description of the Findings of the Procedure:   Date of Procedure: 9/9/2024      Location:  [facility]     Procedure(s): Procedure(s) (LRB):  RECONSTRUCTION, BREAST, USING RODRIGUEZ FREE FLAP / BILATERAL RODRIGUEZ FLAP (Bilateral)  INJECTION, AGENT, INTRAVENOUS, FOR ASSESSMENT OF BLOOD FLOW IN FLAP OR GRAFT (N/A)  MASTECTOMY, BILATERAL (Bilateral)  BIOPSY, LYMPH NODE, SENTINEL (Right)  INJECTION, FOR SENTINEL NODE IDENTIFICATION (Right)      Immediate left breast reconstruction with RODRIGUEZ flap  Immediate right breast reconstruction with RODRIGUEZ flap  ICG angiography to assess flap perfusion  Bilateral TAP and intercostal rib blocks with Exparel for post-operative pain control     Surgeons and Role:  Panel 1:     * Pito Holloway, DO - Primary     * Luis Daniel Mcneal MD - Co-Surgeon  Panel 2:     * NADIRA Carroll MD - Primary     Assistant: SARAH Butterfield and Viraj  MD Triston, Fellow     Pre-Operative Diagnosis: Malignant neoplasm of upper-outer quadrant of right female breast [C50.411]     Post-Operative Diagnosis: same     Anesthesia: General     Indications for Procedure:  68-year-old woman with a history of right breast cancer.  She previously underwent lumpectomy and adjuvant radiotherapy.  She has a recurrence in the right breast.  A right mastectomy was recommended.  She preferred bilateral mastectomy with autologous reconstruction     Operative Findings (including complications, if any):   Right mastectomy weight 656 gm  Left Mastectomy weight 578 gm  Micro information:  Donor site: right keira-abdomen  Recipient site: left breast  Weight:  344 gm  Number of perforators:  2  Pedicle Artery: DIEA  Recipient Artery: YESI  Arterial anastomosis size:  2.5 mm  Recipient vein: IMV  Vein  size:  2.5 mm  Additional Vein:  None   Total right keira abdominal flap ischemia time 1 hour 35 minutes     Micro information:  Donor site: left keira-abdomen  Recipient site: right breast  Weight:  342 gm  Number of perforators:  3  Pedicle Artery: DIEA  Recipient Artery: YESI  Arterial anastomosis size:  2.5 mm  Recipient vein: IMV  Vein  size:  3.0 mm  Additional Vein:  SI Ev to retrograde IMV, 2.5 mm  Total left keira abdominal flap ischemia time 41 minutes        Description of Procedures:  The patient was seen in the preoperative holding area.  Surgical consents were signed at her preoperative visit.  All questions were answered.  Marked with the borders of the breast, the midline of the abdomen and lower abdominal skin fold.  I discussed mastectomy incisions with Sandrine Carroll MD and we decided upon vertical teardrop skin sparing.     The patient was taken to the operating room.  General anesthesia was induced.  Based on the preoperative CTA the dominant perforators of the lower abdomen were marked.  Bilateral RODRIGUEZ flaps were designed and measured for symmetry.  The breasts  and abdomen were then prepped and draped in the sterile fashion     Please see the op note of Sandrine Carroll MD for that portion of the procedure.     I began by elevating the right keira abdominal flap.  The lower border of the flap was incised.  The superficial vein was identified.  It was felt to be small.  Several cm of length were dissected before it was divided with clips.  I then divided the lower border of the flap using the Bovie down to the anterior abdominal wall.  Next the upper border of the flap was incised.  Idid not  need to bevel superiorly for additional volume or perforators.  The umbilicus was grasped with single hooks.  It was incised circumferentially using a 15 blade in the midline between the 2 flaps were incised.  The flap was then elevated from lateral to medial until the lateral perforators were identified.  Flap was also elevated from medial to lateral until medial perforators were identified.  There were 2 dominant perforators were identified.  One was located centrally in the flap over the rectus muscle and it was also a medial periumbilical .  I opened the fascia between these 2 perforators and extended somewhat inferiorly the fascial incision.  The lower  was skeletonized 1st.  It was a short course down to the lateral row.  I continued the dissection inferiorly until I discovered the junction of the medial and lateral rows.  Next the  dissection for the upper  was done.  Mid had a short course on top of the muscle before having a moderate length intramuscular course and finally joining the main pedicle.  I did have to open some rectus muscle partial-thickness between the medial and lateral .  After that I then continued the dissection inferiorly  With the flap was isolated on these perforators sufficient length of the pedicle was dissected before windows were created.  The flap was temporarily stapled back down to the abdomen     In a  similar fashion, the borders of the left keira abdominal flap were incised.  The superficial vein on that side was felt to be medium.  Flap was then elevated from lateral to medial and medial to lateral until perforators were identified  On this side there was 1 medium  located medially, it was a small  in the medial periumbilical area though did not have a very strong Doppler signal.  At 2 small perforators laterally that were about 1 cm apart.  They each had a strong signal so I chose to take both of them with a 1 island of fascia between them.  I began by incising the fascia at the  extended the fascial incision medially and then continuing down inferiorly.  These small perforators had a short course down to the main pedicle.  The distal continuation was clipped.  Then continued the dissection down to the lateral border of the rectus muscle.  Next I performed the preferred greater dissection for the medial .  It was has a long course on top of the muscle before a long lower intramuscular course and finally joining the main pedicle very low, much lower than it was normally seen.  Did have to divide some muscle between  With the flap was isolated on these perforators sufficient length of the pedicle was dissected before windows were created.  The flap was temporarily stapled back down to the abdomen.     ICG angiography was then performed.  4 cc of reconstituted ICG was injected through the IV.  Using the Vision sense system ICG angiography was done.  There was slow filling of both RODRIGUEZ flaps.  After about 30 seconds the left flap was not filling as well.  The skin was also a little darker and when part of it was de-epithelialized it bled a little darker.  I then cut the clip off of the superficial vein.  The flap then briskly filled after that.  After about 2 minutes the lateral tip of the each flap along with some of the lower lateral portion of the right flap were marked  and were removed.     Both flaps were de-epithelialized on the abdomen.  Arc of skin was left along the medial portion of the flap that correlated with the IMF but the rest of the flap was de-epithelialized     Dr. Mcneal performed the recipient vessel exposure and the chest.  Beginning on the right, the skin sparing mastectomy flaps were retracted using self-retaining tractors.  The pectoralis muscle was incised and retracted back laterally the prominent rib space was identified.  A portion of the 3rd rib was removed.  The internal mammary artery and vein were then identified.  Several small branches were ligated with clips until adequate length the vessels were able to be visualized there were one medial and one lateal IMV, the vessels were then bathed in 4% lidocaine and covered with a moist Ray-Alexandre.     Using the same technique, the contralateral YESI and IMV were exposed.  The dominant rib space was identified and was exposed in the same fashion  Bilateral intercostal rib blocks were done using the mixture Marcaine and Exparel.     Both abdominal flaps were then harvested.  Hemoclips were placed on the pedicle and were checked for hemostasis.  Flaps were irrigated with 10 cc of heparinized saline.  The pedicle was oriented with a marking pen.  Both flaps were weighed.  Final weights are listed above.     Began with the anastomosis on the right breast.  The pedicle vessels were cleaned under the microscope The IMV was oriented with a marking pen.  It was divided distally with a hemoclip a single green clamp was placed on the vein the vein was divided.  A 3.0 mm  was felt to be appropriate.  That size  was then used for an end-to-end venous anastomosis.  When the clamp was removed flow was seen across the .  The green approximating clamp was then placed on the artery.  It was divided distally with a hemoclip.  The artery was divided.  Using a double approximating clamp, anastomosis was done  using interrupted 9 0 nylon sutures.  Anastomosis was uneventful.  When the clamps were removed there was pulsatile flow in the pedicle  It was a palpable pulse in the pedicle.  An internal cook Doppler was placed on the pedicle artery just distal to the anastomosis.  The wire was coiled.    Next the flap was repositioned.  It was stapled down to the chest.  It was able to get the superficial vein down to the retrograde IMV.  When the clip was removed from the superficial vein there was a gush of dark blood.  A 2.5 mm  was used from the retrograde IMV to the superficial vein  The flap was then placed into the mastectomy cavity.  It was sutured to the chest wall using 2-0 Vicryl suture.  Skin was then temporarily stapled over the flap.  Fifteen Malay Louie drains were brought out the anterior axillary line bilaterally     Next the anastomosis was done on the 2nd side.  It was done in the same fashion.  On this side a 2.5 mm  was used and the arterial anastomosis was also done using interrupted 9 0 nylon sutures.  The end of the anastomosis there was initially pulse in the pedicle.  Once I went to put the internal Doppler on pulsatile flow could not be heard.  The pulse we had also diminished in the pedicle.  I tried to strip the artery did not bring it briskly refill.  It was concerned of the clot at the anastomosis.  A double green approximating clamp was replaced in the anastomosis was cut out.  There was thrombus at the anastomosis.  The anastomosis was then redone all with interrupted 9 0 nylon suture.  Then it was a strong palpable pulse in the pedicle.  A cook Doppler was placed and it was coiled.  The flap was also sutured into the chest.     The mastectomy utilized vertical teardrop incisions.  The patient's back was sat up.  We then tailor tacked into a wise pattern bilaterally.  We checked horizontal legs and the meridian marks for symmetry  The flap was then inset using several 2-0 Vicryl  sutures including the high lateral border of the Jarrell muscle.  The skin was closed with buried 3-0 Monocryl and running 3-0 Stratafix.    I  assisted with the abdominal closure, flap inset, and breast closure.     After the flaps were harvested, bilateral tap blocks were performed using direct injection of the Marcaine and Exparel mixture.  The rectus abdominis muscle was reapproximated using interrupted 2-0 Vicryl suture.  Anterior abdominal wall fascia was then closed using horizontal mattress 0 Prolene and running 0 V lock sutures.  Fifteen French Louie drains were placed and brought out laterally below the incisions.  The central portion of the upper abdomen was undermined.  Plication was was done in the upper and lower abdomen using 1. PDO barbed suture.  The bed was flexed.  The lower abdominal incisions were brought together.  A 2-0 Vicryl was used to hold the Conor's in the midline.  The position of the umbilicus was marked.  The Conor's layer was then closed with a running 2-0 PDO barbed suture.  The skin was closed with buried 3-0 Monocryl and running 3-0 Stratafix.  A small horizontal box incision was made overlying the umbilicus.  The umbilicus was grasped and brought through the incision.  It was then trimmed as needed.  Several 3-0 Monocryl sutures were used to hold it in position.  The skin was then closed with interrupted 4-0 Prolene.     CHG drain dressings were then used on all of the drains.  Tegaderm were used to secure the cook Doppler wires.  It was Xeroform was placed in the umbilicus it was covered with a Mepilex dressing.  Prineo tape was placed on the lower abdomen it was covered with Mepilex dressings.  The breasts were dressed with Prineo tape and Mepilex dressing.  The patient was then padded and a postoperative bra and an abdominal binder.  She tolerated the procedure well was taken to recovery in a stable condition        Significant Surgical Tasks Conducted by the Assistant(s), if  Applicable: The presence of an additional attending surgeon functioning as a co-surgeon  was required due to the complexity of the procedure relative to any available residents     Estimated Blood Loss (EBL): 200mL           Implants:   Implant Name Type Inv. Item Serial No.  Lot No. LRB No. Used Action    MICROVAS ANSTMS 3.0MM - [id number]    MICROVAS ANSTMS 3.0MM   [employer] [id number] Right 1 Implanted    MICROVAS ANSTMS 2.5MM - [id number]    MICROVAS ANSTMS 2.5MM   [employer] [id number] Right 1 Implanted    MICROVAS ANSTMS 2.5MM - [id number]    MICROVAS ANSTMS 2.5MM   [employer] [id number] Left 1 Implanted         Specimens:   Specimen (24h ago, onward)          Start     Ordered     24 0833   Specimen to Pathology, Surgery Breast  Once        Comments: Pre-op Diagnosis: Malignant neoplasm of upper-outer quadrant of right female breast [C50.411]Procedure(s):RECONSTRUCTION, BREAST, USING RODRIGUEZ FREE FLAP / BILATERAL RODRIGUEZ FLAPINJECTION, AGENT, INTRAVENOUS, FOR ASSESSMENT OF BLOOD FLOW IN FLAP OR GRAFTMASTECTOMY, BILATERALBIOPSY, LYMPH NODE, SENTINELINJECTION, FOR SENTINEL NODE IDENTIFICATION Number of specimens: 3Name of specimens: 1. Left Mastectomy Short Stitch Superior Long Stitch Lateral2. Right Mastectomy Short Stitch Superior Long Stitch Lateral3. Right Axillary [place] Lymph Node #1 Hot 307      References:    Click here for ordering Quick Tip   Question Answer Comment   Procedure Type: Breast     Release to patient Immediate         24 0928                                Condition: Good     Disposition: PACU - hemodynamically stable., DC home     Attestation: I was present and scrubbed for the key portions of the procedure.         Electronically signed by Pito Holloway DO at 2024  1:50 PM    Significant Surgical Tasks Conducted by the Assistant(s), if Applicable:  na    Complications: No    Estimated Blood Loss (EBL): 200 mL           Implants:   Implant Name Type Inv. Item Serial No.  Lot No. LRB No. Used Action    MICROVAS ANSTMS 3.0MM - [id number]   MICROVAS ANSTMS 3.0MM  [employer] [id number] Right 1 Implanted    MICROVAS ANSTMS 2.5MM - [id number]   MICROVAS ANSTMS 2.5MM  [employer] [id number] Right 1 Implanted    MICROVAS ANSTMS 2.5MM - [id number]   MICROVAS ANSTMS 2.5MM  [employer] [id number] Left 1 Implanted       Specimens:   Specimen (24h ago, onward)      None                    Condition: Good    Disposition: PACU - hemodynamically stable.    Attestation: I was present and scrubbed for the entire procedure.    Discharge Note    SUMMARY     Admit Date: 2024    Discharge Date and Time: 2024  3:17 PM    Hospital Course (synopsis of major diagnoses, care, treatment, and services provided during the course of the hospital stay): POD #3     Final Diagnosis: Post-Op Diagnosis Codes:     * Malignant neoplasm of upper-outer quadrant of right female breast [C50.411]     * Malignant neoplasm of upper-inner quadrant of right female breast [C50.211]     * Estrogen receptor positive [Z17.0]    Disposition: Admitted as an Inpatient    Follow Up/Patient Instructions:     Medications:  Reconciled Home Medications:      Medication List        START taking these medications      gabapentin 300 MG capsule  Commonly known as: NEURONTIN  Take 1 capsule (300 mg total) by mouth 3 (three) times daily. for 7 days     oxyCODONE 5 MG immediate release tablet  Commonly known as: ROXICODONE  Take 1 tablet (5 mg total) by mouth every 4 (four) hours as needed.            CONTINUE taking these medications      CLARITIN 10 mg tablet  Generic drug: loratadine  Take 10 mg by mouth once daily. AS NEEDED FOR ALLERGIES     famotidine 20 MG tablet  Commonly known as: PEPCID  Take 20 mg by  mouth 2 (two) times daily.     levothyroxine 88 MCG tablet  Commonly known as: SYNTHROID  Take 1 tablet (88 mcg total) by mouth before breakfast.     ondansetron 4 MG Tbdl  Commonly known as: ZOFRAN-ODT  Take 1 tablet (4 mg total) by mouth every 6 (six) hours as needed.     pantoprazole 40 MG tablet  Commonly known as: PROTONIX  Take 1 tablet (40 mg total) by mouth once daily.     potassium chloride SA 10 MEQ tablet  Commonly known as: K-DUR,KLOR-CON M  Take 20 mEq by mouth once daily.     semaglutide 0.25 mg or 0.5 mg (2 mg/3 mL) pen injector  Commonly known as: OZEMPIC  Inject 0.5 mg into the skin every 7 days. On tuesday     tretinoin 0.025 % cream  Commonly known as: RETIN-A  Compound tretinoin 0.025% / niacinamide 2% cream / hyaluronic acid 0.25%. Apply a pea-sized amount to entire face qhs.            ASK your doctor about these medications      acetaminophen 500 MG tablet  Commonly known as: TYLENOL  Take 2 tablets (1,000 mg total) by mouth every 8 (eight) hours. for 7 days  Ask about: Should I take this medication?     ibuprofen 600 MG tablet  Commonly known as: ADVIL,MOTRIN  Take 1 tablet (600 mg total) by mouth every 6 (six) hours. for 7 days  Ask about: Should I take this medication?     methocarbamoL 500 MG Tab  Commonly known as: ROBAXIN  Take 1 tablet (500 mg total) by mouth 3 (three) times daily. for 7 days  Ask about: Should I take this medication?            Discharge Procedure Orders   Notify your health care provider if you experience any of the following:  temperature >100.4     Notify your health care provider if you experience any of the following:  persistent nausea and vomiting or diarrhea     Notify your health care provider if you experience any of the following:  severe uncontrolled pain     Notify your health care provider if you experience any of the following:  redness, tenderness, or signs of infection (pain, swelling, redness, odor or green/yellow discharge around incision site)      Notify your health care provider if you experience any of the following:  difficulty breathing or increased cough     Notify your health care provider if you experience any of the following:  severe persistent headache     Notify your health care provider if you experience any of the following:  worsening rash     Notify your health care provider if you experience any of the following:  persistent dizziness, light-headedness, or visual disturbances     Notify your health care provider if you experience any of the following:  increased confusion or weakness      Follow-up Information       [facility] Follow up.    Specialties: Home Health Services, Home Therapy Services, Home Living Aide Services  Contact information:  [address] [address]  [phone]

## 2025-01-14 ENCOUNTER — PATIENT MESSAGE (OUTPATIENT)
Dept: PRIMARY CARE CLINIC | Facility: CLINIC | Age: 70
End: 2025-01-14
Payer: MEDICARE

## 2025-01-14 NOTE — TELEPHONE ENCOUNTER
"LOV 6/2024    Pt sent a separate message stating  "  I found my old record.  On 6/15/23, I was prescribed amlodipine, 5 mg."  "

## 2025-01-15 ENCOUNTER — OFFICE VISIT (OUTPATIENT)
Dept: PRIMARY CARE CLINIC | Facility: CLINIC | Age: 70
End: 2025-01-15
Payer: MEDICARE

## 2025-01-15 VITALS
HEIGHT: 65 IN | DIASTOLIC BLOOD PRESSURE: 80 MMHG | BODY MASS INDEX: 21.19 KG/M2 | SYSTOLIC BLOOD PRESSURE: 140 MMHG | HEART RATE: 61 BPM | WEIGHT: 127.19 LBS | RESPIRATION RATE: 14 BRPM | OXYGEN SATURATION: 95 %

## 2025-01-15 DIAGNOSIS — Z23 NEED FOR VACCINATION: ICD-10-CM

## 2025-01-15 DIAGNOSIS — I10 HYPERTENSION, UNSPECIFIED TYPE: Primary | ICD-10-CM

## 2025-01-15 PROCEDURE — 3079F DIAST BP 80-89 MM HG: CPT | Mod: CPTII,S$GLB,,

## 2025-01-15 PROCEDURE — G0008 ADMIN INFLUENZA VIRUS VAC: HCPCS | Mod: S$GLB,,,

## 2025-01-15 PROCEDURE — 1159F MED LIST DOCD IN RCRD: CPT | Mod: CPTII,S$GLB,,

## 2025-01-15 PROCEDURE — 99999 PR PBB SHADOW E&M-EST. PATIENT-LVL III: CPT | Mod: PBBFAC,,,

## 2025-01-15 PROCEDURE — 3008F BODY MASS INDEX DOCD: CPT | Mod: CPTII,S$GLB,,

## 2025-01-15 PROCEDURE — 3077F SYST BP >= 140 MM HG: CPT | Mod: CPTII,S$GLB,,

## 2025-01-15 PROCEDURE — 1101F PT FALLS ASSESS-DOCD LE1/YR: CPT | Mod: CPTII,S$GLB,,

## 2025-01-15 PROCEDURE — 3288F FALL RISK ASSESSMENT DOCD: CPT | Mod: CPTII,S$GLB,,

## 2025-01-15 PROCEDURE — 99214 OFFICE O/P EST MOD 30 MIN: CPT | Mod: S$GLB,,,

## 2025-01-15 PROCEDURE — 1126F AMNT PAIN NOTED NONE PRSNT: CPT | Mod: CPTII,S$GLB,,

## 2025-01-15 PROCEDURE — 90653 IIV ADJUVANT VACCINE IM: CPT | Mod: S$GLB,,,

## 2025-01-15 RX ORDER — SEMAGLUTIDE 0.25 MG/.5ML
INJECTION, SOLUTION SUBCUTANEOUS
COMMUNITY

## 2025-01-15 RX ORDER — AMLODIPINE BESYLATE 5 MG/1
5 TABLET ORAL DAILY
Qty: 90 TABLET | Refills: 0 | Status: SHIPPED | OUTPATIENT
Start: 2025-01-15 | End: 2026-01-15

## 2025-01-15 NOTE — PROGRESS NOTES
[facility] Progress Note  1/15/2025          Reason for Visit:      had concerns including Hypertension and Follow-up.       History of Present Illness:         There are no preventive care reminders to display for this patient.    History of Present Illness      CHIEF COMPLAINT:  Patient presents today for follow-up on high blood pressure.    HYPERTENSION:  She initially experienced headaches in June 2023, prompting blood pressure monitoring which revealed elevated readings. She was prescribed 5mg amlodipine daily which stabilized her blood pressure to 110-120/70-80 and was on it for a couple months and stopped. She reports recent recurrence of previous symptoms with elevated blood pressure readings, notably 148/92 as part of the digital medicine. She denies leg swelling.    SOCIAL HISTORY:  She is a non-smoker with social alcohol use and works part-time as a .    IMMUNIZATIONS:  She has not received a flu vaccine this year. She received her first shingles vaccination in May and is due for her second dose.             Assessment & Plan    IMPRESSION:  - Assessed patient's blood pressure history and recent elevated readings  - Determined to restart blood pressure medication due to consistent elevated levels over multiple days  - Considered patient's age (over 70) in relation to naturally increasing blood pressure  - Reviewed recent bloodwork, noting normal kidney and liver function  - Evaluated absence of medication side effects from previous use    HYPERTENSION:  BP >140s/80-90s   - Educated patient about natural blood pressure increase after age 70 and increaed treatment threshold, but due to sx of headaches shared decision making in restarting medications.   - Instructed patient to continue home monitoring, check BP 20 minutes post-medication  - restarted amlodipine 5mg daily, discussed side effects  - monitor BP and part of digital medicine   - follow up if Bp still  elevated    LABS:  - Reviewed labs, showing normal kidney and liver function.    FLU VACCINATION:  - Administered flu vaccine in office.             Problem List:     Patient Active Problem List   Diagnosis    Post-surgical hypothyroidism    Gastro-esophageal reflux disease without esophagitis    History of thyroid cancer    Lumbar radiculopathy    Tinnitus of right ear    Decreased ROM of neck    Hand pain    Chronic pain    Right knee pain    Hyperlipidemia, unspecified    Acne    Primary hypertension    Disorder of breast, unspecified    Disorder of thyroid, unspecified    Malignant (primary) neoplasm, unspecified    Vitamin D deficiency    Nuclear sclerotic cataract of right eye    History of breast cancer in female    At risk for lymphedema    Malignant neoplasm of upper-inner quadrant of right breast in female, estrogen receptor positive    Psychophysiological insomnia         Medications:       Current Outpatient Medications:     levothyroxine (SYNTHROID) 88 MCG tablet, Take 1 tablet (88 mcg total) by mouth before breakfast., Disp: 30 tablet, Rfl: 11    loratadine (CLARITIN) 10 mg tablet, Take 10 mg by mouth once daily. AS NEEDED FOR ALLERGIES, Disp: , Rfl:     pantoprazole (PROTONIX) 40 MG tablet, Take 1 tablet (40 mg total) by mouth once daily., Disp: 90 tablet, Rfl: 3    semaglutide, weight loss, (WEGOVY) 0.25 mg/0.5 mL PnIj, Inject into the skin., Disp: , Rfl:     amLODIPine (NORVASC) 5 MG tablet, Take 1 tablet (5 mg total) by mouth once daily., Disp: 90 tablet, Rfl: 0    potassium chloride SA (K-DUR,KLOR-CON) 10 MEQ tablet, Take 20 mEq by mouth once daily. (Patient not taking: Reported on 9/25/2024), Disp: , Rfl:     tretinoin (RETIN-A) 0.025 % cream, Compound tretinoin 0.025% / niacinamide 2% cream / hyaluronic acid 0.25%. Apply a pea-sized amount to entire face qhs. (Patient not taking: Reported on 9/25/2024), Disp: 30 g, Rfl: 11  No current facility-administered medications for this  "visit.    Facility-Administered Medications Ordered in Other Visits:     0.9%  NaCl infusion, , Intravenous, Continuous, Hakan Garces PA-C, Last Rate: 70 mL/hr at 10/04/23 1113, New Bag at 10/04/23 1113    haloperidol lactate injection 0.5 mg, 0.5 mg, Intravenous, Q10 Min PRN, Crystal Holloway MD    HYDROmorphone injection 0.2 mg, 0.2 mg, Intravenous, Q5 Min PRN, Crystal Holloway MD    LIDOcaine (PF) 10 mg/ml (1%) injection 10 mg, 1 mL, Intradermal, Once, Eric Cardona MD    sodium chloride 0.9% flush 10 mL, 10 mL, Intravenous, PRN, Crystal Holloway MD        Review of Systems:     Review of Systems   Constitutional:  Negative for activity change, appetite change and fever.   HENT:  Negative for nasal congestion, postnasal drip and rhinorrhea.    Respiratory:  Negative for shortness of breath.    Cardiovascular:  Negative for chest pain and palpitations.   Gastrointestinal:  Negative for constipation, diarrhea, nausea and vomiting.   Integumentary:  Negative for rash.   Neurological:  Positive for headaches (episdoci). Negative for dizziness, syncope and weakness.   Psychiatric/Behavioral:  Negative for confusion and sleep disturbance. The patient is not nervous/anxious.            Physical Exam:     Temp:             Blood Pressure:  (!) 140/80        Pulse:   61     Respirations:  14  Weight:   57.7 kg (127 lb 3.3 oz)  Height:   5' 5" (1.651 m)  BMI:     Body mass index is 21.17 kg/m².    Physical Exam  Constitutional:       General: She is not in acute distress.     Appearance: Normal appearance. She is normal weight. She is not ill-appearing, toxic-appearing or diaphoretic.   HENT:      Head: Normocephalic and atraumatic.      Nose: Nose normal. No congestion or rhinorrhea.   Eyes:      Extraocular Movements: Extraocular movements intact.      Conjunctiva/sclera: Conjunctivae normal.      Pupils: Pupils are equal, round, and reactive to light.   Cardiovascular:      Rate and Rhythm: Normal rate and " regular rhythm.      Pulses: Normal pulses.      Heart sounds: Normal heart sounds.   Pulmonary:      Effort: Pulmonary effort is normal.      Breath sounds: Normal breath sounds. No wheezing.   Chest:      Chest wall: No tenderness.   Musculoskeletal:      Cervical back: Normal range of motion.   Neurological:      General: No focal deficit present.      Mental Status: She is alert and oriented to person, place, and time. Mental status is at baseline.      Cranial Nerves: No cranial nerve deficit.      Motor: No weakness.      Gait: Gait normal.   Psychiatric:         Mood and Affect: Mood normal.         Behavior: Behavior normal.          Physical Exam             Labs/Imaging/Testing:     Lab Results   Component Value Date    WBC 7.15 10/16/2024    HGB 11.5 (L) 10/16/2024    HCT 34.9 (L) 10/16/2024    MCV 92 10/16/2024     10/16/2024        CMP  Sodium   Date Value Ref Range Status   09/10/2024 136 136 - 145 mmol/L Final     Potassium   Date Value Ref Range Status   09/10/2024 4.2 3.5 - 5.1 mmol/L Final     Chloride   Date Value Ref Range Status   09/10/2024 106 95 - 110 mmol/L Final     CO2   Date Value Ref Range Status   09/10/2024 23 23 - 29 mmol/L Final     Glucose   Date Value Ref Range Status   09/10/2024 128 (H) 70 - 110 mg/dL Final     BUN   Date Value Ref Range Status   09/10/2024 10 8 - 23 mg/dL Final     Creatinine   Date Value Ref Range Status   09/10/2024 0.9 0.5 - 1.4 mg/dL Final     Calcium   Date Value Ref Range Status   09/10/2024 8.8 8.7 - 10.5 mg/dL Final     Total Protein   Date Value Ref Range Status   06/14/2024 6.6 6.0 - 8.4 g/dL Final     Albumin   Date Value Ref Range Status   06/14/2024 3.6 3.5 - 5.2 g/dL Final     Total Bilirubin   Date Value Ref Range Status   06/14/2024 0.6 0.1 - 1.0 mg/dL Final     Comment:     For infants and newborns, interpretation of results should be based  on gestational age, weight and in agreement with clinical  observations.    Premature Infant  recommended reference ranges:  Up to 24 hours.............<8.0 mg/dL  Up to 48 hours............<12.0 mg/dL  3-5 days..................<15.0 mg/dL  6-29 days.................<15.0 mg/dL       Alkaline Phosphatase   Date Value Ref Range Status   06/14/2024 38 (L) 55 - 135 U/L Final     AST   Date Value Ref Range Status   06/14/2024 15 10 - 40 U/L Final     ALT   Date Value Ref Range Status   06/14/2024 9 (L) 10 - 44 U/L Final     Anion Gap   Date Value Ref Range Status   09/10/2024 7 (L) 8 - 16 mmol/L Final     eGFR   Date Value Ref Range Status   09/10/2024 >60 >60 mL/min/1.73 m^2 Final       Lab Results   Component Value Date    TSH 4.598 (H) 11/14/2024       Lab Results   Component Value Date    HGBA1C 5.2 06/14/2024       Lab Results   Component Value Date    CHOL 214 (H) 06/14/2024    CHOL 282 (H) 12/05/2019    CHOL 286 (H) 11/06/2019     Lab Results   Component Value Date    HDL 50 06/14/2024    HDL 63 12/05/2019    HDL 69 11/06/2019     Lab Results   Component Value Date    LDLCALC 144.6 06/14/2024    LDLCALC 196.6 (H) 12/05/2019    LDLCALC 174.4 (H) 11/06/2019     Lab Results   Component Value Date    TRIG 97 06/14/2024    TRIG 112 12/05/2019    TRIG 213 (H) 11/06/2019       Lab Results   Component Value Date    CHOLHDL 23.4 06/14/2024    CHOLHDL 22.3 12/05/2019    CHOLHDL 24.1 11/06/2019               Assessment and Plan:         1. Hypertension, unspecified type  -     amLODIPine (NORVASC) 5 MG tablet; Take 1 tablet (5 mg total) by mouth once daily.  Dispense: 90 tablet; Refill: 0    2. Need for vaccination  -     Influenza - Trivalent (Adjuvanted)           Follow up in about 4 weeks (around 2/12/2025) for blood pressure follow up if not controlled.     Smitha Espino MD  [facility]   1/15/2025    This note was generated with the assistance of ambient listening technology. Verbal consent was obtained by the patient and accompanying visitor(s) for the recording of patient appointment to  facilitate this note. I attest to having reviewed and edited the generated note for accuracy, though some syntax or spelling errors may persist. Please contact the author of this note for any clarification.       Thank you for the opportunity to care for you. We strive to always provide excellent care. Please complete a survey if you receive one and let us know how we are doing    This note was prepared using voice-recognition software.  Although efforts are made to proofread the note, some errors may persist in the final document.

## 2025-01-22 ENCOUNTER — TELEPHONE (OUTPATIENT)
Dept: HEMATOLOGY/ONCOLOGY | Facility: CLINIC | Age: 70
End: 2025-01-22
Payer: MEDICARE

## 2025-01-23 NOTE — TELEPHONE ENCOUNTER
Called patient , no answer , detailed voicemail left informing patient the clinics and imaging were closed tomorrow due to the extreme winter weather.  Informed her her dexascan was rescheduled to 2.4.25 and that her appointment with Dr Mckee was rescheduled to Tuesday 2.11.25.

## 2025-01-30 DIAGNOSIS — Z00.00 ENCOUNTER FOR MEDICARE ANNUAL WELLNESS EXAM: ICD-10-CM

## 2025-02-04 ENCOUNTER — HOSPITAL ENCOUNTER (OUTPATIENT)
Dept: RADIOLOGY | Facility: CLINIC | Age: 70
Discharge: HOME | End: 2025-02-04
Attending: INTERNAL MEDICINE
Payer: MEDICARE

## 2025-02-04 ENCOUNTER — OFFICE VISIT (OUTPATIENT)
Dept: PLASTIC SURGERY | Facility: CLINIC | Age: 70
End: 2025-02-04
Payer: MEDICARE

## 2025-02-04 VITALS — DIASTOLIC BLOOD PRESSURE: 73 MMHG | SYSTOLIC BLOOD PRESSURE: 120 MMHG | HEART RATE: 80 BPM

## 2025-02-04 DIAGNOSIS — Z79.811 LONG TERM CURRENT USE OF AROMATASE INHIBITOR: ICD-10-CM

## 2025-02-04 DIAGNOSIS — Z17.0 MALIGNANT NEOPLASM OF UPPER-INNER QUADRANT OF RIGHT BREAST IN FEMALE, ESTROGEN RECEPTOR POSITIVE: Primary | ICD-10-CM

## 2025-02-04 DIAGNOSIS — T81.30XA WOUND DEHISCENCE: ICD-10-CM

## 2025-02-04 DIAGNOSIS — C50.211 MALIGNANT NEOPLASM OF UPPER-INNER QUADRANT OF RIGHT BREAST IN FEMALE, ESTROGEN RECEPTOR POSITIVE: Primary | ICD-10-CM

## 2025-02-04 PROCEDURE — 77080 DXA BONE DENSITY AXIAL: CPT | Mod: 26,,, | Performed by: INTERNAL MEDICINE

## 2025-02-04 PROCEDURE — 77080 DXA BONE DENSITY AXIAL: CPT | Mod: TC

## 2025-02-04 PROCEDURE — 99999 PR PBB SHADOW E&M-EST. PATIENT-LVL III: CPT | Mod: PBBFAC,,, | Performed by: SURGERY

## 2025-02-10 ENCOUNTER — LAB VISIT (OUTPATIENT)
Dept: LAB | Facility: HOSPITAL | Age: 70
End: 2025-02-10
Attending: NURSE PRACTITIONER
Payer: MEDICARE

## 2025-02-10 DIAGNOSIS — E89.0 POST-SURGICAL HYPOTHYROIDISM: ICD-10-CM

## 2025-02-10 LAB — TSH SERPL DL<=0.005 MIU/L-ACNC: 1.35 UIU/ML (ref 0.4–4)

## 2025-02-10 PROCEDURE — 36415 COLL VENOUS BLD VENIPUNCTURE: CPT | Mod: PN | Performed by: NURSE PRACTITIONER

## 2025-02-10 PROCEDURE — 84443 ASSAY THYROID STIM HORMONE: CPT | Performed by: NURSE PRACTITIONER

## 2025-02-11 ENCOUNTER — OFFICE VISIT (OUTPATIENT)
Dept: HEMATOLOGY/ONCOLOGY | Facility: CLINIC | Age: 70
End: 2025-02-11
Payer: MEDICARE

## 2025-02-11 VITALS
DIASTOLIC BLOOD PRESSURE: 61 MMHG | HEART RATE: 73 BPM | HEIGHT: 65 IN | TEMPERATURE: 98 F | OXYGEN SATURATION: 100 % | SYSTOLIC BLOOD PRESSURE: 110 MMHG | WEIGHT: 127.19 LBS | BODY MASS INDEX: 21.19 KG/M2

## 2025-02-11 DIAGNOSIS — Z17.0 MALIGNANT NEOPLASM OF UPPER-INNER QUADRANT OF RIGHT BREAST IN FEMALE, ESTROGEN RECEPTOR POSITIVE: Primary | ICD-10-CM

## 2025-02-11 DIAGNOSIS — R91.1 SOLITARY PULMONARY NODULE: ICD-10-CM

## 2025-02-11 DIAGNOSIS — R91.1 PULMONARY NODULE: ICD-10-CM

## 2025-02-11 DIAGNOSIS — C50.211 MALIGNANT NEOPLASM OF UPPER-INNER QUADRANT OF RIGHT BREAST IN FEMALE, ESTROGEN RECEPTOR POSITIVE: Primary | ICD-10-CM

## 2025-02-11 PROCEDURE — 99999 PR PBB SHADOW E&M-EST. PATIENT-LVL III: CPT | Mod: PBBFAC,,, | Performed by: INTERNAL MEDICINE

## 2025-02-11 RX ORDER — LETROZOLE 2.5 MG/1
2.5 TABLET, FILM COATED ORAL DAILY
Qty: 30 TABLET | Refills: 11 | Status: SHIPPED | OUTPATIENT
Start: 2025-02-11 | End: 2026-02-06

## 2025-02-11 NOTE — PROGRESS NOTES
Subjective:       Patient ID: Sherry Torres is a 69 y.o. female.    Chief Complaint: No chief complaint on file.    HPI Ms Torres is a 69-year-old female seen in follow-up  of recurrent ER positive right breast cancer - in breast recurrence.   She is to start adjuvant endocrine therapy with letrozole.        History:  Screening mammogram on 6/21/24 showed an asymmetry in the upper right breast.    Diagnostic imaging on 7/2/24 showed  -an asymmetry seen in the upper region of the right breast in the posterior depth. The asymmetry correlates with the screening mammogram finding.    US Breast Right Limited  There is an irregularly shaped, hypoechoic mass with angular margins seen in the right breast at 1 o'clock    Biopsy of the right breast on 7/9/24 revealed:  Invasive ductal carcinoma, Grade 1 (tubular differentiation = 3, nuclear pleomorphism = 1, mitotic rate = 1)   Invasive carcinoma measures 3 mm (0.3 cm) in greatest linear dimension within the core biopsies   Calcifications associated with non-nonneoplastic breast     Breast molecular markers:   ER:  Positive (%, strong)   NC:  Positive (71-80%, intermediate)   HER2 IHC:  Negative (score 0)   Ki-67:  6%     CT chest on 7/19/24 showed several small nodules up to 5 mm - non-specific, Bone scan was negative    On September 9th 2024 she underwent bilateral mastectomy with RODRIGUEZ flap reconstruction  The right breast pathology showed a 14 mm invasive ductal carcinoma with some associated atypical lobular hyperplasia.    The left breast showed atypical ductal hyperplasia and atypical lobular hyperplasia.    Oncotype score - 17    Prior Breast cancer history:    She noted an abnormality on self examination at the end of July or early August 2016.  On August 8 she underwent diagnostic mammogram which showed an irregular mass was plated margins in the middle right breast and o'clock position.  By ultrasound this was a solid 1.7 x 1.0 x 1.5 cm mass.     On  August 9 a core needle biopsy showed infiltrating ductal carcinoma which was well differentiated (histologic grade 2, nuclear grade 2, mitotic index 1).  The tumor was 100% ER positive, 70% NH positive and HER-2 1+.  On August 25 right breast lumpectomy and sentinel lymph node biopsy was performed.  That showed a 14 mm low-grade infiltrating ductal carcinoma.    The sentinel lymph node was positive for 1.5 mm micrometastasis.       Final pathological stage TI cN1 stage II    Mammaprint genomic assay  showed that she was low risk.  Score was +0.336 with a 5 year risk of distant recurrence at 5% and a 10 year risk at 10%.       She completed radiation in December 2016 and began Letrozole at that time.  She stopped letrozole after 5 years.    Review of Systems   Constitutional: Negative.  Negative for appetite change and unexpected weight change.   HENT:  Negative for mouth sores.    Eyes:  Negative for visual disturbance.   Respiratory: Negative.  Negative for cough and shortness of breath.    Cardiovascular:  Negative for chest pain.   Gastrointestinal: Negative.  Negative for abdominal pain and diarrhea.   Genitourinary:  Negative for frequency.   Musculoskeletal:  Negative for back pain.   Skin:  Negative for rash.   Neurological:  Negative for headaches.   Hematological:  Negative for adenopathy.   Psychiatric/Behavioral: Negative.  The patient is not nervous/anxious.        Objective:      Physical Exam  Vitals reviewed.   Constitutional:       General: She is not in acute distress.     Appearance: Normal appearance. She is well-developed.   Neurological:      Mental Status: She is alert and oriented to person, place, and time.   Psychiatric:         Mood and Affect: Mood normal.         Behavior: Behavior normal.         Thought Content: Thought content normal.         Judgment: Judgment normal.       Assessment:     DEXA - osteopenia  1. Malignant neoplasm of upper-inner quadrant of right breast in female,  estrogen receptor positive        Plan:     Start letrozole  I discussed bone protective therapy with Boniva. Will start at next visit  RTC 3 M   Will repeat chest CT in 3 M.           Route Chart for Scheduling    Med Onc Chart Routing      Follow up with physician 3 months.   Follow up with MADELINE    Infusion scheduling note    Injection scheduling note    Labs None   Scheduling:  Preferred lab:  Lab interval:     Imaging   CT chest - non contrast in 3 M   Pharmacy appointment No pharmacy appointment needed      Other referrals no referral to [facility] needed -  no Massage appointment needed    No additional referrals needed

## 2025-02-17 ENCOUNTER — TELEPHONE (OUTPATIENT)
Dept: PLASTIC SURGERY | Facility: CLINIC | Age: 70
End: 2025-02-17
Payer: MEDICARE

## 2025-02-17 DIAGNOSIS — Z17.0 MALIGNANT NEOPLASM OF UPPER-INNER QUADRANT OF RIGHT BREAST IN FEMALE, ESTROGEN RECEPTOR POSITIVE: Primary | ICD-10-CM

## 2025-02-17 DIAGNOSIS — C50.211 MALIGNANT NEOPLASM OF UPPER-INNER QUADRANT OF RIGHT BREAST IN FEMALE, ESTROGEN RECEPTOR POSITIVE: Primary | ICD-10-CM

## 2025-02-17 NOTE — TELEPHONE ENCOUNTER
Spoke to pt and confirmed  a surgery date 4/25/25 at the  [place] location, [facility] floor surgery center- Pt agreeable. Provided patient with details of pre /post op appts, basic prep for sx, arrival time the day before, triage call from anesthesia dept, advised on holding WEGOVY x 1 week (She stated she wants to hold x 2 weeks ) and address of the locations.  call back # to RN navigator given should any questions or concerns arise  All questions and concerns addressed. Pt voiced understanding.

## 2025-02-23 NOTE — PLAN OF CARE
Dada Bill Siddiqui MSW met with the patient at the bedside. The patient's  Joe is also at the bedside.     Patient is alert and oriented with no communication barriers.     Patient denies having DME at home.     Patients PCP is correct on the face sheet. Patient choice pharmacy is bedside delivery.     Patients' family will transport the patient home at discharge.     CM team will continue to follow.     [facility] ([address])  Initial Discharge Assessment       Primary Care Provider: Adrián Javier MD    Admission Diagnosis: Malignant neoplasm of upper-outer quadrant of right female breast [C50.411]  Malignant neoplasm of upper-inner quadrant of right female breast [C50.211]  Estrogen receptor positive [Z17.0]  Personal history of breast cancer [Z85.3]    Admission Date: 9/9/2024  Expected Discharge Date:     Transition of Care Barriers: None    Payor: [employer] MED[employer]ARE ADVANTAGE / Plan: [employer] MCARE ADV / Product Type: Medicare Advantage /     Extended Emergency Contact Information  Primary Emergency Contact: Joe Torres  Address: [address] [place] [address] of [facility]  Home Phone: [phone]  Work Phone: [phone]  Mobile Phone: [phone]  Relation: Spouse    Discharge Plan A: Home with family  Discharge Plan B: Home Health, Home with family      [facility] - [place] - [address]  [address] [place] [address]  Phone: [phone] Fax: [phone]    [facility] - [place] [id number] Replaced by [facility]  [address] [address]  Phone: [phone] Fax: [phone]    [facility] [place] - [address]  [address]  [facility] [address]  Phone: [phone] Fax: [phone]    [id number] - [place]  [address] [facility] OF [facility] & [address]  [place]  [address]  Phone: [phone] Fax: [phone]    [facility] [place] - [address]  [address]  [facility] [address]  Phone: [phone] Fax: [phone]      Initial Assessment (most recent)       Adult Discharge Assessment - 09/10/24 1110          Discharge Assessment    Assessment Type Discharge Planning Assessment     Confirmed/corrected address, phone number and insurance Yes     Confirmed Demographics Correct on Facesheet     Source of Information patient;family;health record     People in Home significant other     Do you expect to return to your current living situation? Yes     Do you have help at home or someone to help you manage your care at home? Yes     Prior to hospitilization cognitive status: Alert/Oriented     Current cognitive status: Alert/Oriented     Walking or Climbing Stairs Difficulty no     Dressing/Bathing Difficulty no     Equipment Currently Used at Home none     Readmission within 30 days? No     Patient currently being followed by outpatient case management? No     Do you currently have service(s) that help you manage your care at home? No     Do you take prescription medications? No     Do you have prescription coverage? Yes     Do you have any problems affording any of your prescribed medications? No     Is the patient taking medications as prescribed? yes     How do you get to doctors appointments? car, drives self;family or friend will provide     Are you on dialysis? No     Do you take coumadin? No     Discharge Plan A Home with family     Discharge Plan B Home Health;Home with family     DME Needed Upon Discharge  none     Discharge Plan discussed with: Patient;Spouse/sig other;Adult children     Transition of Care Barriers None        Physical Activity    On average, how many days per week do you engage in moderate to strenuous exercise (like a brisk walk)? 0 days     On average, how many minutes do you engage in exercise at this level? 0 min         Financial Resource Strain    How hard is it for you to pay for the very basics like food, housing, medical care, and heating? Not hard at all        Housing Stability    In the last 12 months, was there a time when you were not able to pay the mortgage or rent on time? No     At any time in the past 12 months, were you homeless or living in a shelter (including now)? No        Transportation Needs    Has the lack of transportation kept you from medical appointments, meetings, work or from getting things needed for daily living? No        Food Insecurity    Within the past 12 months, you worried that your food would run out before you got the money to buy more. Never true     Within the past 12 months, the food you bought just didn't last and you didn't have money to get more. Never true        Stress    Do you feel stress - tense, restless, nervous, or anxious, or unable to sleep at night because your mind is troubled all the time - these days? Not at all        Social Isolation    How often do you feel lonely or isolated from those around you?  Never        Alcohol Use    Q1: How often do you have a drink containing alcohol? Never     Q2: How many drinks containing alcohol do you have on a typical day when you are drinking? Patient does not drink     Q3: How often do you have six or more drinks on one occasion? Never        Utilities    In the past 12 months has the electric, gas, oil, or water company threatened to shut off services in your home? No (P)         Health Literacy    How often do you need to have someone help you when you read instructions, pamphlets, or other written material from your doctor or pharmacy? Never (P)

## 2025-03-17 NOTE — PROGRESS NOTES
Sherry Torres [facility] for a follow up visit status post bilateral skin sparing mastectomy, wise pattern with immediate RODRIGUEZ flap.  It is complicated by hematoma evacuation of the left side on postoperative day 1.  Later she developed bilateral mastectomy skin necrosis we elected to go back to the operating room for wound closure.  This more affected the left side than the right and a terms of the final breast shape.  She has healed well from this.  It had wounds has been healed for some time.  She is now set in his discussing a second-stage reconstruction..  She would prefer surgery sometime after the Easter holiday due to her busy work schedule.   female denies fever, chills, nausea, vomiting or other systemic signs of infection.       ROS - negative, other than stated above    PHYSICAL EXAMINATION  Vitals:    02/04/25 1113   BP: 120/73   Pulse: 80       Gen: NAD, appears stated age  Neuro: normal without focal findings, mental status and speech normal  HEENT: NCAT, neck supple, PEERL  CV: RRR  Pulm: Breathing non-labored, chest wall movement equal bilaterally   Breast:  Inverted T pattern scars healed bilaterally.  The skin envelope is relatively shorter with less lower pole skin on the left compared with the right.  Abdomen: soft, nontender, no guarding  Healed a transverse lower abdominal and umbilical incision with a nice contour  Gu: genitalia not examined  Extremity:normal strength, no cyanosis or edema  Skin: Skin color, texture, turgor normal. No rashes or lesions  Psych: oriented to time, place and person, mood and affect are within normal limits      ASSESSMENT/PLAN  69 y.o. female s/p immediate Rodriguez flap reconstruction with a subsequent wound debridement    Discussed her second-stage reconstruction.  This would include a tightening the skin envelope on the left to better match the right, liposuction of the abdominal donor site with fat grafting to the breasts, more to the  left for volume.  She would like sometime later in the spring after used her busy work holiday.  It had said we could do this on a Friday at [facility].  We will look to get her scheduled and see her for a pre-op visit    All questions were answered. The patient was advised to contact the clinic with any questions or concerns prior to their next visit.       Bennett Holloway, DO  Plastic and Reconstructive Surgery      I spent a total of 10 minutes on the day of the visit.  This includes face to face time and non-face to face time preparing to see the patient (eg, review of tests), obtaining and/or reviewing separately obtained history, documenting clinical information in the electronic or other health record, independently interpreting results and communicating results to the patient/family/caregiver, or care coordinator.

## 2025-03-26 ENCOUNTER — OFFICE VISIT (OUTPATIENT)
Dept: SURGERY | Facility: CLINIC | Age: 70
End: 2025-03-26
Payer: MEDICARE

## 2025-03-26 VITALS
WEIGHT: 123 LBS | DIASTOLIC BLOOD PRESSURE: 77 MMHG | HEART RATE: 79 BPM | OXYGEN SATURATION: 100 % | HEIGHT: 63 IN | BODY MASS INDEX: 21.79 KG/M2 | SYSTOLIC BLOOD PRESSURE: 113 MMHG

## 2025-03-26 DIAGNOSIS — Z85.3 ENCOUNTER FOR FOLLOW-UP SURVEILLANCE OF BREAST CANCER: Primary | ICD-10-CM

## 2025-03-26 DIAGNOSIS — Z08 ENCOUNTER FOR FOLLOW-UP SURVEILLANCE OF BREAST CANCER: Primary | ICD-10-CM

## 2025-03-26 PROCEDURE — 99212 OFFICE O/P EST SF 10 MIN: CPT | Mod: S$GLB,,, | Performed by: SURGERY

## 2025-03-26 PROCEDURE — 1160F RVW MEDS BY RX/DR IN RCRD: CPT | Mod: CPTII,S$GLB,, | Performed by: SURGERY

## 2025-03-26 PROCEDURE — 1159F MED LIST DOCD IN RCRD: CPT | Mod: CPTII,S$GLB,, | Performed by: SURGERY

## 2025-03-26 PROCEDURE — 3008F BODY MASS INDEX DOCD: CPT | Mod: CPTII,S$GLB,, | Performed by: SURGERY

## 2025-03-26 PROCEDURE — 3074F SYST BP LT 130 MM HG: CPT | Mod: CPTII,S$GLB,, | Performed by: SURGERY

## 2025-03-26 PROCEDURE — 99999 PR PBB SHADOW E&M-EST. PATIENT-LVL III: CPT | Mod: PBBFAC,,, | Performed by: SURGERY

## 2025-03-26 PROCEDURE — 1125F AMNT PAIN NOTED PAIN PRSNT: CPT | Mod: CPTII,S$GLB,, | Performed by: SURGERY

## 2025-03-26 PROCEDURE — 3078F DIAST BP <80 MM HG: CPT | Mod: CPTII,S$GLB,, | Performed by: SURGERY

## 2025-04-04 NOTE — PROGRESS NOTES
"Date of Service:3/26/2025    DIAGNOSIS:   This is a 69 y.o. female with a history of recurrent right breast cancer rpT1c N0 M0 grade 2 ER + CA + HER2 - IDC of the right breast.     TREATMENT:   Bilateral mastectomy and right sentinel node biopsy with RODRIGUEZ flap reconstruction on 9/9/2024.     Prior stage TI cN1 stage II right breast cancer  Right breast lumpectomy and sentinel lymph node biopsy 8/2016  She completed radiation in December 2016 and began Letrozole at that time.  She stopped letrozole after 5 years.     HISTORY OF PRESENT ILLNESS:   Sherry Torres is a 69 y.o. female comes in for oncological follow up.  She denies change in her breast self-exam specifically denying new masses, skin changes. Past medical and surgical history is updated with no new changes. There have been no changes to family history.     IMAGING:   na    PHYSICAL EXAM:   /77 (BP Location: Left arm, Patient Position: Sitting)   Pulse 79   Ht 5' 3" (1.6 m)   Wt 55.8 kg (123 lb)   SpO2 100%   BMI 21.79 kg/m²   General: The patient appears well and is in no acute distress.   Neuro: Alert & oriented x3.   Breasts: The exam was done with the patient seated and supine. Left breast - s/p mastectomy with reconstruction.  No palpable masses and no abnormal skin findings. No supraclavicular or axillary lymphadenopathy on the left side.   Right breast - s/p mastectomy with reconstruction.  No palpable masses and no abnormal skin findings. No supraclavicular or axillary lymphadenopathy on the right side.  Extremities: No arm lymphedema.     ASSESSMENT:   This is a 69 y.o. female without evidence of recurrence by exam, history or imaging.       PLAN:   1. Continue monthly self breast exams and call the clinic with any changes or problems.  2. No routine breast imaging recommended.  3. Return to clinic in 1 year for clinical breast exam. The patient is in agreement with the plan. Questions were encouraged and answered to patient's " satisfaction. Sherry will call our office with any questions or concerns.

## 2025-04-15 ENCOUNTER — OFFICE VISIT (OUTPATIENT)
Dept: PLASTIC SURGERY | Facility: CLINIC | Age: 70
End: 2025-04-15
Payer: MEDICARE

## 2025-04-15 VITALS
WEIGHT: 123 LBS | DIASTOLIC BLOOD PRESSURE: 72 MMHG | HEART RATE: 76 BPM | BODY MASS INDEX: 20.49 KG/M2 | HEIGHT: 65 IN | SYSTOLIC BLOOD PRESSURE: 107 MMHG

## 2025-04-15 DIAGNOSIS — Z98.890 HISTORY OF BREAST RECONSTRUCTION: Primary | ICD-10-CM

## 2025-04-15 PROCEDURE — 99999 PR PBB SHADOW E&M-EST. PATIENT-LVL III: CPT | Mod: PBBFAC,,, | Performed by: SURGERY

## 2025-04-15 PROCEDURE — 3074F SYST BP LT 130 MM HG: CPT | Mod: CPTII,S$GLB,, | Performed by: SURGERY

## 2025-04-15 PROCEDURE — 3078F DIAST BP <80 MM HG: CPT | Mod: CPTII,S$GLB,, | Performed by: SURGERY

## 2025-04-15 PROCEDURE — 99212 OFFICE O/P EST SF 10 MIN: CPT | Mod: S$GLB,,, | Performed by: SURGERY

## 2025-04-15 PROCEDURE — 3008F BODY MASS INDEX DOCD: CPT | Mod: CPTII,S$GLB,, | Performed by: SURGERY

## 2025-04-24 NOTE — PRE-PROCEDURE INSTRUCTIONS
The following was discussed with pt via phone and sent to pt portal. Pt verbalized understanding. Pt to be accompanied by her .    Dear Sherry ,    You are scheduled for a procedure with Dr. Holloway on 4/25/2025. Your scheduled arrival time is 11:00am.  This arrival time is roughly 1.5-2 hours before your anticipated procedure time to allow sufficient time for pre-op.  Please wear comfortable clothes.  This procedure will take place at the [facility] at the corner of [address] and [address].  [facility] [place] [facility] to [facility].  The address is:    [address]  [place] [address]    After entering the building, you will proceed to the second floor where you can check in with registration. You should take any medications that you routinely take for blood pressure (other than those listed below), heart medications, thyroid, cholesterol, etc.     If you wear contact lenses, please wear glasses to your procedure.    Your fasting instructions are as follow:  Nothing to eat after midnight the evening before your surgery. STOP drinking clear liquids 2 hours prior to your arrival time. Anesthesia encourages this to ensure that you are adequately hydrated. Clear liquids include water, clear juices, Gatorade, black coffee (no milk, no cream), or soda. It does not include anything with pulp such as broth, ice cream, or jello.     You MUST have a responsible adult to bring you home.      The evening before and morning of your procedure, please hold the following medications:  -Aspirin and Aspirin-containing products (Goody's powder, Excedrin)  -NSAIDs (Advil, Ibuprofen, Aleve, Diclofenac)  -Vitamins/Supplements  -Herbal remedies/Teas  -Stimulants (Adderall, Vyvanse, Adipex)  -Diabetic medication (Please bring with you day of procedure)  -Prescription creams/gels/lotions  -CONTINUE HOLDING WEGOVY    -May take Tylenol      The evening before and morning of your  procedure, take a shower using antibacterial soap (ex: Hibiclens or Dial antibacterial soap). DO NOT apply deodorant, lotion, cologne, or anything else to the skin. Wear loose, comfortable fitting clothing. Do not wear jewelry or bring any valuables with you. If you wear dentures or contacts, please bring your case with you or leave them at home. Use and bring any inhalers that you may have.    If you have any procedure-specific questions, please call your surgeon's office. Any other questions, don't hesitate to call at [phone].    ON THE MORNING OF SURGERY, if you experience any issues, please call our Pre-op Desk a call at [phone].    Thanks,  Pre-Admit Testing Team  Anesthesia Dept [facility]

## 2025-04-25 ENCOUNTER — HOSPITAL ENCOUNTER (OUTPATIENT)
Facility: HOSPITAL | Age: 70
Discharge: HOME | End: 2025-04-25
Attending: SURGERY | Admitting: SURGERY
Payer: MEDICARE

## 2025-04-25 ENCOUNTER — ANESTHESIA EVENT (OUTPATIENT)
Dept: SURGERY | Facility: HOSPITAL | Age: 70
End: 2025-04-25
Payer: MEDICARE

## 2025-04-25 ENCOUNTER — ANESTHESIA (OUTPATIENT)
Dept: SURGERY | Facility: HOSPITAL | Age: 70
End: 2025-04-25
Payer: MEDICARE

## 2025-04-25 VITALS
OXYGEN SATURATION: 100 % | TEMPERATURE: 98 F | HEART RATE: 73 BPM | RESPIRATION RATE: 19 BRPM | DIASTOLIC BLOOD PRESSURE: 70 MMHG | SYSTOLIC BLOOD PRESSURE: 121 MMHG

## 2025-04-25 DIAGNOSIS — Z17.0 MALIGNANT NEOPLASM OF UPPER-INNER QUADRANT OF RIGHT BREAST IN FEMALE, ESTROGEN RECEPTOR POSITIVE: Primary | ICD-10-CM

## 2025-04-25 DIAGNOSIS — Z85.3 PERSONAL HISTORY OF BREAST CANCER: ICD-10-CM

## 2025-04-25 DIAGNOSIS — C50.211 MALIGNANT NEOPLASM OF UPPER-INNER QUADRANT OF RIGHT BREAST IN FEMALE, ESTROGEN RECEPTOR POSITIVE: Primary | ICD-10-CM

## 2025-04-25 PROCEDURE — 63600175 PHARM REV CODE 636 W HCPCS: Performed by: NURSE ANESTHETIST, CERTIFIED REGISTERED

## 2025-04-25 PROCEDURE — 71000015 HC POSTOP RECOV 1ST HR: Performed by: SURGERY

## 2025-04-25 PROCEDURE — 99900035 HC TECH TIME PER 15 MIN (STAT)

## 2025-04-25 PROCEDURE — 94761 N-INVAS EAR/PLS OXIMETRY MLT: CPT

## 2025-04-25 PROCEDURE — 37000008 HC ANESTHESIA 1ST 15 MINUTES: Performed by: SURGERY

## 2025-04-25 PROCEDURE — 25000003 PHARM REV CODE 250: Performed by: STUDENT IN AN ORGANIZED HEALTH CARE EDUCATION/TRAINING PROGRAM

## 2025-04-25 PROCEDURE — 25000003 PHARM REV CODE 250: Performed by: NURSE ANESTHETIST, CERTIFIED REGISTERED

## 2025-04-25 PROCEDURE — 63600175 PHARM REV CODE 636 W HCPCS: Performed by: STUDENT IN AN ORGANIZED HEALTH CARE EDUCATION/TRAINING PROGRAM

## 2025-04-25 PROCEDURE — 25000003 PHARM REV CODE 250: Performed by: PHYSICIAN ASSISTANT

## 2025-04-25 PROCEDURE — 37000009 HC ANESTHESIA EA ADD 15 MINS: Performed by: SURGERY

## 2025-04-25 PROCEDURE — 19380 REVJ RECONSTRUCTED BREAST: CPT | Mod: 50,,, | Performed by: SURGERY

## 2025-04-25 PROCEDURE — 36000707: Performed by: SURGERY

## 2025-04-25 PROCEDURE — 71000016 HC POSTOP RECOV ADDL HR: Performed by: SURGERY

## 2025-04-25 PROCEDURE — 15772 GRFG AUTOL FAT LIPO EA ADDL: CPT | Mod: ,,, | Performed by: SURGERY

## 2025-04-25 PROCEDURE — 71000033 HC RECOVERY, INTIAL HOUR: Performed by: SURGERY

## 2025-04-25 PROCEDURE — 25000003 PHARM REV CODE 250: Performed by: SURGERY

## 2025-04-25 PROCEDURE — 63600175 PHARM REV CODE 636 W HCPCS: Performed by: SURGERY

## 2025-04-25 PROCEDURE — 15771 GRFG AUTOL FAT LIPO 50 CC/<: CPT | Mod: 51,,, | Performed by: SURGERY

## 2025-04-25 PROCEDURE — 36000706: Performed by: SURGERY

## 2025-04-25 RX ORDER — BACITRACIN ZINC 500 UNIT/G
OINTMENT (GRAM) TOPICAL
Status: DISCONTINUED | OUTPATIENT
Start: 2025-04-25 | End: 2025-04-25 | Stop reason: HOSPADM

## 2025-04-25 RX ORDER — FAMOTIDINE 10 MG/ML
INJECTION, SOLUTION INTRAVENOUS
Status: DISCONTINUED | OUTPATIENT
Start: 2025-04-25 | End: 2025-04-28

## 2025-04-25 RX ORDER — LIDOCAINE HYDROCHLORIDE 20 MG/ML
INJECTION INTRAVENOUS
Status: DISCONTINUED | OUTPATIENT
Start: 2025-04-25 | End: 2025-04-28

## 2025-04-25 RX ORDER — LIDOCAINE HYDROCHLORIDE 10 MG/ML
INJECTION, SOLUTION EPIDURAL; INFILTRATION; INTRACAUDAL; PERINEURAL
Status: DISCONTINUED | OUTPATIENT
Start: 2025-04-25 | End: 2025-04-25 | Stop reason: HOSPADM

## 2025-04-25 RX ORDER — SODIUM CHLORIDE 0.9 % (FLUSH) 0.9 %
10 SYRINGE (ML) INJECTION
Status: DISCONTINUED | OUTPATIENT
Start: 2025-04-25 | End: 2025-04-25 | Stop reason: HOSPADM

## 2025-04-25 RX ORDER — CEFAZOLIN SODIUM 1 G/3ML
INJECTION, POWDER, FOR SOLUTION INTRAMUSCULAR; INTRAVENOUS
Status: DISCONTINUED | OUTPATIENT
Start: 2025-04-25 | End: 2025-04-28

## 2025-04-25 RX ORDER — ONDANSETRON 4 MG/1
4 TABLET, ORALLY DISINTEGRATING ORAL EVERY 6 HOURS PRN
Qty: 10 TABLET | Refills: 0 | Status: SHIPPED | OUTPATIENT
Start: 2025-04-25

## 2025-04-25 RX ORDER — DEXAMETHASONE SODIUM PHOSPHATE 4 MG/ML
INJECTION, SOLUTION INTRA-ARTICULAR; INTRALESIONAL; INTRAMUSCULAR; INTRAVENOUS; SOFT TISSUE
Status: DISCONTINUED | OUTPATIENT
Start: 2025-04-25 | End: 2025-04-28

## 2025-04-25 RX ORDER — HYDROMORPHONE HYDROCHLORIDE 1 MG/ML
0.2 INJECTION, SOLUTION INTRAMUSCULAR; INTRAVENOUS; SUBCUTANEOUS EVERY 5 MIN PRN
Status: DISCONTINUED | OUTPATIENT
Start: 2025-04-25 | End: 2025-04-25 | Stop reason: HOSPADM

## 2025-04-25 RX ORDER — FENTANYL CITRATE 50 UG/ML
INJECTION, SOLUTION INTRAMUSCULAR; INTRAVENOUS
Status: DISCONTINUED | OUTPATIENT
Start: 2025-04-25 | End: 2025-04-28

## 2025-04-25 RX ORDER — GABAPENTIN 300 MG/1
300 CAPSULE ORAL 3 TIMES DAILY
Qty: 21 CAPSULE | Refills: 0 | Status: SHIPPED | OUTPATIENT
Start: 2025-04-25 | End: 2025-05-02

## 2025-04-25 RX ORDER — EPHEDRINE SULFATE 50 MG/ML
INJECTION, SOLUTION INTRAVENOUS
Status: DISCONTINUED | OUTPATIENT
Start: 2025-04-25 | End: 2025-04-28

## 2025-04-25 RX ORDER — METHOCARBAMOL 500 MG/1
500 TABLET, FILM COATED ORAL 3 TIMES DAILY
Qty: 21 TABLET | Refills: 0 | Status: SHIPPED | OUTPATIENT
Start: 2025-04-25 | End: 2025-05-02

## 2025-04-25 RX ORDER — HALOPERIDOL LACTATE 5 MG/ML
INJECTION, SOLUTION INTRAMUSCULAR
Status: DISCONTINUED | OUTPATIENT
Start: 2025-04-25 | End: 2025-04-28

## 2025-04-25 RX ORDER — PHENYLEPHRINE HYDROCHLORIDE 10 MG/ML
INJECTION INTRAVENOUS
Status: DISCONTINUED | OUTPATIENT
Start: 2025-04-25 | End: 2025-04-28

## 2025-04-25 RX ORDER — KETAMINE HCL IN 0.9 % NACL 50 MG/5 ML
SYRINGE (ML) INTRAVENOUS
Status: DISCONTINUED | OUTPATIENT
Start: 2025-04-25 | End: 2025-04-28

## 2025-04-25 RX ORDER — GLUCAGON 1 MG
1 KIT INJECTION
Status: DISCONTINUED | OUTPATIENT
Start: 2025-04-25 | End: 2025-04-25 | Stop reason: HOSPADM

## 2025-04-25 RX ORDER — ONDANSETRON HYDROCHLORIDE 2 MG/ML
INJECTION, SOLUTION INTRAVENOUS
Status: DISCONTINUED | OUTPATIENT
Start: 2025-04-25 | End: 2025-04-28

## 2025-04-25 RX ORDER — MUPIROCIN 20 MG/G
OINTMENT TOPICAL
Status: DISCONTINUED | OUTPATIENT
Start: 2025-04-25 | End: 2025-04-25 | Stop reason: HOSPADM

## 2025-04-25 RX ORDER — TRANEXAMIC ACID 100 MG/ML
INJECTION, SOLUTION INTRAVENOUS
Status: DISCONTINUED | OUTPATIENT
Start: 2025-04-25 | End: 2025-04-25 | Stop reason: HOSPADM

## 2025-04-25 RX ORDER — EPINEPHRINE 1 MG/ML
INJECTION INTRAMUSCULAR; INTRAVENOUS; SUBCUTANEOUS
Status: DISCONTINUED | OUTPATIENT
Start: 2025-04-25 | End: 2025-04-25 | Stop reason: HOSPADM

## 2025-04-25 RX ORDER — LIDOCAINE HYDROCHLORIDE 10 MG/ML
1 INJECTION, SOLUTION EPIDURAL; INFILTRATION; INTRACAUDAL; PERINEURAL ONCE
Status: DISCONTINUED | OUTPATIENT
Start: 2025-04-25 | End: 2025-04-25 | Stop reason: HOSPADM

## 2025-04-25 RX ORDER — ROCURONIUM BROMIDE 10 MG/ML
INJECTION, SOLUTION INTRAVENOUS
Status: DISCONTINUED | OUTPATIENT
Start: 2025-04-25 | End: 2025-04-28

## 2025-04-25 RX ORDER — ACETAMINOPHEN 10 MG/ML
INJECTION, SOLUTION INTRAVENOUS
Status: DISCONTINUED | OUTPATIENT
Start: 2025-04-25 | End: 2025-04-28

## 2025-04-25 RX ORDER — ACETAMINOPHEN 500 MG
500 TABLET ORAL EVERY 8 HOURS
Qty: 21 TABLET | Refills: 0 | Status: SHIPPED | OUTPATIENT
Start: 2025-04-25 | End: 2025-05-02

## 2025-04-25 RX ORDER — CEFAZOLIN 2 G/1
2 INJECTION, POWDER, FOR SOLUTION INTRAMUSCULAR; INTRAVENOUS
Status: DISCONTINUED | OUTPATIENT
Start: 2025-04-25 | End: 2025-04-25 | Stop reason: HOSPADM

## 2025-04-25 RX ORDER — OXYCODONE HYDROCHLORIDE 5 MG/1
5 TABLET ORAL
Status: DISCONTINUED | OUTPATIENT
Start: 2025-04-25 | End: 2025-04-25 | Stop reason: HOSPADM

## 2025-04-25 RX ORDER — OXYCODONE HYDROCHLORIDE 5 MG/1
5 TABLET ORAL EVERY 4 HOURS PRN
Qty: 10 TABLET | Refills: 0 | Status: SHIPPED | OUTPATIENT
Start: 2025-04-25

## 2025-04-25 RX ORDER — ONDANSETRON HYDROCHLORIDE 2 MG/ML
4 INJECTION, SOLUTION INTRAVENOUS DAILY PRN
Status: DISCONTINUED | OUTPATIENT
Start: 2025-04-25 | End: 2025-04-25 | Stop reason: HOSPADM

## 2025-04-25 RX ORDER — IBUPROFEN 600 MG/1
600 TABLET ORAL EVERY 6 HOURS
Qty: 28 TABLET | Refills: 0 | Status: SHIPPED | OUTPATIENT
Start: 2025-04-25 | End: 2025-05-02

## 2025-04-25 RX ORDER — SCOPOLAMINE 1 MG/3D
1 PATCH, EXTENDED RELEASE TRANSDERMAL
Status: DISCONTINUED | OUTPATIENT
Start: 2025-04-25 | End: 2025-04-25 | Stop reason: HOSPADM

## 2025-04-25 RX ORDER — PROPOFOL 10 MG/ML
VIAL (ML) INTRAVENOUS
Status: DISCONTINUED | OUTPATIENT
Start: 2025-04-25 | End: 2025-04-28

## 2025-04-25 RX ADMIN — SCOPOLAMINE 1 PATCH: 1.5 PATCH, EXTENDED RELEASE TRANSDERMAL at 11:04

## 2025-04-25 RX ADMIN — SODIUM CHLORIDE: 0.9 INJECTION, SOLUTION INTRAVENOUS at 12:04

## 2025-04-25 RX ADMIN — ROCURONIUM BROMIDE 50 MG: 10 INJECTION INTRAVENOUS at 01:04

## 2025-04-25 RX ADMIN — PHENYLEPHRINE HYDROCHLORIDE 100 MCG: 10 INJECTION INTRAVENOUS at 01:04

## 2025-04-25 RX ADMIN — PROPOFOL 150 MG: 10 INJECTION, EMULSION INTRAVENOUS at 01:04

## 2025-04-25 RX ADMIN — FAMOTIDINE 20 MG: 10 INJECTION, SOLUTION INTRAVENOUS at 01:04

## 2025-04-25 RX ADMIN — HYDROMORPHONE HYDROCHLORIDE 0.2 MG: 1 INJECTION, SOLUTION INTRAMUSCULAR; INTRAVENOUS; SUBCUTANEOUS at 04:04

## 2025-04-25 RX ADMIN — ACETAMINOPHEN 1000 MG: 10 INJECTION INTRAVENOUS at 02:04

## 2025-04-25 RX ADMIN — CEFAZOLIN 2 G: 330 INJECTION, POWDER, FOR SOLUTION INTRAMUSCULAR; INTRAVENOUS at 01:04

## 2025-04-25 RX ADMIN — EPHEDRINE SULFATE 15 MG: 50 INJECTION INTRAVENOUS at 02:04

## 2025-04-25 RX ADMIN — HYDROMORPHONE HYDROCHLORIDE 0.2 MG: 1 INJECTION, SOLUTION INTRAMUSCULAR; INTRAVENOUS; SUBCUTANEOUS at 03:04

## 2025-04-25 RX ADMIN — FENTANYL CITRATE 50 MCG: 50 INJECTION, SOLUTION INTRAMUSCULAR; INTRAVENOUS at 01:04

## 2025-04-25 RX ADMIN — DEXAMETHASONE SODIUM PHOSPHATE 8 MG: 4 INJECTION INTRA-ARTICULAR; INTRALESIONAL; INTRAMUSCULAR; INTRAVENOUS; SOFT TISSUE at 01:04

## 2025-04-25 RX ADMIN — HALOPERIDOL LACTATE 1 MG: 5 INJECTION, SOLUTION INTRAMUSCULAR at 01:04

## 2025-04-25 RX ADMIN — SUGAMMADEX 200 MG: 100 INJECTION, SOLUTION INTRAVENOUS at 02:04

## 2025-04-25 RX ADMIN — ONDANSETRON 4 MG: 2 INJECTION INTRAMUSCULAR; INTRAVENOUS at 03:04

## 2025-04-25 RX ADMIN — OXYCODONE 5 MG: 5 TABLET ORAL at 03:04

## 2025-04-25 RX ADMIN — ONDANSETRON 4 MG: 2 INJECTION INTRAMUSCULAR; INTRAVENOUS at 02:04

## 2025-04-25 RX ADMIN — LIDOCAINE HYDROCHLORIDE 75 MG: 20 INJECTION INTRAVENOUS at 01:04

## 2025-04-25 RX ADMIN — MUPIROCIN: 20 OINTMENT TOPICAL at 11:04

## 2025-04-25 RX ADMIN — Medication 50 MG: at 01:04

## 2025-04-25 NOTE — BRIEF OP NOTE
Brief Operative Note     SUMMARY     Surgery Date: 4/25/2025     Surgeons and Role:     * Pito Holloway DO - Primary    Assisting Surgeon: None    Pre-op Diagnosis:  Malignant neoplasm of upper-inner quadrant of right breast in female, estrogen receptor positive [C50.211, Z17.0]    Post-op Diagnosis:  Malignant neoplasm of upper-inner quadrant of right breast in female, estrogen receptor positive [C50.211, Z17.0]    Procedure(s) (LRB):  BREAST REVISION SURGERY (N/A)  LIPOSUCTION, WITH FAT GRAFTING TO TRUNK, BREASTS, SCALP, ARMS, AND/OR LEGS, 50CC OR LESS INJECTABLE (Bilateral)  REVISION, PROCEDURE INVOLVING FLAP GRAFT (N/A)  REPAIR, DIASTASIS RECTI (N/A)    Anesthesia: General    Description of Procedure:   Excision Right breast scar 9x3cm with intermediate closure  Liposuction with bilateral breast fat graft    Findings/Key Components:  See op note    Estimated Blood Loss: Minimal         Specimens Removed:   Specimen (24h ago, onward)      None            Discharge Note      SUMMARY     Admit Date: 4/25/2025    Attending Physician: Pito Holloway DO     Discharge Physician: Pito Holloway DO    Discharge Date: 4/25/2025     Final Diagnosis: Malignant neoplasm of upper-inner quadrant of right breast in female, estrogen receptor positive [C50.211, Z17.0]    Hospital Course: Patient was admitted for an outpatient procedure and tolerated the procedure well with no complications.    Disposition: Home or Self Care    Follow Up/Patient Instructions:   Current Discharge Medication List        START taking these medications    Details   acetaminophen (TYLENOL) 500 MG tablet Take 1 tablet (500 mg total) by mouth every 8 (eight) hours. for 7 days  Qty: 21 tablet, Refills: 0      gabapentin (NEURONTIN) 300 MG capsule Take 1 capsule (300 mg total) by mouth 3 (three) times daily. for 7 days  Qty: 21 capsule, Refills: 0      ibuprofen (ADVIL,MOTRIN) 600 MG tablet Take 1 tablet (600 mg total) by mouth  every 6 (six) hours. for 7 days  Qty: 28 tablet, Refills: 0      methocarbamoL (ROBAXIN) 500 MG Tab Take 1 tablet (500 mg total) by mouth 3 (three) times daily. for 7 days  Qty: 21 tablet, Refills: 0      ondansetron (ZOFRAN-ODT) 4 MG TbDL Take 1 tablet (4 mg total) by mouth every 6 (six) hours as needed.  Qty: 10 tablet, Refills: 0      oxyCODONE (ROXICODONE) 5 MG immediate release tablet Take 1 tablet (5 mg total) by mouth every 4 (four) hours as needed.  Qty: 10 tablet, Refills: 0    Comments: Quantity prescribed more than 7 day supply? No  Associated Diagnoses: Malignant neoplasm of upper-inner quadrant of right breast in female, estrogen receptor positive           CONTINUE these medications which have NOT CHANGED    Details   amLODIPine (NORVASC) 5 MG tablet Take 1 tablet (5 mg total) by mouth once daily.  Qty: 90 tablet, Refills: 0    Comments: .  Associated Diagnoses: Hypertension, unspecified type      levothyroxine (SYNTHROID) 88 MCG tablet Take 1 tablet (88 mcg total) by mouth before breakfast.  Qty: 30 tablet, Refills: 11    Associated Diagnoses: Post-surgical hypothyroidism      pantoprazole (PROTONIX) 40 MG tablet Take 1 tablet (40 mg total) by mouth once daily.  Qty: 90 tablet, Refills: 3    Associated Diagnoses: Hiatal hernia with GERD      letrozole (FEMARA) 2.5 mg Tab Take 1 tablet (2.5 mg total) by mouth once daily.  Qty: 30 tablet, Refills: 11    Associated Diagnoses: Malignant neoplasm of upper-inner quadrant of right breast in female, estrogen receptor positive      loratadine (CLARITIN) 10 mg tablet Take 10 mg by mouth once daily. AS NEEDED FOR ALLERGIES      potassium chloride SA (K-DUR,KLOR-CON) 10 MEQ tablet Take 20 mEq by mouth once daily.      semaglutide, weight loss, (WEGOVY) 0.25 mg/0.5 mL PnIj Inject into the skin.      tretinoin (RETIN-A) 0.025 % cream Compound tretinoin 0.025% / niacinamide 2% cream / hyaluronic acid 0.25%. Apply a pea-sized amount to entire face qhs.  Qty: 30 g,  Refills: 11    Associated Diagnoses: Rhytides      varicella-zoster [id number], PF, (SHINGRIX, PF,) 50 mcg/0.5 mL injection Inject into the muscle.  Qty: 1 each, Refills: 0             Discharge Procedure Orders (must include Diet, Follow-up, Activity)  No discharge procedures on file.

## 2025-04-25 NOTE — H&P
Plastic Surgery History and Physical     HPI:  Sherry Torres presents today for breast revision surgery. She is status post bilateral skin sparing mastectomy, wise pattern with immediate RODRIGUEZ flap.  It is complicated by hematoma evacuation of the left side on postoperative day 1.  Later she developed bilateral mastectomy skin necrosis we elected to go back to the operating room for wound closure.  This more affected the left side than the right and a terms of the final breast shape.  She has healed well from this.  It had wounds has been healed for some time.  She is now set in his discussing a second-stage reconstruction..  She would prefer surgery sometime after the Easter holiday due to her busy work schedule.   female denies fever, chills, nausea, vomiting or other systemic signs of infection.     Past Medical History:   Diagnosis Date    Anxiety disorder, unspecified     r/t dx    Atypical ductal hyperplasia, breast 2008    left side    Breast cancer 08/2016    right side    Breast cyst approx. 40 years ago    Cancer     Depression     r/t dx    GERD (gastroesophageal reflux disease)     History of thyroid cancer 2006    Lobular carcinoma in situ not certain of 2016 diagnosis    Malignant neoplasm of upper-outer quadrant of right breast in female, estrogen receptor positive 09/15/2016    Mitral valve prolapse     PONV (postoperative nausea and vomiting)      Past Surgical History:   Procedure Laterality Date    BILATERAL MASTECTOMY Bilateral 9/9/2024    Procedure: MASTECTOMY, BILATERAL;  Surgeon: NADIRA Carroll MD;  Location: [facility] OR;  Service: General;  Laterality: Bilateral;    BREAST BIOPSY Right 08/2016    BREAST BIOPSY Left 2008    exc bx    BREAST LUMPECTOMY Right 08/2016    w/radiation    BREAST SURGERY  8/16 AND 2008 OR 2009    CATARACT EXTRACTION W/  INTRAOCULAR LENS IMPLANT Right 04/18/2024    Procedure: EXTRACTION, CATARACT, WITH IOL INSERTION;  Surgeon: Zhane Khan MD;  Location: [facility] OR;   Service: Ophthalmology;  Laterality: Right;     SECTION      x 2  and     COLONOSCOPY N/A 10/08/2019    Procedure: COLONOSCOPY;  Surgeon: Eliceo Holloway MD;  Location: [facility] (4TH FLR);  Service: Endoscopy;  Laterality: N/A;  Okay for Freeman or [facility]el. However, she needs it done before end , so if they are not available before then, okay for any provided.    CYST REMOVED FROM RIGHT BREAST IN       EVACUATION OF HEMATOMA Left 2024    Procedure: EVACUATION, HEMATOMA;  Surgeon: Pito Holloway DO;  Location: [facility] OR;  Service: Plastics;  Laterality: Left;    EXTRACTION OF CATARACT Left 2023    Procedure: EXTRACTION, CATARACT;  Surgeon: Eric Cardona MD;  Location: [facility] OR;  Service: Ophthalmology;  Laterality: Left;  DiB00 +21.0D    HERNIA REPAIR      over C section incision     INJECTION FOR SENTINEL NODE IDENTIFICATION Right 2024    Procedure: INJECTION, FOR SENTINEL NODE IDENTIFICATION;  Surgeon: NADIRA Carroll MD;  Location: [facility] OR;  Service: General;  Laterality: Right;    INJECTION, AGENT, INTRAVENOUS, FOR ASSESSMENT OF BLOOD FLOW IN FLAP OR GRAFT N/A 2024    Procedure: INJECTION, AGENT, INTRAVENOUS, FOR ASSESSMENT OF BLOOD FLOW IN FLAP OR GRAFT;  Surgeon: Pito Holloway DO;  Location: [facility];  Service: Plastics;  Laterality: N/A;    left breast wire localization excisional biopsy with bracketed wires using 3 wires and excision through 1 incision.       LIPOMA RESECTION N/A 10/04/2023    Procedure: EXCISION, LIPOMA Back;  Surgeon: Kenneth Hernández MD;  Location: [facility] OR 2ND FLR;  Service: General;  Laterality: N/A;    PHACOEMULSIFICATION, CATARACT, WITH IOL INSERTION Left 2023    Procedure: PHACOEMULSIFICATION, CATARACT, WITH IOL INSERTION;  Surgeon: Eric Cardona MD;  Location: [facility];  Service: Ophthalmology;  Laterality: Left;    RECONSTRUCTION OF BREAST WITH DEEP INFERIOR EPIGASTRIC ARTERY  (RODRIGUEZ) FREE FLAP Bilateral  9/9/2024    Procedure: RECONSTRUCTION, BREAST, USING YOJANA FREE FLAP / BILATERAL YOJANA FLAP;  Surgeon: Pito Holloway DO;  Location: [facility] OR;  Service: Plastics;  Laterality: Bilateral;  2-3 DAY STAY    SENTINEL LYMPH NODE BIOPSY Right 9/9/2024    Procedure: BIOPSY, LYMPH NODE, SENTINEL;  Surgeon: NADIRA Carroll MD;  Location: [facility] OR;  Service: General;  Laterality: Right;    THYROIDECTOMY      TRANSFORAMINAL EPIDURAL INJECTION OF STEROID Right 07/01/2019    Procedure: Injection,steroid,epidural,transforaminal approach LUMBAR TRANSFORAMINAL RIGHT L5 AND S1 TF ARNOLDO;  Surgeon: Nadya Mcfarland MD;  Location: [facility] PAIN MGT;  Service: Pain Management;  Laterality: Right;  NEEDS CONSENT    TUBAL LIGATION      WOUND DEBRIDEMENT Bilateral 10/16/2024    Procedure: DEBRIDEMENT, WOUND;  Surgeon: Pito Holloway DO;  Location: [facility] OR;  Service: Plastics;  Laterality: Bilateral;  Bilateral Debridement Mastectomy skin - 1 hour         ROS - negative, other than stated above    PHYSICAL EXAMINATION  There were no vitals filed for this visit.      Gen: NAD, appears stated age  Neuro: normal without focal findings, mental status and speech normal  HEENT: NCAT, neck supple, PEERL  CV: RRR  Pulm: Breathing non-labored, chest wall movement equal bilaterally   Breast:  Inverted T pattern scars healed bilaterally.  The skin envelope is relatively shorter with less lower pole skin on the left compared with the right.  Abdomen: soft, nontender, no guarding  Healed a transverse lower abdominal and umbilical incision with a nice contour  Gu: genitalia not examined  Extremity:normal strength, no cyanosis or edema  Skin: Skin color, texture, turgor normal. No rashes or lesions  Psych: oriented to time, place and person, mood and affect are within normal limits      ASSESSMENT/PLAN  69 y.o. female s/p immediate Yojana flap reconstruction with a subsequent wound debridement    Plan for second-stage reconstruction.  This would include  a tightening the skin envelope on the left to better match the right, liposuction of the abdominal donor site with fat grafting to the breasts, more to the left for volume.  She would like sometime later in the spring after used her busy work holiday.  It had said we could do this on a Friday at [facility].  We will look to get her scheduled and see her for a pre-op visit    All questions were answered.     Discussed with patient and/or family the risks and benefits of surgical intervention.  Conservative measures have been exhausted and patient would like to proceed with surgery.      We have discussed risks, which include but are not limited to blood clots in the legs that can travel to the lungs (pulmonary embolism). Pulmonary embolism can cause shortness of breath, chest pain, and even shock. Other risks include urinary tract infection, nausea and vomiting (usually related to pain medication), chronic pain, bleeding, nerve damage, blood vessel injury, scarring and infection, which can require re-operation. Furthermore, the risks of anesthesia include potential heart, lung, kidney, and liver damage.  Informed consent was obtained.  The patient understands and would like to proceed with surgery.      Maurisio Goldstein MD  Plastic Surgery Fellow  04/25/2025

## 2025-04-25 NOTE — TRANSFER OF CARE
Anesthesia Transfer of Care Note    Patient: Sherry Torres    Procedure(s) Performed: Procedure(s) (LRB):  BREAST REVISION SURGERY (N/A)  LIPOSUCTION, WITH FAT GRAFTING TO TRUNK, BREASTS, SCALP, ARMS, AND/OR LEGS, 50CC OR LESS INJECTABLE (Bilateral)  REVISION, PROCEDURE INVOLVING FLAP GRAFT (N/A)  REPAIR, DIASTASIS RECTI (N/A)    Patient location: PACU    Anesthesia Type: general    Transport from OR: Transported from OR on 6-10 L/min O2 by face mask with adequate spontaneous ventilation    Post pain: adequate analgesia    Post assessment: no apparent anesthetic complications    Post vital signs: stable    Level of consciousness: awake and lethargic    Nausea/Vomiting: no nausea/vomiting    Complications: none    Transfer of care protocol was followed      Last vitals: Visit Vitals  /62 (BP Location: Right arm, Patient Position: Lying)   Pulse 65   Temp 36.6 °C (97.9 °F) (Temporal)   Resp 20   SpO2 100%   Breastfeeding No

## 2025-04-25 NOTE — ANESTHESIA PROCEDURE NOTES
Intubation    Date/Time: 4/25/2025 1:17 PM    Performed by: Laura Padilla CRNA  Authorized by: Jimbo Ivey DO    Intubation:     Induction:  Intravenous    Intubated:  Postinduction    Mask Ventilation:  Easy mask    Attempts:  1    Attempted By:  CRNA    Method of Intubation:  Video laryngoscopy    Blade:  Delatorre 3    Laryngeal View Grade: Grade I - full view of cords      Difficult Airway Encountered?: No      Complications:  None    Airway Device:  Oral endotracheal tube    Airway Device Size:  7.0    Style/Cuff Inflation:  Cuffed    Inflation Amount (mL):  5    Tube secured:  22    Secured at:  The lips    Placement Verified By:  Capnometry    Complicating Factors:  Anterior larynx, small mouth and poor neck/head extension    Findings Post-Intubation:  BS equal bilateral and atraumatic/condition of teeth unchanged

## 2025-04-25 NOTE — DISCHARGE INSTRUCTIONS
Plastic Surgery Discharge Instructions  Bennett Holloway, DO    Wound Care  1. Shower daily beginning 48hrs after surgery  2. Okay to let warm soapy water run over the wound at that time  3. Do not submerge wounds in the bath  4. Leave any skin glue or mesh tape in place    Drain Care  If you have drains, strip tubing and record output when drain bulb becomes half full, or at least twice daily  Record output for each drain and bring to our follow up appointment    Diet  Heart healthy    Activity  Light activity only - no lifting greater than 10 lbs  Avoid strenuous activity that would cause you to get hot or sweaty    Medications  You have been given a prescription for narcotic pain medication, anti-inflammatory pain, muscle relaxer, and nerve pain  Unless otherwise contraindicated by your doctor, take 2 extra strength Tylenol and 600mg of ibuprofen every 8 hours  Please take medications as prescribed     What to call for:  1. Sustained fever > 101.0  2. Changes in color, sensation, temperature or pain at surgical site  3. Redness and/or drainage from the surgical site  4. Any reaction to prescribed medications  5. Continuous bloody drainage from surgical drains  6. Wound vac malfunction  7. Questions related to the procedure    Follow-up  1. Please call [phone] to schedule or confirm your follow up visit at the [facility] on [address]  2. Call with any questions or concerns.  2. After hours call [phone] - ask to speak with the Plastic Surgery fellow on call

## 2025-04-25 NOTE — ANESTHESIA PREPROCEDURE EVALUATION
"[facility]  Anesthesia Pre-Operative Evaluation         Patient Name: Sherry Torres  YOB: 1955  MRN: [id number]    SUBJECTIVE:     Pre-operative evaluation for Procedure(s) (LRB):  BREAST REVISION SURGERY (N/A)  LIPOSUCTION, WITH FAT GRAFTING TO TRUNK, BREASTS, SCALP, ARMS, AND/OR LEGS, 50CC OR LESS INJECTABLE (Bilateral)  REVISION, PROCEDURE INVOLVING FLAP GRAFT (N/A)  REPAIR, DIASTASIS RECTI (N/A)     2025    Sherry Torres is a 69 y.o. female w/ a significant PMHx seen below.    Patient now presents for the above procedure(s).    TTE:   No echocardiogram results found for the past 12 months     Problem List[1]    Review of patient's allergies indicates:   Allergen Reactions    Sulfa (sulfonamide antibiotics) Nausea Only    Codeine Nausea Only     Pt states "it makes me feel crazy". Prefers ES Tylenol and ibuprofen        Current Inpatient Medications:   LIDOcaine (PF) 10 mg/ml (1%)  1 mL Intradermal Once       Medications Ordered Prior to Encounter[2]    Past Surgical History:   Procedure Laterality Date    BILATERAL MASTECTOMY Bilateral 2024    Procedure: MASTECTOMY, BILATERAL;  Surgeon: NADIRA Carroll MD;  Location: [facility] OR;  Service: General;  Laterality: Bilateral;    BREAST BIOPSY Right 2016    BREAST BIOPSY Left     exc bx    BREAST LUMPECTOMY Right 2016    w/radiation    BREAST SURGERY   AND  OR     CATARACT EXTRACTION W/  INTRAOCULAR LENS IMPLANT Right 2024    Procedure: EXTRACTION, CATARACT, WITH IOL INSERTION;  Surgeon: Zhane Khan MD;  Location: [facility] OR;  Service: Ophthalmology;  Laterality: Right;     SECTION      x 2  and     COLONOSCOPY N/A 10/08/2019    Procedure: COLONOSCOPY;  Surgeon: Eliceo Holloway MD;  Location: [facility] ENDO ([address] [place] [address]);  Service: Endoscopy;  Laterality: N/A;  Okay for Freeman Holloway. However, she needs it done before end of October, so if they are not available before then, okay for " any provided.    CYST REMOVED FROM RIGHT BREAST IN 1969      EVACUATION OF HEMATOMA Left 9/9/2024    Procedure: EVACUATION, HEMATOMA;  Surgeon: Pito Holloway DO;  Location: [facility] [place] OR;  Service: Plastics;  Laterality: Left;    EXTRACTION OF CATARACT Left 12/27/2023    Procedure: EXTRACTION, CATARACT;  Surgeon: Eric Cardona MD;  Location: [facility] OR;  Service: Ophthalmology;  Laterality: Left;  DiB00 +21.0D    HERNIA REPAIR      over C section incision     INJECTION FOR SENTINEL NODE IDENTIFICATION Right 9/9/2024    Procedure: INJECTION, FOR SENTINEL NODE IDENTIFICATION;  Surgeon: NADIRA Carroll MD;  Location: [facility] [place] OR;  Service: General;  Laterality: Right;    INJECTION, AGENT, INTRAVENOUS, FOR ASSESSMENT OF BLOOD FLOW IN FLAP OR GRAFT N/A 9/9/2024    Procedure: INJECTION, AGENT, INTRAVENOUS, FOR ASSESSMENT OF BLOOD FLOW IN FLAP OR GRAFT;  Surgeon: Pito Holloway DO;  Location: [facility] [place] OR;  Service: Plastics;  Laterality: N/A;    left breast wire localization excisional biopsy with bracketed wires using 3 wires and excision through 1 incision.       LIPOMA RESECTION N/A 10/04/2023    Procedure: EXCISION, LIPOMA Back;  Surgeon: Kenneth Hernández MD;  Location: [facility] OR [address] [place] [address];  Service: General;  Laterality: N/A;    PHACOEMULSIFICATION, CATARACT, WITH IOL INSERTION Left 12/27/2023    Procedure: PHACOEMULSIFICATION, CATARACT, WITH IOL INSERTION;  Surgeon: Eric Cardona MD;  Location: [facility] OR;  Service: Ophthalmology;  Laterality: Left;    RECONSTRUCTION OF BREAST WITH DEEP INFERIOR EPIGASTRIC ARTERY  (RODRIGUEZ) FREE FLAP Bilateral 9/9/2024    Procedure: RECONSTRUCTION, BREAST, USING RODRIGUEZ FREE FLAP / BILATERAL RODRIGUEZ FLAP;  Surgeon: Pito Holloway DO;  Location: [facility] [place] OR;  Service: Plastics;  Laterality: Bilateral;  2-3 DAY STAY    SENTINEL LYMPH NODE BIOPSY Right 9/9/2024    Procedure: BIOPSY, LYMPH NODE, SENTINEL;  Surgeon: NADIRA Carroll MD;  Location: [facility];  Service: General;   Laterality: Right;    THYROIDECTOMY      TRANSFORAMINAL EPIDURAL INJECTION OF STEROID Right 07/01/2019    Procedure: Injection,steroid,epidural,transforaminal approach LUMBAR TRANSFORAMINAL RIGHT L5 AND S1 TF ARNOLDO;  Surgeon: Nadya Mcfarland MD;  Location: [facility] PAIN MGT;  Service: Pain Management;  Laterality: Right;  NEEDS CONSENT    TUBAL LIGATION      WOUND DEBRIDEMENT Bilateral 10/16/2024    Procedure: DEBRIDEMENT, WOUND;  Surgeon: Pito Holloway DO;  Location: [facility] OR;  Service: Plastics;  Laterality: Bilateral;  Bilateral Debridement Mastectomy skin - 1 hour       OBJECTIVE:     Vital Signs Range (Last 24H):         Significant Labs:  Lab Results   Component Value Date    WBC 7.15 10/16/2024    HGB 11.5 (L) 10/16/2024    HCT 34.9 (L) 10/16/2024     10/16/2024    CHOL 214 (H) 06/14/2024    TRIG 97 06/14/2024    HDL 50 06/14/2024    ALT 9 (L) 06/14/2024    AST 15 06/14/2024     09/10/2024    K 4.2 09/10/2024     09/10/2024    CREATININE 0.9 09/10/2024    BUN 10 09/10/2024    CO2 23 09/10/2024    TSH 1.349 02/10/2025    INR 0.9 07/04/2017    HGBA1C 5.2 06/14/2024       Diagnostic Studies: No relevant studies.    EKG:   Results for orders placed or performed during the hospital encounter of 04/18/23   EKG 12-lead    Collection Time: 04/18/23  1:45 PM    Narrative    Test Reason : DIZZINESS    Vent. Rate : 076 BPM     Atrial Rate : 076 BPM     P-R Int : 156 ms          QRS Dur : 082 ms      QT Int : 404 ms       P-R-T Axes : 059 033 076 degrees     QTc Int : 454 ms    Normal sinus rhythm  Low anterior forces  Abnormal ECG  When compared with ECG of 04-JUL-2017 10:10,  No significant change was found  Confirmed by Hao DALTON MD ([id number]) on 4/19/2023 8:27:24 AM    Referred By: [email or web address]   SELF           Confirmed By:Hao DALTON MD       ASSESSMENT/PLAN:       Pre-op Assessment    I have reviewed the Patient Summary Reports.     I have reviewed the Nursing Notes. I have reviewed the NPO Status.   I  have reviewed the Medications.     Review of Systems  Anesthesia Hx:  No problems with previous Anesthesia               Denies Personal Hx of Anesthesia complications.                    Social:  Non-Smoker       Hematology/Oncology:       -- Denies Anemia:                  Current/Recent Cancer.  Breast              Cardiovascular:  Exercise tolerance: good   Hypertension   Denies MI.  Denies CAD.     Denies Dysrhythmias.    Denies CHF.    no hyperlipidemia   ECG has been reviewed.    Functional Capacity good / => 4 METS                   Hypertension         Pulmonary:    Denies COPD.  Denies Asthma.   Denies Shortness of breath.   Denies Sleep Apnea.                Renal/:   Denies Chronic Renal Disease.                Hepatic/GI:     GERD         Gerd          Musculoskeletal:  Musculoskeletal Normal                Neurological:  Denies TIA.  Denies CVA. Neuromuscular Disease,   Denies Seizures.                              Neuromuscular Disease   Endocrine:   Hypothyroidism       Hypothyroidism          Psych:  Psychiatric History                  Physical Exam  General: Well nourished, Cooperative, Alert and Oriented    Airway:  Mallampati: II   Mouth Opening: Normal  TM Distance: Normal  Tongue: Normal  Neck ROM: Normal ROM    Dental:  Intact        Anesthesia Plan  Type of Anesthesia, risks & benefits discussed:    Anesthesia Type: Gen ETT, Gen Supraglottic Airway  Intra-op Monitoring Plan: Standard ASA Monitors  Post Op Pain Control Plan: multimodal analgesia and IV/PO Opioids PRN  Induction:  IV  Airway Plan: Video, Post-Induction  Informed Consent: Informed consent signed with the Patient and all parties understand the risks and agree with anesthesia plan.  All questions answered.   ASA Score: 3  Day of Surgery Review of History & Physical: H&P Update referred to the surgeon/provider.    Ready For Surgery From Anesthesia Perspective.     .           [1]   Patient Active Problem List  Diagnosis     Post-surgical hypothyroidism    Gastro-esophageal reflux disease without esophagitis    History of thyroid cancer    Lumbar radiculopathy    Tinnitus of right ear    Decreased ROM of neck    Hand pain    Chronic pain    Right knee pain    Hyperlipidemia, unspecified    Acne    Primary hypertension    Disorder of breast, unspecified    Disorder of thyroid, unspecified    Malignant (primary) neoplasm, unspecified    Vitamin D deficiency    Nuclear sclerotic cataract of right eye    History of breast cancer in female    At risk for lymphedema    Malignant neoplasm of upper-inner quadrant of right breast in female, estrogen receptor positive    Psychophysiological insomnia    Pulmonary nodule   [2]   Current Facility-Administered Medications on File Prior to Encounter   Medication Dose Route Frequency Provider Last Rate Last Admin    0.9%  NaCl infusion   Intravenous Continuous Hakan Garces PA-C 70 mL/hr at 10/04/23 1113 New Bag at 10/04/23 1113    haloperidol lactate injection 0.5 mg  0.5 mg Intravenous Q10 Min PRCrystal Moon MD        HYDROmorphone injection 0.2 mg  0.2 mg Intravenous Q5 Min PRN Crystal Holloway MD        LIDOcaine (PF) 10 mg/ml (1%) injection 10 mg  1 mL Intradermal Once Eric Cardona MD        sodium chloride 0.9% flush 10 mL  10 mL Intravenous PRN Crystal Holloway MD         Current Outpatient Medications on File Prior to Encounter   Medication Sig Dispense Refill    amLODIPine (NORVASC) 5 MG tablet Take 1 tablet (5 mg total) by mouth once daily. 90 tablet 0    letrozole (FEMARA) 2.5 mg Tab Take 1 tablet (2.5 mg total) by mouth once daily. 30 tablet 11    levothyroxine (SYNTHROID) 88 MCG tablet Take 1 tablet (88 mcg total) by mouth before breakfast. 30 tablet 11    loratadine (CLARITIN) 10 mg tablet Take 10 mg by mouth once daily. AS NEEDED FOR ALLERGIES      pantoprazole (PROTONIX) 40 MG tablet Take 1 tablet (40 mg total) by mouth once daily. 90 tablet 3    potassium chloride SA  (K-DUR,KLOR-CON) 10 MEQ tablet Take 20 mEq by mouth once daily.      semaglutide, weight loss, (WEGOVY) 0.25 mg/0.5 mL PnIj Inject into the skin.      tretinoin (RETIN-A) 0.025 % cream Compound tretinoin 0.025% / niacinamide 2% cream / hyaluronic acid 0.25%. Apply a pea-sized amount to entire face qhs. 30 g 11    varicella-zoster gE-AS01B, PF, (SHINGRIX, PF,) 50 mcg/0.5 mL injection Inject into the muscle. 1 each 0

## 2025-04-25 NOTE — OP NOTE
[facility] ([facility])  Plastic Surgery  Operative Note    SUMMARY     Date of Procedure: 4/25/2025     Location:  [facility]    Procedure(s): Procedure(s) (LRB):  BREAST REVISION SURGERY (Bilateral)  LIPOSUCTION, WITH FAT GRAFTING TO TRUNK, BREASTS, SCALP, ARMS, AND/OR LEGS, 50CC OR LESS INJECTABLE (Bilateral)  REVISION, PROCEDURE INVOLVING FLAP GRAFT (Bilateral)  REPAIR, DIASTASIS RECTI (N/A)     Fat grafting to the left reconstructed breast, 195 cc   Fat grafting to the right reconstructed breasts, 80 cc  Excision of excess left breast skin with intermediate closure, 9 cm    Surgeons and Role:     * Pito Holloway, DO - Primary    Assistant: Viraj Goldstein MD, Fellow    Pre-Operative Diagnosis: Malignant neoplasm of upper-inner quadrant of right breast in female, estrogen receptor positive [C50.211, Z17.0]    Post-Operative Diagnosis: same    Anesthesia: General    Indications for Procedure:  This is a 69-year-old woman with a history of right breast cancer in September of 2024 she underwent bilateral skin sparing mastectomy with the inverted T pattern closure and immediate RODRIGUEZ flaps.  She had some T point necrosis had a hematoma on the left side.  Ultimately we took her back to the operating room to close the wound dehiscence to aid in wound care and speed up her recovery.  She now presents for second-stage reconstruction.  She has a less volume and a flattened appearance on the left lower inner quadrant.  She is happy with the appearance of the right breasts.  We discussed fat grafting for added volume replacement and excision a small amount of excess skin    Operative Findings (including complications, if any):   1 L tumescent solution used  1100 cc total lipoaspirate  195 cc of fat injected into the left breast  80 cc of fat injected in the left breasts  She had some excess overhanging skin along the IMF medially on the right side.  That has excised and closed.  The area  excised measured 1 x 9 cm the length was 9 cm intermediate closure    Description of Procedures:  The patient is seen in the preop holding area.  The surgical plan was discussed.  All questions were answered.  Consent has been signed at her preoperative visit.  The patient is taken the operating room general anesthesia was induced.  The breasts abdomen medial thighs were prepped and draped in a sterile fashion.  The patient is then frog-leg position.      Began by instilling tumescent solution.  Three small stab incisions were made along her previous transverse abdominal incision was small stab incision was made in the upper medial thigh on each side.  Through that has a total of 1 L of tumescent solution was instilled.  A 300 cc were placed in the thigh and 400 cc across the abdomen.      Next liposuction performed.  It had a 4 mm Mercedes tip cannula with the power assisted liposuction her arm liposuction was done to the medial thighs, the upper and lower abdomen and a small amount to the bilateral flanks.  The fat was collected into the Aphria Viibryd and canister.  A total of 1100 cc of fat was collected into the canister.  Afterwards the fluid which it had  was drained off the bottom.  Next the fat was rinsed with a 1 L of warm lactated Ringer's solution.  After allowing the settle I then the effluent was drained off the bottom.  The remaining fat was then drawn into 10 cc syringes.      With the liposuction was being done that has excess skin was removed from the right breast.  Just above the IMF medially she had some excess overhanging skin.  An elliptical incision has been marked in preop.  That measured 1 x 9 cm.  Using a 15 blade the elliptical incision was made.  The skin only was removed.  The wound was then closed in 2 layers with a buried 3-0 Monocryl and running 3-0 Stratafix.    Next fat grafting was performed to each breasts.  Along the IMF lateral to the T point on the left side a  small stab incision was made using a 15 blade.  A 2.1 mm spoon tip cannula was used for fat grafting.  It had some fat grafting was done along the upper lateral border of the breasts and along the upper pole.  A majority of the fat was done centrally and to the lower inner quadrant where she had an area of tight skin and volume deficiency.  A total of the 195 cc of fat was injected on that side  Next fat grafting was done to the right breast.  Using her medial incision and a same cannula a total of 80 cc of fat was injected into area of upper inner step-off and also at the lateral border of the pectoralis muscle and breast.  She had a improve the symmetry of the left side appeared a little bit larger after fat grafting.      All of the liposuction sites of the incision on the left breasts were closed with the interrupted 5 0 nylon sutures.    The patient is placed in a postoperative bra and a girdle.  There were padded with ABD pads.  She tolerated the procedure well taken to recovery room in stable condition    Significant Surgical Tasks Conducted by the Assistant(s), if Applicable: The indicated resident served as first assistant for this procedure.    Estimated Blood Loss (EBL): 20mL           Implants: * No implants in log *    Specimens:   Specimen (24h ago, onward)      None                    Condition: Good    Disposition: PACU - hemodynamically stable., Dc home,follow up one week    Attestation: I was present and scrubbed for the entire procedure.

## 2025-04-28 NOTE — PROGRESS NOTES
Sherry Torres presents to Plastic Surgery Clinic for a follow up visit status post bilateral skin sparing mastectomy with immediate RODRIGUEZ flap reconstruction on 9/9/24.  Post op course complicated by hematoma requiring drainage on day of surgery and later skin flap necrosis s/p wound debridement on 10/16/24. She is now fully healed. She is ready to discuss second stage revision. Her main complaint about her reconstruction is volume deficiency of her left flap medially. She's otherwise pleased. She's lost a little more weight on GLP1 but is at her goal weight.  She denies fever, chills, nausea, vomiting or other systemic signs of infection.       ROS - negative, other than stated above    PHYSICAL EXAMINATION  Vitals:    04/15/25 1348   BP: 107/72   Pulse: 76       Gen: NAD, appears stated age  Neuro: normal without focal findings, mental status and speech normal  HEENT: NCAT, neck supple, PEERL  CV: RRR  Pulm: Breathing non-labored, chest wall movement equal bilaterally   Breast:  bilateral RODRIGUEZ flap reconstruction. Flaps are soft. Well healed incisions. Small volume.  Abdomen: Well healed donor site  Gu: genitalia not examined  Extremity:normal strength, no cyanosis or edema  Skin: Skin color, texture, turgor normal. No rashes or lesions  Psych: oriented to time, place and person      ASSESSMENT/PLAN  69 y.o. female s/p bilateral SSM with RODRIGUEZ flap reconstruction    Discussed second stage planning including liposuction of donor site with fat grafting to the breasts to address volume deficiency and improve symmetry.     All questions were answered. The patient was advised to contact the clinic with any questions or concerns prior to their next visit.   F/u for preop visit.    Evelyn Callahan PA-C  Plastic and Reconstructive Surgery      I spent a total of 15 minutes on the day of the visit.This includes face to face time and non-face to face time preparing to see the patient (eg, review of tests),  obtaining and/or reviewing separately obtained history, documenting clinical information in the electronic or other health record, independently interpreting results and communicating results to the patient/family/caregiver, or care coordinator.

## 2025-05-01 ENCOUNTER — OFFICE VISIT (OUTPATIENT)
Dept: PLASTIC SURGERY | Facility: CLINIC | Age: 70
End: 2025-05-01
Payer: MEDICARE

## 2025-05-01 VITALS
HEART RATE: 80 BPM | DIASTOLIC BLOOD PRESSURE: 81 MMHG | WEIGHT: 123 LBS | HEIGHT: 65 IN | SYSTOLIC BLOOD PRESSURE: 119 MMHG | BODY MASS INDEX: 20.49 KG/M2

## 2025-05-01 DIAGNOSIS — Z09 SURGERY FOLLOW-UP: Primary | ICD-10-CM

## 2025-05-01 PROCEDURE — 3079F DIAST BP 80-89 MM HG: CPT | Mod: CPTII,S$GLB,, | Performed by: SURGERY

## 2025-05-01 PROCEDURE — 3074F SYST BP LT 130 MM HG: CPT | Mod: CPTII,S$GLB,, | Performed by: SURGERY

## 2025-05-01 PROCEDURE — 99024 POSTOP FOLLOW-UP VISIT: CPT | Mod: S$GLB,,, | Performed by: SURGERY

## 2025-05-01 PROCEDURE — 1125F AMNT PAIN NOTED PAIN PRSNT: CPT | Mod: CPTII,S$GLB,, | Performed by: SURGERY

## 2025-05-01 PROCEDURE — 99999 PR PBB SHADOW E&M-EST. PATIENT-LVL III: CPT | Mod: PBBFAC,,, | Performed by: SURGERY

## 2025-05-09 ENCOUNTER — HOSPITAL ENCOUNTER (OUTPATIENT)
Dept: RADIOLOGY | Facility: HOSPITAL | Age: 70
Discharge: HOME | End: 2025-05-09
Attending: INTERNAL MEDICINE
Payer: MEDICARE

## 2025-05-09 DIAGNOSIS — R91.1 SOLITARY PULMONARY NODULE: ICD-10-CM

## 2025-05-09 PROCEDURE — 71250 CT THORAX DX C-: CPT | Mod: TC

## 2025-05-09 PROCEDURE — 71250 CT THORAX DX C-: CPT | Mod: 26,,, | Performed by: RADIOLOGY

## 2025-05-09 NOTE — PROGRESS NOTES
Sherry Torres presents to Plastic Surgery Clinic for a follow up visit status post second stage revision of autologous breast reconstruction on 4/25/25 with excision of excess left breast skin and fat grafting to both breasts. She is sore from lipo.    PHYSICAL EXAMINATION  Bilateral breast fat grafted with improvement in contour. There is bruising, as expected.   Lipo site of abdomen is mildly bruised. Lipo site sutures with nylon sutures in place.  Drains are not present       ASSESSMENT/PLAN  Sherry Torres is s/p second stage RODRIGUEZ revision with fat grafting and left breast skin revision  - Lipo site sutures removed  - Continue compression for 4 weeks day and night and an additional 4 weeks during the day for best contour  - Lymphatic massage recommended for pain and swelling  - Follow up 3 months      All questions were answered. The patient was advised to contact the clinic with any questions or concerns prior to their next visit.       Evelyn Callahan PA-C  Plastic and Reconstructive Surgery

## 2025-05-12 ENCOUNTER — OFFICE VISIT (OUTPATIENT)
Dept: HEMATOLOGY/ONCOLOGY | Facility: CLINIC | Age: 70
End: 2025-05-12
Payer: MEDICARE

## 2025-05-12 ENCOUNTER — PATIENT MESSAGE (OUTPATIENT)
Dept: HEMATOLOGY/ONCOLOGY | Facility: CLINIC | Age: 70
End: 2025-05-12

## 2025-05-12 VITALS
OXYGEN SATURATION: 100 % | BODY MASS INDEX: 21.71 KG/M2 | SYSTOLIC BLOOD PRESSURE: 138 MMHG | HEART RATE: 62 BPM | DIASTOLIC BLOOD PRESSURE: 78 MMHG | HEIGHT: 65 IN | WEIGHT: 130.31 LBS

## 2025-05-12 DIAGNOSIS — M85.89 OSTEOPENIA OF MULTIPLE SITES: ICD-10-CM

## 2025-05-12 DIAGNOSIS — C50.211 MALIGNANT NEOPLASM OF UPPER-INNER QUADRANT OF RIGHT BREAST IN FEMALE, ESTROGEN RECEPTOR POSITIVE: Primary | ICD-10-CM

## 2025-05-12 DIAGNOSIS — Z17.0 MALIGNANT NEOPLASM OF UPPER-INNER QUADRANT OF RIGHT BREAST IN FEMALE, ESTROGEN RECEPTOR POSITIVE: Primary | ICD-10-CM

## 2025-05-12 PROCEDURE — 3078F DIAST BP <80 MM HG: CPT | Mod: CPTII,S$GLB,, | Performed by: INTERNAL MEDICINE

## 2025-05-12 PROCEDURE — 1126F AMNT PAIN NOTED NONE PRSNT: CPT | Mod: CPTII,S$GLB,, | Performed by: INTERNAL MEDICINE

## 2025-05-12 PROCEDURE — 99213 OFFICE O/P EST LOW 20 MIN: CPT | Mod: S$GLB,,, | Performed by: INTERNAL MEDICINE

## 2025-05-12 PROCEDURE — 1101F PT FALLS ASSESS-DOCD LE1/YR: CPT | Mod: CPTII,S$GLB,, | Performed by: INTERNAL MEDICINE

## 2025-05-12 PROCEDURE — 3075F SYST BP GE 130 - 139MM HG: CPT | Mod: CPTII,S$GLB,, | Performed by: INTERNAL MEDICINE

## 2025-05-12 PROCEDURE — 1159F MED LIST DOCD IN RCRD: CPT | Mod: CPTII,S$GLB,, | Performed by: INTERNAL MEDICINE

## 2025-05-12 PROCEDURE — 3288F FALL RISK ASSESSMENT DOCD: CPT | Mod: CPTII,S$GLB,, | Performed by: INTERNAL MEDICINE

## 2025-05-12 PROCEDURE — 99999 PR PBB SHADOW E&M-EST. PATIENT-LVL III: CPT | Mod: PBBFAC,,, | Performed by: INTERNAL MEDICINE

## 2025-05-12 PROCEDURE — 3008F BODY MASS INDEX DOCD: CPT | Mod: CPTII,S$GLB,, | Performed by: INTERNAL MEDICINE

## 2025-05-12 RX ORDER — VIT C/E/ZN/COPPR/LUTEIN/ZEAXAN 250MG-90MG
1000 CAPSULE ORAL DAILY
Qty: 30 CAPSULE | Refills: 11 | Status: SHIPPED | OUTPATIENT
Start: 2025-05-12

## 2025-05-12 RX ORDER — IBANDRONATE SODIUM 150 MG/1
150 TABLET, FILM COATED ORAL
Qty: 3 TABLET | Refills: 3 | Status: SHIPPED | OUTPATIENT
Start: 2025-05-12 | End: 2026-05-12

## 2025-05-12 NOTE — PROGRESS NOTES
Subjective:       Patient ID: Sherry Torres is a 69 y.o. female.    Chief Complaint: No chief complaint on file.    HPI Ms Torres is a 69-year-old female seen in follow-up  of recurrent ER positive right breast cancer - in breast recurrence.   She is on adjuvant endocrine therapy with letrozole.    Overall, she has been feeling very well.    She had additional reconstructive surgery on the 25th in is recovering from that.  She has residual abdominal tightness.  She is looking forward to starting exercising again.          History:  Screening mammogram on 6/21/24 showed an asymmetry in the upper right breast.    Diagnostic imaging on 7/2/24 showed  -an asymmetry seen in the upper region of the right breast in the posterior depth. The asymmetry correlates with the screening mammogram finding.    US Breast Right Limited  There is an irregularly shaped, hypoechoic mass with angular margins seen in the right breast at 1 o'clock    Biopsy of the right breast on 7/9/24 revealed:  Invasive ductal carcinoma, Grade 1 (tubular differentiation = 3, nuclear pleomorphism = 1, mitotic rate = 1)   Invasive carcinoma measures 3 mm (0.3 cm) in greatest linear dimension within the core biopsies   Calcifications associated with non-nonneoplastic breast     Breast molecular markers:   ER:  Positive (%, strong)   OH:  Positive (71-80%, intermediate)   HER2 IHC:  Negative (score 0)   Ki-67:  6%     CT chest on 7/19/24 showed several small nodules up to 5 mm - non-specific, Bone scan was negative    On September 9th 2024 she underwent bilateral mastectomy with RODRIGUEZ flap reconstruction  The right breast pathology showed a 14 mm invasive ductal carcinoma with some associated atypical lobular hyperplasia.    The left breast showed atypical ductal hyperplasia and atypical lobular hyperplasia.    Oncotype score - 17    DEXA - osteopenia    Letrozole started February 2025.    Prior Breast cancer history:    She noted an abnormality  on self examination at the end of July or early August 2016.  On August 8 she underwent diagnostic mammogram which showed an irregular mass was plated margins in the middle right breast and o'clock position.  By ultrasound this was a solid 1.7 x 1.0 x 1.5 cm mass.     On August 9 a core needle biopsy showed infiltrating ductal carcinoma which was well differentiated (histologic grade 2, nuclear grade 2, mitotic index 1).  The tumor was 100% ER positive, 70% AK positive and HER-2 1+.  On August 25 right breast lumpectomy and sentinel lymph node biopsy was performed.  That showed a 14 mm low-grade infiltrating ductal carcinoma.    The sentinel lymph node was positive for 1.5 mm micrometastasis.       Final pathological stage TI cN1 stage II    Mammaprint genomic assay  showed that she was low risk.  Score was +0.336 with a 5 year risk of distant recurrence at 5% and a 10 year risk at 10%.       She completed radiation in December 2016 and began Letrozole at that time.  She stopped letrozole after 5 years.    Review of Systems   Constitutional: Negative.  Negative for appetite change and unexpected weight change.   HENT:  Negative for mouth sores.    Eyes:  Negative for visual disturbance.   Respiratory: Negative.  Negative for cough and shortness of breath.    Cardiovascular:  Negative for chest pain.   Gastrointestinal: Negative.  Negative for abdominal pain and diarrhea.   Genitourinary:  Negative for frequency.   Musculoskeletal:  Negative for back pain.   Skin:  Negative for rash.   Neurological:  Negative for headaches.   Hematological:  Negative for adenopathy.   Psychiatric/Behavioral: Negative.  The patient is not nervous/anxious.        Objective:      Physical Exam  Vitals reviewed.   Constitutional:       General: She is not in acute distress.     Appearance: Normal appearance. She is well-developed.   Cardiovascular:      Rate and Rhythm: Normal rate and regular rhythm.   Pulmonary:      Effort: Pulmonary  effort is normal. No respiratory distress.      Breath sounds: Normal breath sounds. No wheezing or rales.   Chest:       Abdominal:      Palpations: Abdomen is soft.      Tenderness: There is no abdominal tenderness.   Lymphadenopathy:      Cervical: No cervical adenopathy.      Upper Body:      Right upper body: No supraclavicular or axillary adenopathy.      Left upper body: No supraclavicular or axillary adenopathy.   Neurological:      Mental Status: She is alert and oriented to person, place, and time.   Psychiatric:         Mood and Affect: Mood normal.         Behavior: Behavior normal.         Thought Content: Thought content normal.         Judgment: Judgment normal.       Assessment:     CT chest 5/9/25 - pending  1. Malignant neoplasm of upper-inner quadrant of right breast in female, estrogen receptor positive        Plan:     Continue letrozole  Start Boniva.   RTC 3 M              Route Chart for Scheduling    Med Onc Chart Routing      Follow up with physician 3 months.   Follow up with MADELINE    Infusion scheduling note    Injection scheduling note    Labs None   Scheduling:  Preferred lab:  Lab interval:     Imaging None      Pharmacy appointment No pharmacy appointment needed      Other referrals No nutrition appointment needed -  No referral to Oncology Primary Care needed -  No massage appointment needed    No additional referrals needed

## 2025-05-13 ENCOUNTER — PATIENT MESSAGE (OUTPATIENT)
Dept: HEMATOLOGY/ONCOLOGY | Facility: CLINIC | Age: 70
End: 2025-05-13
Payer: MEDICARE

## 2025-06-04 NOTE — PROGRESS NOTES
[facility] Progress Note  6/10/2025          Reason for Visit:      had concerns including Annual Exam.       History of Present Illness:     Patient presents today for routine check up    Breast cancer with recurrence in past year  Letrozole  She has history of breast cancer initially diagnosed in 2016 with recurrence detected on annual mammogram in July. She underwent double mastectomy on September 4th, complicated by hematoma requiring surgical intervention. She subsequently required additional follow-up surgery for a non-healing area, followed by revision surgery in April. She is currently on a 5-year course of letrozole.    Lung nodule  CT revealed a 4mm lung spot which remained stable on repeat imaging in May.    UTI  She currently has a UTI that developed Friday night and is on antibiotics.    Dyslipidemia  She continues Lipitor with good response and no side effects    Osteoporosis  She recently started monthly Boniva following bone scan results and denies side effects after first dose.     HTN  Amlodipine 5 mg daily  On [facility] medicine  Says has been controlled on current medication  No side effects     Hypothyroidism, s/p thyroid cancer  Levothyroxine 88 mcg daily  2006, thyroidectomy for papillary thyroid cancer  Treated with radioactive Iodine after surgery  Thyroglobulin has been undetectable.  Follows with Endocrine    She consumes 1-2 alcoholic drinks on weekend nights, limited due to reflux aggravation. She is a former social smoker who consumed approximately 1 pack per week and quit in the 1980s.    She contracted COVID-19 in January 2022 while traveling by plane, initially presenting with mild symptoms resembling a sinus infection. She denies any other known COVID-19 infections.         Problem List:     Problem List[1]      Medications:     Current Medications[2]      Review of Systems:     Review of Systems   Constitutional:  Negative for chills and fever.   HENT:   "Negative for ear pain and sore throat.    Respiratory:  Negative for cough, shortness of breath and wheezing.    Cardiovascular:  Negative for chest pain and palpitations.   Gastrointestinal:  Negative for constipation, diarrhea, nausea and vomiting.   Genitourinary:  Negative for dysuria and hematuria.   Musculoskeletal:  Negative for joint swelling and joint deformity.   Neurological:  Negative for dizziness and weakness.         Physical Exam:     Temp:             Blood Pressure:  108/64        Pulse:   85     Respirations:     Weight:   57.4 kg (126 lb 8.7 oz)  Height:   5' 5" (1.651 m)  BMI:     Body mass index is 21.06 kg/m².    Physical Exam  Constitutional:       General: She is not in acute distress.  HENT:      Right Ear: Tympanic membrane normal.      Left Ear: Tympanic membrane normal.      Nose: No congestion or rhinorrhea.      Mouth/Throat:      Pharynx: No oropharyngeal exudate or posterior oropharyngeal erythema.   Cardiovascular:      Rate and Rhythm: Normal rate and regular rhythm.   Pulmonary:      Effort: Pulmonary effort is normal. No respiratory distress.      Breath sounds: No wheezing.   Abdominal:      Palpations: Abdomen is soft.      Tenderness: There is no abdominal tenderness.   Skin:     General: Skin is warm.   Neurological:      General: No focal deficit present.      Mental Status: She is alert and oriented to person, place, and time.         Labs/Imaging/Testing:     Most recent CBC:  Lab Results   Component Value Date    WBC 7.15 10/16/2024    HGB 11.5 (L) 10/16/2024     10/16/2024    MCV 92 10/16/2024       Most recent Lipids:  Lab Results   Component Value Date    CHOL 214 (H) 06/14/2024    HDL 50 06/14/2024    LDLCALC 144.6 06/14/2024    TRIG 97 06/14/2024       Most recent Chemistry:  Lab Results   Component Value Date     09/10/2024    K 4.2 09/10/2024     09/10/2024    CO2 23 09/10/2024    BUN 10 09/10/2024    CREATININE 0.9 09/10/2024     (H) " 09/10/2024    CALCIUM 8.8 09/10/2024    ALT 9 (L) 06/14/2024    AST 15 06/14/2024    ALKPHOS 38 (L) 06/14/2024    PROT 6.6 06/14/2024    ALBUMIN 3.6 06/14/2024       Other tests:  Lab Results   Component Value Date    TSH 1.349 02/10/2025    FREET4 1.21 11/14/2024    INR 0.9 07/04/2017    HGBA1C 5.2 06/14/2024    IRON 101 06/16/2023    VSIVQOWH80 282 06/16/2023    QOVIDWHO18AT 21 (L) 09/26/2024    MG 2.1 02/24/2015    CRP 0.9 01/25/2021    LIPASE 21 04/18/2023       Assessment and Plan:     Visit Summary:  Reviewed breast cancer recurrence and treatment, including bilateral mastectomy and subsequent surgeries.  Evaluated appropriateness of Boniva for bone health, particularly important given letrozole therapy.  Reviewed thyroid function status, noting recent improvement in TSH levels.  Reviewed incidental 4 mm lung nodule found on previous CT, noting stability and low concern given size and lack of change.    - Order routine lipid panel  - Schedule lab appointment at convenient location when fasting  - Continue amlodipine for blood pressure control; refilled today  - Continue current dose of thyroid medication at 88 mcg  - Continue letrozole for 5 years as hormonal therapy  - Treat UTI with prescribed antibiotics  - Obtain Tdap vaccine at pharmacy or [facility] at [employer]  - Follow up with endocrinology for ongoing thyroid management  - Contact office for lab results by evening of test day  - Oncologist to continue monitoring every 3 months for the first year         1. Malignant neoplasm of upper-inner quadrant of right breast in female, estrogen receptor positive  - Monitored recurrence diagnosed during annual mammogram in July, followed by double mastectomy on September 4th with subsequent surgeries for hematoma and non-healing area.  - Recurrence was Stage I, consistent with previous cancer type.  - Treatment plan includes continued letrozole for 5 years as hormonal therapy.  - Documented bilateral mastectomy in  surgical history to discontinue mammogram notifications.    2. Hypertension, unspecified type  - Blood pressure well-controlled between 105-115 mmHg on current amlodipine, which was refilled today.    - CBC Auto Differential; Future  - Comprehensive Metabolic Panel; Future  - Lipid Panel; Future  - Hemoglobin A1C; Future  - amLODIPine (NORVASC) 5 MG tablet; Take 1 tablet (5 mg total) by mouth once daily.  Dispense: 90 tablet; Refill: 3    3. Post-surgical hypothyroidism  - Thyroid function tests from last fall and February show improvement from previously elevated TSH levels.  - Continuing current dose of thyroid medication at 88 mcg with ongoing endocrinology follow-up.    4. Lung nodule  - Monitoring 4 mm pulmonary nodule discovered during CT last year, presumed to be scar tissue.  - Repeat CT in May showed stability with no changes.    5. Dyslipidemia  - Ordered routine lipid panel.  - Patient advised to schedule lab appointment at a convenient location when fasting.    6. Osteoporosis, unspecified osteoporosis type, unspecified pathological fracture presence   Continue boniva    - Patient contracted COVID-19 in January of last year, likely during travel.  - Advised on current vaccine recommendations for individuals 65 and older: 2 doses per year  by 6 months.    - Discussed pneumonia vaccine guidelines: lifetime coverage with 1 dose of Prevnar 20 after age 50.  - Recommend Tdap vaccine once as an adult with tetanus boosters every 10 years thereafter; patient [facility] obtain at pharmacy [facility] [place] [facility] [place].  - Patient instructed to contact office for lab results by evening of test day; office will reach out if there are any concerns.            Adrián Javier MD  6/10/2025    This note was prepared using voice-recognition software.  Although efforts are made to proofread the note, some errors may persist in the final document.         [1]   Patient Active Problem List  Diagnosis    Post-surgical  hypothyroidism    Gastro-esophageal reflux disease without esophagitis    History of thyroid cancer    Lumbar radiculopathy    Tinnitus of right ear    Decreased ROM of neck    Hand pain    Chronic pain    Right knee pain    Hyperlipidemia, unspecified    Acne    Primary hypertension    Disorder of breast, unspecified    Disorder of thyroid, unspecified    Malignant (primary) neoplasm, unspecified    Vitamin D deficiency    Nuclear sclerotic cataract of right eye    History of breast cancer in female    At risk for lymphedema    Malignant neoplasm of upper-inner quadrant of right breast in female, estrogen receptor positive    Psychophysiological insomnia    Pulmonary nodule    Osteopenia of multiple sites   [2]   Current Outpatient Medications:     cholecalciferol, vitamin D3, (VITAMIN D3) 25 mcg (1,000 unit) capsule, Take 1 capsule (1,000 Units total) by mouth once daily., Disp: 30 capsule, Rfl: 11    ibandronate (BONIVA) 150 mg tablet, Take 1 tablet (150 mg total) by mouth every 30 days., Disp: 3 tablet, Rfl: 3    letrozole (FEMARA) 2.5 mg Tab, Take 1 tablet (2.5 mg total) by mouth once daily., Disp: 30 tablet, Rfl: 11    levothyroxine (SYNTHROID) 88 MCG tablet, Take 1 tablet (88 mcg total) by mouth before breakfast., Disp: 30 tablet, Rfl: 11    loratadine (CLARITIN) 10 mg tablet, Take 10 mg by mouth daily as needed. AS NEEDED FOR ALLERGIES, Disp: , Rfl:     ondansetron (ZOFRAN-ODT) 4 MG TbDL, Take 1 tablet (4 mg total) by mouth every 6 (six) hours as needed., Disp: 10 tablet, Rfl: 0    pantoprazole (PROTONIX) 40 MG tablet, Take 1 tablet (40 mg total) by mouth once daily., Disp: 90 tablet, Rfl: 3    potassium chloride SA (K-DUR,KLOR-CON) 10 MEQ tablet, Take 20 mEq by mouth once daily., Disp: , Rfl:     semaglutide, weight loss, (WEGOVY) 0.25 mg/0.5 mL PnIj, Inject 2.5 mg into the skin once a week., Disp: , Rfl:     tretinoin (RETIN-A) 0.025 % cream, Compound tretinoin 0.025% / niacinamide 2% cream / hyaluronic  acid 0.25%. Apply a pea-sized amount to entire face qhs., Disp: 30 g, Rfl: 11    amLODIPine (NORVASC) 5 MG tablet, Take 1 tablet (5 mg total) by mouth once daily., Disp: 90 tablet, Rfl: 3  No current facility-administered medications for this visit.    Facility-Administered Medications Ordered in Other Visits:     0.9%  NaCl infusion, , Intravenous, Continuous, Hakan Garces PA-C, Last Rate: 70 mL/hr at 10/04/23 1113, New Bag at 10/04/23 1113    haloperidol lactate injection 0.5 mg, 0.5 mg, Intravenous, Q10 Min PRN, Crystal Holloway MD    HYDROmorphone injection 0.2 mg, 0.2 mg, Intravenous, Q5 Min PRN, Crystal Holloway MD    LIDOcaine (PF) 10 mg/ml (1%) injection 10 mg, 1 mL, Intradermal, Once, Eric Cardona MD    sodium chloride 0.9% flush 10 mL, 10 mL, Intravenous, PRN, Crystal Holloway MD

## 2025-06-06 ENCOUNTER — OFFICE VISIT (OUTPATIENT)
Dept: URGENT CARE | Facility: CLINIC | Age: 70
End: 2025-06-06
Payer: MEDICARE

## 2025-06-06 VITALS
OXYGEN SATURATION: 98 % | SYSTOLIC BLOOD PRESSURE: 112 MMHG | WEIGHT: 130 LBS | BODY MASS INDEX: 21.66 KG/M2 | TEMPERATURE: 98 F | HEIGHT: 65 IN | RESPIRATION RATE: 16 BRPM | DIASTOLIC BLOOD PRESSURE: 77 MMHG | HEART RATE: 82 BPM

## 2025-06-06 DIAGNOSIS — N39.0 URINARY TRACT INFECTION WITH HEMATURIA, SITE UNSPECIFIED: Primary | ICD-10-CM

## 2025-06-06 DIAGNOSIS — R31.9 URINARY TRACT INFECTION WITH HEMATURIA, SITE UNSPECIFIED: Primary | ICD-10-CM

## 2025-06-06 DIAGNOSIS — R30.0 DYSURIA: ICD-10-CM

## 2025-06-06 LAB
BILIRUBIN, UA POC OHS: NEGATIVE
BLOOD, UA POC OHS: ABNORMAL
CLARITY, UA POC OHS: ABNORMAL
COLOR, UA POC OHS: YELLOW
GLUCOSE, UA POC OHS: NEGATIVE
KETONES, UA POC OHS: NEGATIVE
LEUKOCYTES, UA POC OHS: ABNORMAL
NITRITE, UA POC OHS: NEGATIVE
PH, UA POC OHS: 5.5
PROTEIN, UA POC OHS: 30
SPECIFIC GRAVITY, UA POC OHS: 1.01
UROBILINOGEN, UA POC OHS: 0.2

## 2025-06-06 PROCEDURE — 81003 URINALYSIS AUTO W/O SCOPE: CPT | Mod: QW,S$GLB,,

## 2025-06-06 PROCEDURE — 87086 URINE CULTURE/COLONY COUNT: CPT

## 2025-06-06 PROCEDURE — 99214 OFFICE O/P EST MOD 30 MIN: CPT | Mod: S$GLB,,,

## 2025-06-06 RX ORDER — NITROFURANTOIN 25; 75 MG/1; MG/1
100 CAPSULE ORAL 2 TIMES DAILY
Qty: 10 CAPSULE | Refills: 0 | Status: SHIPPED | OUTPATIENT
Start: 2025-06-06 | End: 2025-06-10

## 2025-06-07 NOTE — PATIENT INSTRUCTIONS
If your condition worsens or fails to improve we recommend that you receive another evaluation at the ER immediately or contact your PCP to discuss your concerns or return here. You must understand that you've received an urgent care treatment only and that you may be released before all your medical problems are known or treated. You the patient will arrange for followup care as instructed.     If you had cultures done it will take 3-5 days to result. We will call you with the result.     Cranberry juice may help. Get the 100% cranberry juice and mix 4 oz of juice with 4 oz of water and drink this 8 oz glass of liquid once a day.   Increase water intake to at least 8-10 glasses/day.    Avoid caffeine, alcohol, or spicy foods as they irritate the bladder.      If symptoms do not improve in 2-3 days please return for evaluation

## 2025-06-07 NOTE — PROGRESS NOTES
"Subjective:      Patient ID: Sherry Torres is a 69 y.o. female.    Vitals:  height is 5' 5" (1.651 m) and weight is 59 kg (130 lb). Her oral temperature is 98.3 °F (36.8 °C). Her blood pressure is 112/77 and her pulse is 82. Her respiration is 16 and oxygen saturation is 98%.     Chief Complaint: Urinary Tract Infection    This is a 69 y.o. female who presents today with a chief complaint of UTI. Symptoms started yesterday. Urine is cloudy and patient has a burning sensation during urination. States macrobid has worked well for her in the past. Declines any fevers, chills, or flank pain       Urinary Tract Infection   This is a new problem. The current episode started yesterday. The problem has been gradually worsening. The quality of the pain is described as burning. The pain is at a severity of 8/10. The pain is severe. There has been no fever. She is Sexually active. There is No history of pyelonephritis. Associated symptoms include frequency and urgency. Pertinent negatives include no flank pain. She has tried increased fluids for the symptoms. The treatment provided no relief.       Constitution: Negative.   HENT: Negative.     Neck: neck negative.   Cardiovascular: Negative.    Eyes: Negative.    Respiratory: Negative.     Gastrointestinal: Negative.    Endocrine: negative.   Genitourinary:  Positive for dysuria, frequency and urgency. Negative for urine decreased and flank pain.   Musculoskeletal: Negative.    Skin: Negative.    Allergic/Immunologic: Negative.    Neurological: Negative.    Hematologic/Lymphatic: Negative.    Psychiatric/Behavioral: Negative.        Objective:     Physical Exam   Constitutional: She is oriented to person, place, and time.  Non-toxic appearance. No distress.   HENT:   Head: Normocephalic and atraumatic.   Ears:   Right Ear: External ear normal.   Left Ear: External ear normal.   Cardiovascular: Normal rate.   Pulmonary/Chest: Effort normal and breath sounds normal. No " stridor. No respiratory distress. She has no wheezes. She has no rhonchi. She has no rales.   Abdominal: She exhibits no distension. There is no abdominal tenderness. There is no rebound, no guarding, no left CVA tenderness and no right CVA tenderness.   Musculoskeletal: Normal range of motion.         General: Normal range of motion.   Neurological: She is alert and oriented to person, place, and time.   Skin: Skin is warm, dry and not diaphoretic.   Psychiatric: Her behavior is normal. Mood, judgment and thought content normal.     Results for orders placed or performed in visit on 06/06/25   POCT Urinalysis(Instrument)    Collection Time: 06/06/25  7:35 PM   Result Value Ref Range    Color, POC UA Yellow Yellow, Straw, Colorless    Clarity, POC UA Cloudy (A) Clear    Glucose, POC UA Negative Negative    Bilirubin, POC UA Negative Negative    Ketones, POC UA Negative Negative    Spec Grav POC UA 1.010 1.005 - 1.030    Blood, POC UA Moderate (A) Negative    pH, POC UA 5.5 5.0 - 8.0    Protein, POC UA 30 (A) Negative    Urobilinogen, POC UA 0.2 <=1.0    Nitrite, POC UA Negative Negative    WBC, POC UA Large (A) Negative       Assessment:     1. Urinary tract infection with hematuria, site unspecified    2. Dysuria        Plan:   I have reviewed the patient chart and pertinent past imaging/labs.      Urinary tract infection with hematuria, site unspecified  -     nitrofurantoin, macrocrystal-monohydrate, (MACROBID) 100 MG capsule; Take 1 capsule (100 mg total) by mouth 2 (two) times daily. for 5 days  Dispense: 10 capsule; Refill: 0  -     Urine Culture High Risk    Dysuria  -     POCT Urinalysis(Instrument)      Patient Instructions   If your condition worsens or fails to improve we recommend that you receive another evaluation at the ER immediately or contact your PCP to discuss your concerns or return here. You must understand that you've received an urgent care treatment only and that you may be released before  all your medical problems are known or treated. You the patient will arrange for followup care as instructed.     If you had cultures done it will take 3-5 days to result. We will call you with the result.     Cranberry juice may help. Get the 100% cranberry juice and mix 4 oz of juice with 4 oz of water and drink this 8 oz glass of liquid once a day.   Increase water intake to at least 8-10 glasses/day.    Avoid caffeine, alcohol, or spicy foods as they irritate the bladder.      If symptoms do not improve in 2-3 days please return for evaluation

## 2025-06-09 ENCOUNTER — RESULTS FOLLOW-UP (OUTPATIENT)
Dept: URGENT CARE | Facility: CLINIC | Age: 70
End: 2025-06-09

## 2025-06-10 ENCOUNTER — OFFICE VISIT (OUTPATIENT)
Dept: PRIMARY CARE CLINIC | Facility: CLINIC | Age: 70
End: 2025-06-10
Payer: MEDICARE

## 2025-06-10 ENCOUNTER — PATIENT MESSAGE (OUTPATIENT)
Dept: URGENT CARE | Facility: CLINIC | Age: 70
End: 2025-06-10
Payer: MEDICARE

## 2025-06-10 VITALS
SYSTOLIC BLOOD PRESSURE: 108 MMHG | DIASTOLIC BLOOD PRESSURE: 64 MMHG | HEART RATE: 85 BPM | OXYGEN SATURATION: 98 % | HEIGHT: 65 IN | WEIGHT: 126.56 LBS | BODY MASS INDEX: 21.09 KG/M2

## 2025-06-10 DIAGNOSIS — M81.0 OSTEOPOROSIS, UNSPECIFIED OSTEOPOROSIS TYPE, UNSPECIFIED PATHOLOGICAL FRACTURE PRESENCE: ICD-10-CM

## 2025-06-10 DIAGNOSIS — E78.5 DYSLIPIDEMIA: ICD-10-CM

## 2025-06-10 DIAGNOSIS — I10 HYPERTENSION, UNSPECIFIED TYPE: Primary | ICD-10-CM

## 2025-06-10 DIAGNOSIS — C50.211 MALIGNANT NEOPLASM OF UPPER-INNER QUADRANT OF RIGHT BREAST IN FEMALE, ESTROGEN RECEPTOR POSITIVE: ICD-10-CM

## 2025-06-10 DIAGNOSIS — R91.1 LUNG NODULE: ICD-10-CM

## 2025-06-10 DIAGNOSIS — E89.0 POST-SURGICAL HYPOTHYROIDISM: ICD-10-CM

## 2025-06-10 DIAGNOSIS — Z17.0 MALIGNANT NEOPLASM OF UPPER-INNER QUADRANT OF RIGHT BREAST IN FEMALE, ESTROGEN RECEPTOR POSITIVE: ICD-10-CM

## 2025-06-10 LAB — BACTERIA UR CULT: ABNORMAL

## 2025-06-10 PROCEDURE — 99999 PR PBB SHADOW E&M-EST. PATIENT-LVL IV: CPT | Mod: PBBFAC,,, | Performed by: INTERNAL MEDICINE

## 2025-06-10 RX ORDER — AMLODIPINE BESYLATE 5 MG/1
5 TABLET ORAL DAILY
Qty: 90 TABLET | Refills: 0 | Status: SHIPPED | OUTPATIENT
Start: 2025-06-10 | End: 2025-06-10 | Stop reason: SDUPTHER

## 2025-06-10 RX ORDER — AMLODIPINE BESYLATE 5 MG/1
5 TABLET ORAL DAILY
Qty: 90 TABLET | Refills: 3 | Status: SHIPPED | OUTPATIENT
Start: 2025-06-10 | End: 2026-06-10

## 2025-06-17 ENCOUNTER — RESULTS FOLLOW-UP (OUTPATIENT)
Dept: PRIMARY CARE CLINIC | Facility: CLINIC | Age: 70
End: 2025-06-17
Payer: MEDICARE

## 2025-06-17 ENCOUNTER — LAB VISIT (OUTPATIENT)
Dept: LAB | Facility: HOSPITAL | Age: 70
End: 2025-06-17
Attending: INTERNAL MEDICINE
Payer: MEDICARE

## 2025-06-17 DIAGNOSIS — I10 HYPERTENSION, UNSPECIFIED TYPE: ICD-10-CM

## 2025-06-17 DIAGNOSIS — E89.0 POST-SURGICAL HYPOTHYROIDISM: ICD-10-CM

## 2025-06-17 LAB
ABSOLUTE EOSINOPHIL (OHS): 0.15 K/UL
ABSOLUTE MONOCYTE (OHS): 0.52 K/UL (ref 0.3–1)
ABSOLUTE NEUTROPHIL COUNT (OHS): 3.57 K/UL (ref 1.8–7.7)
ALBUMIN SERPL BCP-MCNC: 4.1 G/DL (ref 3.5–5.2)
ALP SERPL-CCNC: 46 UNIT/L (ref 40–150)
ALT SERPL W/O P-5'-P-CCNC: 7 UNIT/L (ref 10–44)
ANION GAP (OHS): 9 MMOL/L (ref 8–16)
AST SERPL-CCNC: 15 UNIT/L (ref 11–45)
BASOPHILS # BLD AUTO: 0.04 K/UL
BASOPHILS NFR BLD AUTO: 0.7 %
BILIRUB SERPL-MCNC: 0.4 MG/DL (ref 0.1–1)
BUN SERPL-MCNC: 9 MG/DL (ref 8–23)
CALCIUM SERPL-MCNC: 9.2 MG/DL (ref 8.7–10.5)
CHLORIDE SERPL-SCNC: 106 MMOL/L (ref 95–110)
CHOLEST SERPL-MCNC: 249 MG/DL (ref 120–199)
CHOLEST/HDLC SERPL: 3.7 {RATIO} (ref 2–5)
CO2 SERPL-SCNC: 24 MMOL/L (ref 23–29)
CREAT SERPL-MCNC: 0.8 MG/DL (ref 0.5–1.4)
EAG (OHS): 103 MG/DL (ref 68–131)
ERYTHROCYTE [DISTWIDTH] IN BLOOD BY AUTOMATED COUNT: 13.9 % (ref 11.5–14.5)
GFR SERPLBLD CREATININE-BSD FMLA CKD-EPI: >60 ML/MIN/1.73/M2
GLUCOSE SERPL-MCNC: 79 MG/DL (ref 70–110)
HBA1C MFR BLD: 5.2 % (ref 4–5.6)
HCT VFR BLD AUTO: 39.9 % (ref 37–48.5)
HDLC SERPL-MCNC: 68 MG/DL (ref 40–75)
HDLC SERPL: 27.3 % (ref 20–50)
HGB BLD-MCNC: 12.5 GM/DL (ref 12–16)
IMM GRANULOCYTES # BLD AUTO: 0.02 K/UL (ref 0–0.04)
IMM GRANULOCYTES NFR BLD AUTO: 0.3 % (ref 0–0.5)
LDLC SERPL CALC-MCNC: 162.6 MG/DL (ref 63–159)
LYMPHOCYTES # BLD AUTO: 1.65 K/UL (ref 1–4.8)
MCH RBC QN AUTO: 29 PG (ref 27–31)
MCHC RBC AUTO-ENTMCNC: 31.3 G/DL (ref 32–36)
MCV RBC AUTO: 93 FL (ref 82–98)
NONHDLC SERPL-MCNC: 181 MG/DL
NUCLEATED RBC (/100WBC) (OHS): 0 /100 WBC
PLATELET # BLD AUTO: 508 K/UL (ref 150–450)
PMV BLD AUTO: 9.8 FL (ref 9.2–12.9)
POTASSIUM SERPL-SCNC: 4.3 MMOL/L (ref 3.5–5.1)
PROT SERPL-MCNC: 7.2 GM/DL (ref 6–8.4)
RBC # BLD AUTO: 4.31 M/UL (ref 4–5.4)
RELATIVE EOSINOPHIL (OHS): 2.5 %
RELATIVE LYMPHOCYTE (OHS): 27.7 % (ref 18–48)
RELATIVE MONOCYTE (OHS): 8.7 % (ref 4–15)
RELATIVE NEUTROPHIL (OHS): 60.1 % (ref 38–73)
SODIUM SERPL-SCNC: 139 MMOL/L (ref 136–145)
TRIGL SERPL-MCNC: 92 MG/DL (ref 30–150)
WBC # BLD AUTO: 5.95 K/UL (ref 3.9–12.7)

## 2025-06-17 PROCEDURE — 36415 COLL VENOUS BLD VENIPUNCTURE: CPT | Mod: PN

## 2025-06-17 PROCEDURE — 82465 ASSAY BLD/SERUM CHOLESTEROL: CPT

## 2025-06-17 PROCEDURE — 83036 HEMOGLOBIN GLYCOSYLATED A1C: CPT

## 2025-06-17 PROCEDURE — 85025 COMPLETE CBC W/AUTO DIFF WBC: CPT

## 2025-06-17 PROCEDURE — 82435 ASSAY OF BLOOD CHLORIDE: CPT

## 2025-06-18 RX ORDER — ROSUVASTATIN CALCIUM 20 MG/1
20 TABLET, COATED ORAL DAILY
Qty: 90 TABLET | Refills: 3 | Status: SHIPPED | OUTPATIENT
Start: 2025-06-18 | End: 2026-06-18

## 2025-07-07 DIAGNOSIS — E89.0 POST-SURGICAL HYPOTHYROIDISM: ICD-10-CM

## 2025-07-07 DIAGNOSIS — K21.9 HIATAL HERNIA WITH GERD: ICD-10-CM

## 2025-07-07 DIAGNOSIS — K44.9 HIATAL HERNIA WITH GERD: ICD-10-CM

## 2025-07-07 RX ORDER — LEVOTHYROXINE SODIUM 88 UG/1
88 TABLET ORAL
Qty: 90 TABLET | Refills: 1 | Status: SHIPPED | OUTPATIENT
Start: 2025-07-07 | End: 2026-07-07

## 2025-07-07 RX ORDER — PANTOPRAZOLE SODIUM 40 MG/1
40 TABLET, DELAYED RELEASE ORAL DAILY
Qty: 90 TABLET | Refills: 3 | Status: SHIPPED | OUTPATIENT
Start: 2025-07-07 | End: 2026-07-07

## 2025-08-10 ENCOUNTER — HOSPITAL ENCOUNTER (EMERGENCY)
Facility: HOSPITAL | Age: 70
Discharge: HOME | End: 2025-08-10
Attending: EMERGENCY MEDICINE
Payer: MEDICARE

## 2025-08-10 VITALS
WEIGHT: 122 LBS | OXYGEN SATURATION: 100 % | HEART RATE: 72 BPM | SYSTOLIC BLOOD PRESSURE: 124 MMHG | HEIGHT: 65 IN | RESPIRATION RATE: 18 BRPM | BODY MASS INDEX: 20.33 KG/M2 | DIASTOLIC BLOOD PRESSURE: 61 MMHG | TEMPERATURE: 98 F

## 2025-08-10 DIAGNOSIS — K92.2 GASTROINTESTINAL HEMORRHAGE, UNSPECIFIED GASTROINTESTINAL HEMORRHAGE TYPE: ICD-10-CM

## 2025-08-10 DIAGNOSIS — R11.2 NAUSEA, VOMITING, AND DIARRHEA: ICD-10-CM

## 2025-08-10 DIAGNOSIS — A05.9 FOOD POISONING: Primary | ICD-10-CM

## 2025-08-10 DIAGNOSIS — R10.84 GENERALIZED ABDOMINAL PAIN: ICD-10-CM

## 2025-08-10 DIAGNOSIS — R19.7 NAUSEA, VOMITING, AND DIARRHEA: ICD-10-CM

## 2025-08-10 LAB
ABSOLUTE EOSINOPHIL (OHS): 0.02 K/UL
ABSOLUTE MONOCYTE (OHS): 0.65 K/UL (ref 0.3–1)
ABSOLUTE NEUTROPHIL COUNT (OHS): 11.01 K/UL (ref 1.8–7.7)
ALBUMIN SERPL BCP-MCNC: 4.2 G/DL (ref 3.5–5.2)
ALP SERPL-CCNC: 46 UNIT/L (ref 40–150)
ALT SERPL W/O P-5'-P-CCNC: 10 UNIT/L (ref 0–55)
ANION GAP (OHS): 13 MMOL/L (ref 8–16)
AST SERPL-CCNC: 22 UNIT/L (ref 0–50)
BASOPHILS # BLD AUTO: 0.04 K/UL
BASOPHILS NFR BLD AUTO: 0.3 %
BILIRUB SERPL-MCNC: 0.6 MG/DL (ref 0.1–1)
BILIRUB UR QL STRIP.AUTO: NEGATIVE
BUN SERPL-MCNC: 11 MG/DL (ref 8–23)
BUN SERPL-MCNC: 12 MG/DL (ref 6–30)
CALCIUM SERPL-MCNC: 9.6 MG/DL (ref 8.7–10.5)
CHLORIDE SERPL-SCNC: 105 MMOL/L (ref 95–110)
CHLORIDE SERPL-SCNC: 107 MMOL/L (ref 95–110)
CLARITY UR: CLEAR
CO2 SERPL-SCNC: 21 MMOL/L (ref 23–29)
COLOR UR AUTO: YELLOW
CREAT SERPL-MCNC: 0.8 MG/DL (ref 0.5–1.4)
CREAT SERPL-MCNC: 0.8 MG/DL (ref 0.5–1.4)
ERYTHROCYTE [DISTWIDTH] IN BLOOD BY AUTOMATED COUNT: 13.7 % (ref 11.5–14.5)
GFR SERPLBLD CREATININE-BSD FMLA CKD-EPI: >60 ML/MIN/1.73/M2
GLUCOSE SERPL-MCNC: 93 MG/DL (ref 70–110)
GLUCOSE SERPL-MCNC: 98 MG/DL (ref 70–110)
GLUCOSE UR QL STRIP: NEGATIVE
HCT VFR BLD AUTO: 40.2 % (ref 37–48.5)
HCT VFR BLD CALC: 41 %PCV (ref 36–54)
HCV AB SERPL QL IA: NORMAL
HGB BLD-MCNC: 13.4 GM/DL (ref 12–16)
HGB UR QL STRIP: ABNORMAL
HIV 1+2 AB+HIV1 P24 AG SERPL QL IA: NORMAL
IMM GRANULOCYTES # BLD AUTO: 0.04 K/UL (ref 0–0.04)
IMM GRANULOCYTES NFR BLD AUTO: 0.3 % (ref 0–0.5)
KETONES UR QL STRIP: ABNORMAL
LEUKOCYTE ESTERASE UR QL STRIP: NEGATIVE
LIPASE SERPL-CCNC: 39 U/L (ref 4–60)
LYMPHOCYTES # BLD AUTO: 1.04 K/UL (ref 1–4.8)
MCH RBC QN AUTO: 29.6 PG (ref 27–31)
MCHC RBC AUTO-ENTMCNC: 33.3 G/DL (ref 32–36)
MCV RBC AUTO: 89 FL (ref 82–98)
MICROSCOPIC COMMENT: ABNORMAL
NITRITE UR QL STRIP: NEGATIVE
NUCLEATED RBC (/100WBC) (OHS): 0 /100 WBC
PH UR STRIP: 6 [PH]
PLATELET # BLD AUTO: 406 K/UL (ref 150–450)
PMV BLD AUTO: 10.4 FL (ref 9.2–12.9)
POC IONIZED CALCIUM: 1.11 MMOL/L (ref 1.06–1.42)
POC TCO2 (MEASURED): 24 MMOL/L (ref 23–29)
POTASSIUM BLD-SCNC: 3.8 MMOL/L (ref 3.5–5.1)
POTASSIUM SERPL-SCNC: 4 MMOL/L (ref 3.5–5.1)
PROT SERPL-MCNC: 7.4 GM/DL (ref 6–8.4)
PROT UR QL STRIP: NEGATIVE
RBC # BLD AUTO: 4.53 M/UL (ref 4–5.4)
RBC #/AREA URNS AUTO: 5 /HPF (ref 0–4)
RELATIVE EOSINOPHIL (OHS): 0.2 %
RELATIVE LYMPHOCYTE (OHS): 8.1 % (ref 18–48)
RELATIVE MONOCYTE (OHS): 5.1 % (ref 4–15)
RELATIVE NEUTROPHIL (OHS): 86 % (ref 38–73)
SAMPLE: NORMAL
SODIUM BLD-SCNC: 140 MMOL/L (ref 136–145)
SODIUM SERPL-SCNC: 141 MMOL/L (ref 136–145)
SP GR UR STRIP: 1.02
SQUAMOUS #/AREA URNS AUTO: 1 /HPF
UROBILINOGEN UR STRIP-ACNC: NEGATIVE EU/DL
WBC # BLD AUTO: 12.8 K/UL (ref 3.9–12.7)
WBC #/AREA URNS AUTO: 4 /HPF (ref 0–5)

## 2025-08-10 PROCEDURE — 83690 ASSAY OF LIPASE: CPT | Performed by: EMERGENCY MEDICINE

## 2025-08-10 PROCEDURE — 87389 HIV-1 AG W/HIV-1&-2 AB AG IA: CPT | Performed by: PHYSICIAN ASSISTANT

## 2025-08-10 PROCEDURE — 96361 HYDRATE IV INFUSION ADD-ON: CPT

## 2025-08-10 PROCEDURE — 80047 BASIC METABLC PNL IONIZED CA: CPT

## 2025-08-10 PROCEDURE — 25000003 PHARM REV CODE 250: Performed by: EMERGENCY MEDICINE

## 2025-08-10 PROCEDURE — 84075 ASSAY ALKALINE PHOSPHATASE: CPT | Performed by: EMERGENCY MEDICINE

## 2025-08-10 PROCEDURE — 99284 EMERGENCY DEPT VISIT MOD MDM: CPT | Mod: 25

## 2025-08-10 PROCEDURE — 85025 COMPLETE CBC W/AUTO DIFF WBC: CPT | Performed by: EMERGENCY MEDICINE

## 2025-08-10 PROCEDURE — 81001 URINALYSIS AUTO W/SCOPE: CPT | Performed by: EMERGENCY MEDICINE

## 2025-08-10 PROCEDURE — 63600175 PHARM REV CODE 636 W HCPCS: Performed by: EMERGENCY MEDICINE

## 2025-08-10 PROCEDURE — 86803 HEPATITIS C AB TEST: CPT | Performed by: PHYSICIAN ASSISTANT

## 2025-08-10 PROCEDURE — 96374 THER/PROPH/DIAG INJ IV PUSH: CPT

## 2025-08-10 RX ORDER — DICYCLOMINE HYDROCHLORIDE 10 MG/1
20 CAPSULE ORAL
Status: COMPLETED | OUTPATIENT
Start: 2025-08-10 | End: 2025-08-10

## 2025-08-10 RX ORDER — PROMETHAZINE HYDROCHLORIDE 12.5 MG/1
12.5 TABLET ORAL EVERY 6 HOURS PRN
Qty: 10 TABLET | Refills: 0 | Status: SHIPPED | OUTPATIENT
Start: 2025-08-10

## 2025-08-10 RX ORDER — ONDANSETRON HYDROCHLORIDE 2 MG/ML
4 INJECTION, SOLUTION INTRAVENOUS
Status: COMPLETED | OUTPATIENT
Start: 2025-08-10 | End: 2025-08-10

## 2025-08-10 RX ORDER — ONDANSETRON 4 MG/1
4 TABLET, ORALLY DISINTEGRATING ORAL EVERY 6 HOURS PRN
Qty: 10 TABLET | Refills: 0 | Status: SHIPPED | OUTPATIENT
Start: 2025-08-10

## 2025-08-10 RX ORDER — DICYCLOMINE HYDROCHLORIDE 20 MG/1
20 TABLET ORAL EVERY 6 HOURS PRN
Qty: 20 TABLET | Refills: 0 | Status: SHIPPED | OUTPATIENT
Start: 2025-08-10 | End: 2025-08-30

## 2025-08-10 RX ADMIN — DICYCLOMINE HYDROCHLORIDE 20 MG: 10 CAPSULE ORAL at 12:08

## 2025-08-10 RX ADMIN — SODIUM CHLORIDE, POTASSIUM CHLORIDE, SODIUM LACTATE AND CALCIUM CHLORIDE 1000 ML: 600; 310; 30; 20 INJECTION, SOLUTION INTRAVENOUS at 12:08

## 2025-08-10 RX ADMIN — ONDANSETRON 4 MG: 2 INJECTION INTRAMUSCULAR; INTRAVENOUS at 11:08

## 2025-08-11 ENCOUNTER — PATIENT MESSAGE (OUTPATIENT)
Dept: PRIMARY CARE CLINIC | Facility: CLINIC | Age: 70
End: 2025-08-11
Payer: MEDICARE

## 2025-08-11 LAB
HOLD SPECIMEN: NORMAL
OHS QRS DURATION: 74 MS
OHS QTC CALCULATION: 444 MS

## 2025-08-13 LAB — HOLD SPECIMEN: NORMAL

## 2025-08-14 ENCOUNTER — PATIENT MESSAGE (OUTPATIENT)
Dept: SURGERY | Facility: CLINIC | Age: 70
End: 2025-08-14
Payer: MEDICARE

## 2025-08-18 ENCOUNTER — PATIENT OUTREACH (OUTPATIENT)
Dept: ADMINISTRATIVE | Facility: HOSPITAL | Age: 70
End: 2025-08-18
Payer: MEDICARE

## 2025-08-18 ENCOUNTER — OFFICE VISIT (OUTPATIENT)
Dept: HEMATOLOGY/ONCOLOGY | Facility: CLINIC | Age: 70
End: 2025-08-18
Payer: MEDICARE

## 2025-08-18 VITALS
SYSTOLIC BLOOD PRESSURE: 125 MMHG | TEMPERATURE: 98 F | RESPIRATION RATE: 16 BRPM | HEART RATE: 76 BPM | DIASTOLIC BLOOD PRESSURE: 66 MMHG | WEIGHT: 126.13 LBS | HEIGHT: 65 IN | OXYGEN SATURATION: 100 % | BODY MASS INDEX: 21.01 KG/M2

## 2025-08-18 DIAGNOSIS — Z17.0 MALIGNANT NEOPLASM OF UPPER-INNER QUADRANT OF RIGHT BREAST IN FEMALE, ESTROGEN RECEPTOR POSITIVE: Primary | ICD-10-CM

## 2025-08-18 DIAGNOSIS — C50.211 MALIGNANT NEOPLASM OF UPPER-INNER QUADRANT OF RIGHT BREAST IN FEMALE, ESTROGEN RECEPTOR POSITIVE: Primary | ICD-10-CM

## 2025-08-18 PROCEDURE — 3044F HG A1C LEVEL LT 7.0%: CPT | Mod: CPTII,S$GLB,, | Performed by: INTERNAL MEDICINE

## 2025-08-18 PROCEDURE — 1101F PT FALLS ASSESS-DOCD LE1/YR: CPT | Mod: CPTII,S$GLB,, | Performed by: INTERNAL MEDICINE

## 2025-08-18 PROCEDURE — 1126F AMNT PAIN NOTED NONE PRSNT: CPT | Mod: CPTII,S$GLB,, | Performed by: INTERNAL MEDICINE

## 2025-08-18 PROCEDURE — 3078F DIAST BP <80 MM HG: CPT | Mod: CPTII,S$GLB,, | Performed by: INTERNAL MEDICINE

## 2025-08-18 PROCEDURE — 3008F BODY MASS INDEX DOCD: CPT | Mod: CPTII,S$GLB,, | Performed by: INTERNAL MEDICINE

## 2025-08-18 PROCEDURE — 3074F SYST BP LT 130 MM HG: CPT | Mod: CPTII,S$GLB,, | Performed by: INTERNAL MEDICINE

## 2025-08-18 PROCEDURE — 99213 OFFICE O/P EST LOW 20 MIN: CPT | Mod: S$GLB,,, | Performed by: INTERNAL MEDICINE

## 2025-08-18 PROCEDURE — 3288F FALL RISK ASSESSMENT DOCD: CPT | Mod: CPTII,S$GLB,, | Performed by: INTERNAL MEDICINE

## 2025-08-18 PROCEDURE — 1159F MED LIST DOCD IN RCRD: CPT | Mod: CPTII,S$GLB,, | Performed by: INTERNAL MEDICINE

## 2025-08-18 PROCEDURE — 99999 PR PBB SHADOW E&M-EST. PATIENT-LVL IV: CPT | Mod: PBBFAC,,, | Performed by: INTERNAL MEDICINE

## 2025-08-19 ENCOUNTER — OFFICE VISIT (OUTPATIENT)
Dept: PRIMARY CARE CLINIC | Facility: CLINIC | Age: 70
End: 2025-08-19
Payer: MEDICARE

## 2025-08-19 VITALS
BODY MASS INDEX: 20.79 KG/M2 | DIASTOLIC BLOOD PRESSURE: 70 MMHG | HEIGHT: 65 IN | WEIGHT: 124.75 LBS | SYSTOLIC BLOOD PRESSURE: 112 MMHG | HEART RATE: 77 BPM | RESPIRATION RATE: 14 BRPM | OXYGEN SATURATION: 99 %

## 2025-08-19 DIAGNOSIS — L98.8 RHYTIDES: ICD-10-CM

## 2025-08-19 DIAGNOSIS — A05.9 FOOD POISONING: Primary | ICD-10-CM

## 2025-08-19 DIAGNOSIS — E78.5 DYSLIPIDEMIA: ICD-10-CM

## 2025-08-19 PROCEDURE — 1159F MED LIST DOCD IN RCRD: CPT | Mod: CPTII,S$GLB,, | Performed by: INTERNAL MEDICINE

## 2025-08-19 PROCEDURE — 99999 PR PBB SHADOW E&M-EST. PATIENT-LVL IV: CPT | Mod: PBBFAC,,, | Performed by: INTERNAL MEDICINE

## 2025-08-19 PROCEDURE — 1101F PT FALLS ASSESS-DOCD LE1/YR: CPT | Mod: CPTII,S$GLB,, | Performed by: INTERNAL MEDICINE

## 2025-08-19 PROCEDURE — 3078F DIAST BP <80 MM HG: CPT | Mod: CPTII,S$GLB,, | Performed by: INTERNAL MEDICINE

## 2025-08-19 PROCEDURE — 99214 OFFICE O/P EST MOD 30 MIN: CPT | Mod: S$GLB,,, | Performed by: INTERNAL MEDICINE

## 2025-08-19 PROCEDURE — 3074F SYST BP LT 130 MM HG: CPT | Mod: CPTII,S$GLB,, | Performed by: INTERNAL MEDICINE

## 2025-08-19 PROCEDURE — 1126F AMNT PAIN NOTED NONE PRSNT: CPT | Mod: CPTII,S$GLB,, | Performed by: INTERNAL MEDICINE

## 2025-08-19 PROCEDURE — 3288F FALL RISK ASSESSMENT DOCD: CPT | Mod: CPTII,S$GLB,, | Performed by: INTERNAL MEDICINE

## 2025-08-19 PROCEDURE — 3044F HG A1C LEVEL LT 7.0%: CPT | Mod: CPTII,S$GLB,, | Performed by: INTERNAL MEDICINE

## 2025-08-19 PROCEDURE — 3008F BODY MASS INDEX DOCD: CPT | Mod: CPTII,S$GLB,, | Performed by: INTERNAL MEDICINE

## 2025-08-19 RX ORDER — TRETINOIN 0.25 MG/G
CREAM TOPICAL
Qty: 30 G | Refills: 11 | Status: SHIPPED | OUTPATIENT
Start: 2025-08-19

## 2025-08-22 DIAGNOSIS — M17.11 PRIMARY OSTEOARTHRITIS OF RIGHT KNEE: Primary | ICD-10-CM

## 2025-08-29 ENCOUNTER — OFFICE VISIT (OUTPATIENT)
Dept: SURGERY | Facility: CLINIC | Age: 70
End: 2025-08-29
Payer: MEDICARE

## 2025-08-29 ENCOUNTER — OFFICE VISIT (OUTPATIENT)
Dept: ORTHOPEDICS | Facility: CLINIC | Age: 70
End: 2025-08-29
Payer: MEDICARE

## 2025-08-29 VITALS — HEIGHT: 65 IN | WEIGHT: 127.19 LBS | BODY MASS INDEX: 21.19 KG/M2

## 2025-08-29 VITALS
WEIGHT: 127 LBS | HEIGHT: 65 IN | SYSTOLIC BLOOD PRESSURE: 121 MMHG | DIASTOLIC BLOOD PRESSURE: 74 MMHG | HEART RATE: 78 BPM | BODY MASS INDEX: 21.16 KG/M2

## 2025-08-29 DIAGNOSIS — M17.11 PRIMARY OSTEOARTHRITIS OF RIGHT KNEE: Primary | ICD-10-CM

## 2025-08-29 DIAGNOSIS — Z90.13 S/P MASTECTOMY, BILATERAL: ICD-10-CM

## 2025-08-29 DIAGNOSIS — Z85.3 PERSONAL HISTORY OF BREAST CANCER: Primary | ICD-10-CM

## 2025-08-29 PROCEDURE — 99999 PR PBB SHADOW E&M-EST. PATIENT-LVL III: CPT | Mod: PBBFAC,,, | Performed by: PHYSICIAN ASSISTANT

## 2025-09-05 ENCOUNTER — OFFICE VISIT (OUTPATIENT)
Dept: ORTHOPEDICS | Facility: CLINIC | Age: 70
End: 2025-09-05
Payer: MEDICARE

## 2025-09-05 VITALS — WEIGHT: 125.69 LBS | BODY MASS INDEX: 20.94 KG/M2 | HEIGHT: 65 IN

## 2025-09-05 DIAGNOSIS — M17.11 PRIMARY OSTEOARTHRITIS OF RIGHT KNEE: Primary | ICD-10-CM

## 2025-09-05 PROCEDURE — 99999 PR PBB SHADOW E&M-EST. PATIENT-LVL III: CPT | Mod: PBBFAC,,, | Performed by: PHYSICIAN ASSISTANT

## (undated) DEVICE — ELECTRODE REM PLYHSV RETURN 9

## (undated) DEVICE — TRAY CATH 1-LYR URIMTR 16FR

## (undated) DEVICE — REMOVER STAPLE SKIN STERILE

## (undated) DEVICE — Device

## (undated) DEVICE — PAD ABDOMINAL STERILE 8X10IN

## (undated) DEVICE — MARKER FN REG DUAL UTIL RULER

## (undated) DEVICE — TIP YANKAUERS BULB NO VENT

## (undated) DEVICE — SUT 2/0 30IN SILK BLK BRAI

## (undated) DEVICE — SYR DISP LL 5CC

## (undated) DEVICE — SUT MONOCRYL 3-0 PS-2 UND

## (undated) DEVICE — CLIP DOUBLE MICRO.

## (undated) DEVICE — BLADE SURG CARBON STEEL #10

## (undated) DEVICE — POSITIONER IV ARMBOARD FOAM

## (undated) DEVICE — SUT 5/0 18IN PLAIN GUT D/A

## (undated) DEVICE — SUT PROLENE 4-0 MONO 18IN

## (undated) DEVICE — BANDAGE ADHESIVE

## (undated) DEVICE — GOWN SURGICAL X-LARGE

## (undated) DEVICE — BLADE ELECTRO EDGE INSULATED

## (undated) DEVICE — CONTAINER SPEC OR STRL 4.5OZ

## (undated) DEVICE — SUT 0 VLOC GR GS-21

## (undated) DEVICE — SUT STRATAFIX 4-0 30CM PS-2

## (undated) DEVICE — CARTRIDGE MICROCLIP SFINE BLUE

## (undated) DEVICE — DRAPE THREE-QTR REINF 53X77IN

## (undated) DEVICE — SYS CLSR DERMABOND PRINEO 22CM

## (undated) DEVICE — SYS CLSR DERMABOND PRINEO 42CM

## (undated) DEVICE — SUT VICRYL 3-0 27 SH

## (undated) DEVICE — SUT STRATAFIX PGAPCL 3 FS-1

## (undated) DEVICE — DRESSING CHG FOAM 4MM 1IN

## (undated) DEVICE — STAPLER SKIN ROTATING HEAD

## (undated) DEVICE — COVER PROBE US 5.5X58L NON LTX

## (undated) DEVICE — GLOVE BIOGEL SKINSENSE PI 6.5

## (undated) DEVICE — BLANKET HYPER ADULT 24X60IN

## (undated) DEVICE — GLOVE SENSICARE PI ALOE 7

## (undated) DEVICE — GLOVE BIOGEL SKINSENSE PI 7.0

## (undated) DEVICE — GLOVE GAMMEX SURG LF PI SZ 7.5

## (undated) DEVICE — NDL HYPO REG 25G X 1 1/2

## (undated) DEVICE — DRESSING XEROFORM NONADH 1X8IN

## (undated) DEVICE — CLOSURE SKIN STERI STRIP 1/2X4

## (undated) DEVICE — SOL POVIDONE SCRUB IODINE 4 OZ

## (undated) DEVICE — STAPLER SKIN PROXIMATE WIDE

## (undated) DEVICE — UNDERGLOVE BIOGEL PI SZ 6.5 LF

## (undated) DEVICE — SUT STRATAFIX PDS 2 CT-1 14IN

## (undated) DEVICE — TOWEL OR XRAY BLUE 17X26IN

## (undated) DEVICE — APPLIER LIGACLIP MED 11IN

## (undated) DEVICE — SOL IRRI STRL WATER 1000ML

## (undated) DEVICE — BRA CLASSIC COMFORM BLK SZ 36

## (undated) DEVICE — ELECTRODE BLADE TEFLON 6

## (undated) DEVICE — SYR B-D DISP CONTROL 10CC100/C

## (undated) DEVICE — CATH IV CATHLON W/HUB18GAX

## (undated) DEVICE — SYR 10CC LUER LOCK

## (undated) DEVICE — SOL BETADINE 5%

## (undated) DEVICE — SPONGE LAP 18X18 PREWASHED

## (undated) DEVICE — PROBE FLOW DOPPLER

## (undated) DEVICE — DRAPE OPMI STERILE

## (undated) DEVICE — DRESSING MEPILEX 4X10IN

## (undated) DEVICE — ELECTRODE BLD EXT INSUL 1

## (undated) DEVICE — TRAY DRY SKIN SCRUB PREP

## (undated) DEVICE — SUT VICRYL 2-0 CT-1 18 CR

## (undated) DEVICE — SUT STRATAFIX SPRL UD 3-0 27IN

## (undated) DEVICE — BLADE SURG #15 CARBON STEEL

## (undated) DEVICE — BANDAGE BULKEE II 2.25INX3YD

## (undated) DEVICE — SUT MCRYL PLUS 3-0 PS2 27IN

## (undated) DEVICE — NDL HYPO STD REG BVL 22GX1.5IN

## (undated) DEVICE — CLAMP SINGLE MICRO.

## (undated) DEVICE — PACK SPY-PHI DRUG DRAPE

## (undated) DEVICE — PAD ABD 8X10 STERILE

## (undated) DEVICE — APPLICATOR CHLORAPREP ORN 26ML

## (undated) DEVICE — CABLE EXT DOPPLER FLOW PROBE

## (undated) DEVICE — SUT VICRYL 2-0 36 CT-1

## (undated) DEVICE — SUT PROLENE 0 CT1 30IN BLUE

## (undated) DEVICE — SPEARS EYE 10/PK

## (undated) DEVICE — BOVIE SUCTION

## (undated) DEVICE — DRESSING TEGADERM CHG 3.5X4.5

## (undated) DEVICE — SOL NORMAL USPCA 0.9%

## (undated) DEVICE — POSITIONER HEAD DONUT 9IN FOAM

## (undated) DEVICE — SUT 9/0 5IN ETHILON BLK MON

## (undated) DEVICE — DRAPE CORETEMP FLD WRM 56X62IN

## (undated) DEVICE — SUT ETHILON 3-0 FS-1 30

## (undated) DEVICE — SUT 0 VICRYL PLUS CT-1 27IN

## (undated) DEVICE — ADHESIVE DERMABOND ADVANCED

## (undated) DEVICE — CLIPPER BLADE MOD 4406 (CAREF)

## (undated) DEVICE — POSITIONER HEEL FOAM CONVOLTD

## (undated) DEVICE — SUT 2-0 VICRYL / CT-1

## (undated) DEVICE — SUT 3-0 MONOCRYL PLUS PS-2

## (undated) DEVICE — NDL SAFETY 22G X 1.5 ECLIPSE

## (undated) DEVICE — COVER LIGHT HANDLE 80/CA

## (undated) DEVICE — SUT STRATAFIX PDS 2-0 CT-1 9IN

## (undated) DEVICE — ELECTRODE BLADE INSULATED 1 IN

## (undated) DEVICE — DRAPE STERI INSTRUMENT 1018

## (undated) DEVICE — EVACUATOR WOUND BULB 100CC

## (undated) DEVICE — TOWEL OR DISP STRL BLUE 4/PK

## (undated) DEVICE — DRESSING TRANS 4X4 TEGADERM

## (undated) DEVICE — GLOVE BIOGEL SKINSENSE PI 7.5

## (undated) DEVICE — GOWN NONREINF SET-IN SLV XL

## (undated) DEVICE — DRAIN CHANNEL ROUND 15FR

## (undated) DEVICE — APPLIER CLIP LIAGCLIP 9.375IN

## (undated) DEVICE — SPONGE PATTY SURGICAL .5X3IN

## (undated) DEVICE — SUT STRATAFIX PGAPCL 4 FS-2

## (undated) DEVICE — SUT MONOCYRL 4-0 PS2 UND

## (undated) DEVICE — SYR MEDALLION 10 ML

## (undated) DEVICE — DRAPE LAP T SHT W/ INSTR PAD

## (undated) DEVICE — STOCKINETTE TUBULAR 2PL 6 X 4

## (undated) DEVICE — UNDERGLOVES BIOGEL PI SZ 7 LF

## (undated) DEVICE — PACK UNIV PROCEDURE

## (undated) DEVICE — TUBING SUC UNIV W/CONN 12FT

## (undated) DEVICE — TRAY MINOR GEN SURG OMC

## (undated) DEVICE — SUT VICRYL PLUS 2-0 CT1 18

## (undated) DEVICE — SUT 3-0 ETHILON 18 FS-1

## (undated) DEVICE — EVACUATOR PENCIL SMOKE NEPTUNE

## (undated) DEVICE — COVER MAYO STND XL 30X57IN

## (undated) DEVICE — MANIFOLD 4 PORT

## (undated) DEVICE — SEE MEDLINE ITEM 152529

## (undated) DEVICE — ADHESIVE MASTISOL VIAL 48/BX

## (undated) DEVICE — HOLDER DRAIN POUCH PINK

## (undated) DEVICE — GEL AQUASONIC 100 STERILE20GM

## (undated) DEVICE — GLOVE SENSICARE PI SURG 7.5